# Patient Record
Sex: FEMALE
[De-identification: names, ages, dates, MRNs, and addresses within clinical notes are randomized per-mention and may not be internally consistent; named-entity substitution may affect disease eponyms.]

---

## 2017-11-14 ENCOUNTER — HOSPITAL ENCOUNTER (INPATIENT)
Dept: HOSPITAL 14 - H.ER | Age: 82
LOS: 15 days | Discharge: NURSING, REHAB OR LONG-TERM CARE | DRG: 471 | End: 2017-11-29
Attending: INTERNAL MEDICINE | Admitting: INTERNAL MEDICINE
Payer: MEDICARE

## 2017-11-14 DIAGNOSIS — E11.9: ICD-10-CM

## 2017-11-14 DIAGNOSIS — N17.9: ICD-10-CM

## 2017-11-14 DIAGNOSIS — E86.0: ICD-10-CM

## 2017-11-14 DIAGNOSIS — D50.9: ICD-10-CM

## 2017-11-14 DIAGNOSIS — G40.909: ICD-10-CM

## 2017-11-14 DIAGNOSIS — J45.909: ICD-10-CM

## 2017-11-14 DIAGNOSIS — S12.121A: ICD-10-CM

## 2017-11-14 DIAGNOSIS — H40.9: ICD-10-CM

## 2017-11-14 DIAGNOSIS — G93.6: ICD-10-CM
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
## 2017-11-14 DIAGNOSIS — F07.81: ICD-10-CM

## 2017-11-14 DIAGNOSIS — D63.8: ICD-10-CM

## 2017-11-14 DIAGNOSIS — J96.02: ICD-10-CM

## 2017-11-14 DIAGNOSIS — J44.1: ICD-10-CM

## 2017-11-14 DIAGNOSIS — S12.031A: Primary | ICD-10-CM

## 2017-11-14 DIAGNOSIS — J98.11: ICD-10-CM

## 2017-11-14 DIAGNOSIS — I95.1: ICD-10-CM

## 2017-11-14 DIAGNOSIS — J44.9: ICD-10-CM

## 2017-11-14 DIAGNOSIS — M81.0: ICD-10-CM

## 2017-11-14 DIAGNOSIS — I10: ICD-10-CM

## 2017-11-14 DIAGNOSIS — S00.83XA: ICD-10-CM

## 2017-11-14 LAB
ALBUMIN/GLOB SERPL: 1.2 {RATIO} (ref 1–2.1)
ALP SERPL-CCNC: 70 U/L (ref 38–126)
ALT SERPL-CCNC: 43 U/L (ref 9–52)
APTT BLD: 22.8 SECONDS (ref 25.6–37.1)
AST SERPL-CCNC: 34 U/L (ref 14–36)
BACTERIA #/AREA URNS HPF: (no result) /[HPF]
BASOPHILS # BLD AUTO: 0 K/UL (ref 0–0.2)
BASOPHILS NFR BLD: 0.6 % (ref 0–2)
BILIRUB SERPL-MCNC: 0.3 MG/DL (ref 0.2–1.3)
BILIRUB UR-MCNC: NEGATIVE MG/DL
BUN SERPL-MCNC: 37 MG/DL (ref 7–17)
CALCIUM SERPL-MCNC: 9.4 MG/DL (ref 8.4–10.2)
CO2 SERPL-SCNC: 27 MMOL/L (ref 22–30)
COLOR UR: YELLOW
EOSINOPHIL # BLD AUTO: 0 K/UL (ref 0–0.7)
EOSINOPHIL NFR BLD: 0.3 % (ref 0–4)
ERYTHROCYTE [DISTWIDTH] IN BLOOD BY AUTOMATED COUNT: 17 % (ref 11.5–14.5)
GLOBULIN SER-MCNC: 3.4 GM/DL (ref 2.2–3.9)
GLUCOSE SERPL-MCNC: 100 MG/DL (ref 65–105)
GLUCOSE UR STRIP-MCNC: (no result) MG/DL
HCT VFR BLD CALC: 25.5 % (ref 34–47)
KETONES UR STRIP-MCNC: NEGATIVE MG/DL
LEUKOCYTE ESTERASE UR-ACNC: (no result) LEU/UL
LYMPHOCYTES # BLD AUTO: 0.8 K/UL (ref 1–4.3)
LYMPHOCYTES NFR BLD AUTO: 10.5 % (ref 20–40)
MCH RBC QN AUTO: 23.4 PG (ref 27–31)
MCHC RBC AUTO-ENTMCNC: 29.5 G/DL (ref 33–37)
MCV RBC AUTO: 79.4 FL (ref 81–99)
MONOCYTES # BLD: 0.5 K/UL (ref 0–0.8)
MONOCYTES NFR BLD: 5.8 % (ref 0–10)
NEUTROPHILS # BLD: 6.5 K/UL (ref 1.8–7)
NEUTROPHILS NFR BLD AUTO: 82.8 % (ref 50–75)
NRBC BLD AUTO-RTO: 0.4 % (ref 0–0)
PH UR STRIP: 5 [PH] (ref 5–8)
PLATELET # BLD: 222 K/UL (ref 130–400)
PMV BLD AUTO: 8.4 FL (ref 7.2–11.7)
POTASSIUM SERPL-SCNC: 4.5 MMOL/L (ref 3.6–5)
PROT SERPL-MCNC: 7.6 G/DL (ref 6.3–8.2)
PROT UR STRIP-MCNC: 100 MG/DL
RBC # UR STRIP: (no result) /UL
RBC #/AREA URNS HPF: 13 /HPF (ref 0–3)
SODIUM SERPL-SCNC: 146 MMOL/L (ref 132–148)
SP GR UR STRIP: 1.01 (ref 1–1.03)
TROPONIN I SERPL-MCNC: 0.03 NG/ML (ref 0–0.12)
UROBILINOGEN UR-MCNC: (no result) MG/DL (ref 0.2–1)
WBC # BLD AUTO: 7.9 K/UL (ref 4.8–10.8)
WBC #/AREA URNS HPF: 4 /HPF (ref 0–5)

## 2017-11-14 NOTE — CT
EXAM:

  CT Head Without Intravenous Contrast



CLINICAL HISTORY:

  88 years old, female; Injury or trauma; Fall; Initial encounter; Concussion / 

head injury; Consciousness not specified



TECHNIQUE:

  Axial computed tomography images of the head/brain without intravenous 

contrast.  All CT scans at this facility use one or more dose reduction 

techniques, viz.: automated exposure control; ma/kV adjustment per patient size 

(including targeted exams where dose is matched to indication; i.e. head); or 

iterative reconstruction technique.

  Coronal and sagittal reformatted images were created and reviewed.



COMPARISON:

  No relevant prior studies available.







FINDINGS:

  Brain: No acute intracranial hemorrhage.  Age-appropriate periventricular 

white matter disease.  No edema.

  Ventricles: Age-appropriate ventriculomegaly.

  Bones:  No acute displaced fracture.

  Sinuses: Air-fluid level within the left sphenoid sinus. The remaining 

paranasal sinuses are otherwise unremarkable. 

Mastoid air cells:  Unremarkable as visualized.  No mastoid effusion.



Large right frontal scalp hematoma



IMPRESSION:     

Large right frontal scalp hematoma, without underlying fracture, acute 

intracranial hemorrhage, or suspicious mass effect.



Inflammatory sinus disease.

## 2017-11-14 NOTE — CT
EXAM:

  CT Maxillofacial Without Intravenous Contrast



CLINICAL HISTORY:

  88 years old, female; Injury or trauma; Fall; Initial encounter; Blunt trauma 

(contusions or hematomas); Forehead; Additional info: Facial trauma



TECHNIQUE:

  Axial computed tomography images of the face without intravenous contrast.  

All CT scans at this facility use one or more dose reduction techniques, viz.: 

automated exposure control; ma/kV adjustment per patient size (including 

targeted exams where dose is matched to indication; i.e. head); or iterative 

reconstruction technique.

  Coronal and sagittal reformatted images were created and reviewed.



COMPARISON:

  No relevant prior studies available.



FINDINGS:

  Bones/joints:  No acute fracture.

  Soft tissues: A large right frontal scalp hematoma is identified.

  Orbits: Preserved

  Sinuses:  Unremarkable.  No air-fluid levels.



IMPRESSION:     

Large right frontal scalp hematoma without underlying fracture.

## 2017-11-14 NOTE — CP.PCM.HP
History of Present Illness





- History of Present Illness


History of Present Illness: 


Attending: Dr Sandoval





Chief Complaint: Fall/ Head injury





The Patient was seen and examined in the ED in the Presence of her daughter





HPI: The hx is obtained from patient's daughter. She is an 88 years old  

 female with hx of COPD Asthma, DM II and HTN brought to the ED after 

she fell while getting up from sitting on a chair. She fell flat, receiving 

trauma to the right forehead resulting in edema and pain to the forehead. The 

patient referred that she could not remember the incident and so having a brief 

LOC. No fever, nausea, vomits, SOB, Hx of Dizziness, chest pain, nor 

diaphoresis.








PMH: Arthritis, Asthma; Asthma, COPD; DM II borderline;  Osteoporesis; Glaucoma 

of the right eye; HTN;





PSH: Tubal Ligation





SH: No Alcohol; never Smoked; No illegal drug use; Live with family





FH: Significant for DM and HTN





Allergies: NKDA





Medications: Viewed  











Present on Admission





- Present on Admission


Any Indicators Present on Admission: No


History of DVT/PE: No


History of Uncontrolled Diabetes: No


Urinary Catheter: No


Decubitus Ulcer Present: No





Review of Systems





- Constitutional


Constitutional: absent: Anorexia, Chills, Fever, Headache





- EENT


Eyes: Requires Corrective Lenses.  absent: Diplopia, Floaters


Ears: absent: Decreased Hearing, Ear Discharge, Tinnitus


Nose/Mouth/Throat: absent: Epistaxis, Nasal Congestion





- Cardiovascular


Cardiovascular: absent: Chest Pain, Dyspnea, Edema





- Respiratory


Respiratory: Cough.  absent: Dyspnea, Hemoptysis, Wheezing, Stridor





- Gastrointestinal


Gastrointestinal: absent: Abdominal Pain, Constipation, Diarrhea, Melena, Nausea

, Vomiting





- Genitourinary


Genitourinary: absent: Dysuria, Flank Pain, Hematuria, Urinary Frequency





- Musculoskeletal


Musculoskeletal: Arthralgias.  absent: Joint Swelling, Muscle Weakness


Additional comments: 





left wrist and hand pain





- Integumentary


Integumentary: absent: Pruritus, Rash, Skin Ulcer, Sores, Striae


Additional comments: 





Painful swelling above the right eye and right forehead





- Neurological


Neurological: Headaches.  absent: Confusion, Dizziness, Focal Weakness, Weakness





- Psychiatric


Psychiatric: absent: Anxiety, Depression, Panic Attacks





- Endocrine


Endocrine: absent: Palpitations, Polydipsia, Polyphagia, Polyuria





- Hematologic/Lymphatic


Hematologic: absent: Easy Bleeding, Easy Bruising





Past Patient History





- Infectious Disease


Hx of Infectious Diseases: None





- Tetanus Immunizations


Tetanus Immunization: Unknown





- Past Medical History & Family History


Past Medical History?: Yes





- Past Social History


Smoking Status: Never Smoked


Chewing Tobacco Use: No


Cigar Use: No


Alcohol: None


Drugs: Denies


Home Situation {Lives}: With Family





- CARDIAC


Hx Hypertension: Yes





- PULMONARY


Hx Asthma: Yes


Hx Chronic Obstructive Pulmonary Disease (COPD): Yes





- NEUROLOGICAL


Hx Neurological Disorder: No





- HEENT


Hx HEENT Problems: No





- RENAL


Hx Chronic Kidney Disease: No





- ENDOCRINE/METABOLIC


Hx Endocrine Disorders: Yes


Hx Diabetes Mellitus Type 2: Yes





- HEMATOLOGICAL/ONCOLOGICAL


Hx Blood Disorders: No





- INTEGUMENTARY


Hx Dermatological Problems: No





- MUSCULOSKELETAL/RHEUMATOLOGICAL


Hx Arthritis: Yes





- GASTROINTESTINAL


Hx Constipation: Yes





- GENITOURINARY/GYNECOLOGICAL


Hx Genitourinary Disorders: No





- PSYCHIATRIC


Hx Psychophysiologic Disorder: No


Hx Substance Use: No





- SURGICAL HISTORY


Hx Tubal Ligation: Yes





- ANESTHESIA


Hx Anesthesia: Yes


Hx Anesthesia Reactions: No





Meds


Allergies/Adverse Reactions: 


 Allergies











Allergy/AdvReac Type Severity Reaction Status Date / Time


 


No Known Allergies Allergy   Verified 11/14/17 20:12














Physical Exam





- Constitutional


Appears: No Acute Distress





- Head Exam


Additional comments: 





Right frontal scalp Hematoma.





- Eye Exam


Eye Exam: EOMI, PERRL


Pupil Exam: NORMAL ACCOMODATION, PERRL





- ENT Exam


ENT Exam: Mucous Membranes Dry, Normal Exam, Normal External Ear Exam


Additional comments: 





Tongue with areas of blue colorations of Hematomas.





- Neck Exam


Neck exam: Positive for: Full Rom, Normal Inspection





- Respiratory Exam


Respiratory Exam: Decreased Breath Sounds.  absent: Rales, Rhonchi, Wheezes





- Cardiovascular Exam


Cardiovascular Exam: REGULAR RHYTHM, RRR, +S1, +S2.  absent: Gallop, JVD





- GI/Abdominal Exam


GI & Abdominal Exam: Normal Bowel Sounds, Soft.  absent: Mass, Organomegaly





- Rectal Exam


Rectal Exam: Deferred





- Extremities Exam


Extremities exam: Positive for: full ROM, normal inspection.  Negative for: 

calf tenderness, joint swelling, normal capillary refill





- Back Exam


Back exam: NORMAL INSPECTION.  absent: CVA tenderness (L), CVA tenderness (R)





- Neurological Exam


Neurological exam: Alert, CN II-XII Intact, Oriented x3, Reflexes Normal





- Psychiatric Exam


Psychiatric exam: Normal Affect, Normal Mood





- Skin


Skin Exam: Dry, Intact, Normal Color, Warm





Results





- Vital Signs


Recent Vital Signs: 





 Last Vital Signs











Temp  97.2 F L  11/14/17 20:12


 


Pulse  88   11/14/17 20:12


 


Resp  16   11/14/17 20:12


 


BP  159/86 H  11/14/17 20:12


 


Pulse Ox      














- Labs


Result Diagrams: 


 11/14/17 21:54





 11/14/17 21:54


Labs: 





 Laboratory Results - last 24 hr











  11/14/17 11/14/17 11/14/17





  21:54 21:54 21:54


 


WBC  7.9  


 


RBC  3.21 L  


 


Hgb  7.5 L D  


 


Hct  25.5 L  


 


MCV  79.4 L D  


 


MCH  23.4 L  


 


MCHC  29.5 L  


 


RDW  17.0 H  


 


Plt Count  222  


 


MPV  8.4  


 


Neut % (Auto)  82.8 H  


 


Lymph % (Auto)  10.5 L  


 


Mono % (Auto)  5.8  


 


Eos % (Auto)  0.3  


 


Baso % (Auto)  0.6  


 


Neut #  6.5  


 


Lymph #  0.8 L  


 


Mono #  0.5  


 


Eos #  0.0  


 


Baso #  0.0  


 


PT    12.4


 


INR    1.1


 


APTT    22.8 L


 


Sodium   146 


 


Potassium   4.5 


 


Chloride   109 H 


 


Carbon Dioxide   27 


 


Anion Gap   15 


 


BUN   37 H 


 


Creatinine   1.5 H 


 


Est GFR ( Amer)   40 


 


Est GFR (Non-Af Amer)   33 


 


Random Glucose   100 


 


Calcium   9.4 


 


Total Bilirubin   0.3 


 


AST   34 


 


ALT   43 


 


Alkaline Phosphatase   70 


 


Troponin I   0.0250 


 


Total Protein   7.6 


 


Albumin   4.1 


 


Globulin   3.4 


 


Albumin/Globulin Ratio   1.2 














- Impressions


Impression: 





NSR 71/min with incomplete RBBB





- Imaging and Cardiology


  ** CT Head


Status: Image reviewed by me


Additional comment: 





Large right frontal scalp hematoma without underlying fx, acute intracraneal 

hemorrhage nor suspicious mass





  ** Maxillofacial CT


Additional comment: 





No fracture





Assessment & Plan





- Assessment and Plan (Free Text)


Assessment: 





#. Syncope


#. Microcytic Anemia


#. Azotemia with Dehydration


#. Forehead hematoma


#. HTN


#. COPD


#. DM II


Plan: 


88 years old   female with hx of COPD Asthma, DM II and HTN brought to 

the ED after she fell while getting up from sitting on a chair. She fell flat, 

receiving trauma to the right forehead resulting in edema and pain to the 

forehead. The patient had a brief LOC. 





#. Syncope probably secondary to Orthostatic Hypotension and not the anemia. r/

o Cardiac dysrrhythmis and seizure less likely as there was no post ictal state.


- Observe in Telemetry for Cardiac monitoring


- Neuro checks Q4 hrs


- Treat Dehydration





#. Head injury with Forehead Hematoma


- Observe for signs of increase intracraneal pressure


- Neuro checks


- Tylenol for Headaches





#. Microcytic Anemia


- Iron panel


- Vitamin B12 and folate


- Follow HB





#. Azotemia with Dehydration


- IV fluids 0.45NS at 75mls/hr


- Follow renal labs





#. HTN


- Enalapril


- Losartan


- Follow Blood Pressures





#. COPD/Asthma


- Albuterol


- 


#. DM II


- Aspart Insulin sliding scale according to Accucheck


- HbA1c





#. DVT prophylaxis with SCD





#. Code Status: Full





- Date & Time


Date: 11/14/17


Time: 22:53

## 2017-11-15 LAB
ABG MECHANICAL RATE: 10
ABG MECHANICAL RATE: 18
ARTERIAL  BLOOD GAS MODE: (no result)
ARTERIAL  BLOOD GAS MODE: (no result)
ARTERIAL PATENCY WRIST A: YES
ATERIAL BLOOD GAS PEEP: 3
ATERIAL BLOOD GAS PEEP: 3
BASOPHILS # BLD AUTO: 0.1 K/UL (ref 0–0.2)
BASOPHILS NFR BLD: 0.7 % (ref 0–2)
BUN SERPL-MCNC: 35 MG/DL (ref 7–17)
CALCIUM SERPL-MCNC: 9.4 MG/DL (ref 8.4–10.2)
CHLORIDE SERPL-SCNC: 109 MMOL/L (ref 98–107)
CHLORIDE SERPL-SCNC: 110 MMOL/L (ref 98–107)
CO2 SERPL-SCNC: 28 MMOL/L (ref 22–30)
COHGB MFR BLD: 2.8 % (ref 0.5–1.5)
COHGB MFR BLD: 2.9 % (ref 0.5–1.5)
EOSINOPHIL # BLD AUTO: 0 K/UL (ref 0–0.7)
EOSINOPHIL NFR BLD: 0.2 % (ref 0–4)
ERYTHROCYTE [DISTWIDTH] IN BLOOD BY AUTOMATED COUNT: 16.6 % (ref 11.5–14.5)
GLUCOSE SERPL-MCNC: 104 MG/DL (ref 65–105)
HCO3 BLDA-SCNC: 23.8 MMOL/L (ref 21–28)
HCO3 BLDA-SCNC: 24.5 MMOL/L (ref 21–28)
HCO3 BLDA-SCNC: 25.8 MMOL/L (ref 21–28)
HCT VFR BLD CALC: 24.6 % (ref 34–47)
HHB: -0.8 % (ref 0–5)
HHB: 0.9 % (ref 0–5)
IRON SERPL-MCNC: 23 UG/DL (ref 37–170)
LYMPHOCYTES # BLD AUTO: 1 K/UL (ref 1–4.3)
LYMPHOCYTES NFR BLD AUTO: 10.5 % (ref 20–40)
MCH RBC QN AUTO: 23.7 PG (ref 27–31)
MCHC RBC AUTO-ENTMCNC: 30.2 G/DL (ref 33–37)
MCV RBC AUTO: 78.6 FL (ref 81–99)
METHGB MFR BLD: 0.3 % (ref 0–3)
METHGB MFR BLD: 0.5 % (ref 0–3)
MONOCYTES # BLD: 0.5 K/UL (ref 0–0.8)
MONOCYTES NFR BLD: 5.1 % (ref 0–10)
NEUTROPHILS # BLD: 7.7 K/UL (ref 1.8–7)
NEUTROPHILS NFR BLD AUTO: 83.5 % (ref 50–75)
NRBC BLD AUTO-RTO: 0.2 % (ref 0–0)
O2 CAP BLDA-SCNC: 10.5 ML/DL (ref 16–24)
O2 CAP BLDA-SCNC: 9.9 ML/DL (ref 16–24)
O2 CT BLDA-SCNC: 10 ML/DL (ref 15–23)
O2 CT BLDA-SCNC: 10.4 ML/DL (ref 15–23)
PH BLDA: 7.04 [PH] (ref 7.35–7.45)
PH BLDA: 7.19 [PH] (ref 7.35–7.45)
PH BLDA: 7.31 [PH] (ref 7.35–7.45)
PLATELET # BLD: 221 K/UL (ref 130–400)
PMV BLD AUTO: 8.2 FL (ref 7.2–11.7)
PO2 BLDA: 109 MM/HG (ref 80–100)
PO2 BLDA: 204 MM/HG (ref 80–100)
PO2 BLDA: 91 MM/HG (ref 80–100)
POTASSIUM SERPL-SCNC: 4.5 MMOL/L (ref 3.6–5)
SAO2 % BLDA: 96.1 % (ref 95–98)
SAO2 % BLDA: 97.4 % (ref 95–98)
SODIUM SERPL-SCNC: 148 MMOL/L (ref 132–148)
TROPONIN I SERPL-MCNC: 0.03 NG/ML (ref 0–0.12)
WBC # BLD AUTO: 9.2 K/UL (ref 4.8–10.8)

## 2017-11-15 PROCEDURE — 5A1955Z RESPIRATORY VENTILATION, GREATER THAN 96 CONSECUTIVE HOURS: ICD-10-PCS | Performed by: INTERNAL MEDICINE

## 2017-11-15 PROCEDURE — 0BH17EZ INSERTION OF ENDOTRACHEAL AIRWAY INTO TRACHEA, VIA NATURAL OR ARTIFICIAL OPENING: ICD-10-PCS

## 2017-11-15 RX ADMIN — PROPOFOL SCH MLS/HR: 10 INJECTION, EMULSION INTRAVENOUS at 17:40

## 2017-11-15 RX ADMIN — ALBUTEROL SULFATE SCH MG: 2.5 SOLUTION RESPIRATORY (INHALATION) at 04:17

## 2017-11-15 RX ADMIN — IPRATROPIUM BROMIDE AND ALBUTEROL SULFATE SCH: .5; 3 SOLUTION RESPIRATORY (INHALATION) at 18:33

## 2017-11-15 RX ADMIN — ALBUTEROL SULFATE SCH: 2.5 SOLUTION RESPIRATORY (INHALATION) at 19:08

## 2017-11-15 RX ADMIN — FOSPHENYTOIN SODIUM SCH: 50 INJECTION, SOLUTION INTRAMUSCULAR; INTRAVENOUS at 20:00

## 2017-11-15 RX ADMIN — IPRATROPIUM BROMIDE AND ALBUTEROL SULFATE SCH ML: .5; 3 SOLUTION RESPIRATORY (INHALATION) at 19:07

## 2017-11-15 RX ADMIN — INSULIN LISPRO SCH UNITS: 100 INJECTION, SOLUTION INTRAVENOUS; SUBCUTANEOUS at 17:24

## 2017-11-15 RX ADMIN — INSULIN LISPRO SCH: 100 INJECTION, SOLUTION INTRAVENOUS; SUBCUTANEOUS at 22:00

## 2017-11-15 RX ADMIN — ALBUTEROL SULFATE SCH MG: 2.5 SOLUTION RESPIRATORY (INHALATION) at 13:13

## 2017-11-15 RX ADMIN — INSULIN LISPRO SCH: 100 INJECTION, SOLUTION INTRAVENOUS; SUBCUTANEOUS at 10:33

## 2017-11-15 RX ADMIN — ALBUTEROL SULFATE SCH MG: 2.5 SOLUTION RESPIRATORY (INHALATION) at 11:04

## 2017-11-15 NOTE — PCM.ANES
Anesthesia Emergent Intubation





- Diagnosis


Working Diagnosis:: cervical spine fracture 





- Consult


Reason for Consult:: Called for emergency intubation in ICU. Pt. in hard collar.





- Intubation Attempts


Previous Number of Intubation Attempts:: 0





- Pre-Intubation Vital Signs


Blood Pressure: 134/82


Heart Rate: 90


Respiratory Rate: 25


O2 Sat: 99


FIO2: 40


Oxygen Delivery Method: Nasal Cannula


Level Of Consciousness: Comatose/Unresponsive


Intubation Meds Given: Versed, Fentanyl





- Airway Management


Oropharyngeal Area Suctioned: Yes


PreOxygenation: 100


Inhalation: No


Rapid Sequence: No (Pt non-responsive hypercapnic CO2>100 CO2 Narcosis)


Cricoid Pressure: Yes


Possible Aspiration: No





- Method of Intubation


Intubation Method: Oral ETT


ETT Size: 8.0


Lipline@: 21


Easy: Yes


Atramatic: Yes





- Intubation Devices


Glide Scope Used: Yes ( IN-LINE NECK STABILIZED IN HARD COLLAR (Cervical Spine 

was not moved))


Fiber Optic Scope: No





- Placement Confirmation


Breath Sounds Present & Equal Bilaterally: Yes


Positive EtCO2: Yes


Portable CXR: Yes


Recommendations: Ventilator

## 2017-11-15 NOTE — RAD
HISTORY:

post intubation  



COMPARISON:

11/14/2017 



FINDINGS:



LUNGS:

No active pulmonary disease.



PLEURA:

No significant pleural effusion identified, no pneumothorax apparent.



CARDIOVASCULAR:

Mild cardiomegaly



OSSEOUS STRUCTURES:

No significant abnormalities.



VISUALIZED UPPER ABDOMEN:

Normal.



OTHER FINDINGS:

Endotracheal tube and nasogastric tube in satisfactory position



IMPRESSION:

No active disease.

## 2017-11-15 NOTE — CARD
--------------- APPROVED REPORT --------------





EKG Measurement

Heart [id number]

[id number]

[id number]

[id number]

[id number]



<Conclusion>

Normal sinus rhythm

Incomplete right bundle branch block

Cannot rule out Anteroseptal infarct, age undetermined

Abnormal ECG

## 2017-11-15 NOTE — CT
PROCEDURE:  CT HEAD WITHOUT CONTRAST.



HISTORY:

delayed bleeding



COMPARISON:

CT scan performed earlier the same day. 



TECHNIQUE:

Axial computed tomography images were obtained through the head/brain 

without intravenous contrast.  



Radiation dose:



Total exam DLP = 1428.36 mGy-cm.



This CT exam was performed using one or more of the following dose 

reduction techniques: Automated exposure control, adjustment of the 

mA and/or kV according to patient size, and/or use of iterative 

reconstruction technique.



FINDINGS:



HEMORRHAGE:

No intracranial hemorrhage. 



BRAIN:

Gray-white matter differentiation is preserved.  There is no mass, 

mass effect or abnormal extra-axial fluid collection.



VENTRICLES:

There is mild age-related global parenchymal volume loss and 

proportionate enlargement of the ventricles and cortical sulci 



CALVARIUM:

There is no calvarial fracture. There is a large right frontal and 

parietal scalp hematoma. 



PARANASAL SINUSES:

There are fluid levels in the left posterior ethmoid air cells and 

left sphenoid sinus, expected finding in intubated patients.



MASTOID AIR CELLS:

Unremarkable as visualized. No inflammatory changes.



OTHER FINDINGS:

None.



IMPRESSION:

No acute intracranial abnormality.



Large right frontal and parietal scalp hematoma.

## 2017-11-15 NOTE — CP.PCM.PN
Subjective





- Date & Time of Evaluation


Date of Evaluation: 11/15/17


Time of Evaluation: 07:35





- Subjective


Subjective: 





Pt seen and examined at bedside in the ED  ( pt in ED Hold - waiting for Tele 

bed)


She was noted to be very lethargic this am


accdg to Dr Simmons during  sign out - he was able to peak with pt in Greek 

last night


Pt barely responds to pain, sl moaning on sternal rub





Noted occ bigeminy on Tele monitor


VS stable


sat 96% on 2 liters,  RR=12-15


Lungs : clear, no wheezing, no rales


Heart; irreg rhythm








reviewed CT scan of head from last night ,  called up Radiology Dept to ask in 

house Radiologist  to review Night Radiology reading as there has been change 

of mental status in the pt  


Rpt CT of head also ordered to r/o late onset  intracerebral bleed/hematoma


accucheck 117

















 





Objective





- Vital Signs/Intake and Output


Vital Signs (last 24 hours): 


 











Temp Pulse Resp BP Pulse Ox


 


 98.1 F   54 L  19   116/64   100 


 


 11/15/17 07:29  11/15/17 15:00  11/15/17 15:00  11/15/17 15:00  11/15/17 15:00








Intake and Output: 


 











 11/15/17 11/15/17





 06:59 18:59


 


Intake Total  150


 


Output Total  225


 


Balance  -75














- Medications


Medications: 


 Current Medications





Acetaminophen (Tylenol 325mg Tab)  650 mg PO Q4 PRN


   PRN Reason: Headache


   Last Admin: 11/15/17 02:14 Dose:  650 mg


Albuterol Sulfate (Albuterol 0.083% Inhal Sol (2.5 Mg/3 Ml) Ud)  2.5 mg INH RQ6 

MARIELY


   Last Admin: 11/15/17 13:13 Dose:  2.5 mg


Albuterol/Ipratropium (Duoneb 3 Mg/0.5 Mg (3 Ml) Ud)  3 ml INH RQID MARIELY


Amlodipine Besylate (Norvasc)  2.5 mg PO DAILY MARIELY


   Last Admin: 11/15/17 10:34 Dose:  Not Given


Sodium Chloride (Sodium Chloride 0.45%)  1,000 mls @ 75 mls/hr IV .[id number] MARIELY


   Stop: 11/15/17 23:34


   Last Admin: 11/15/17 02:15 Dose:  75 mls/hr


Fentanyl Citrate 2,500 mcg/ (Dextrose)  250 mls @ 0 mls/hr IV .Q0M MARIELY; As 

Directed


   PRN Reason: Protocol


   Last Titration: 11/15/17 15:35 Dose:  2.49 mcg/kg/hr, 18.7 mls/hr


Insulin Human Lispro (Humalog)  0 units SC ACHS MARIELY


   PRN Reason: Protocol


   Last Admin: 11/15/17 10:33 Dose:  Not Given


Losartan Potassium (Cozaar)  50 mg PO DAILY MARIELY


   Last Admin: 11/15/17 10:34 Dose:  Not Given


Montelukast Sodium (Singulair)  10 mg PO HS MARIELY


Pantoprazole Sodium (Protonix Inj)  40 mg IVP DAILY MARIELY











- Labs


Labs: 


 





 11/15/17 05:12 





 11/15/17 05:12 





 











PT  12.4 Seconds (9.8-13.1)   11/14/17  21:54    


 


INR  1.1  (0.9-1.2)   11/14/17  21:54    


 


APTT  22.8 Seconds (25.6-37.1)  L  11/14/17  21:54    














Assessment and Plan





- Assessment and Plan (Free Text)


Assessment: 








82 y/o lady , had mechnical fall at home, sustained frontal scalp hematoma.  


Sudden mental Status change  12 hours post presentation in ED.   CT of head : 

C2 fracture.  ABG showed pCO2= 114, PH= 7.002





1. C2 Fracture


- rpt CT of head done bec of change in mental status- CT showed C2 fracture, 

spoke with dr Snowden - rec MRI of Cervical spine 


- cervical Collar applied


- Decadron 10 mg IV stat


- Neurosurgery consult stat - spoke with dr Pretty , discussed case rec CT 

of Cervical spine, pt would need Cervical fusion surgery


- ICU consult 





2.  Hypercapneic Respiratory Failure


? central due to C2 fracture  vs due to hx of COPD


Anesthesia called to intubate pt





3. Anemia prob chronic 


Hgb= 7.5


pt is not tachy, not hypotensive 


monitor cbc


type and cross PRBC 


discussed with pt's daughter Christa - wants to think about it before consenting 

to the transfusion





4. CKD  vs JASON


- IVF hydration


monitor   BMP





DVT proph


scd for now


no anticoag sec to head trauma

## 2017-11-15 NOTE — CP.CCUPN
CCU Subjective





- Physician Review


Subjective (Free Text): 





ICU Admission from ER Holding: discussed with Dr. Khan covering for Dr. Sandoval:


88F admitted after a syncopal episode at home after falling while getting up 

from a chair and falling forward hitting her forehead. Initial CT Head 

evaluations negative for any anne marie abnormalities nor acute hemorrhage. Several 

hours later upon re-examination, patient noted to be lethargic, and had repeat 

CT brain and spine imaging, now showing a non-displaced fracture at the base of 

the odontoid process.  Underwent eval by NeuroSurg and deemed a Type II 

odontoid fracture. She currently is wearing a rigid cervical spine collar. 

Patient was noted to be conversant on presentation and states she sensed blood 

in the back of her throat. ABG showed acute hypercarbia with PCO2 114 with pH 

7.04. After transfer to ICU, measures made to urgently intubate patient under 

Anesthesiologist assistance. 


Other vitals and I/O's reviewed. 


ROS:  No other pertinent negs or positives on 10+  system review. 


Allergies: NKDA





Home Meds:   Albuterol inh, Fosamax,Norvasc, Budesonide/Formeterol, Glipizde, 

Losaratn, Singulair, KCL, Torsemide.


PMSFH:    HTN, DMI II, COPD / asthma, osteoarthritis; no previous surgeries,   

non- smoker- Otherwise all Nursing and physician documentation reviewed to date

; no new pertinent info noted relevant to current medical problems.








IMPRESSION / MAJOR PROBLEMS NOW:


1.   Acute Type II Odontoid / C2 fracture ( nondisplaced)


2.   Acute Hypercapneic resp failure 2 #1 and contribution from COPD 

exacerbation


3.   Chronic Disease anemia


4.   HTN


5.   DM II








PLAN:


1.   Intubated by Anesthesiologist, now on MV: AC 10. 450ml TV, 50% PEEP 3. 

Check repeat ABG, for gradual reduction in PCO2 levels. 


2.   To minimize patient movement, will start Fentanyl drip as BP allows.


3.   Duonebs Q4-Q6H for now. Will try to limit steroids unless required by Neuro

/ Neurosurg. 


4.   Empiric dose of Decadron  given in ER.  


5.   Neurosurg plans noted, for OR on 11/17 pending medical clearance. 


6.   Maintain normoglycemia





CCU Objective





- Vital Signs / Intake & Output


Vital Signs (Last 4 hours): 


Vital Signs











  Pulse Resp BP Pulse Ox


 


 11/15/17 13:13  57 L   


 


 11/15/17 13:05  90  25 H  134/82  99


 


 11/15/17 10:55  61  16  135/82 


 


 11/15/17 10:13  61  16  135/82  96











Intake and Output (Last 8hrs): 


 Intake & Output











 11/14/17 11/15/17 11/15/17





 22:59 06:59 14:59


 


Weight 165 lb  














- Physical Exam


Physical Exam Limitations: Positive for: Altered Mental Status


Head: Positive for: Normocephalic, Contusion, Swelling, Ecchymosis (bilateral 

orbital areas).  Negative for: Laceration


Pupils: Positive for: Sluggish, Pinpoint


Conjunctiva: Positive for: Injected.  Negative for: Icteric


Mouth: Positive for: Moist Mucous Membranes


Pharnyx: Positive for: Normal


Neck: Positive for: Other (Hard, fixed Cervical collar in place)


Respiratory/Chest: Positive for: Decreased Breath Sounds.  Negative for: 

Accessory Muscle Use, Wheezes


Cardiovascular: Positive for: Irregular Rhythm, Bradycardic.  Negative for: 

Murmurs, Rub


Abdomen: Positive for: Normal Bowel Sounds.  Negative for: Tenderness, 

Distention, Mass/Organomegaly


Upper Extremity: Negative for: Edema


Lower Extremity: Positive for: Edema.  Negative for: CALF TENDERNESS, Cyanosis


Neurological: Negative for: GCS=15 (GCS= 6 ( E1V1M4))


Skin: Positive for: Warm, Dry.  Negative for: Rashes


Psychiatric: Positive for: Lethargic





- Medications


Active Medications: 


Active Medications











Generic Name Dose Route Start Last Admin





  Trade Name Freq  PRN Reason Stop Dose Admin


 


Acetaminophen  650 mg  11/14/17 23:29  11/15/17 02:14





  Tylenol 325mg Tab  PO   650 mg





  Q4 PRN   Administration





  Headache   


 


Albuterol Sulfate  2.5 mg  11/15/17 02:00  11/15/17 13:13





  Albuterol 0.083% Inhal Sol (2.5 Mg/3 Ml) Ud  INH   2.5 mg





  RQ6 MARIELY   Administration


 


Alendronate Sodium  70 mg  11/19/17 09:00  





  Fosamax  PO   





  SUN MARIELY   


 


Amlodipine Besylate  2.5 mg  11/15/17 09:00  11/15/17 10:34





  Norvasc  PO   Not Given





  DAILY MARIELY   


 


Clotrimazole  1 applic  11/15/17 09:00  





  Lotrimin 1% Vaginal  VG   





  DAILY MARIELY   


 


Sodium Chloride  1,000 mls @ 75 mls/hr  11/14/17 23:45  11/15/17 02:15





  Sodium Chloride 0.45%  IV  11/15/17 23:34  75 mls/hr





  .[id number] MARIELY   Administration


 


Fentanyl Citrate 2,500 mcg/  250 mls @ 0 mls/hr  11/15/17 12:30  11/15/17 13:00





  Dextrose  IV   1 mcg/kg/hr





  .Q0M MARIELY   7.5 mls/hr





  Protocol   Administration





  As Directed   


 


Insulin Human Lispro  0 units  11/15/17 07:30  11/15/17 10:33





  Humalog  SC   Not Given





  ACHS MARIELY   





  Protocol   


 


Losartan Potassium  50 mg  11/15/17 09:00  11/15/17 10:34





  Cozaar  PO   Not Given





  DAILY MARIELY   


 


Montelukast Sodium  10 mg  11/15/17 22:00  





  Singulair  PO   





  HS MARIELY   


 


Pantoprazole Sodium  40 mg  11/15/17 12:45  





  Protonix Inj  IVP   





  DAILY MARIELY   














- Patient Studies


Lab Studies: 


 Lab Studies











  11/15/17 11/15/17 11/15/17 Range/Units





  11:19 10:00 05:12 


 


WBC     (4.8-10.8)  K/uL


 


RBC     (3.80-5.20)  Mil/uL


 


Hgb     (12.0-16.0)  g/dL


 


Hct     (34.0-47.0)  %


 


MCV     (81.0-99.0)  fl


 


MCH     (27.0-31.0)  pg


 


MCHC     (33.0-37.0)  g/dL


 


RDW     (11.5-14.5)  %


 


Plt Count     (130-400)  K/uL


 


MPV     (7.2-11.7)  fl


 


Neut % (Auto)     (50.0-75.0)  %


 


Lymph % (Auto)     (20.0-40.0)  %


 


Mono % (Auto)     (0.0-10.0)  %


 


Eos % (Auto)     (0.0-4.0)  %


 


Baso % (Auto)     (0.0-2.0)  %


 


Neut #     (1.8-7.0)  K/uL


 


Lymph #     (1.0-4.3)  K/uL


 


Mono #     (0.0-0.8)  K/uL


 


Eos #     (0.0-0.7)  K/uL


 


Baso #     (0.0-0.2)  K/uL


 


PT     (9.8-13.1)  Seconds


 


INR     (0.9-1.2)  


 


APTT     (25.6-37.1)  Seconds


 


pCO2  114 H*    (35-45)  mm/Hg


 


pO2  91    ()  mm/Hg


 


HCO3  24.5    (21-28)  mmol/L


 


ABG pH  7.04 L*    (7.35-7.45)  


 


ABG Total CO2  34.3 H    (22-28)  mmol/L


 


ABG O2 Saturation  99.1 H    (95-98)  %


 


ABG O2 Content  10.4 L    (15-23)  ML/dL


 


ABG Base Excess  -0.6    (-2.0-3.0)  mmol/L


 


ABG Hemoglobin  7.6 L    (11.7-17.4)  g/dL


 


ABG Carboxyhemoglobin  2.8 H    (0.5-1.5)  %


 


POC ABG HHb (Measured)  0.9    (0.0-5.0)  %


 


ABG Methemoglobin  0.3    (0.0-3.0)  %


 


ABG O2 Capacity  10.5 L    (16-24)  mL/dL


 


Almas Test  Yes    


 


A-a O2 Difference  -12.0    mm/Hg


 


Hgb O2 Saturation  96.1    (95.0-98.0)  %


 


FiO2  31.0    %


 


Crit Value Called To  Dr rafat garcia    


 


Crit Value Called By  5    


 


Crit Value Read Back  Y    


 


Blood Gas Notified Time  1109    


 


Sodium     (132-148)  mmol/l


 


Potassium     (3.6-5.0)  MMOL/L


 


Chloride     ()  mmol/L


 


Carbon Dioxide     (22-30)  mmol/L


 


Anion Gap     (10-20)  


 


BUN     (7-17)  mg/dl


 


Creatinine     (0.7-1.2)  mg/dl


 


Est GFR (African Amer)     


 


Est GFR (Non-Af Amer)     


 


Random Glucose     ()  mg/dL


 


Hemoglobin A1c    6.1  (4.2-6.5)  %


 


Calcium     (8.4-10.2)  mg/dL


 


Iron     ()  ug/dL


 


TIBC     (250-450)  ug/dL


 


% Saturation     (20-55)  %


 


Total Bilirubin     (0.2-1.3)  mg/dl


 


AST     (14-36)  U/L


 


ALT     (9-52)  U/L


 


Alkaline Phosphatase     ()  U/L


 


Troponin I     (0.00-0.120)  ng/mL


 


Total Protein     (6.3-8.2)  G/DL


 


Albumin     (3.5-5.0)  g/dL


 


Globulin     (2.2-3.9)  gm/dL


 


Albumin/Globulin Ratio     (1.0-2.1)  


 


Vitamin B12     (239-931)  pg/mL


 


Urine Color     (YELLOW)  


 


Urine Clarity     (Clear)  


 


Urine pH     (5.0-8.0)  


 


Ur Specific Gravity     (1.003-1.030)  


 


Urine Protein     (NEGATIVE)  mg/dL


 


Urine Glucose (UA)     (Normal)  mg/dL


 


Urine Ketones     (NEGATIVE)  mg/dL


 


Urine Blood     (NEGATIVE)  


 


Urine Nitrate     (NEGATIVE)  


 


Urine Bilirubin     (NEGATIVE)  


 


Urine Urobilinogen     (0.2-1.0)  mg/dL


 


Ur Leukocyte Esterase     (Negative)  Tex/uL


 


Urine RBC (Auto)     (0-3)  /hpf


 


Urine Microscopic WBC     (0-5)  /hpf


 


Ur Squamous Epith Cells     (0-5)  /hpf


 


Urine Bacteria     (<OCC)  


 


Blood Type     


 


Blood Type Confirm   A POSITIVE   


 


Antibody Screen     


 


Crossmatch     


 


BBK History Checked     














  11/15/17 11/15/17 11/15/17 Range/Units





  05:12 05:12 05:12 


 


WBC  9.2    (4.8-10.8)  K/uL


 


RBC  3.13 L    (3.80-5.20)  Mil/uL


 


Hgb  7.4 L    (12.0-16.0)  g/dL


 


Hct  24.6 L    (34.0-47.0)  %


 


MCV  78.6 L    (81.0-99.0)  fl


 


MCH  23.7 L    (27.0-31.0)  pg


 


MCHC  30.2 L    (33.0-37.0)  g/dL


 


RDW  16.6 H    (11.5-14.5)  %


 


Plt Count  221    (130-400)  K/uL


 


MPV  8.2    (7.2-11.7)  fl


 


Neut % (Auto)  83.5 H    (50.0-75.0)  %


 


Lymph % (Auto)  10.5 L    (20.0-40.0)  %


 


Mono % (Auto)  5.1    (0.0-10.0)  %


 


Eos % (Auto)  0.2    (0.0-4.0)  %


 


Baso % (Auto)  0.7    (0.0-2.0)  %


 


Neut #  7.7 H    (1.8-7.0)  K/uL


 


Lymph #  1.0    (1.0-4.3)  K/uL


 


Mono #  0.5    (0.0-0.8)  K/uL


 


Eos #  0.0    (0.0-0.7)  K/uL


 


Baso #  0.1    (0.0-0.2)  K/uL


 


PT     (9.8-13.1)  Seconds


 


INR     (0.9-1.2)  


 


APTT     (25.6-37.1)  Seconds


 


pCO2     (35-45)  mm/Hg


 


pO2     ()  mm/Hg


 


HCO3     (21-28)  mmol/L


 


ABG pH     (7.35-7.45)  


 


ABG Total CO2     (22-28)  mmol/L


 


ABG O2 Saturation     (95-98)  %


 


ABG O2 Content     (15-23)  ML/dL


 


ABG Base Excess     (-2.0-3.0)  mmol/L


 


ABG Hemoglobin     (11.7-17.4)  g/dL


 


ABG Carboxyhemoglobin     (0.5-1.5)  %


 


POC ABG HHb (Measured)     (0.0-5.0)  %


 


ABG Methemoglobin     (0.0-3.0)  %


 


ABG O2 Capacity     (16-24)  mL/dL


 


Amlas Test     


 


A-a O2 Difference     mm/Hg


 


Hgb O2 Saturation     (95.0-98.0)  %


 


FiO2     %


 


Crit Value Called To     


 


Crit Value Called By     


 


Crit Value Read Back     


 


Blood Gas Notified Time     


 


Sodium   148   (132-148)  mmol/l


 


Potassium   4.5   (3.6-5.0)  MMOL/L


 


Chloride   110 H   ()  mmol/L


 


Carbon Dioxide   28   (22-30)  mmol/L


 


Anion Gap   14   (10-20)  


 


BUN   35 H   (7-17)  mg/dl


 


Creatinine   1.5 H   (0.7-1.2)  mg/dl


 


Est GFR (African Amer)   40   


 


Est GFR (Non-Af Amer)   33   


 


Random Glucose   104   ()  mg/dL


 


Hemoglobin A1c     (4.2-6.5)  %


 


Calcium   9.4   (8.4-10.2)  mg/dL


 


Iron    23 L  ()  ug/dL


 


TIBC    437  (250-450)  ug/dL


 


% Saturation    5 L  (20-55)  %


 


Total Bilirubin     (0.2-1.3)  mg/dl


 


AST     (14-36)  U/L


 


ALT     (9-52)  U/L


 


Alkaline Phosphatase     ()  U/L


 


Troponin I   0.0320   (0.00-0.120)  ng/mL


 


Total Protein     (6.3-8.2)  G/DL


 


Albumin     (3.5-5.0)  g/dL


 


Globulin     (2.2-3.9)  gm/dL


 


Albumin/Globulin Ratio     (1.0-2.1)  


 


Vitamin B12   389   (239-931)  pg/mL


 


Urine Color     (YELLOW)  


 


Urine Clarity     (Clear)  


 


Urine pH     (5.0-8.0)  


 


Ur Specific Gravity     (1.003-1.030)  


 


Urine Protein     (NEGATIVE)  mg/dL


 


Urine Glucose (UA)     (Normal)  mg/dL


 


Urine Ketones     (NEGATIVE)  mg/dL


 


Urine Blood     (NEGATIVE)  


 


Urine Nitrate     (NEGATIVE)  


 


Urine Bilirubin     (NEGATIVE)  


 


Urine Urobilinogen     (0.2-1.0)  mg/dL


 


Ur Leukocyte Esterase     (Negative)  Tex/uL


 


Urine RBC (Auto)     (0-3)  /hpf


 


Urine Microscopic WBC     (0-5)  /hpf


 


Ur Squamous Epith Cells     (0-5)  /hpf


 


Urine Bacteria     (<OCC)  


 


Blood Type     


 


Blood Type Confirm     


 


Antibody Screen     


 


Crossmatch     


 


BBK History Checked     














  11/15/17 11/14/17 11/14/17 Range/Units





  00:31 23:00 21:54 


 


WBC     (4.8-10.8)  K/uL


 


RBC     (3.80-5.20)  Mil/uL


 


Hgb     (12.0-16.0)  g/dL


 


Hct     (34.0-47.0)  %


 


MCV     (81.0-99.0)  fl


 


MCH     (27.0-31.0)  pg


 


MCHC     (33.0-37.0)  g/dL


 


RDW     (11.5-14.5)  %


 


Plt Count     (130-400)  K/uL


 


MPV     (7.2-11.7)  fl


 


Neut % (Auto)     (50.0-75.0)  %


 


Lymph % (Auto)     (20.0-40.0)  %


 


Mono % (Auto)     (0.0-10.0)  %


 


Eos % (Auto)     (0.0-4.0)  %


 


Baso % (Auto)     (0.0-2.0)  %


 


Neut #     (1.8-7.0)  K/uL


 


Lymph #     (1.0-4.3)  K/uL


 


Mono #     (0.0-0.8)  K/uL


 


Eos #     (0.0-0.7)  K/uL


 


Baso #     (0.0-0.2)  K/uL


 


PT    12.4  (9.8-13.1)  Seconds


 


INR    1.1  (0.9-1.2)  


 


APTT    22.8 L  (25.6-37.1)  Seconds


 


pCO2     (35-45)  mm/Hg


 


pO2     ()  mm/Hg


 


HCO3     (21-28)  mmol/L


 


ABG pH     (7.35-7.45)  


 


ABG Total CO2     (22-28)  mmol/L


 


ABG O2 Saturation     (95-98)  %


 


ABG O2 Content     (15-23)  ML/dL


 


ABG Base Excess     (-2.0-3.0)  mmol/L


 


ABG Hemoglobin     (11.7-17.4)  g/dL


 


ABG Carboxyhemoglobin     (0.5-1.5)  %


 


POC ABG HHb (Measured)     (0.0-5.0)  %


 


ABG Methemoglobin     (0.0-3.0)  %


 


ABG O2 Capacity     (16-24)  mL/dL


 


Almas Test     


 


A-a O2 Difference     mm/Hg


 


Hgb O2 Saturation     (95.0-98.0)  %


 


FiO2     %


 


Crit Value Called To     


 


Crit Value Called By     


 


Crit Value Read Back     


 


Blood Gas Notified Time     


 


Sodium     (132-148)  mmol/l


 


Potassium     (3.6-5.0)  MMOL/L


 


Chloride     ()  mmol/L


 


Carbon Dioxide     (22-30)  mmol/L


 


Anion Gap     (10-20)  


 


BUN     (7-17)  mg/dl


 


Creatinine     (0.7-1.2)  mg/dl


 


Est GFR (African Amer)     


 


Est GFR (Non-Af Amer)     


 


Random Glucose     ()  mg/dL


 


Hemoglobin A1c     (4.2-6.5)  %


 


Calcium     (8.4-10.2)  mg/dL


 


Iron     ()  ug/dL


 


TIBC     (250-450)  ug/dL


 


% Saturation     (20-55)  %


 


Total Bilirubin     (0.2-1.3)  mg/dl


 


AST     (14-36)  U/L


 


ALT     (9-52)  U/L


 


Alkaline Phosphatase     ()  U/L


 


Troponin I     (0.00-0.120)  ng/mL


 


Total Protein     (6.3-8.2)  G/DL


 


Albumin     (3.5-5.0)  g/dL


 


Globulin     (2.2-3.9)  gm/dL


 


Albumin/Globulin Ratio     (1.0-2.1)  


 


Vitamin B12     (239-931)  pg/mL


 


Urine Color   Yellow   (YELLOW)  


 


Urine Clarity   Cloudy   (Clear)  


 


Urine pH   5.0   (5.0-8.0)  


 


Ur Specific Gravity   1.013   (1.003-1.030)  


 


Urine Protein   100   (NEGATIVE)  mg/dL


 


Urine Glucose (UA)   Neg   (Normal)  mg/dL


 


Urine Ketones   Negative   (NEGATIVE)  mg/dL


 


Urine Blood   Small   (NEGATIVE)  


 


Urine Nitrate   Negative   (NEGATIVE)  


 


Urine Bilirubin   Negative   (NEGATIVE)  


 


Urine Urobilinogen   0.2-1.0   (0.2-1.0)  mg/dL


 


Ur Leukocyte Esterase   Neg   (Negative)  Tex/uL


 


Urine RBC (Auto)   13 H   (0-3)  /hpf


 


Urine Microscopic WBC   4   (0-5)  /hpf


 


Ur Squamous Epith Cells   < 1   (0-5)  /hpf


 


Urine Bacteria   Rare   (<OCC)  


 


Blood Type  A POSITIVE    


 


Blood Type Confirm     


 


Antibody Screen  Negative    


 


Crossmatch  See Detail    


 


BBK History Checked  No verified bt    














  11/14/17 11/14/17 Range/Units





  21:54 21:54 


 


WBC   7.9  (4.8-10.8)  K/uL


 


RBC   3.21 L  (3.80-5.20)  Mil/uL


 


Hgb   7.5 L D  (12.0-16.0)  g/dL


 


Hct   25.5 L  (34.0-47.0)  %


 


MCV   79.4 L D  (81.0-99.0)  fl


 


MCH   23.4 L  (27.0-31.0)  pg


 


MCHC   29.5 L  (33.0-37.0)  g/dL


 


RDW   17.0 H  (11.5-14.5)  %


 


Plt Count   222  (130-400)  K/uL


 


MPV   8.4  (7.2-11.7)  fl


 


Neut % (Auto)   82.8 H  (50.0-75.0)  %


 


Lymph % (Auto)   10.5 L  (20.0-40.0)  %


 


Mono % (Auto)   5.8  (0.0-10.0)  %


 


Eos % (Auto)   0.3  (0.0-4.0)  %


 


Baso % (Auto)   0.6  (0.0-2.0)  %


 


Neut #   6.5  (1.8-7.0)  K/uL


 


Lymph #   0.8 L  (1.0-4.3)  K/uL


 


Mono #   0.5  (0.0-0.8)  K/uL


 


Eos #   0.0  (0.0-0.7)  K/uL


 


Baso #   0.0  (0.0-0.2)  K/uL


 


PT    (9.8-13.1)  Seconds


 


INR    (0.9-1.2)  


 


APTT    (25.6-37.1)  Seconds


 


pCO2    (35-45)  mm/Hg


 


pO2    ()  mm/Hg


 


HCO3    (21-28)  mmol/L


 


ABG pH    (7.35-7.45)  


 


ABG Total CO2    (22-28)  mmol/L


 


ABG O2 Saturation    (95-98)  %


 


ABG O2 Content    (15-23)  ML/dL


 


ABG Base Excess    (-2.0-3.0)  mmol/L


 


ABG Hemoglobin    (11.7-17.4)  g/dL


 


ABG Carboxyhemoglobin    (0.5-1.5)  %


 


POC ABG HHb (Measured)    (0.0-5.0)  %


 


ABG Methemoglobin    (0.0-3.0)  %


 


ABG O2 Capacity    (16-24)  mL/dL


 


Almas Test    


 


A-a O2 Difference    mm/Hg


 


Hgb O2 Saturation    (95.0-98.0)  %


 


FiO2    %


 


Crit Value Called To    


 


Crit Value Called By    


 


Crit Value Read Back    


 


Blood Gas Notified Time    


 


Sodium  146   (132-148)  mmol/l


 


Potassium  4.5   (3.6-5.0)  MMOL/L


 


Chloride  109 H   ()  mmol/L


 


Carbon Dioxide  27   (22-30)  mmol/L


 


Anion Gap  15   (10-20)  


 


BUN  37 H   (7-17)  mg/dl


 


Creatinine  1.5 H   (0.7-1.2)  mg/dl


 


Est GFR (African Amer)  40   


 


Est GFR (Non-Af Amer)  33   


 


Random Glucose  100   ()  mg/dL


 


Hemoglobin A1c    (4.2-6.5)  %


 


Calcium  9.4   (8.4-10.2)  mg/dL


 


Iron    ()  ug/dL


 


TIBC    (250-450)  ug/dL


 


% Saturation    (20-55)  %


 


Total Bilirubin  0.3   (0.2-1.3)  mg/dl


 


AST  34   (14-36)  U/L


 


ALT  43   (9-52)  U/L


 


Alkaline Phosphatase  70   ()  U/L


 


Troponin I  0.0250   (0.00-0.120)  ng/mL


 


Total Protein  7.6   (6.3-8.2)  G/DL


 


Albumin  4.1   (3.5-5.0)  g/dL


 


Globulin  3.4   (2.2-3.9)  gm/dL


 


Albumin/Globulin Ratio  1.2   (1.0-2.1)  


 


Vitamin B12    (239-931)  pg/mL


 


Urine Color    (YELLOW)  


 


Urine Clarity    (Clear)  


 


Urine pH    (5.0-8.0)  


 


Ur Specific Gravity    (1.003-1.030)  


 


Urine Protein    (NEGATIVE)  mg/dL


 


Urine Glucose (UA)    (Normal)  mg/dL


 


Urine Ketones    (NEGATIVE)  mg/dL


 


Urine Blood    (NEGATIVE)  


 


Urine Nitrate    (NEGATIVE)  


 


Urine Bilirubin    (NEGATIVE)  


 


Urine Urobilinogen    (0.2-1.0)  mg/dL


 


Ur Leukocyte Esterase    (Negative)  Tex/uL


 


Urine RBC (Auto)    (0-3)  /hpf


 


Urine Microscopic WBC    (0-5)  /hpf


 


Ur Squamous Epith Cells    (0-5)  /hpf


 


Urine Bacteria    (<OCC)  


 


Blood Type    


 


Blood Type Confirm    


 


Antibody Screen    


 


Crossmatch    


 


BBK History Checked    








 Laboratory Results - last 24 hr











  11/14/17 11/14/17 11/14/17





  21:54 21:54 21:54


 


WBC  7.9  


 


RBC  3.21 L  


 


Hgb  7.5 L D  


 


Hct  25.5 L  


 


MCV  79.4 L D  


 


MCH  23.4 L  


 


MCHC  29.5 L  


 


RDW  17.0 H  


 


Plt Count  222  


 


MPV  8.4  


 


Neut % (Auto)  82.8 H  


 


Lymph % (Auto)  10.5 L  


 


Mono % (Auto)  5.8  


 


Eos % (Auto)  0.3  


 


Baso % (Auto)  0.6  


 


Neut #  6.5  


 


Lymph #  0.8 L  


 


Mono #  0.5  


 


Eos #  0.0  


 


Baso #  0.0  


 


PT    12.4


 


INR    1.1


 


APTT    22.8 L


 


pCO2   


 


pO2   


 


HCO3   


 


ABG pH   


 


ABG Total CO2   


 


ABG O2 Saturation   


 


ABG O2 Content   


 


ABG Base Excess   


 


ABG Hemoglobin   


 


ABG Carboxyhemoglobin   


 


POC ABG HHb (Measured)   


 


ABG Methemoglobin   


 


ABG O2 Capacity   


 


Almas Test   


 


A-a O2 Difference   


 


Hgb O2 Saturation   


 


FiO2   


 


Crit Value Called To   


 


Crit Value Called By   


 


Crit Value Read Back   


 


Blood Gas Notified Time   


 


Sodium   146 


 


Potassium   4.5 


 


Chloride   109 H 


 


Carbon Dioxide   27 


 


Anion Gap   15 


 


BUN   37 H 


 


Creatinine   1.5 H 


 


Est GFR ( Amer)   40 


 


Est GFR (Non-Af Amer)   33 


 


Random Glucose   100 


 


Hemoglobin A1c   


 


Calcium   9.4 


 


Iron   


 


TIBC   


 


% Saturation   


 


Total Bilirubin   0.3 


 


AST   34 


 


ALT   43 


 


Alkaline Phosphatase   70 


 


Troponin I   0.0250 


 


Total Protein   7.6 


 


Albumin   4.1 


 


Globulin   3.4 


 


Albumin/Globulin Ratio   1.2 


 


Vitamin B12   


 


Urine Color   


 


Urine Clarity   


 


Urine pH   


 


Ur Specific Gravity   


 


Urine Protein   


 


Urine Glucose (UA)   


 


Urine Ketones   


 


Urine Blood   


 


Urine Nitrate   


 


Urine Bilirubin   


 


Urine Urobilinogen   


 


Ur Leukocyte Esterase   


 


Urine RBC (Auto)   


 


Urine Microscopic WBC   


 


Ur Squamous Epith Cells   


 


Urine Bacteria   


 


Blood Type   


 


Blood Type Confirm   


 


Antibody Screen   


 


Crossmatch   


 


BBK History Checked   














  11/14/17 11/15/17 11/15/17





  23:00 00:31 05:12


 


WBC   


 


RBC   


 


Hgb   


 


Hct   


 


MCV   


 


MCH   


 


MCHC   


 


RDW   


 


Plt Count   


 


MPV   


 


Neut % (Auto)   


 


Lymph % (Auto)   


 


Mono % (Auto)   


 


Eos % (Auto)   


 


Baso % (Auto)   


 


Neut #   


 


Lymph #   


 


Mono #   


 


Eos #   


 


Baso #   


 


PT   


 


INR   


 


APTT   


 


pCO2   


 


pO2   


 


HCO3   


 


ABG pH   


 


ABG Total CO2   


 


ABG O2 Saturation   


 


ABG O2 Content   


 


ABG Base Excess   


 


ABG Hemoglobin   


 


ABG Carboxyhemoglobin   


 


POC ABG HHb (Measured)   


 


ABG Methemoglobin   


 


ABG O2 Capacity   


 


Almas Test   


 


A-a O2 Difference   


 


Hgb O2 Saturation   


 


FiO2   


 


Crit Value Called To   


 


Crit Value Called By   


 


Crit Value Read Back   


 


Blood Gas Notified Time   


 


Sodium   


 


Potassium   


 


Chloride   


 


Carbon Dioxide   


 


Anion Gap   


 


BUN   


 


Creatinine   


 


Est GFR ( Amer)   


 


Est GFR (Non-Af Amer)   


 


Random Glucose   


 


Hemoglobin A1c   


 


Calcium   


 


Iron    23 L


 


TIBC    437


 


% Saturation    5 L


 


Total Bilirubin   


 


AST   


 


ALT   


 


Alkaline Phosphatase   


 


Troponin I   


 


Total Protein   


 


Albumin   


 


Globulin   


 


Albumin/Globulin Ratio   


 


Vitamin B12   


 


Urine Color  Yellow  


 


Urine Clarity  Cloudy  


 


Urine pH  5.0  


 


Ur Specific Gravity  1.013  


 


Urine Protein  100  


 


Urine Glucose (UA)  Neg  


 


Urine Ketones  Negative  


 


Urine Blood  Small  


 


Urine Nitrate  Negative  


 


Urine Bilirubin  Negative  


 


Urine Urobilinogen  0.2-1.0  


 


Ur Leukocyte Esterase  Neg  


 


Urine RBC (Auto)  13 H  


 


Urine Microscopic WBC  4  


 


Ur Squamous Epith Cells  < 1  


 


Urine Bacteria  Rare  


 


Blood Type   A POSITIVE 


 


Blood Type Confirm   


 


Antibody Screen   Negative 


 


Crossmatch   See Detail 


 


BBK History Checked   No verified bt 














  11/15/17 11/15/17 11/15/17





  05:12 05:12 05:12


 


WBC   9.2 


 


RBC   3.13 L 


 


Hgb   7.4 L 


 


Hct   24.6 L 


 


MCV   78.6 L 


 


MCH   23.7 L 


 


MCHC   30.2 L 


 


RDW   16.6 H 


 


Plt Count   221 


 


MPV   8.2 


 


Neut % (Auto)   83.5 H 


 


Lymph % (Auto)   10.5 L 


 


Mono % (Auto)   5.1 


 


Eos % (Auto)   0.2 


 


Baso % (Auto)   0.7 


 


Neut #   7.7 H 


 


Lymph #   1.0 


 


Mono #   0.5 


 


Eos #   0.0 


 


Baso #   0.1 


 


PT   


 


INR   


 


APTT   


 


pCO2   


 


pO2   


 


HCO3   


 


ABG pH   


 


ABG Total CO2   


 


ABG O2 Saturation   


 


ABG O2 Content   


 


ABG Base Excess   


 


ABG Hemoglobin   


 


ABG Carboxyhemoglobin   


 


POC ABG HHb (Measured)   


 


ABG Methemoglobin   


 


ABG O2 Capacity   


 


Almas Test   


 


A-a O2 Difference   


 


Hgb O2 Saturation   


 


FiO2   


 


Crit Value Called To   


 


Crit Value Called By   


 


Crit Value Read Back   


 


Blood Gas Notified Time   


 


Sodium  148  


 


Potassium  4.5  


 


Chloride  110 H  


 


Carbon Dioxide  28  


 


Anion Gap  14  


 


BUN  35 H  


 


Creatinine  1.5 H  


 


Est GFR ( Amer)  40  


 


Est GFR (Non-Af Amer)  33  


 


Random Glucose  104  


 


Hemoglobin A1c    6.1


 


Calcium  9.4  


 


Iron   


 


TIBC   


 


% Saturation   


 


Total Bilirubin   


 


AST   


 


ALT   


 


Alkaline Phosphatase   


 


Troponin I  0.0320  


 


Total Protein   


 


Albumin   


 


Globulin   


 


Albumin/Globulin Ratio   


 


Vitamin B12  389  


 


Urine Color   


 


Urine Clarity   


 


Urine pH   


 


Ur Specific Gravity   


 


Urine Protein   


 


Urine Glucose (UA)   


 


Urine Ketones   


 


Urine Blood   


 


Urine Nitrate   


 


Urine Bilirubin   


 


Urine Urobilinogen   


 


Ur Leukocyte Esterase   


 


Urine RBC (Auto)   


 


Urine Microscopic WBC   


 


Ur Squamous Epith Cells   


 


Urine Bacteria   


 


Blood Type   


 


Blood Type Confirm   


 


Antibody Screen   


 


Crossmatch   


 


BBK History Checked   














  11/15/17 11/15/17





  10:00 11:19


 


WBC  


 


RBC  


 


Hgb  


 


Hct  


 


MCV  


 


MCH  


 


MCHC  


 


RDW  


 


Plt Count  


 


MPV  


 


Neut % (Auto)  


 


Lymph % (Auto)  


 


Mono % (Auto)  


 


Eos % (Auto)  


 


Baso % (Auto)  


 


Neut #  


 


Lymph #  


 


Mono #  


 


Eos #  


 


Baso #  


 


PT  


 


INR  


 


APTT  


 


pCO2   114 H*


 


pO2   91


 


HCO3   24.5


 


ABG pH   7.04 L*


 


ABG Total CO2   34.3 H


 


ABG O2 Saturation   99.1 H


 


ABG O2 Content   10.4 L


 


ABG Base Excess   -0.6


 


ABG Hemoglobin   7.6 L


 


ABG Carboxyhemoglobin   2.8 H


 


POC ABG HHb (Measured)   0.9


 


ABG Methemoglobin   0.3


 


ABG O2 Capacity   10.5 L


 


Almas Test   Yes


 


A-a O2 Difference   -12.0


 


Hgb O2 Saturation   96.1


 


FiO2   31.0


 


Crit Value Called To   Dr rafat garcia


 


Crit Value Called By   5


 


Crit Value Read Back   Y


 


Blood Gas Notified Time   1109


 


Sodium  


 


Potassium  


 


Chloride  


 


Carbon Dioxide  


 


Anion Gap  


 


BUN  


 


Creatinine  


 


Est GFR ( Amer)  


 


Est GFR (Non-Af Amer)  


 


Random Glucose  


 


Hemoglobin A1c  


 


Calcium  


 


Iron  


 


TIBC  


 


% Saturation  


 


Total Bilirubin  


 


AST  


 


ALT  


 


Alkaline Phosphatase  


 


Troponin I  


 


Total Protein  


 


Albumin  


 


Globulin  


 


Albumin/Globulin Ratio  


 


Vitamin B12  


 


Urine Color  


 


Urine Clarity  


 


Urine pH  


 


Ur Specific Gravity  


 


Urine Protein  


 


Urine Glucose (UA)  


 


Urine Ketones  


 


Urine Blood  


 


Urine Nitrate  


 


Urine Bilirubin  


 


Urine Urobilinogen  


 


Ur Leukocyte Esterase  


 


Urine RBC (Auto)  


 


Urine Microscopic WBC  


 


Ur Squamous Epith Cells  


 


Urine Bacteria  


 


Blood Type  


 


Blood Type Confirm  A POSITIVE 


 


Antibody Screen  


 


Crossmatch  


 


BBK History Checked  











Radiology Interpretations (Free Text): 


CXR (my interp); Post intubation film, ETT position OK above maegan, no yo 

consolidation. 


Fingerstick Blood Sugar Results: 111





Review of Systems





- Review of Systems


Systems not reviewed;Unavailable: Intubated





Critical Care Progress Note





- Ventilator Checklist


Head of Bed 30 Degrees: Yes


Daily Sedation Vacation: Yes


Daily Assessment of Readiness to Wean: Yes


Daily Spontaneous Breathing Trial: Yes


PUD Prophalyxis: Yes


DVT Prophylaxis: Yes


Oral Care with Chlorhexidine Gluconate {CHG}: Yes





- Vent Settings


MODE:: ASSIST CONTROL


TIDAL VOLUME:: 450


RESP RATE:: 10


FIO2:: 50


PEEP:: 3





- Extremities/Vascular


Does the Patient have a Central Venous Catheter?: No


Does the Patient need a Central Venous Catheter?: No


Does the Patient have a Mace Catheter?: Yes


Does the Patient need a Mace Catheter?: Yes


Catheter Insertion Criteria: Need for accurate measurement of output in 

critically ill patient





- Prophylaxis GI


Prophylaxis GI: PPI





- Prophylaxis DVT


Prophylaxis DVT: SCDs





- Nutrition


Nutrition: 


 Nutrition











 Category Date Time Status


 


 NPO Diet [DIET] Diets  11/15/17 Breakfast Active














Assessment/Plan





- Assessment and Plan (Free Text)


Plan: 





Time spent with this patient did not overlap with any other provider's medical 

or critical care time. Additionally the code selected for the services rendered 

in this note includes the time spent: talking to the patients family, 

associated physicians and reviewing hospital data/results not listed here which 

extended to a total of 50 minutes.

## 2017-11-15 NOTE — RAD
PROCEDURE:  Left Hand Radiographs.



HISTORY:

hand pain  



COMPARISON:

None.



FINDINGS:



BONES:

Diffuse osteopenia suggests osteoporosis, appear relatively 

extensive. No acute displaced fracture identified throughout or 

destructive bony lesion identified. 



JOINTS:

Diffuse joint space narrowing and cortical sclerosis are identified 

throughout the joints of the hand diffusely and also throughout the 

rest compatible with degenerative joint disease. The pattern seen 

worst at the distal interphalangeal joints as well as the basal joint 

at the wrist. 



SOFT TISSUES:

Normal. 



OTHER FINDINGS:

None.



IMPRESSION:

Advanced degenerative joint disease diffusely noted. No acute 

fracture or dislocation identified.



Diffuse osteopenia suggests osteoporosis.

## 2017-11-15 NOTE — CP.PCM.CON
History of Present Illness





- History of Present Illness


History of Present Illness: 





   CONSULT DICTATED 


POST CONCUSSION  NEW ONSET OF SEIZURES 


ODENTOID FRACTURE 


SEDATED 


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


RX CEREBYX 


EEG 


NEEDS MRI AFTER EXTUBATION 





Past Patient History





- Infectious Disease


Hx of Infectious Diseases: None





- Tetanus Immunizations


Tetanus Immunization: Unknown





- Past Medical History & Family History


Past Medical History?: Yes





- Past Social History


Smoking Status: Never Smoked





- CARDIAC


Hx Cardiac Disorders: Yes (HTN)





- PULMONARY


Hx Respiratory Disorders: Yes (ASTHMA, COPD)





- NEUROLOGICAL


Hx Neurological Disorder: No





- HEENT


Hx HEENT Problems: No





- RENAL


Hx Chronic Kidney Disease: No





- ENDOCRINE/METABOLIC


Hx Endocrine Disorders: Yes (DM)





- HEMATOLOGICAL/ONCOLOGICAL


Hx AIDS: No


Hx Human Immunodeficiency Virus (HIV): No





- INTEGUMENTARY


Hx Dermatological Problems: No





- MUSCULOSKELETAL/RHEUMATOLOGICAL


Hx Falls: Yes





- GASTROINTESTINAL


Hx Constipation: Yes





- GENITOURINARY/GYNECOLOGICAL


Hx Genitourinary Disorders: No





- PSYCHIATRIC


Hx Substance Use: No





- SURGICAL HISTORY


Hx Tubal Ligation: Yes





- ANESTHESIA


Hx Anesthesia: Yes


Hx Anesthesia Reactions: No





Meds


Allergies/Adverse Reactions: 


 Allergies











Allergy/AdvReac Type Severity Reaction Status Date / Time


 


No Known Allergies Allergy   Verified 11/14/17 20:12














- Medications


Medications: 


 Current Medications





Acetaminophen (Tylenol 325mg Tab)  650 mg PO Q4 PRN


   PRN Reason: Headache


   Last Admin: 11/15/17 02:14 Dose:  650 mg


Albuterol Sulfate (Albuterol 0.083% Inhal Sol (2.5 Mg/3 Ml) Ud)  2.5 mg INH RQ6 

[facility]


   Last Admin: 11/15/17 19:08 Dose:  Not Given


Albuterol/Ipratropium (Duoneb 3 Mg/0.5 Mg (3 Ml) Ud)  3 ml INH RQID [facility]


   Last Admin: 11/15/17 19:07 Dose:  3 ml


Amlodipine Besylate (Norvasc)  2.5 mg PO DAILY [facility]


   Last Admin: 11/15/17 10:34 Dose:  Not Given


Fosphenytoin Sodium (Cerebyx)  100 mg IV Q8 [facility]


Sodium Chloride (Sodium Chloride 0.45%)  1,000 mls @ 75 mls/hr IV .[id number] [facility]


   Stop: 11/15/17 23:34


   Last Admin: 11/15/17 02:15 Dose:  75 mls/hr


Propofol (Diprivan)  1,000 mg in 100 mls @ 2.245 mls/hr IV .Q24H [facility]; 5 MCG/KG/

MIN


   PRN Reason: Protocol


   Stop: 11/16/17 17:06


   Last Admin: 11/15/17 17:40 Dose:  5 mcg/kg/min, 2.245 mls/hr


Insulin Human Lispro (Humalog)  0 units SC ACHS [facility]


   PRN Reason: Protocol


   Last Admin: 11/15/17 17:24 Dose:  2 units


Losartan Potassium (Cozaar)  50 mg PO DAILY [facility]


   Last Admin: 11/15/17 10:34 Dose:  Not Given


Montelukast Sodium (Singulair)  10 mg PO HS [facility]


Pantoprazole Sodium (Protonix Inj)  40 mg IVP DAILY [facility]


   Last Admin: 11/15/17 16:37 Dose:  40 mg











Results





- Vital Signs


Recent Vital Signs: 


 Last Vital Signs











Temp  97.6 F   11/15/17 16:00


 


Pulse  58 L  11/15/17 18:00


 


Resp  18   11/15/17 18:00


 


BP  140/68   11/15/17 18:00


 


Pulse Ox  100   11/15/17 18:00














- Labs


Result Diagrams: 


 11/15/17 05:12





 11/15/17 05:12


Labs: 


 Laboratory Results - last 24 hr











  11/14/17 11/14/17 11/14/17





  21:54 21:54 21:54


 


WBC  7.9  


 


RBC  3.21 L  


 


Hgb  7.5 L D  


 


Hct  25.5 L  


 


MCV  79.4 L D  


 


MCH  23.4 L  


 


MCHC  29.5 L  


 


RDW  17.0 H  


 


Plt Count  222  


 


MPV  8.4  


 


Neut % (Auto)  82.8 H  


 


Lymph % (Auto)  10.5 L  


 


Mono % (Auto)  5.8  


 


Eos % (Auto)  0.3  


 


Baso % (Auto)  0.6  


 


Neut #  6.5  


 


Lymph #  0.8 L  


 


Mono #  0.5  


 


Eos #  0.0  


 


Baso #  0.0  


 


PT    12.4


 


INR    1.1


 


APTT    22.8 L


 


pCO2   


 


pO2   


 


HCO3   


 


ABG pH   


 


ABG Total CO2   


 


ABG O2 Saturation   


 


ABG O2 Content   


 


ABG Base Excess   


 


ABG Hemoglobin   


 


ABG Carboxyhemoglobin   


 


POC ABG HHb (Measured)   


 


ABG Methemoglobin   


 


ABG O2 Capacity   


 


Almas Test   


 


ABG Potassium   


 


A-a O2 Difference   


 


Hgb O2 Saturation   


 


Glucose   


 


Lactate   


 


Vent Mode   


 


Mechanical Rate   


 


FiO2   


 


Tidal Volume   


 


PEEP   


 


Crit Value Called To   


 


Crit Value Called By   


 


Crit Value Read Back   


 


Blood Gas Notified Time   


 


Sodium   146 


 


Potassium   4.5 


 


Chloride   109 H 


 


Carbon Dioxide   27 


 


Anion Gap   15 


 


BUN   37 H 


 


Creatinine   1.5 H 


 


Est GFR ( Amer)   40 


 


Est GFR (Non-Af Amer)   33 


 


POC Glucose (mg/dL)   


 


Random Glucose   100 


 


Hemoglobin A1c   


 


Calcium   9.4 


 


Iron   


 


TIBC   


 


% Saturation   


 


Total Bilirubin   0.3 


 


AST   34 


 


ALT   43 


 


Alkaline Phosphatase   70 


 


Troponin I   0.0250 


 


Total Protein   7.6 


 


Albumin   4.1 


 


Globulin   3.4 


 


Albumin/Globulin Ratio   1.2 


 


Vitamin B12   


 


Arterial Blood Potassium   


 


Urine Color   


 


Urine Clarity   


 


Urine pH   


 


Ur Specific Gravity   


 


Urine Protein   


 


Urine Glucose (UA)   


 


Urine Ketones   


 


Urine Blood   


 


Urine Nitrate   


 


Urine Bilirubin   


 


Urine Urobilinogen   


 


Ur Leukocyte Esterase   


 


Urine RBC (Auto)   


 


Urine Microscopic WBC   


 


Ur Squamous Epith Cells   


 


Urine Bacteria   


 


Blood Type   


 


Blood Type Confirm   


 


Antibody Screen   


 


Crossmatch   


 


BBK History Checked   














  11/14/17 11/15/17 11/15/17





  23:00 00:31 05:12


 


WBC   


 


RBC   


 


Hgb   


 


Hct   


 


MCV   


 


MCH   


 


MCHC   


 


RDW   


 


Plt Count   


 


MPV   


 


Neut % (Auto)   


 


Lymph % (Auto)   


 


Mono % (Auto)   


 


Eos % (Auto)   


 


Baso % (Auto)   


 


Neut #   


 


Lymph #   


 


Mono #   


 


Eos #   


 


Baso #   


 


PT   


 


INR   


 


APTT   


 


pCO2   


 


pO2   


 


HCO3   


 


ABG pH   


 


ABG Total CO2   


 


ABG O2 Saturation   


 


ABG O2 Content   


 


ABG Base Excess   


 


ABG Hemoglobin   


 


ABG Carboxyhemoglobin   


 


POC ABG HHb (Measured)   


 


ABG Methemoglobin   


 


ABG O2 Capacity   


 


Almas Test   


 


ABG Potassium   


 


A-a O2 Difference   


 


Hgb O2 Saturation   


 


Glucose   


 


Lactate   


 


Vent Mode   


 


Mechanical Rate   


 


FiO2   


 


Tidal Volume   


 


PEEP   


 


Crit Value Called To   


 


Crit Value Called By   


 


Crit Value Read Back   


 


Blood Gas Notified Time   


 


Sodium   


 


Potassium   


 


Chloride   


 


Carbon Dioxide   


 


Anion Gap   


 


BUN   


 


Creatinine   


 


Est GFR ( Amer)   


 


Est GFR (Non-Af Amer)   


 


POC Glucose (mg/dL)   


 


Random Glucose   


 


Hemoglobin A1c   


 


Calcium   


 


Iron    23 L


 


TIBC    437


 


% Saturation    5 L


 


Total Bilirubin   


 


AST   


 


ALT   


 


Alkaline Phosphatase   


 


Troponin I   


 


Total Protein   


 


Albumin   


 


Globulin   


 


Albumin/Globulin Ratio   


 


Vitamin B12   


 


Arterial Blood Potassium   


 


Urine Color  Yellow  


 


Urine Clarity  Cloudy  


 


Urine pH  5.0  


 


Ur Specific Gravity  1.013  


 


Urine Protein  100  


 


Urine Glucose (UA)  Neg  


 


Urine Ketones  Negative  


 


Urine Blood  Small  


 


Urine Nitrate  Negative  


 


Urine Bilirubin  Negative  


 


Urine Urobilinogen  0.2-1.0  


 


Ur Leukocyte Esterase  Neg  


 


Urine RBC (Auto)  13 H  


 


Urine Microscopic WBC  4  


 


Ur Squamous Epith Cells  < 1  


 


Urine Bacteria  Rare  


 


Blood Type   A POSITIVE 


 


Blood Type Confirm   


 


Antibody Screen   Negative 


 


Crossmatch   See Detail 


 


BBK History Checked   No verified bt 














  11/15/17 11/15/17 11/15/17





  05:12 05:12 05:12


 


WBC   9.2 


 


RBC   3.13 L 


 


Hgb   7.4 L 


 


Hct   24.6 L 


 


MCV   78.6 L 


 


MCH   23.7 L 


 


MCHC   30.2 L 


 


RDW   16.6 H 


 


Plt Count   221 


 


MPV   8.2 


 


Neut % (Auto)   83.5 H 


 


Lymph % (Auto)   10.5 L 


 


Mono % (Auto)   5.1 


 


Eos % (Auto)   0.2 


 


Baso % (Auto)   0.7 


 


Neut #   7.7 H 


 


Lymph #   1.0 


 


Mono #   0.5 


 


Eos #   0.0 


 


Baso #   0.1 


 


PT   


 


INR   


 


APTT   


 


pCO2   


 


pO2   


 


HCO3   


 


ABG pH   


 


ABG Total CO2   


 


ABG O2 Saturation   


 


ABG O2 Content   


 


ABG Base Excess   


 


ABG Hemoglobin   


 


ABG Carboxyhemoglobin   


 


POC ABG HHb (Measured)   


 


ABG Methemoglobin   


 


ABG O2 Capacity   


 


Almas Test   


 


ABG Potassium   


 


A-a O2 Difference   


 


Hgb O2 Saturation   


 


Glucose   


 


Lactate   


 


Vent Mode   


 


Mechanical Rate   


 


FiO2   


 


Tidal Volume   


 


PEEP   


 


Crit Value Called To   


 


Crit Value Called By   


 


Crit Value Read Back   


 


Blood Gas Notified Time   


 


Sodium  148  


 


Potassium  4.5  


 


Chloride  110 H  


 


Carbon Dioxide  28  


 


Anion Gap  14  


 


BUN  35 H  


 


Creatinine  1.5 H  


 


Est GFR ( Amer)  40  


 


Est GFR (Non-Af Amer)  33  


 


POC Glucose (mg/dL)   


 


Random Glucose  104  


 


Hemoglobin A1c    6.1


 


Calcium  9.4  


 


Iron   


 


TIBC   


 


% Saturation   


 


Total Bilirubin   


 


AST   


 


ALT   


 


Alkaline Phosphatase   


 


Troponin I  0.0320  


 


Total Protein   


 


Albumin   


 


Globulin   


 


Albumin/Globulin Ratio   


 


Vitamin B12  389  


 


Arterial Blood Potassium   


 


Urine Color   


 


Urine Clarity   


 


Urine pH   


 


Ur Specific Gravity   


 


Urine Protein   


 


Urine Glucose (UA)   


 


Urine Ketones   


 


Urine Blood   


 


Urine Nitrate   


 


Urine Bilirubin   


 


Urine Urobilinogen   


 


Ur Leukocyte Esterase   


 


Urine RBC (Auto)   


 


Urine Microscopic WBC   


 


Ur Squamous Epith Cells   


 


Urine Bacteria   


 


Blood Type   


 


Blood Type Confirm   


 


Antibody Screen   


 


Crossmatch   


 


BBK History Checked   














  11/15/17 11/15/17 11/15/17





  10:00 11:19 14:07


 


WBC   


 


RBC   


 


Hgb   


 


Hct   


 


MCV   


 


MCH   


 


MCHC   


 


RDW   


 


Plt Count   


 


MPV   


 


Neut % (Auto)   


 


Lymph % (Auto)   


 


Mono % (Auto)   


 


Eos % (Auto)   


 


Baso % (Auto)   


 


Neut #   


 


Lymph #   


 


Mono #   


 


Eos #   


 


Baso #   


 


PT   


 


INR   


 


APTT   


 


pCO2   114 H*  78 H*


 


pO2   91  109 H


 


HCO3   24.5  25.8


 


ABG pH   7.04 L*  7.19 L*


 


ABG Total CO2   34.3 H  32.2 H


 


ABG O2 Saturation   99.1 H  100.8 H


 


ABG O2 Content   10.4 L  10.0 L


 


ABG Base Excess   -0.6  1.1


 


ABG Hemoglobin   7.6 L  7.1 L


 


ABG Carboxyhemoglobin   2.8 H  2.9 H


 


POC ABG HHb (Measured)   0.9  -0.8 L


 


ABG Methemoglobin   0.3  0.5


 


ABG O2 Capacity   10.5 L  9.9 L


 


Almas Test   Yes  Yes


 


ABG Potassium   


 


A-a O2 Difference   -12.0  150.0


 


Hgb O2 Saturation   96.1  97.4


 


Glucose   


 


Lactate   


 


Vent Mode    Prvc/ac


 


Mechanical Rate    10


 


FiO2   31.0  50.0


 


Tidal Volume    450


 


PEEP    3


 


Crit Value Called To   rafat Hernandez


 


Crit Value Called By   5  203


 


Crit Value Read Back   Y  Y


 


Blood Gas Notified Time   1109  1419


 


Sodium   


 


Potassium   


 


Chloride   


 


Carbon Dioxide   


 


Anion Gap   


 


BUN   


 


Creatinine   


 


Est GFR ( Amer)   


 


Est GFR (Non-Af Amer)   


 


POC Glucose (mg/dL)   


 


Random Glucose   


 


Hemoglobin A1c   


 


Calcium   


 


Iron   


 


TIBC   


 


% Saturation   


 


Total Bilirubin   


 


AST   


 


ALT   


 


Alkaline Phosphatase   


 


Troponin I   


 


Total Protein   


 


Albumin   


 


Globulin   


 


Albumin/Globulin Ratio   


 


Vitamin B12   


 


Arterial Blood Potassium   


 


Urine Color   


 


Urine Clarity   


 


Urine pH   


 


Ur Specific Gravity   


 


Urine Protein   


 


Urine Glucose (UA)   


 


Urine Ketones   


 


Urine Blood   


 


Urine Nitrate   


 


Urine Bilirubin   


 


Urine Urobilinogen   


 


Ur Leukocyte Esterase   


 


Urine RBC (Auto)   


 


Urine Microscopic WBC   


 


Ur Squamous Epith Cells   


 


Urine Bacteria   


 


Blood Type   


 


Blood Type Confirm  A POSITIVE  


 


Antibody Screen   


 


Crossmatch   


 


BBK History Checked   














  11/15/17 11/15/17





  16:32 17:04


 


WBC  


 


RBC  


 


Hgb  


 


Hct  


 


MCV  


 


MCH  


 


MCHC  


 


RDW  


 


Plt Count  


 


MPV  


 


Neut % (Auto)  


 


Lymph % (Auto)  


 


Mono % (Auto)  


 


Eos % (Auto)  


 


Baso % (Auto)  


 


Neut #  


 


Lymph #  


 


Mono #  


 


Eos #  


 


Baso #  


 


PT  


 


INR  


 


APTT  


 


pCO2  49 H 


 


pO2  204 H 


 


HCO3  23.8 


 


ABG pH  7.31 L 


 


ABG Total CO2  26.2 


 


ABG O2 Saturation  100.9 H 


 


ABG O2 Content  


 


ABG Base Excess  -1.5 


 


ABG Hemoglobin  


 


ABG Carboxyhemoglobin  


 


POC ABG HHb (Measured)  


 


ABG Methemoglobin  


 


ABG O2 Capacity  


 


Almas Test  Yes 


 


ABG Potassium  4.8 


 


A-a O2 Difference  91.0 


 


Hgb O2 Saturation  


 


Glucose  185 H 


 


Lactate  2.7 H 


 


Vent Mode  Prvc/ac 


 


Mechanical Rate  18 


 


FiO2  50.0 


 


Tidal Volume  450 


 


PEEP  3 


 


Crit Value Called To  


 


Crit Value Called By  


 


Crit Value Read Back  


 


Blood Gas Notified Time  


 


Sodium  144.0 


 


Potassium  


 


Chloride  113.0 H 


 


Carbon Dioxide  


 


Anion Gap  


 


BUN  


 


Creatinine  


 


Est GFR ( Amer)  


 


Est GFR (Non-Af Amer)  


 


POC Glucose (mg/dL)   210 H


 


Random Glucose  


 


Hemoglobin A1c  


 


Calcium  


 


Iron  


 


TIBC  


 


% Saturation  


 


Total Bilirubin  


 


AST  


 


ALT  


 


Alkaline Phosphatase  


 


Troponin I  


 


Total Protein  


 


Albumin  


 


Globulin  


 


Albumin/Globulin Ratio  


 


Vitamin B12  


 


Arterial Blood Potassium  4.8 


 


Urine Color  


 


Urine Clarity  


 


Urine pH  


 


Ur Specific Gravity  


 


Urine Protein  


 


Urine Glucose (UA)  


 


Urine Ketones  


 


Urine Blood  


 


Urine Nitrate  


 


Urine Bilirubin  


 


Urine Urobilinogen  


 


Ur Leukocyte Esterase  


 


Urine RBC (Auto)  


 


Urine Microscopic WBC  


 


Ur Squamous Epith Cells  


 


Urine Bacteria  


 


Blood Type  


 


Blood Type Confirm  


 


Antibody Screen  


 


Crossmatch  


 


BBK History Checked

## 2017-11-15 NOTE — RAD
PROCEDURE:  CHEST RADIOGRAPH, 1 VIEW



HISTORY:

admit



COMPARISON:

Chest radiographs 01/28/2017.



FINDINGS:



LUNGS:

No acute infiltrate identified bilaterally.



PLEURA:

No pneumothorax or pleural fluid seen.



CARDIOVASCULAR:

Stable cardiomegaly is noted with mild right hilar prominence 

unchanged in the interval.



OSSEOUS STRUCTURES:

No significant abnormalities.



VISUALIZED UPPER ABDOMEN:

Normal.



OTHER FINDINGS:

None. 



IMPRESSION:

No interval acute cardiopulmonary disease appreciated. Mild 

cardiomegaly is unchanged.

## 2017-11-15 NOTE — MRI
PROCEDURE:  MR CERVICAL SPINE WITHOUT CONTRAST



HISTORY:

c spine fx



COMPARISON:

CT cervical spine performed the same day. 



TECHNIQUE:

Multiecho multiplanar sequences were performed through the cervical 

spine without the use of intravenous contrast.



FINDINGS:

There is normal alignment of the cervical vertebral bodies.  There is 

normal cervical lordosis.  There is an acute nondisplaced fracture at 

the base of the odontoid process with mild perivertebral soft tissue 

swelling. There is mild bone marrow edema/ contusion in the C2 

vertebral body and spinous process of C2. No evidence of 

spondylolisthesis. The atlantoaxial joint is normal. 



The cervical cord is normal in contour, caliber and has normal 

intrinsic signal. There is no evidence of cord compression or 

epidural hematoma. 



C2-C3:

No disc herniation, spinal canal stenosis or neural foraminal 

narrowing. 



C3-C4:

Disc osteophyte complex and superimposed small left posterolateral 

protrusion without spinal canal stenosis. No neural foraminal 

stenosis. 



C4-C5:

Broad-based disc osteophyte complex in conjunction with mild right 

and moderate left facet arthropathy result in mild left neural 

foraminal stenosis. No spinal canal stenosis. 



C5-C6:

Broad-based disc osteophyte complex without central spinal canal 

stenosis. No neural foraminal stenosis. 



C6-C7:

No disc herniation, spinal canal stenosis or neural foraminal 

narrowing. 



C7-T1:

No  disc herniation, spinal canal stenosis or neural foraminal 

narrowing. 



OTHER FINDINGS:

The paraspinous soft tissues are normal. 



IMPRESSION:

1. Acute nondisplaced Type 2 odontoid fracture with mild 

perivertebral soft tissue swelling.  No evidence of epidural hematoma 

or cord compression. No atlantoaxial dislocation. 



2. Mild multilevel degenerative disc disease without spinal canal 

stenosis.

## 2017-11-15 NOTE — CP.PCM.HP
History of Present Illness





- History of Present Illness


History of Present Illness: 





H & P will be dictated. Cont present care as per ICU. Neurosurgical and Neuro 

consults appreciated. Cont to monitor ABG and labwork. EEG, MRI, tentative 

surgery on Friday pending Cardiology clearance. Cleared for Pulmonary and 

Medical view, in view of a much needed surgical procedure. [phone] for all 

communications. 











Past Patient History





- Infectious Disease


Hx of Infectious Diseases: None





- Tetanus Immunizations


Tetanus Immunization: Unknown





- Past Medical History & Family History


Past Medical History?: Yes





- Past Social History


Smoking Status: Never Smoked





- CARDIAC


Hx Cardiac Disorders: Yes (HTN)





- PULMONARY


Hx Respiratory Disorders: Yes (ASTHMA, COPD)





- NEUROLOGICAL


Hx Neurological Disorder: No





- HEENT


Hx HEENT Problems: No





- RENAL


Hx Chronic Kidney Disease: No





- ENDOCRINE/METABOLIC


Hx Endocrine Disorders: Yes (DM)





- HEMATOLOGICAL/ONCOLOGICAL


Hx AIDS: No


Hx Human Immunodeficiency Virus (HIV): No





- INTEGUMENTARY


Hx Dermatological Problems: No





- MUSCULOSKELETAL/RHEUMATOLOGICAL


Hx Falls: Yes





- GASTROINTESTINAL


Hx Constipation: Yes





- GENITOURINARY/GYNECOLOGICAL


Hx Genitourinary Disorders: No





- PSYCHIATRIC


Hx Substance Use: No





- SURGICAL HISTORY


Hx Tubal Ligation: Yes





- ANESTHESIA


Hx Anesthesia: Yes


Hx Anesthesia Reactions: No





Meds


Allergies/Adverse Reactions: 


 Allergies











Allergy/AdvReac Type Severity Reaction Status Date / Time


 


No Known Allergies Allergy   Verified 11/14/17 20:12














Results





- Vital Signs


Recent Vital Signs: 





 Last Vital Signs











Temp  98.9 F   11/15/17 20:00


 


Pulse  49 L  11/15/17 21:00


 


Resp  18   11/15/17 21:00


 


BP  103/52 L  11/15/17 21:00


 


Pulse Ox  100   11/15/17 21:00














- Labs


Result Diagrams: 


 11/15/17 05:12





 11/15/17 05:12


Labs: 





 Laboratory Results - last 24 hr











  11/14/17 11/14/17 11/14/17





  21:54 21:54 21:54


 


WBC  7.9  


 


RBC  3.21 L  


 


Hgb  7.5 L D  


 


Hct  25.5 L  


 


MCV  79.4 L D  


 


MCH  23.4 L  


 


MCHC  29.5 L  


 


RDW  17.0 H  


 


Plt Count  222  


 


MPV  8.4  


 


Neut % (Auto)  82.8 H  


 


Lymph % (Auto)  10.5 L  


 


Mono % (Auto)  5.8  


 


Eos % (Auto)  0.3  


 


Baso % (Auto)  0.6  


 


Neut #  6.5  


 


Lymph #  0.8 L  


 


Mono #  0.5  


 


Eos #  0.0  


 


Baso #  0.0  


 


PT    12.4


 


INR    1.1


 


APTT    22.8 L


 


pCO2   


 


pO2   


 


HCO3   


 


ABG pH   


 


ABG Total CO2   


 


ABG O2 Saturation   


 


ABG O2 Content   


 


ABG Base Excess   


 


ABG Hemoglobin   


 


ABG Carboxyhemoglobin   


 


POC ABG HHb (Measured)   


 


ABG Methemoglobin   


 


ABG O2 Capacity   


 


Almas Test   


 


ABG Potassium   


 


A-a O2 Difference   


 


Hgb O2 Saturation   


 


Glucose   


 


Lactate   


 


Vent Mode   


 


Mechanical Rate   


 


FiO2   


 


Tidal Volume   


 


PEEP   


 


Crit Value Called To   


 


Crit Value Called By   


 


Crit Value Read Back   


 


Blood Gas Notified Time   


 


Sodium   146 


 


Potassium   4.5 


 


Chloride   109 H 


 


Carbon Dioxide   27 


 


Anion Gap   15 


 


BUN   37 H 


 


Creatinine   1.5 H 


 


Est GFR ( Amer)   40 


 


Est GFR (Non-Af Amer)   33 


 


POC Glucose (mg/dL)   


 


Random Glucose   100 


 


Hemoglobin A1c   


 


Calcium   9.4 


 


Iron   


 


TIBC   


 


% Saturation   


 


Total Bilirubin   0.3 


 


AST   34 


 


ALT   43 


 


Alkaline Phosphatase   70 


 


Troponin I   0.0250 


 


Total Protein   7.6 


 


Albumin   4.1 


 


Globulin   3.4 


 


Albumin/Globulin Ratio   1.2 


 


Vitamin B12   


 


Arterial Blood Potassium   


 


Urine Color   


 


Urine Clarity   


 


Urine pH   


 


Ur Specific Gravity   


 


Urine Protein   


 


Urine Glucose (UA)   


 


Urine Ketones   


 


Urine Blood   


 


Urine Nitrate   


 


Urine Bilirubin   


 


Urine Urobilinogen   


 


Ur Leukocyte Esterase   


 


Urine RBC (Auto)   


 


Urine Microscopic WBC   


 


Ur Squamous Epith Cells   


 


Urine Bacteria   


 


Blood Type   


 


Blood Type Confirm   


 


Antibody Screen   


 


Crossmatch   


 


BBK History Checked   














  11/14/17 11/15/17 11/15/17





  23:00 00:31 05:12


 


WBC   


 


RBC   


 


Hgb   


 


Hct   


 


MCV   


 


MCH   


 


MCHC   


 


RDW   


 


Plt Count   


 


MPV   


 


Neut % (Auto)   


 


Lymph % (Auto)   


 


Mono % (Auto)   


 


Eos % (Auto)   


 


Baso % (Auto)   


 


Neut #   


 


Lymph #   


 


Mono #   


 


Eos #   


 


Baso #   


 


PT   


 


INR   


 


APTT   


 


pCO2   


 


pO2   


 


HCO3   


 


ABG pH   


 


ABG Total CO2   


 


ABG O2 Saturation   


 


ABG O2 Content   


 


ABG Base Excess   


 


ABG Hemoglobin   


 


ABG Carboxyhemoglobin   


 


POC ABG HHb (Measured)   


 


ABG Methemoglobin   


 


ABG O2 Capacity   


 


Almas Test   


 


ABG Potassium   


 


A-a O2 Difference   


 


Hgb O2 Saturation   


 


Glucose   


 


Lactate   


 


Vent Mode   


 


Mechanical Rate   


 


FiO2   


 


Tidal Volume   


 


PEEP   


 


Crit Value Called To   


 


Crit Value Called By   


 


Crit Value Read Back   


 


Blood Gas Notified Time   


 


Sodium   


 


Potassium   


 


Chloride   


 


Carbon Dioxide   


 


Anion Gap   


 


BUN   


 


Creatinine   


 


Est GFR ( Amer)   


 


Est GFR (Non-Af Amer)   


 


POC Glucose (mg/dL)   


 


Random Glucose   


 


Hemoglobin A1c   


 


Calcium   


 


Iron    23 L


 


TIBC    437


 


% Saturation    5 L


 


Total Bilirubin   


 


AST   


 


ALT   


 


Alkaline Phosphatase   


 


Troponin I   


 


Total Protein   


 


Albumin   


 


Globulin   


 


Albumin/Globulin Ratio   


 


Vitamin B12   


 


Arterial Blood Potassium   


 


Urine Color  Yellow  


 


Urine Clarity  Cloudy  


 


Urine pH  5.0  


 


Ur Specific Gravity  1.013  


 


Urine Protein  100  


 


Urine Glucose (UA)  Neg  


 


Urine Ketones  Negative  


 


Urine Blood  Small  


 


Urine Nitrate  Negative  


 


Urine Bilirubin  Negative  


 


Urine Urobilinogen  0.2-1.0  


 


Ur Leukocyte Esterase  Neg  


 


Urine RBC (Auto)  13 H  


 


Urine Microscopic WBC  4  


 


Ur Squamous Epith Cells  < 1  


 


Urine Bacteria  Rare  


 


Blood Type   A POSITIVE 


 


Blood Type Confirm   


 


Antibody Screen   Negative 


 


Crossmatch   See Detail 


 


BBK History Checked   No verified bt 














  11/15/17 11/15/17 11/15/17





  05:12 05:12 05:12


 


WBC   9.2 


 


RBC   3.13 L 


 


Hgb   7.4 L 


 


Hct   24.6 L 


 


MCV   78.6 L 


 


MCH   23.7 L 


 


MCHC   30.2 L 


 


RDW   16.6 H 


 


Plt Count   221 


 


MPV   8.2 


 


Neut % (Auto)   83.5 H 


 


Lymph % (Auto)   10.5 L 


 


Mono % (Auto)   5.1 


 


Eos % (Auto)   0.2 


 


Baso % (Auto)   0.7 


 


Neut #   7.7 H 


 


Lymph #   1.0 


 


Mono #   0.5 


 


Eos #   0.0 


 


Baso #   0.1 


 


PT   


 


INR   


 


APTT   


 


pCO2   


 


pO2   


 


HCO3   


 


ABG pH   


 


ABG Total CO2   


 


ABG O2 Saturation   


 


ABG O2 Content   


 


ABG Base Excess   


 


ABG Hemoglobin   


 


ABG Carboxyhemoglobin   


 


POC ABG HHb (Measured)   


 


ABG Methemoglobin   


 


ABG O2 Capacity   


 


Almas Test   


 


ABG Potassium   


 


A-a O2 Difference   


 


Hgb O2 Saturation   


 


Glucose   


 


Lactate   


 


Vent Mode   


 


Mechanical Rate   


 


FiO2   


 


Tidal Volume   


 


PEEP   


 


Crit Value Called To   


 


Crit Value Called By   


 


Crit Value Read Back   


 


Blood Gas Notified Time   


 


Sodium  148  


 


Potassium  4.5  


 


Chloride  110 H  


 


Carbon Dioxide  28  


 


Anion Gap  14  


 


BUN  35 H  


 


Creatinine  1.5 H  


 


Est GFR ( Amer)  40  


 


Est GFR (Non-Af Amer)  33  


 


POC Glucose (mg/dL)   


 


Random Glucose  104  


 


Hemoglobin A1c    6.1


 


Calcium  9.4  


 


Iron   


 


TIBC   


 


% Saturation   


 


Total Bilirubin   


 


AST   


 


ALT   


 


Alkaline Phosphatase   


 


Troponin I  0.0320  


 


Total Protein   


 


Albumin   


 


Globulin   


 


Albumin/Globulin Ratio   


 


Vitamin B12  389  


 


Arterial Blood Potassium   


 


Urine Color   


 


Urine Clarity   


 


Urine pH   


 


Ur Specific Gravity   


 


Urine Protein   


 


Urine Glucose (UA)   


 


Urine Ketones   


 


Urine Blood   


 


Urine Nitrate   


 


Urine Bilirubin   


 


Urine Urobilinogen   


 


Ur Leukocyte Esterase   


 


Urine RBC (Auto)   


 


Urine Microscopic WBC   


 


Ur Squamous Epith Cells   


 


Urine Bacteria   


 


Blood Type   


 


Blood Type Confirm   


 


Antibody Screen   


 


Crossmatch   


 


BBK History Checked   














  11/15/17 11/15/17 11/15/17





  10:00 11:19 14:07


 


WBC   


 


RBC   


 


Hgb   


 


Hct   


 


MCV   


 


MCH   


 


MCHC   


 


RDW   


 


Plt Count   


 


MPV   


 


Neut % (Auto)   


 


Lymph % (Auto)   


 


Mono % (Auto)   


 


Eos % (Auto)   


 


Baso % (Auto)   


 


Neut #   


 


Lymph #   


 


Mono #   


 


Eos #   


 


Baso #   


 


PT   


 


INR   


 


APTT   


 


pCO2   114 H*  78 H*


 


pO2   91  109 H


 


HCO3   24.5  25.8


 


ABG pH   7.04 L*  7.19 L*


 


ABG Total CO2   34.3 H  32.2 H


 


ABG O2 Saturation   99.1 H  100.8 H


 


ABG O2 Content   10.4 L  10.0 L


 


ABG Base Excess   -0.6  1.1


 


ABG Hemoglobin   7.6 L  7.1 L


 


ABG Carboxyhemoglobin   2.8 H  2.9 H


 


POC ABG HHb (Measured)   0.9  -0.8 L


 


ABG Methemoglobin   0.3  0.5


 


ABG O2 Capacity   10.5 L  9.9 L


 


Almas Test   Yes  Yes


 


ABG Potassium   


 


A-a O2 Difference   -12.0  150.0


 


Hgb O2 Saturation   96.1  97.4


 


Glucose   


 


Lactate   


 


Vent Mode    Prvc/ac


 


Mechanical Rate    10


 


FiO2   31.0  50.0


 


Tidal Volume    450


 


PEEP    3


 


Crit Value Called To   rafat Hernandez


 


Crit Value Called By   5  203


 


Crit Value Read Back   Y  Y


 


Blood Gas Notified Time   1109  1419


 


Sodium   


 


Potassium   


 


Chloride   


 


Carbon Dioxide   


 


Anion Gap   


 


BUN   


 


Creatinine   


 


Est GFR ( Amer)   


 


Est GFR (Non-Af Amer)   


 


POC Glucose (mg/dL)   


 


Random Glucose   


 


Hemoglobin A1c   


 


Calcium   


 


Iron   


 


TIBC   


 


% Saturation   


 


Total Bilirubin   


 


AST   


 


ALT   


 


Alkaline Phosphatase   


 


Troponin I   


 


Total Protein   


 


Albumin   


 


Globulin   


 


Albumin/Globulin Ratio   


 


Vitamin B12   


 


Arterial Blood Potassium   


 


Urine Color   


 


Urine Clarity   


 


Urine pH   


 


Ur Specific Gravity   


 


Urine Protein   


 


Urine Glucose (UA)   


 


Urine Ketones   


 


Urine Blood   


 


Urine Nitrate   


 


Urine Bilirubin   


 


Urine Urobilinogen   


 


Ur Leukocyte Esterase   


 


Urine RBC (Auto)   


 


Urine Microscopic WBC   


 


Ur Squamous Epith Cells   


 


Urine Bacteria   


 


Blood Type   


 


Blood Type Confirm  A POSITIVE  


 


Antibody Screen   


 


Crossmatch   


 


BBK History Checked   














  11/15/17 11/15/17 11/15/17





  16:32 17:04 21:32


 


WBC   


 


RBC   


 


Hgb   


 


Hct   


 


MCV   


 


MCH   


 


MCHC   


 


RDW   


 


Plt Count   


 


MPV   


 


Neut % (Auto)   


 


Lymph % (Auto)   


 


Mono % (Auto)   


 


Eos % (Auto)   


 


Baso % (Auto)   


 


Neut #   


 


Lymph #   


 


Mono #   


 


Eos #   


 


Baso #   


 


PT   


 


INR   


 


APTT   


 


pCO2  49 H  


 


pO2  204 H  


 


HCO3  23.8  


 


ABG pH  7.31 L  


 


ABG Total CO2  26.2  


 


ABG O2 Saturation  100.9 H  


 


ABG O2 Content   


 


ABG Base Excess  -1.5  


 


ABG Hemoglobin   


 


ABG Carboxyhemoglobin   


 


POC ABG HHb (Measured)   


 


ABG Methemoglobin   


 


ABG O2 Capacity   


 


Almas Test  Yes  


 


ABG Potassium  4.8  


 


A-a O2 Difference  91.0  


 


Hgb O2 Saturation   


 


Glucose  185 H  


 


Lactate  2.7 H  


 


Vent Mode  Prvc/ac  


 


Mechanical Rate  18  


 


FiO2  50.0  


 


Tidal Volume  450  


 


PEEP  3  


 


Crit Value Called To   


 


Crit Value Called By   


 


Crit Value Read Back   


 


Blood Gas Notified Time   


 


Sodium  144.0  


 


Potassium   


 


Chloride  113.0 H  


 


Carbon Dioxide   


 


Anion Gap   


 


BUN   


 


Creatinine   


 


Est GFR ( Amer)   


 


Est GFR (Non-Af Amer)   


 


POC Glucose (mg/dL)   210 H  136 H


 


Random Glucose   


 


Hemoglobin A1c   


 


Calcium   


 


Iron   


 


TIBC   


 


% Saturation   


 


Total Bilirubin   


 


AST   


 


ALT   


 


Alkaline Phosphatase   


 


Troponin I   


 


Total Protein   


 


Albumin   


 


Globulin   


 


Albumin/Globulin Ratio   


 


Vitamin B12   


 


Arterial Blood Potassium  4.8  


 


Urine Color   


 


Urine Clarity   


 


Urine pH   


 


Ur Specific Gravity   


 


Urine Protein   


 


Urine Glucose (UA)   


 


Urine Ketones   


 


Urine Blood   


 


Urine Nitrate   


 


Urine Bilirubin   


 


Urine Urobilinogen   


 


Ur Leukocyte Esterase   


 


Urine RBC (Auto)   


 


Urine Microscopic WBC   


 


Ur Squamous Epith Cells   


 


Urine Bacteria   


 


Blood Type   


 


Blood Type Confirm   


 


Antibody Screen   


 


Crossmatch   


 


BBK History Checked

## 2017-11-15 NOTE — CT
PROCEDURE:  CT Cervical Spine without contrast



HISTORY:

Trauma



COMPARISON:

None available.



TECHNIQUE:

Axial computed tomography images were obtained of the cervical spine 

without the use of intravenous contrast. Coronal and sagittal 

reformatted images were created and reviewed.



Radiation dose: 



Total exam DLP = 595.09 mGy-cm.



This CT exam was performed using one or more of the following dose 

reduction techniques: Automated exposure control, adjustment of the 

mA and/or kV according to patient size, and/or use of iterative 

reconstruction technique.



FINDINGS:



VERTEBRAE:

There is an acute nondisplaced fracture at the base of the odontoid 

process with approximately 3 mm distraction of fracture fragments. 

There is mild perivertebral soft tissue swelling. There are also 

acute nondisplaced fractures in the posterior arch of C1, mildly 

distracted on the left with 2 mm distraction of fracture fragments. 

No evidence of spondylolisthesis.  Vertebral alignment is normal.  

There is normal cervical lordosis.  The atlantoaxial joint is normal.



DISCS/SPINAL CANAL/NEURAL FORAMINA:

There is mild multilevel degenerative disc disease due to combination 

of disc osteophyte complexes, uncovertebral joint hypertrophy and 

multilevel facet arthropathy, predominantly at C2-3, C3-4 and C4-5 

with variable degree of left neural foraminal stenosis.  No spinal 

canal stenosis.



PARASPINAL SOFT TISSUES:

The paraspinous soft tissues are normal. 



OTHER FINDINGS:

There is no apical pneumothorax.  There is a multinodular thyroid 

gland with asymmetric enlargement of the right thyroid lobe. 



IMPRESSION:

Acute nondisplaced type 2 odontoid fracture and nondisplaced 

fractures in the posterior arch of C1 with 2 mm distraction of 

fracture fragments in the arch on the left. Mild perivertebral soft 

tissue swelling.  No evidence of epidural hematoma or cord 

compression. Although, MRI is more sensitive for evaluation of 

intrinsic cord signal abnormality.

## 2017-11-15 NOTE — CT
PROCEDURE:  CT HEAD WITHOUT CONTRAST.



HISTORY:

r/o head bleed, unarousable



COMPARISON:

Noncontrast head CT performed 11/14/17 



TECHNIQUE:

Axial computed tomography images were obtained through the head/brain 

without intravenous contrast.  



Radiation dose:



Total exam DLP = 973.74 mGy-cm.



This CT exam was performed using one or more of the following dose 

reduction techniques: Automated exposure control, adjustment of the 

mA and/or kV according to patient size, and/or use of iterative 

reconstruction technique.



FINDINGS:



HEMORRHAGE:

No intracranial hemorrhage. 



BRAIN:

No mass effect or edema.  Scattered white matter hypodensities, which 

are nonspecific, but often seen with chronic microvascular ischemic 

disease. Please note that MRI with diffusion imaging is more 

sensitive in the detection of acute ischemic event.



VENTRICLES:

No hydrocephalus. 



CALVARIUM:

Unremarkable.



PARANASAL SINUSES:

Fluid level within the left sphenoid sinus.  Mucosal thickening/small 

fluid within the ethmoid air cells.



MASTOID AIR CELLS:

Unremarkable as visualized. No inflammatory changes.



OTHER FINDINGS:

Right scalp hematoma.



Acute oblique fracture deformity of the odontoid. 



IMPRESSION:

Acute oblique fracture deformity of the odontoid. Recommend emergent 

MRI for further evaluation. 



Right scalp hematoma. 



Fluid level within the left sphenoid sinus.  Mucosal thickening/small 

fluid within the ethmoid air cells. 



Emergent findings discussed with Dr. Khan on 11/15/17 at 8:29 a.m.

## 2017-11-16 LAB
ABG MECHANICAL RATE: 18
ALBUMIN/GLOB SERPL: 1.1 {RATIO} (ref 1–2.1)
ALP SERPL-CCNC: 49 U/L (ref 38–126)
ALT SERPL-CCNC: 39 U/L (ref 9–52)
ARTERIAL  BLOOD GAS MODE: (no result)
ARTERIAL PATENCY WRIST A: YES
AST SERPL-CCNC: 34 U/L (ref 14–36)
ATERIAL BLOOD GAS PEEP: 3
BASOPHILS # BLD AUTO: 0 K/UL (ref 0–0.2)
BASOPHILS NFR BLD: 0.3 % (ref 0–2)
BILIRUB SERPL-MCNC: 0.2 MG/DL (ref 0.2–1.3)
BUN SERPL-MCNC: 43 MG/DL (ref 7–17)
CALCIUM SERPL-MCNC: 8.5 MG/DL (ref 8.4–10.2)
CHLORIDE SERPL-SCNC: 114 MMOL/L (ref 98–107)
CO2 SERPL-SCNC: 26 MMOL/L (ref 22–30)
COHGB MFR BLD: 1 % (ref 0.5–1.5)
EOSINOPHIL # BLD AUTO: 0 K/UL (ref 0–0.7)
EOSINOPHIL NFR BLD: 0.1 % (ref 0–4)
ERYTHROCYTE [DISTWIDTH] IN BLOOD BY AUTOMATED COUNT: 16.7 % (ref 11.5–14.5)
FOLATE SERPL-MCNC: > 20 NG/ML
GLOBULIN SER-MCNC: 2.7 GM/DL (ref 2.2–3.9)
GLUCOSE SERPL-MCNC: 118 MG/DL (ref 65–105)
HCO3 BLDA-SCNC: 27.8 MMOL/L (ref 21–28)
HCT VFR BLD CALC: 20.6 % (ref 34–47)
HHB: 0.7 % (ref 0–5)
LYMPHOCYTES # BLD AUTO: 0.9 K/UL (ref 1–4.3)
LYMPHOCYTES NFR BLD AUTO: 11.2 % (ref 20–40)
MCH RBC QN AUTO: 23.9 PG (ref 27–31)
MCHC RBC AUTO-ENTMCNC: 31.2 G/DL (ref 33–37)
MCV RBC AUTO: 76.5 FL (ref 81–99)
METHGB MFR BLD: 0.9 % (ref 0–3)
MONOCYTES # BLD: 0.9 K/UL (ref 0–0.8)
MONOCYTES NFR BLD: 11.4 % (ref 0–10)
NEUTROPHILS # BLD: 6 K/UL (ref 1.8–7)
NEUTROPHILS NFR BLD AUTO: 77 % (ref 50–75)
NRBC BLD AUTO-RTO: 0.6 % (ref 0–0)
O2 CAP BLDA-SCNC: 9.6 ML/DL (ref 16–24)
O2 CT BLDA-SCNC: 9.5 ML/DL (ref 15–23)
PH BLDA: 7.56 [PH] (ref 7.35–7.45)
PLATELET # BLD: 180 K/UL (ref 130–400)
PMV BLD AUTO: 8.8 FL (ref 7.2–11.7)
PO2 BLDA: 137 MM/HG (ref 80–100)
POTASSIUM SERPL-SCNC: 4.3 MMOL/L (ref 3.6–5)
PROT SERPL-MCNC: 5.7 G/DL (ref 6.3–8.2)
SAO2 % BLDA: 97.3 % (ref 95–98)
SODIUM SERPL-SCNC: 147 MMOL/L (ref 132–148)
WBC # BLD AUTO: 7.7 K/UL (ref 4.8–10.8)

## 2017-11-16 PROCEDURE — 30233N1 TRANSFUSION OF NONAUTOLOGOUS RED BLOOD CELLS INTO PERIPHERAL VEIN, PERCUTANEOUS APPROACH: ICD-10-PCS | Performed by: INTERNAL MEDICINE

## 2017-11-16 RX ADMIN — INSULIN LISPRO SCH: 100 INJECTION, SOLUTION INTRAVENOUS; SUBCUTANEOUS at 06:37

## 2017-11-16 RX ADMIN — ALBUTEROL SULFATE SCH MG: 2.5 SOLUTION RESPIRATORY (INHALATION) at 01:12

## 2017-11-16 RX ADMIN — FOSPHENYTOIN SODIUM SCH MG: 50 INJECTION, SOLUTION INTRAMUSCULAR; INTRAVENOUS at 01:01

## 2017-11-16 RX ADMIN — IPRATROPIUM BROMIDE AND ALBUTEROL SULFATE SCH ML: .5; 3 SOLUTION RESPIRATORY (INHALATION) at 19:23

## 2017-11-16 RX ADMIN — PROPOFOL SCH MLS/HR: 10 INJECTION, EMULSION INTRAVENOUS at 07:57

## 2017-11-16 RX ADMIN — IPRATROPIUM BROMIDE AND ALBUTEROL SULFATE SCH: .5; 3 SOLUTION RESPIRATORY (INHALATION) at 08:20

## 2017-11-16 RX ADMIN — INSULIN LISPRO SCH: 100 INJECTION, SOLUTION INTRAVENOUS; SUBCUTANEOUS at 22:12

## 2017-11-16 RX ADMIN — FOSPHENYTOIN SODIUM SCH MG: 50 INJECTION, SOLUTION INTRAMUSCULAR; INTRAVENOUS at 16:50

## 2017-11-16 RX ADMIN — DEXTROSE AND SODIUM CHLORIDE SCH MLS/HR: 5; 450 INJECTION, SOLUTION INTRAVENOUS at 06:57

## 2017-11-16 RX ADMIN — INSULIN LISPRO SCH: 100 INJECTION, SOLUTION INTRAVENOUS; SUBCUTANEOUS at 11:33

## 2017-11-16 RX ADMIN — FOSPHENYTOIN SODIUM SCH MG: 50 INJECTION, SOLUTION INTRAMUSCULAR; INTRAVENOUS at 09:23

## 2017-11-16 RX ADMIN — IPRATROPIUM BROMIDE AND ALBUTEROL SULFATE SCH ML: .5; 3 SOLUTION RESPIRATORY (INHALATION) at 15:38

## 2017-11-16 RX ADMIN — IPRATROPIUM BROMIDE AND ALBUTEROL SULFATE SCH ML: .5; 3 SOLUTION RESPIRATORY (INHALATION) at 11:14

## 2017-11-16 RX ADMIN — DEXTROSE AND SODIUM CHLORIDE SCH MLS/HR: 5; 450 INJECTION, SOLUTION INTRAVENOUS at 16:31

## 2017-11-16 RX ADMIN — ALBUTEROL SULFATE SCH MG: 2.5 SOLUTION RESPIRATORY (INHALATION) at 08:19

## 2017-11-16 RX ADMIN — INSULIN LISPRO SCH UNITS: 100 INJECTION, SOLUTION INTRAVENOUS; SUBCUTANEOUS at 16:52

## 2017-11-16 NOTE — CP.CCUPN
CCU Subjective





- Physician Review


Subjective (Free Text): 





Sedated on Propofol now, still having intermittent seizures overnight, started 

on Cerebryx as per Neuro. MV adjusted to limit post-hypercapneic resp alkalosis 

now as evident on this AM's ABG. No MV weans performed today, scheduled for OR 

tomorrow AM if medicaly cleared. 





Other vitals and I/O's reviewed. 





ROS:  No other pertinent negs or positives on 10+  system review. 





PMSFH:    HTN, DMI II, COPD / asthma, osteoarthritis; no previous surgeries,   

non- smoker- Otherwise all Nursing and physician documentation reviewed to date

; no new pertinent info noted relevant to current medical problems.








IMPRESSION / MAJOR PROBLEMS NOW:


1.   Acute Type II Odontoid / C2 fracture ( nondisplaced)


2.   Acute Hypercapneic resp failure 2 #1 and contribution from COPD 

exacerbation


3.   Chronic Disease anemia


4.   HTN


5.   DM II








PLAN:


1.   Intubated by Anesthesiologist yesterday using Glidescope assistance, now 

on MV: AC 12. 450ml TV, 40% PEEP 3. Check repeat ABG, for gradual adustment in 

PCO2 levels. 


2.   To minimize patient movement, maintain Propofol drip for sedation as BP 

allows.


3.   Duonebs Q4-Q6H for now. Will try to limit steroids unless required by Neuro

/ Neurosurg. 


4.   Empiric dose of Decadron  given in ER.  


5.   Neurosurg plans noted, for OR on 11/17 pending medical clearance. 


6.   Maintain normoglycemia


7.   Recuurent seizure activity, check phenytoin levels, will re-bolus if 

necessary.


8.   PRBCs now consented to by daughter. 











CCU Objective





- Vital Signs / Intake & Output


Vital Signs (Last 4 hours): 


Vital Signs











  Pulse Resp BP Pulse Ox


 


 11/16/17 06:00  49 L  14  130/61  100


 


 11/16/17 05:00  52 L  18  134/67  100











Intake and Output (Last 8hrs): 


 Intake & Output











 11/15/17 11/16/17 11/16/17





 22:59 06:59 14:59


 


Intake Total 425 575 225


 


Output Total 100 150 20


 


Balance 325 425 205


 


Intake:   


 


   525 225


 


  Intake, Piggyback 50 50 


 


Output:   


 


  Urine 100 150 20


 


    Urethral (Mace) 100 150 20














- Physical Exam


Head: Positive for: Normocephalic, Contusion, Swelling, Ecchymosis (bilateral 

orbital areas).  Negative for: Laceration


Pupils: Positive for: Sluggish, Pinpoint


Conjunctiva: Positive for: Injected.  Negative for: Icteric


Mouth: Positive for: Moist Mucous Membranes


Pharnyx: Positive for: Normal


Neck: Positive for: Other (Hard, fixed Cervical collar in place)


Respiratory/Chest: Positive for: Decreased Breath Sounds.  Negative for: 

Accessory Muscle Use, Wheezes


Cardiovascular: Positive for: Irregular Rhythm, Bradycardic.  Negative for: 

Murmurs, Rub


Abdomen: Positive for: Normal Bowel Sounds.  Negative for: Tenderness, 

Distention, Mass/Organomegaly


Upper Extremity: Negative for: Edema


Lower Extremity: Positive for: Edema.  Negative for: CALF TENDERNESS, Cyanosis


Neurological: Negative for: GCS=15 (GCS= 6 ( [id number]))


Skin: Positive for: Warm, Dry.  Negative for: Rashes


Psychiatric: Positive for: Lethargic





- Medications


Active Medications: 


Active Medications











Generic Name Dose Route Start Last Admin





  Trade Name Freq  PRN Reason Stop Dose Admin


 


Acetaminophen  650 mg  11/14/17 23:29  11/15/17 02:14





  Tylenol 325mg Tab  PO   650 mg





  Q4 PRN   Administration





  Headache   


 


Albuterol Sulfate  2.5 mg  11/15/17 02:00  11/16/17 01:12





  Albuterol 0.083% Inhal Sol (2.5 Mg/3 Ml) Ud  INH   2.5 mg





  RQ6 MARIELY   Administration


 


Albuterol/Ipratropium  3 ml  11/15/17 16:00  11/15/17 19:07





  Duoneb 3 Mg/0.5 Mg (3 Ml) Ud  INH   3 ml





  RQID MARIELY   Administration


 


Amlodipine Besylate  2.5 mg  11/15/17 09:00  11/15/17 10:34





  Norvasc  PO   Not Given





  DAILY MARIELY   


 


Fosphenytoin Sodium  100 mg  11/15/17 17:00  11/16/17 01:01





  Cerebyx  IV   100 mg





  Q8 MARIELY   Administration


 


Propofol  1,000 mg in 100 mls @ 2.245 mls/hr  11/15/17 17:15  11/16/17 07:57





  Diprivan  IV  11/16/17 17:06  10 mcg/kg/min





  .Q24H MARIELY   4.491 mls/hr





  Protocol   Administration





  5 MCG/KG/MIN   


 


Dextrose/Sodium Chloride  1,000 mls @ 125 mls/hr  11/16/17 06:45  11/16/17 06:57





  Dextrose 5%/0.45% Ns 1000 Ml  IV  11/17/17 06:39  125 mls/hr





  .Q8H MARIELY   Administration


 


Insulin Human Lispro  0 units  11/15/17 07:30  11/16/17 06:37





  Humalog  SC   Not Given





  ACHS MARIELY   





  Protocol   


 


Lorazepam  2 mg  11/16/17 02:24  





  Ativan  IV   





  Q4 PRN   





  Seizure activity   


 


Losartan Potassium  50 mg  11/15/17 09:00  11/15/17 10:34





  Cozaar  PO   Not Given





  DAILY MARIELY   


 


Montelukast Sodium  10 mg  11/15/17 22:00  11/15/17 22:53





  Singulair  PO   Not Given





  HS MARIELY   


 


Pantoprazole Sodium  40 mg  11/15/17 12:45  11/15/17 16:37





  Protonix Inj  IVP   40 mg





  DAILY MARIELY   Administration














- Patient Studies


Lab Studies: 


 Lab Studies











  11/16/17 11/16/17 11/16/17 Range/Units





  05:19 04:52 04:20 


 


WBC     (4.8-10.8)  K/uL


 


RBC     (3.80-5.20)  Mil/uL


 


Hgb     (12.0-16.0)  g/dL


 


Hct     (34.0-47.0)  %


 


MCV     (81.0-99.0)  fl


 


MCH     (27.0-31.0)  pg


 


MCHC     (33.0-37.0)  g/dL


 


RDW     (11.5-14.5)  %


 


Plt Count     (130-400)  K/uL


 


MPV     (7.2-11.7)  fl


 


Neut % (Auto)     (50.0-75.0)  %


 


Lymph % (Auto)     (20.0-40.0)  %


 


Mono % (Auto)     (0.0-10.0)  %


 


Eos % (Auto)     (0.0-4.0)  %


 


Baso % (Auto)     (0.0-2.0)  %


 


Neut #     (1.8-7.0)  K/uL


 


Lymph #     (1.0-4.3)  K/uL


 


Mono #     (0.0-0.8)  K/uL


 


Eos #     (0.0-0.7)  K/uL


 


Baso #     (0.0-0.2)  K/uL


 


pCO2  29 L    (35-45)  mm/Hg


 


pO2  137 H    ()  mm/Hg


 


HCO3  27.8    (21-28)  mmol/L


 


ABG pH  7.56 H    (7.35-7.45)  


 


ABG Total CO2  26.9    (22-28)  mmol/L


 


ABG O2 Saturation  99.3 H    (95-98)  %


 


ABG O2 Content  9.5 L    (15-23)  ML/dL


 


ABG Base Excess  3.6 H    (-2.0-3.0)  mmol/L


 


ABG Hemoglobin  6.7 L    (11.7-17.4)  g/dL


 


ABG Carboxyhemoglobin  1.0    (0.5-1.5)  %


 


POC ABG HHb (Measured)  0.7    (0.0-5.0)  %


 


ABG Methemoglobin  0.9    (0.0-3.0)  %


 


ABG O2 Capacity  9.6 L    (16-24)  mL/dL


 


Almas Test  Yes    


 


ABG Potassium     (3.6-5.2)  mmol/L


 


A-a O2 Difference  112.0    mm/Hg


 


Hgb O2 Saturation  97.3    (95.0-98.0)  %


 


Sodium    147  (132-148)  mmol/L


 


Glucose     ()  mg/dL


 


Lactate     (0.7-2.1)  mmol/L


 


Vent Mode  Prvc/ac    


 


Mechanical Rate  18    


 


FiO2  40.0    %


 


Tidal Volume  450    


 


PEEP  3    


 


Crit Value Called To     


 


Crit Value Called By     


 


Crit Value Read Back     


 


Blood Gas Notified Time     


 


Potassium    4.3  (3.6-5.0)  MMOL/L


 


Chloride    114 H  ()  mmol/L


 


Carbon Dioxide    26  (22-30)  mmol/L


 


Anion Gap    11  (10-20)  


 


BUN    43 H  (7-17)  mg/dl


 


Creatinine    1.9 H  (0.7-1.2)  mg/dl


 


Est GFR (African Amer)    30  


 


Est GFR (Non-Af Amer)    25  


 


POC Glucose (mg/dL)   138 H   ()  mg/dL


 


Random Glucose    118 H  ()  mg/dL


 


Hemoglobin A1c     (4.2-6.5)  %


 


Calcium    8.5  (8.4-10.2)  mg/dL


 


Total Bilirubin    0.2  (0.2-1.3)  mg/dl


 


AST    34  (14-36)  U/L


 


ALT    39  (9-52)  U/L


 


Alkaline Phosphatase    49  ()  U/L


 


Total Protein    5.7 L  (6.3-8.2)  G/DL


 


Albumin    3.0 L D  (3.5-5.0)  g/dL


 


Globulin    2.7  (2.2-3.9)  gm/dL


 


Albumin/Globulin Ratio    1.1  (1.0-2.1)  


 


Arterial Blood Potassium     (3.6-5.2)  mmol/L


 


Blood Type     


 


Blood Type Confirm     


 


Antibody Screen     


 


Crossmatch     


 


BBK History Checked     














  11/16/17 11/15/17 11/15/17 Range/Units





  04:20 21:32 17:04 


 


WBC  7.7    (4.8-10.8)  K/uL


 


RBC  2.69 L    (3.80-5.20)  Mil/uL


 


Hgb  6.4 L*    (12.0-16.0)  g/dL


 


Hct  20.6 L    (34.0-47.0)  %


 


MCV  76.5 L D    (81.0-99.0)  fl


 


MCH  23.9 L    (27.0-31.0)  pg


 


MCHC  31.2 L    (33.0-37.0)  g/dL


 


RDW  16.7 H    (11.5-14.5)  %


 


Plt Count  180    (130-400)  K/uL


 


MPV  8.8    (7.2-11.7)  fl


 


Neut % (Auto)  77.0 H    (50.0-75.0)  %


 


Lymph % (Auto)  11.2 L    (20.0-40.0)  %


 


Mono % (Auto)  11.4 H    (0.0-10.0)  %


 


Eos % (Auto)  0.1    (0.0-4.0)  %


 


Baso % (Auto)  0.3    (0.0-2.0)  %


 


Neut #  6.0    (1.8-7.0)  K/uL


 


Lymph #  0.9 L    (1.0-4.3)  K/uL


 


Mono #  0.9 H    (0.0-0.8)  K/uL


 


Eos #  0.0    (0.0-0.7)  K/uL


 


Baso #  0.0    (0.0-0.2)  K/uL


 


pCO2     (35-45)  mm/Hg


 


pO2     ()  mm/Hg


 


HCO3     (21-28)  mmol/L


 


ABG pH     (7.35-7.45)  


 


ABG Total CO2     (22-28)  mmol/L


 


ABG O2 Saturation     (95-98)  %


 


ABG O2 Content     (15-23)  ML/dL


 


ABG Base Excess     (-2.0-3.0)  mmol/L


 


ABG Hemoglobin     (11.7-17.4)  g/dL


 


ABG Carboxyhemoglobin     (0.5-1.5)  %


 


POC ABG HHb (Measured)     (0.0-5.0)  %


 


ABG Methemoglobin     (0.0-3.0)  %


 


ABG O2 Capacity     (16-24)  mL/dL


 


Almas Test     


 


ABG Potassium     (3.6-5.2)  mmol/L


 


A-a O2 Difference     mm/Hg


 


Hgb O2 Saturation     (95.0-98.0)  %


 


Sodium     (132-148)  mmol/L


 


Glucose     ()  mg/dL


 


Lactate     (0.7-2.1)  mmol/L


 


Vent Mode     


 


Mechanical Rate     


 


FiO2     %


 


Tidal Volume     


 


PEEP     


 


Crit Value Called To     


 


Crit Value Called By     


 


Crit Value Read Back     


 


Blood Gas Notified Time     


 


Potassium     (3.6-5.0)  MMOL/L


 


Chloride     ()  mmol/L


 


Carbon Dioxide     (22-30)  mmol/L


 


Anion Gap     (10-20)  


 


BUN     (7-17)  mg/dl


 


Creatinine     (0.7-1.2)  mg/dl


 


Est GFR (African Amer)     


 


Est GFR (Non-Af Amer)     


 


POC Glucose (mg/dL)   136 H  210 H  ()  mg/dL


 


Random Glucose     ()  mg/dL


 


Hemoglobin A1c     (4.2-6.5)  %


 


Calcium     (8.4-10.2)  mg/dL


 


Total Bilirubin     (0.2-1.3)  mg/dl


 


AST     (14-36)  U/L


 


ALT     (9-52)  U/L


 


Alkaline Phosphatase     ()  U/L


 


Total Protein     (6.3-8.2)  G/DL


 


Albumin     (3.5-5.0)  g/dL


 


Globulin     (2.2-3.9)  gm/dL


 


Albumin/Globulin Ratio     (1.0-2.1)  


 


Arterial Blood Potassium     (3.6-5.2)  mmol/L


 


Blood Type     


 


Blood Type Confirm     


 


Antibody Screen     


 


Crossmatch     


 


BBK History Checked     














  11/15/17 11/15/17 11/15/17 Range/Units





  16:32 14:07 11:19 


 


WBC     (4.8-10.8)  K/uL


 


RBC     (3.80-5.20)  Mil/uL


 


Hgb     (12.0-16.0)  g/dL


 


Hct     (34.0-47.0)  %


 


MCV     (81.0-99.0)  fl


 


MCH     (27.0-31.0)  pg


 


MCHC     (33.0-37.0)  g/dL


 


RDW     (11.5-14.5)  %


 


Plt Count     (130-400)  K/uL


 


MPV     (7.2-11.7)  fl


 


Neut % (Auto)     (50.0-75.0)  %


 


Lymph % (Auto)     (20.0-40.0)  %


 


Mono % (Auto)     (0.0-10.0)  %


 


Eos % (Auto)     (0.0-4.0)  %


 


Baso % (Auto)     (0.0-2.0)  %


 


Neut #     (1.8-7.0)  K/uL


 


Lymph #     (1.0-4.3)  K/uL


 


Mono #     (0.0-0.8)  K/uL


 


Eos #     (0.0-0.7)  K/uL


 


Baso #     (0.0-0.2)  K/uL


 


pCO2  49 H  78 H*  114 H*  (35-45)  mm/Hg


 


pO2  204 H  109 H  91  ()  mm/Hg


 


HCO3  23.8  25.8  24.5  (21-28)  mmol/L


 


ABG pH  7.31 L  7.19 L*  7.04 L*  (7.35-7.45)  


 


ABG Total CO2  26.2  32.2 H  34.3 H  (22-28)  mmol/L


 


ABG O2 Saturation  100.9 H  100.8 H  99.1 H  (95-98)  %


 


ABG O2 Content   10.0 L  10.4 L  (15-23)  ML/dL


 


ABG Base Excess  -1.5  1.1  -0.6  (-2.0-3.0)  mmol/L


 


ABG Hemoglobin   7.1 L  7.6 L  (11.7-17.4)  g/dL


 


ABG Carboxyhemoglobin   2.9 H  2.8 H  (0.5-1.5)  %


 


POC ABG HHb (Measured)   -0.8 L  0.9  (0.0-5.0)  %


 


ABG Methemoglobin   0.5  0.3  (0.0-3.0)  %


 


ABG O2 Capacity   9.9 L  10.5 L  (16-24)  mL/dL


 


Almas Test  Yes  Yes  Yes  


 


ABG Potassium  4.8    (3.6-5.2)  mmol/L


 


A-a O2 Difference  91.0  150.0  -12.0  mm/Hg


 


Hgb O2 Saturation   97.4  96.1  (95.0-98.0)  %


 


Sodium  144.0    (132-148)  mmol/L


 


Glucose  185 H    ()  mg/dL


 


Lactate  2.7 H    (0.7-2.1)  mmol/L


 


Vent Mode  Prvc/ac  Prvc/ac   


 


Mechanical Rate  18  10   


 


FiO2  50.0  50.0  31.0  %


 


Tidal Volume  450  450   


 


PEEP  3  3   


 


Crit Value Called To   rafat [address]  


 


Crit Value Called By   203  5  


 


Crit Value Read Back   Y  Y  


 


Blood Gas Notified Time   1419  1109  


 


Potassium     (3.6-5.0)  MMOL/L


 


Chloride  113.0 H    ()  mmol/L


 


Carbon Dioxide     (22-30)  mmol/L


 


Anion Gap     (10-20)  


 


BUN     (7-17)  mg/dl


 


Creatinine     (0.7-1.2)  mg/dl


 


Est GFR (African Amer)     


 


Est GFR (Non-Af Amer)     


 


POC Glucose (mg/dL)     ()  mg/dL


 


Random Glucose     ()  mg/dL


 


Hemoglobin A1c     (4.2-6.5)  %


 


Calcium     (8.4-10.2)  mg/dL


 


Total Bilirubin     (0.2-1.3)  mg/dl


 


AST     (14-36)  U/L


 


ALT     (9-52)  U/L


 


Alkaline Phosphatase     ()  U/L


 


Total Protein     (6.3-8.2)  G/DL


 


Albumin     (3.5-5.0)  g/dL


 


Globulin     (2.2-3.9)  gm/dL


 


Albumin/Globulin Ratio     (1.0-2.1)  


 


Arterial Blood Potassium  4.8    (3.6-5.2)  mmol/L


 


Blood Type     


 


Blood Type Confirm     


 


Antibody Screen     


 


Crossmatch     


 


BBK History Checked     














  11/15/17 11/15/17 11/15/17 Range/Units





  10:00 05:12 05:12 


 


WBC     (4.8-10.8)  K/uL


 


RBC     (3.80-5.20)  Mil/uL


 


Hgb     (12.0-16.0)  g/dL


 


Hct     (34.0-47.0)  %


 


MCV     (81.0-99.0)  fl


 


MCH     (27.0-31.0)  pg


 


MCHC     (33.0-37.0)  g/dL


 


RDW     (11.5-14.5)  %


 


Plt Count     (130-400)  K/uL


 


MPV     (7.2-11.7)  fl


 


Neut % (Auto)     (50.0-75.0)  %


 


Lymph % (Auto)     (20.0-40.0)  %


 


Mono % (Auto)     (0.0-10.0)  %


 


Eos % (Auto)     (0.0-4.0)  %


 


Baso % (Auto)     (0.0-2.0)  %


 


Neut #     (1.8-7.0)  K/uL


 


Lymph #     (1.0-4.3)  K/uL


 


Mono #     (0.0-0.8)  K/uL


 


Eos #     (0.0-0.7)  K/uL


 


Baso #     (0.0-0.2)  K/uL


 


pCO2     (35-45)  mm/Hg


 


pO2     ()  mm/Hg


 


HCO3     (21-28)  mmol/L


 


ABG pH     (7.35-7.45)  


 


ABG Total CO2     (22-28)  mmol/L


 


ABG O2 Saturation     (95-98)  %


 


ABG O2 Content     (15-23)  ML/dL


 


ABG Base Excess     (-2.0-3.0)  mmol/L


 


ABG Hemoglobin     (11.7-17.4)  g/dL


 


ABG Carboxyhemoglobin     (0.5-1.5)  %


 


POC ABG HHb (Measured)     (0.0-5.0)  %


 


ABG Methemoglobin     (0.0-3.0)  %


 


ABG O2 Capacity     (16-24)  mL/dL


 


Almas Test     


 


ABG Potassium     (3.6-5.2)  mmol/L


 


A-a O2 Difference     mm/Hg


 


Hgb O2 Saturation     (95.0-98.0)  %


 


Sodium     (132-148)  mmol/L


 


Glucose     ()  mg/dL


 


Lactate     (0.7-2.1)  mmol/L


 


Vent Mode     


 


Mechanical Rate     


 


FiO2     %


 


Tidal Volume     


 


PEEP     


 


Crit Value Called To     


 


Crit Value Called By     


 


Crit Value Read Back     


 


Blood Gas Notified Time     


 


Potassium     (3.6-5.0)  MMOL/L


 


Chloride    110 H  ()  mmol/L


 


Carbon Dioxide     (22-30)  mmol/L


 


Anion Gap    14  (10-20)  


 


BUN     (7-17)  mg/dl


 


Creatinine     (0.7-1.2)  mg/dl


 


Est GFR (African Amer)     


 


Est GFR (Non-Af Amer)     


 


POC Glucose (mg/dL)     ()  mg/dL


 


Random Glucose     ()  mg/dL


 


Hemoglobin A1c   6.1   (4.2-6.5)  %


 


Calcium     (8.4-10.2)  mg/dL


 


Total Bilirubin     (0.2-1.3)  mg/dl


 


AST     (14-36)  U/L


 


ALT     (9-52)  U/L


 


Alkaline Phosphatase     ()  U/L


 


Total Protein     (6.3-8.2)  G/DL


 


Albumin     (3.5-5.0)  g/dL


 


Globulin     (2.2-3.9)  gm/dL


 


Albumin/Globulin Ratio     (1.0-2.1)  


 


Arterial Blood Potassium     (3.6-5.2)  mmol/L


 


Blood Type     


 


Blood Type Confirm  A POSITIVE    


 


Antibody Screen     


 


Crossmatch     


 


BBK History Checked     














  11/15/17 11/14/17 11/14/17 Range/Units





  00:31 21:54 21:07 


 


WBC     (4.8-10.8)  K/uL


 


RBC     (3.80-5.20)  Mil/uL


 


Hgb     (12.0-16.0)  g/dL


 


Hct     (34.0-47.0)  %


 


MCV     (81.0-99.0)  fl


 


MCH     (27.0-31.0)  pg


 


MCHC     (33.0-37.0)  g/dL


 


RDW     (11.5-14.5)  %


 


Plt Count     (130-400)  K/uL


 


MPV     (7.2-11.7)  fl


 


Neut % (Auto)     (50.0-75.0)  %


 


Lymph % (Auto)     (20.0-40.0)  %


 


Mono % (Auto)     (0.0-10.0)  %


 


Eos % (Auto)     (0.0-4.0)  %


 


Baso % (Auto)     (0.0-2.0)  %


 


Neut #     (1.8-7.0)  K/uL


 


Lymph #     (1.0-4.3)  K/uL


 


Mono #     (0.0-0.8)  K/uL


 


Eos #     (0.0-0.7)  K/uL


 


Baso #     (0.0-0.2)  K/uL


 


pCO2     (35-45)  mm/Hg


 


pO2     ()  mm/Hg


 


HCO3     (21-28)  mmol/L


 


ABG pH     (7.35-7.45)  


 


ABG Total CO2     (22-28)  mmol/L


 


ABG O2 Saturation     (95-98)  %


 


ABG O2 Content     (15-23)  ML/dL


 


ABG Base Excess     (-2.0-3.0)  mmol/L


 


ABG Hemoglobin     (11.7-17.4)  g/dL


 


ABG Carboxyhemoglobin     (0.5-1.5)  %


 


POC ABG HHb (Measured)     (0.0-5.0)  %


 


ABG Methemoglobin     (0.0-3.0)  %


 


ABG O2 Capacity     (16-24)  mL/dL


 


Almas Test     


 


ABG Potassium     (3.6-5.2)  mmol/L


 


A-a O2 Difference     mm/Hg


 


Hgb O2 Saturation     (95.0-98.0)  %


 


Sodium     (132-148)  mmol/L


 


Glucose     ()  mg/dL


 


Lactate     (0.7-2.1)  mmol/L


 


Vent Mode     


 


Mechanical Rate     


 


FiO2     %


 


Tidal Volume     


 


PEEP     


 


Crit Value Called To     


 


Crit Value Called By     


 


Crit Value Read Back     


 


Blood Gas Notified Time     


 


Potassium     (3.6-5.0)  MMOL/L


 


Chloride   109 H   ()  mmol/L


 


Carbon Dioxide     (22-30)  mmol/L


 


Anion Gap     (10-20)  


 


BUN     (7-17)  mg/dl


 


Creatinine     (0.7-1.2)  mg/dl


 


Est GFR (African Amer)     


 


Est GFR (Non-Af Amer)     


 


POC Glucose (mg/dL)    111 H  ()  mg/dL


 


Random Glucose     ()  mg/dL


 


Hemoglobin A1c     (4.2-6.5)  %


 


Calcium     (8.4-10.2)  mg/dL


 


Total Bilirubin     (0.2-1.3)  mg/dl


 


AST     (14-36)  U/L


 


ALT     (9-52)  U/L


 


Alkaline Phosphatase     ()  U/L


 


Total Protein     (6.3-8.2)  G/DL


 


Albumin     (3.5-5.0)  g/dL


 


Globulin     (2.2-3.9)  gm/dL


 


Albumin/Globulin Ratio     (1.0-2.1)  


 


Arterial Blood Potassium     (3.6-5.2)  mmol/L


 


Blood Type  A POSITIVE    


 


Blood Type Confirm     


 


Antibody Screen  Negative    


 


Crossmatch  See Detail    


 


BBK History Checked  No verified bt    








 Laboratory Results - last 24 hr











  11/14/17 11/14/17 11/15/17





  21:07 21:54 00:31


 


WBC   


 


RBC   


 


Hgb   


 


Hct   


 


MCV   


 


MCH   


 


MCHC   


 


RDW   


 


Plt Count   


 


MPV   


 


Neut % (Auto)   


 


Lymph % (Auto)   


 


Mono % (Auto)   


 


Eos % (Auto)   


 


Baso % (Auto)   


 


Neut #   


 


Lymph #   


 


Mono #   


 


Eos #   


 


Baso #   


 


pCO2   


 


pO2   


 


HCO3   


 


ABG pH   


 


ABG Total CO2   


 


ABG O2 Saturation   


 


ABG O2 Content   


 


ABG Base Excess   


 


ABG Hemoglobin   


 


ABG Carboxyhemoglobin   


 


POC ABG HHb (Measured)   


 


ABG Methemoglobin   


 


ABG O2 Capacity   


 


Almas Test   


 


ABG Potassium   


 


A-a O2 Difference   


 


Hgb O2 Saturation   


 


Sodium   


 


Glucose   


 


Lactate   


 


Vent Mode   


 


Mechanical Rate   


 


FiO2   


 


Tidal Volume   


 


PEEP   


 


Crit Value Called To   


 


Crit Value Called By   


 


Crit Value Read Back   


 


Blood Gas Notified Time   


 


Potassium   


 


Chloride   109 H 


 


Carbon Dioxide   


 


Anion Gap   


 


BUN   


 


Creatinine   


 


Est GFR ( Amer)   


 


Est GFR (Non-Af Amer)   


 


POC Glucose (mg/dL)  111 H  


 


Random Glucose   


 


Hemoglobin A1c   


 


Calcium   


 


Total Bilirubin   


 


AST   


 


ALT   


 


Alkaline Phosphatase   


 


Total Protein   


 


Albumin   


 


Globulin   


 


Albumin/Globulin Ratio   


 


Arterial Blood Potassium   


 


Blood Type    A POSITIVE


 


Blood Type Confirm   


 


Antibody Screen    Negative


 


Crossmatch    See Detail


 


BBK History Checked    No verified bt














  11/15/17 11/15/17 11/15/17





  05:12 05:12 10:00


 


WBC   


 


RBC   


 


Hgb   


 


Hct   


 


MCV   


 


MCH   


 


MCHC   


 


RDW   


 


Plt Count   


 


MPV   


 


Neut % (Auto)   


 


Lymph % (Auto)   


 


Mono % (Auto)   


 


Eos % (Auto)   


 


Baso % (Auto)   


 


Neut #   


 


Lymph #   


 


Mono #   


 


Eos #   


 


Baso #   


 


pCO2   


 


pO2   


 


HCO3   


 


ABG pH   


 


ABG Total CO2   


 


ABG O2 Saturation   


 


ABG O2 Content   


 


ABG Base Excess   


 


ABG Hemoglobin   


 


ABG Carboxyhemoglobin   


 


POC ABG HHb (Measured)   


 


ABG Methemoglobin   


 


ABG O2 Capacity   


 


Almas Test   


 


ABG Potassium   


 


A-a O2 Difference   


 


Hgb O2 Saturation   


 


Sodium   


 


Glucose   


 


Lactate   


 


Vent Mode   


 


Mechanical Rate   


 


FiO2   


 


Tidal Volume   


 


PEEP   


 


Crit Value Called To   


 


Crit Value Called By   


 


Crit Value Read Back   


 


Blood Gas Notified Time   


 


Potassium   


 


Chloride  110 H  


 


Carbon Dioxide   


 


Anion Gap  14  


 


BUN   


 


Creatinine   


 


Est GFR ( Amer)   


 


Est GFR (Non-Af Amer)   


 


POC Glucose (mg/dL)   


 


Random Glucose   


 


Hemoglobin A1c   6.1 


 


Calcium   


 


Total Bilirubin   


 


AST   


 


ALT   


 


Alkaline Phosphatase   


 


Total Protein   


 


Albumin   


 


Globulin   


 


Albumin/Globulin Ratio   


 


Arterial Blood Potassium   


 


Blood Type   


 


Blood Type Confirm    A POSITIVE


 


Antibody Screen   


 


Crossmatch   


 


BBK History Checked   














  11/15/17 11/15/17 11/15/17





  11:19 14:07 16:32


 


WBC   


 


RBC   


 


Hgb   


 


Hct   


 


MCV   


 


MCH   


 


MCHC   


 


RDW   


 


Plt Count   


 


MPV   


 


Neut % (Auto)   


 


Lymph % (Auto)   


 


Mono % (Auto)   


 


Eos % (Auto)   


 


Baso % (Auto)   


 


Neut #   


 


Lymph #   


 


Mono #   


 


Eos #   


 


Baso #   


 


pCO2  114 H*  78 H*  49 H


 


pO2  91  109 H  204 H


 


HCO3  24.5  25.8  23.8


 


ABG pH  7.04 L*  7.19 L*  7.31 L


 


ABG Total CO2  34.3 H  32.2 H  26.2


 


ABG O2 Saturation  99.1 H  100.8 H  100.9 H


 


ABG O2 Content  10.4 L  10.0 L 


 


ABG Base Excess  -0.6  1.1  -1.5


 


ABG Hemoglobin  7.6 L  7.1 L 


 


ABG Carboxyhemoglobin  2.8 H  2.9 H 


 


POC ABG HHb (Measured)  0.9  -0.8 L 


 


ABG Methemoglobin  0.3  0.5 


 


ABG O2 Capacity  10.5 L  9.9 L 


 


Almas Test  Yes  Yes  Yes


 


ABG Potassium    4.8


 


A-a O2 Difference  -12.0  150.0  91.0


 


Hgb O2 Saturation  96.1  97.4 


 


Sodium    144.0


 


Glucose    185 H


 


Lactate    2.7 H


 


Vent Mode   Prvc/ac  Prvc/ac


 


Mechanical Rate   10  18


 


FiO2  31.0  50.0  50.0


 


Tidal Volume   450  450


 


PEEP   3  3


 


Crit Value Called To  rafat Hernandez 


 


Crit Value Called By  5  203 


 


Crit Value Read Back  Y  Y 


 


Blood Gas Notified Time  1109  1419 


 


Potassium   


 


Chloride    113.0 H


 


Carbon Dioxide   


 


Anion Gap   


 


BUN   


 


Creatinine   


 


Est GFR ( Amer)   


 


Est GFR (Non-Af Amer)   


 


POC Glucose (mg/dL)   


 


Random Glucose   


 


Hemoglobin A1c   


 


Calcium   


 


Total Bilirubin   


 


AST   


 


ALT   


 


Alkaline Phosphatase   


 


Total Protein   


 


Albumin   


 


Globulin   


 


Albumin/Globulin Ratio   


 


Arterial Blood Potassium    4.8


 


Blood Type   


 


Blood Type Confirm   


 


Antibody Screen   


 


Crossmatch   


 


BBK History Checked   














  11/15/17 11/15/17 11/16/17





  17:04 21:32 04:20


 


WBC    7.7


 


RBC    2.69 L


 


Hgb    6.4 L*


 


Hct    20.6 L


 


MCV    76.5 L D


 


MCH    23.9 L


 


MCHC    31.2 L


 


RDW    16.7 H


 


Plt Count    180


 


MPV    8.8


 


Neut % (Auto)    77.0 H


 


Lymph % (Auto)    11.2 L


 


Mono % (Auto)    11.4 H


 


Eos % (Auto)    0.1


 


Baso % (Auto)    0.3


 


Neut #    6.0


 


Lymph #    0.9 L


 


Mono #    0.9 H


 


Eos #    0.0


 


Baso #    0.0


 


pCO2   


 


pO2   


 


HCO3   


 


ABG pH   


 


ABG Total CO2   


 


ABG O2 Saturation   


 


ABG O2 Content   


 


ABG Base Excess   


 


ABG Hemoglobin   


 


ABG Carboxyhemoglobin   


 


POC ABG HHb (Measured)   


 


ABG Methemoglobin   


 


ABG O2 Capacity   


 


Almas Test   


 


ABG Potassium   


 


A-a O2 Difference   


 


Hgb O2 Saturation   


 


Sodium   


 


Glucose   


 


Lactate   


 


Vent Mode   


 


Mechanical Rate   


 


FiO2   


 


Tidal Volume   


 


PEEP   


 


Crit Value Called To   


 


Crit Value Called By   


 


Crit Value Read Back   


 


Blood Gas Notified Time   


 


Potassium   


 


Chloride   


 


Carbon Dioxide   


 


Anion Gap   


 


BUN   


 


Creatinine   


 


Est GFR ( Amer)   


 


Est GFR (Non-Af Amer)   


 


POC Glucose (mg/dL)  210 H  136 H 


 


Random Glucose   


 


Hemoglobin A1c   


 


Calcium   


 


Total Bilirubin   


 


AST   


 


ALT   


 


Alkaline Phosphatase   


 


Total Protein   


 


Albumin   


 


Globulin   


 


Albumin/Globulin Ratio   


 


Arterial Blood Potassium   


 


Blood Type   


 


Blood Type Confirm   


 


Antibody Screen   


 


Crossmatch   


 


BBK History Checked   














  11/16/17 11/16/17 11/16/17





  04:20 04:52 05:19


 


WBC   


 


RBC   


 


Hgb   


 


Hct   


 


MCV   


 


MCH   


 


MCHC   


 


RDW   


 


Plt Count   


 


MPV   


 


Neut % (Auto)   


 


Lymph % (Auto)   


 


Mono % (Auto)   


 


Eos % (Auto)   


 


Baso % (Auto)   


 


Neut #   


 


Lymph #   


 


Mono #   


 


Eos #   


 


Baso #   


 


pCO2    29 L


 


pO2    137 H


 


HCO3    27.8


 


ABG pH    7.56 H


 


ABG Total CO2    26.9


 


ABG O2 Saturation    99.3 H


 


ABG O2 Content    9.5 L


 


ABG Base Excess    3.6 H


 


ABG Hemoglobin    6.7 L


 


ABG Carboxyhemoglobin    1.0


 


POC ABG HHb (Measured)    0.7


 


ABG Methemoglobin    0.9


 


ABG O2 Capacity    9.6 L


 


Almas Test    Yes


 


ABG Potassium   


 


A-a O2 Difference    112.0


 


Hgb O2 Saturation    97.3


 


Sodium  147  


 


Glucose   


 


Lactate   


 


Vent Mode    Prvc/ac


 


Mechanical Rate    18


 


FiO2    40.0


 


Tidal Volume    450


 


PEEP    3


 


Crit Value Called To   


 


Crit Value Called By   


 


Crit Value Read Back   


 


Blood Gas Notified Time   


 


Potassium  4.3  


 


Chloride  114 H  


 


Carbon Dioxide  26  


 


Anion Gap  11  


 


BUN  43 H  


 


Creatinine  1.9 H  


 


Est GFR ( Amer)  30  


 


Est GFR (Non-Af Amer)  25  


 


POC Glucose (mg/dL)   138 H 


 


Random Glucose  118 H  


 


Hemoglobin A1c   


 


Calcium  8.5  


 


Total Bilirubin  0.2  


 


AST  34  


 


ALT  39  


 


Alkaline Phosphatase  49  


 


Total Protein  5.7 L  


 


Albumin  3.0 L D  


 


Globulin  2.7  


 


Albumin/Globulin Ratio  1.1  


 


Arterial Blood Potassium   


 


Blood Type   


 


Blood Type Confirm   


 


Antibody Screen   


 


Crossmatch   


 


BBK History Checked   











Fingerstick Blood Sugar Results: 138





Review of Systems





- Review of Systems


Systems not reviewed;Unavailable: Intubated





Critical Care Progress Note





- Ventilator Checklist


Head of Bed 30 Degrees: Yes


Daily Sedation Vacation: Yes


Daily Assessment of Readiness to Wean: No (no MV weans while awaiting OR 

procedure in less than 24h.)


Daily Spontaneous Breathing Trial: No (no MV weans while awaiting OR procedure 

in less than 24h.)


PUD Prophalyxis: Yes


DVT Prophylaxis: Yes


Oral Care with Chlorhexidine Gluconate {CHG}: Yes





- Vent Settings


MODE:: ASSIST CONTROL


TIDAL VOLUME:: 450


RESP RATE:: 12


FIO2:: 40


PEEP:: 3





- Extremities/Vascular


Does the Patient have a Central Venous Catheter?: No


Does the Patient need a Central Venous Catheter?: No


Does the Patient have a Mace Catheter?: Yes


Does the Patient need a Mace Catheter?: Yes


Catheter Insertion Criteria: Need for accurate measurement of output in 

critically ill patient





- Prophylaxis GI


Prophylaxis GI: PPI





- Prophylaxis DVT


Prophylaxis DVT: SCDs





- Nutrition


Nutrition: 


 Nutrition











 Category Date Time Status


 


 NPO Diet [DIET] Diets  11/15/17 Breakfast Active

## 2017-11-16 NOTE — CON
DATE:  11/15/2017



HISTORY OF PRESENT ILLNESS:  Ms. Lexy Herndon is an 88-year-old, right

handed, moderately obese,  female, brought in to [facility] following a fall from the chair being witnessed by her daughter. 

She fell forward, hitting the wooden floor on her head.  No history of

seizure activities, bowel or bladder incontinence.  Immediately, 911 was

called and patient was brought in to the hospital.  During the hospital

course, the patient did have workup, found that odontoid process fracture

and she was placed on [place] collar.  This evening, patient had

witnessed generalized tonic-clonic activities.  Patient was electively

intubated prior to the seizure activities.  Patient was advised to give

Cerebyx to break seizure activities.



PAST MEDICAL HISTORY:  Bronchial asthma, hypertension.  Patient is also

known to be prediabetic.



ALLERGIES:  NO KNOWN ALLERGIES:



PERSONAL HISTORY:  Denies smoking or alcohol use.



REVIEW OF SYSTEMS:  A 12-point system has been reviewed.  From neuro, new

onset of seizures.



PHYSICAL EXAMINATION:

VITAL SIGNS:  Blood pressure 140/68, mean arterial pressure 92, respiratory

rate 18, on vent.  Temperature 97.6.  Pulse rate 58.

NECK:  Under [place] collar.

GENERAL:  Patient is sedated.  She is not moving any of the 4 extremities. 

On stopping the propofol, she has some movement of her both upper

extremities; however, it is not under command.  She spontaneously moves.

HEENT:  Face, ecchymosis and edema in both orbits.  Scalp edema over the

right parietal region.  No conjunctival hemorrhage.  Pupils seems to be

sluggishly reactive.  Corneal reflex present.

EXTREMITIES:  No movement of both upper and lower extremities.

NEUROLOGIC:  Deep tendon reflexes are absent.  Plantars are without

response.  Patient not responding to pain.



WORKUP:  CT of the head, no acute pathology.  CT of the cervical spine and

MRI of the cervical spine being reviewed showed nondisplaced type 2

odontoid fracture.  There is 2-mm distraction of the fracture fragment in

the arch on the left side.  There is no edema noted.  MRI of the cervical

spine, there is herniated disc and severe degenerative disc disease with

pressure on thecal sac.



BLOOD WORKUP:  WBC 9.2, hemoglobin 7.4, hematocrit 24.6, platelet 221.  PT

12.4, INR 1.1, PTT 22.8.  Sodium 148, potassium 4.5, chloride 110,

bicarbonate 28, BUN 35.  Urinalysis shows 13 rbc's.



CONCLUSION:  Ms. Lexy Herndon is presenting with post-concussion

syndrome, superimposed with probably contusion in the brain, could be

precipitating her generalized tonic-clonic activities.  Patient also had

odontoid fracture from the fall.  The current examination does not show any

myelopathy.



RECOMMENDATIONS:  Cerebyx is given, continue with given bolus, followed

with 100 mg q. 8 hours.  Keep that therapeutic level.  Deep vein thrombosis

prophylaxis.  Patient is scheduled for neurosurgical procedure to stabilize

her fracture.  However, I strongly recommend her to have medical as well as

cardiology clearance prior to the surgical approach.



The patient's condition has been well discussed with her daughter.  Patient

will be followed closely with you.





__________________________________________

Girish Huang MD



DD:  11/15/2017 19:35:49

DT:  11/16/2017 1:42:37

Job # [id number]

## 2017-11-16 NOTE — RAD
HISTORY:

vented  



COMPARISON:

Portable chest 11/15/2017. 



FINDINGS:

Endotracheal tube is unchanged in position with nasogastric tube 

again identified placed with the tip off the inferior margins of the 

image but directed into the abdomen. 



LUNGS:

Interval bilateral basilar opacity may indicate bilateral atelectasis 

though pneumonia is not excluded.



PLEURA:

Bilateral pleural effusions are in question given opacity at the 

bilateral lung bases and lack of definition of either hemidiaphragm. 

No pneumothorax bilaterally.



CARDIOVASCULAR:

Cardiac silhouette is stable. No definite pulmonary vascular 

derangement appreciated this time.



OSSEOUS STRUCTURES:

No significant abnormalities.



VISUALIZED UPPER ABDOMEN:

Normal.



OTHER FINDINGS:

None.



IMPRESSION:

The bilateral hemidiaphragms are obscured suggesting interval 

bilateral basilar airspace disease or effusions or combination of 

both.  No pneumothorax bilaterally. Endotracheal tube and nasogastric 

tube are unchanged in appearance as imaged.

## 2017-11-16 NOTE — CON
DATE:



HISTORY OF PRESENT ILLNESS:  An 88-year-old woman, history of asthma, COPD,

diabetes, hypertension, came to the Emergency Room after falling from

sitting on the chair.  She had ecchymosis around her face and forehead. 

She was awake, but did not remember the incident.  She was taken to CT,

showed that no intracranial hemorrhage.  Several hours after being brought

into the ER, her mental status changed, she became more lethargic.  A

repeat CT was done and it did not show any intracranial hemorrhage or

lesion, but it did catch a fracture of the dens at C2.  She subsequently

underwent a CT and an MRI of the C-spine, which demonstrate type 2 odontoid

fracture.  Her mental status continued to wean.  Blood gas demonstrated a

pCO2 of approximately 120 and currently she is in the process of being

intubated.



PHYSICAL EXAMINATION:  Her pupils are equal and reactive.  She is not

following commands.  She is moving all extremities purposefully to pin. 

Reflexes are 1/4.



IMPRESSION AND PLAN:  At this point, she has a type 2 odontoid fracture,

most likely this will not heal without a fusion fixation.  I discussed this

with the daughter.  I explained the risks, benefits, and alternatives, and

the daughter wishes to proceed with surgery.  I will schedule for Friday

morning, keeping the [employer] collar until then, get medical clearance, and

if there is no change in her mental status, then we will rethink doing

surgery at this time.





__________________________________________

Fadi Pretty MD



DD:  11/15/2017 12:01:02

DT:  11/15/2017 13:31:11

Job # [id number]

## 2017-11-16 NOTE — PN
DATE:  11/16/2017



NEUROLOGICAL PROBLEM:  Postconcussion syndrome with new onset of seizures

and odontoid fracture.



PHYSICAL EXAMINATION:

VITAL SIGNS:  Blood pressure 130/63, mean arterial pressure of 85,

respirations are 12, temperature afebrile with a pulse rate of 55.

NEUROLOGICAL:  The patient is sedated with propofol and Cerebyx.  The

patient's eyes are closed.  Ecchymosis noted over her eye and facial

region.  Pupils are sluggishly reactive to light.  Right side tone is

increased to compare with the left side.  Right side leg is slightly

externally rotated.  Plantars are withdrawn, respond to pain symmetrically

on both sides.



ASSESSMENT AND PLAN:  The patient is stable with antiepileptic drug at

present.  The patient was seen by cardiologist and she was cleared for her

surgery tomorrow.



The patient will be followed closely while she is in the hospital.



__________________________________________

iGrish Huang MD





DD:  11/16/2017 19:49:13

DT:  11/16/2017 20:24:42

Job # [id number]

## 2017-11-16 NOTE — CP.PCM.CON
History of Present Illness





- History of Present Illness


History of Present Illness: 





CC:


S/p fall, C2 cervical fracture.





HPI:


I have been requested on cardiology consultation by Dr. Sandoval on Ms. Herndon 

who is accompanied by her daughter who provides the history. The patient has a 

PMH of borderline diabetes mellitus, hypertension, asthma and osteoperosis who 

was doing well until this past Tuesday when was witnessed to sustain a fall at 

home while getting up from a chair in her living room after which the saw her 

face down on the floor unable to move. The daughter denies syncope, seizures or 

secondary causes for the fall and states that her mother did not complain of 

any specific symptoms such as chest pain, dizziness, lightheadedness or 

palpitations. The family quickly turned her over and called 911 who promptly 

transported the patient to [facility] where a CT of the head 

revealed a large frontal scalp hematoma and a cervical CT revealed an acute 

odontoid and C1 spiral fracture. An ECG reveals NSR at 76 BPM, prolonged DC 

interval, IRBBB, possible anterior MI, age indeterminate. Cardiology is 

consulted for clearance to proceed with neurosurgery and cervical fusion. The 

patient is currently intubated and sedated for airway protection, 

immobilization and for seizure activity presumably due to cerebral edema and 

has been started on IV Cerebrex. She is s/p 3 units of PRBC transfusion for a 

hemoglobin drop from 7.5 g/dl to 6.4 g/dl.





Review of Systems





- Review of Systems


Systems not reviewed;Unavailable: Acuity of Condition, Intubated


All systems: reviewed and no additional remarkable complaints except





Past Patient History





- Infectious Disease


Hx of Infectious Diseases: None





- Tetanus Immunizations


Tetanus Immunization: Unknown





- Past Medical History & Family History


Past Medical History?: Yes





- Past Social History


Smoking Status: Never Smoked


Chewing Tobacco Use: No


Cigar Use: No


Alcohol: None


Drugs: Denies


Home Situation {Lives}: With Family





- CARDIAC


Hx Cardiac Disorders: Yes (HTN)


Hx Angina: No


Hx Atrial Fibrillation: No


Hx Cardia Arrhythmia: No


Hx Circulatory Problems: No


Hx Congestive Heart Failure: No


Hx Heart Attack: No


Hx Hypercholesterolemia: No


Hx Hypertension: Yes


Hx Internal Defibrillator: No


Hx Pacemaker: No


Hx Peripheral Vascular Disease: No





- PULMONARY


Hx Respiratory Disorders: Yes (ASTHMA, COPD)





- NEUROLOGICAL


Hx Neurological Disorder: No


HX Cerebrovascular Accident: No


Hx Seizures: No


Hx Syncope: No


Hx Transient Ischemic Attacks (TIA): No





- HEENT


Hx HEENT Problems: No





- RENAL


Hx Chronic Kidney Disease: No





- ENDOCRINE/METABOLIC


Hx Endocrine Disorders: Yes (DM)





- HEMATOLOGICAL/ONCOLOGICAL


Hx AIDS: No


Hx Human Immunodeficiency Virus (HIV): No





- INTEGUMENTARY


Hx Dermatological Problems: No





- MUSCULOSKELETAL/RHEUMATOLOGICAL


Hx Falls: Yes


Hx Osteoporosis: Yes





- GASTROINTESTINAL


Hx Constipation: Yes





- GENITOURINARY/GYNECOLOGICAL


Hx Genitourinary Disorders: No





- PSYCHIATRIC


Hx Substance Use: No





- SURGICAL HISTORY


Hx Tubal Ligation: Yes





- ANESTHESIA


Hx Anesthesia: Yes


Hx Anesthesia Reactions: No





Meds


Allergies/Adverse Reactions: 


 Allergies











Allergy/AdvReac Type Severity Reaction Status Date / Time


 


No Known Allergies Allergy   Verified 11/14/17 20:12














- Medications


Medications: 


 Current Medications





Acetaminophen (Tylenol 325mg Tab)  650 mg PO Q4 PRN


   PRN Reason: Headache


   Last Admin: 11/15/17 02:14 Dose:  650 mg


Albuterol/Ipratropium (Duoneb 3 Mg/0.5 Mg (3 Ml) Ud)  3 ml INH RQID [facility]


   Last Admin: 11/16/17 11:14 Dose:  3 ml


Amlodipine Besylate (Norvasc)  2.5 mg PO DAILY [facility]


   Last Admin: 11/16/17 08:52 Dose:  2.5 mg


Fosphenytoin Sodium (Cerebyx)  100 mg IV Q8 [facility]


   Last Admin: 11/16/17 09:23 Dose:  100 mg


Propofol (Diprivan)  1,000 mg in 100 mls @ 2.245 mls/hr IV .Q24H [facility]; 5 MCG/KG/

MIN


   PRN Reason: Protocol


   Stop: 11/16/17 17:06


   Last Admin: 11/16/17 07:57 Dose:  10 mcg/kg/min, 4.491 mls/hr


Dextrose/Sodium Chloride (Dextrose 5%/0.45% Ns 1000 Ml)  1,000 mls @ 125 mls/hr 

IV .Q8H [facility]


   Stop: 11/17/17 06:39


   Last Admin: 11/16/17 06:57 Dose:  125 mls/hr


Insulin Human Lispro (Humalog)  0 units SC ACHS [facility]


   PRN Reason: Protocol


   Last Admin: 11/16/17 11:33 Dose:  Not Given


Lorazepam (Ativan)  2 mg IV Q4 PRN


   PRN Reason: Seizure activity


Losartan Potassium (Cozaar)  50 mg PO DAILY [facility]


   Last Admin: 11/16/17 08:51 Dose:  50 mg


Montelukast Sodium (Singulair)  10 mg PO HS [facility]


   Last Admin: 11/15/17 22:53 Dose:  Not Given


Pantoprazole Sodium (Protonix Inj)  40 mg IVP DAILY [facility]


   Last Admin: 11/16/17 08:52 Dose:  40 mg











Physical Exam





- Constitutional


Appears: Other


Additional comments: 





Intubated, sedated.





- Head Exam


Additional comments: 





Frontal and periorbital ecchymosis and edema.





- Eye Exam


Eye Exam: Periorbital swelling


Pupil Exam: PERRL





- ENT Exam


ENT Exam: Mucous Membranes Moist





- Neck Exam


Additional comments: 





Cervical collar.





- Respiratory Exam


Respiratory Exam: Decreased Breath Sounds, Rhonchi





- Cardiovascular Exam


Cardiovascular Exam: REGULAR RHYTHM, RRR, +S1, +S2, Systolic Murmur





- GI/Abdominal Exam


GI & Abdominal Exam: Diminished Bowel Sounds, Soft





- Rectal Exam


Rectal Exam: Deferred





- Extremities Exam


Extremities exam: Positive for: normal capillary refill, normal inspection, 

pedal pulses present





- Neurological Exam


Additional comments: 





Sedated.





- Skin


Skin Exam: Dry, Normal Color, Warm





Results





- Vital Signs


Recent Vital Signs: 


 Last Vital Signs











Temp  99.5 F   11/16/17 08:00


 


Pulse  49 L  11/16/17 14:00


 


Resp  12   11/16/17 14:00


 


BP  138/74   11/16/17 14:00


 


Pulse Ox  100   11/16/17 14:00














- Labs


Result Diagrams: 


 11/16/17 04:20





 11/16/17 04:20


Labs: 


 Laboratory Results - last 24 hr











  11/14/17 11/14/17 11/15/17





  21:07 21:54 00:31


 


WBC   


 


RBC   


 


Hgb   


 


Hct   


 


MCV   


 


MCH   


 


MCHC   


 


RDW   


 


Plt Count   


 


MPV   


 


Neut % (Auto)   


 


Lymph % (Auto)   


 


Mono % (Auto)   


 


Eos % (Auto)   


 


Baso % (Auto)   


 


Neut #   


 


Lymph #   


 


Mono #   


 


Eos #   


 


Baso #   


 


pCO2   


 


pO2   


 


HCO3   


 


ABG pH   


 


ABG Total CO2   


 


ABG O2 Saturation   


 


ABG O2 Content   


 


ABG Base Excess   


 


ABG Hemoglobin   


 


ABG Carboxyhemoglobin   


 


POC ABG HHb (Measured)   


 


ABG Methemoglobin   


 


ABG O2 Capacity   


 


Almas Test   


 


ABG Potassium   


 


A-a O2 Difference   


 


Hgb O2 Saturation   


 


Sodium   


 


Glucose   


 


Lactate   


 


Vent Mode   


 


Mechanical Rate   


 


FiO2   


 


Tidal Volume   


 


PEEP   


 


Potassium   


 


Chloride   109 H 


 


Carbon Dioxide   


 


Anion Gap   


 


BUN   


 


Creatinine   


 


Est GFR ( Amer)   


 


Est GFR (Non-Af Amer)   


 


POC Glucose (mg/dL)  111 H  


 


Random Glucose   


 


Calcium   


 


Total Bilirubin   


 


AST   


 


ALT   


 


Alkaline Phosphatase   


 


Total Protein   


 


Albumin   


 


Globulin   


 


Albumin/Globulin Ratio   


 


Arterial Blood Potassium   


 


Blood Type    A POSITIVE


 


Antibody Screen    Negative


 


Crossmatch    See Detail


 


BBK History Checked    No verified bt














  11/15/17 11/15/17 11/15/17





  05:12 07:22 16:32


 


WBC   


 


RBC   


 


Hgb   


 


Hct   


 


MCV   


 


MCH   


 


MCHC   


 


RDW   


 


Plt Count   


 


MPV   


 


Neut % (Auto)   


 


Lymph % (Auto)   


 


Mono % (Auto)   


 


Eos % (Auto)   


 


Baso % (Auto)   


 


Neut #   


 


Lymph #   


 


Mono #   


 


Eos #   


 


Baso #   


 


pCO2    49 H


 


pO2    204 H


 


HCO3    23.8


 


ABG pH    7.31 L


 


ABG Total CO2    26.2


 


ABG O2 Saturation    100.9 H


 


ABG O2 Content   


 


ABG Base Excess    -1.5


 


ABG Hemoglobin   


 


ABG Carboxyhemoglobin   


 


POC ABG HHb (Measured)   


 


ABG Methemoglobin   


 


ABG O2 Capacity   


 


Almas Test    Yes


 


ABG Potassium    4.8


 


A-a O2 Difference    91.0


 


Hgb O2 Saturation   


 


Sodium    144.0


 


Glucose    185 H


 


Lactate    2.7 H


 


Vent Mode    Prvc/ac


 


Mechanical Rate    18


 


FiO2    50.0


 


Tidal Volume    450


 


PEEP    3


 


Potassium   


 


Chloride  110 H   113.0 H


 


Carbon Dioxide   


 


Anion Gap  14  


 


BUN   


 


Creatinine   


 


Est GFR ( Amer)   


 


Est GFR (Non-Af Amer)   


 


POC Glucose (mg/dL)   117 H 


 


Random Glucose   


 


Calcium   


 


Total Bilirubin   


 


AST   


 


ALT   


 


Alkaline Phosphatase   


 


Total Protein   


 


Albumin   


 


Globulin   


 


Albumin/Globulin Ratio   


 


Arterial Blood Potassium    4.8


 


Blood Type   


 


Antibody Screen   


 


Crossmatch   


 


BBK History Checked   














  11/15/17 11/15/17 11/16/17





  17:04 21:32 04:20


 


WBC    7.7


 


RBC    2.69 L


 


Hgb    6.4 L*


 


Hct    20.6 L


 


MCV    76.5 L D


 


MCH    23.9 L


 


MCHC    31.2 L


 


RDW    16.7 H


 


Plt Count    180


 


MPV    8.8


 


Neut % (Auto)    77.0 H


 


Lymph % (Auto)    11.2 L


 


Mono % (Auto)    11.4 H


 


Eos % (Auto)    0.1


 


Baso % (Auto)    0.3


 


Neut #    6.0


 


Lymph #    0.9 L


 


Mono #    0.9 H


 


Eos #    0.0


 


Baso #    0.0


 


pCO2   


 


pO2   


 


HCO3   


 


ABG pH   


 


ABG Total CO2   


 


ABG O2 Saturation   


 


ABG O2 Content   


 


ABG Base Excess   


 


ABG Hemoglobin   


 


ABG Carboxyhemoglobin   


 


POC ABG HHb (Measured)   


 


ABG Methemoglobin   


 


ABG O2 Capacity   


 


Almas Test   


 


ABG Potassium   


 


A-a O2 Difference   


 


Hgb O2 Saturation   


 


Sodium   


 


Glucose   


 


Lactate   


 


Vent Mode   


 


Mechanical Rate   


 


FiO2   


 


Tidal Volume   


 


PEEP   


 


Potassium   


 


Chloride   


 


Carbon Dioxide   


 


Anion Gap   


 


BUN   


 


Creatinine   


 


Est GFR ( Amer)   


 


Est GFR (Non-Af Amer)   


 


POC Glucose (mg/dL)  210 H  136 H 


 


Random Glucose   


 


Calcium   


 


Total Bilirubin   


 


AST   


 


ALT   


 


Alkaline Phosphatase   


 


Total Protein   


 


Albumin   


 


Globulin   


 


Albumin/Globulin Ratio   


 


Arterial Blood Potassium   


 


Blood Type   


 


Antibody Screen   


 


Crossmatch   


 


BBK History Checked   














  11/16/17 11/16/17 11/16/17





  04:20 04:52 05:19


 


WBC   


 


RBC   


 


Hgb   


 


Hct   


 


MCV   


 


MCH   


 


MCHC   


 


RDW   


 


Plt Count   


 


MPV   


 


Neut % (Auto)   


 


Lymph % (Auto)   


 


Mono % (Auto)   


 


Eos % (Auto)   


 


Baso % (Auto)   


 


Neut #   


 


Lymph #   


 


Mono #   


 


Eos #   


 


Baso #   


 


pCO2    29 L


 


pO2    137 H


 


HCO3    27.8


 


ABG pH    7.56 H


 


ABG Total CO2    26.9


 


ABG O2 Saturation    99.3 H


 


ABG O2 Content    9.5 L


 


ABG Base Excess    3.6 H


 


ABG Hemoglobin    6.7 L


 


ABG Carboxyhemoglobin    1.0


 


POC ABG HHb (Measured)    0.7


 


ABG Methemoglobin    0.9


 


ABG O2 Capacity    9.6 L


 


Almas Test    Yes


 


ABG Potassium   


 


A-a O2 Difference    112.0


 


Hgb O2 Saturation    97.3


 


Sodium  147  


 


Glucose   


 


Lactate   


 


Vent Mode    Prvc/ac


 


Mechanical Rate    18


 


FiO2    40.0


 


Tidal Volume    450


 


PEEP    3


 


Potassium  4.3  


 


Chloride  114 H  


 


Carbon Dioxide  26  


 


Anion Gap  11  


 


BUN  43 H  


 


Creatinine  1.9 H  


 


Est GFR ( Amer)  30  


 


Est GFR (Non-Af Amer)  25  


 


POC Glucose (mg/dL)   138 H 


 


Random Glucose  118 H  


 


Calcium  8.5  


 


Total Bilirubin  0.2  


 


AST  34  


 


ALT  39  


 


Alkaline Phosphatase  49  


 


Total Protein  5.7 L  


 


Albumin  3.0 L D  


 


Globulin  2.7  


 


Albumin/Globulin Ratio  1.1  


 


Arterial Blood Potassium   


 


Blood Type   


 


Antibody Screen   


 


Crossmatch   


 


BBK History Checked   














  11/16/17





  11:18


 


WBC 


 


RBC 


 


Hgb 


 


Hct 


 


MCV 


 


MCH 


 


MCHC 


 


RDW 


 


Plt Count 


 


MPV 


 


Neut % (Auto) 


 


Lymph % (Auto) 


 


Mono % (Auto) 


 


Eos % (Auto) 


 


Baso % (Auto) 


 


Neut # 


 


Lymph # 


 


Mono # 


 


Eos # 


 


Baso # 


 


pCO2 


 


pO2 


 


HCO3 


 


ABG pH 


 


ABG Total CO2 


 


ABG O2 Saturation 


 


ABG O2 Content 


 


ABG Base Excess 


 


ABG Hemoglobin 


 


ABG Carboxyhemoglobin 


 


POC ABG HHb (Measured) 


 


ABG Methemoglobin 


 


ABG O2 Capacity 


 


Almas Test 


 


ABG Potassium 


 


A-a O2 Difference 


 


Hgb O2 Saturation 


 


Sodium 


 


Glucose 


 


Lactate 


 


Vent Mode 


 


Mechanical Rate 


 


FiO2 


 


Tidal Volume 


 


PEEP 


 


Potassium 


 


Chloride 


 


Carbon Dioxide 


 


Anion Gap 


 


BUN 


 


Creatinine 


 


Est GFR ( Amer) 


 


Est GFR (Non-Af Amer) 


 


POC Glucose (mg/dL)  135 H


 


Random Glucose 


 


Calcium 


 


Total Bilirubin 


 


AST 


 


ALT 


 


Alkaline Phosphatase 


 


Total Protein 


 


Albumin 


 


Globulin 


 


Albumin/Globulin Ratio 


 


Arterial Blood Potassium 


 


Blood Type 


 


Antibody Screen 


 


Crossmatch 


 


BBK History Checked 














Assessment & Plan





- Assessment and Plan (Free Text)


Assessment: 





1. Cervical and odontoid fracture.


2. s/p fall.


3. Bradycardia.


4. Anemia.


5. Prerenal azotemia.


6. Dehydration.


7. Hypertension.


8. Seizure.


9. Diabetes mellitus.


Plan: 





1. Continue vent support.


2. Continue sedation.


3. Monitor telemetry.


4. Monitor hemoglobin.


5. IVF.


6. Management discussed with Dr. Gonzalez, MICU nurse and the patient's daughter 

regarding cardiac clearance. The patient requires urgent neurosurgery for 

cervical spine stabilization to prevent catastrophic complications and as such 

she will be cleared from a cardiac point since there are no acute cardiac 

issues such as ACS or CHF. Given the patient's advanced age and comorbidities, 

the overall risk is moderate to high.





Thank you for allowing me to participate in the care of this patient.





- Date & Time


Date: 11/16/17


Time: 15:15

## 2017-11-16 NOTE — CP.PCM.PN
Subjective





- Subjective


Subjective: 





Acute Resp Failure type 2 2/2 RAD and possible brainstem dysfunction not 

detected on imaging stuides? 


DMII


HTN


Odontoid Fx


OA


Sz: combination of previous alkalemia from a resp alkalosis and possible brain 

trauma. 


Anemia.





S/ Patient is in a propofol drip and not able to respond. 





O/ VSS Afebrile.


Head: Ecchymosis of orbits as well as other soft tissue swelling of face.


Heart RRR, Ns1S2 Neg m


Lungs: Clear to A & P


Abdo, Soft, not Pos BS


Ext: No c,c,1 plus edema of LE


Neuro: unbable to asses since patient is in a propfol induced stupor/coma.





Labs: as below


 


Cont MV, and monitor serial ABG's. Avoid alkalemia and hyperoxia. 


Cont anti Sz Rx and Neuro recommendations. 


Bg monitoring with SSI. 


Monitor Hgb. Will transfuse 2 units of PRBC, and continue a anemia w/u after 

sugery. 


Cont VERNA Q 4 hours. No wheezing, and patient scheduled for surgery tomorrow, 

so no steroids for now. 


Cont anit DM/HTN rx. 


For Neurosurgery in am. Patient is cleared from a Medical/Pulmonary/Cardiology (

see cardiology note) for this much needed surgery, with its associated risks 2/

2 age and other comorbidities. Daughter understands risks as well as 

alternatives explained to her clearly. She acknowledges and agrees for surgery. 


EEG today. would benefit from continous EEG if Sz continue despite 

anticonvulsives. 


PUD & DVT Px. 


Please call my cell phone for all communications regarding my patient: [phone]











Objective





- Vital Signs/Intake and Output


Vital Signs (last 24 hours): 


 











Temp Pulse Resp BP Pulse Ox


 


 96.7 F L  55 L  12   130/63   100 


 


 11/16/17 16:00  11/16/17 18:00  11/16/17 18:00  11/16/17 18:00  11/16/17 18:00








Intake and Output: 


 











 11/16/17 11/17/17





 18:59 06:59


 


Intake Total 1675 


 


Output Total 190 


 


Balance 1485 














- Medications


Medications: 


 Current Medications





Acetaminophen (Tylenol 325mg Tab)  650 mg PO Q4 PRN


   PRN Reason: Headache


   Last Admin: 11/15/17 02:14 Dose:  650 mg


Albuterol/Ipratropium (Duoneb 3 Mg/0.5 Mg (3 Ml) Ud)  3 ml INH RQID [facility]


   Last Admin: 11/16/17 19:23 Dose:  3 ml


Amlodipine Besylate (Norvasc)  2.5 mg PO DAILY [facility]


   Last Admin: 11/16/17 08:52 Dose:  2.5 mg


Fosphenytoin Sodium (Cerebyx)  100 mg IV Q8 [facility]


   Last Admin: 11/16/17 16:50 Dose:  100 mg


Dextrose/Sodium Chloride (Dextrose 5%/0.45% Ns 1000 Ml)  1,000 mls @ 125 mls/hr 

IV .Q8H [facility]


   Stop: 11/17/17 06:39


   Last Admin: 11/16/17 16:31 Dose:  125 mls/hr


Insulin Human Lispro (Humalog)  0 units SC ACHS [facility]


   PRN Reason: Protocol


   Last Admin: 11/16/17 16:52 Dose:  1 units


Lorazepam (Ativan)  2 mg IV Q4 PRN


   PRN Reason: Seizure activity


Losartan Potassium (Cozaar)  50 mg PO DAILY [facility]


   Last Admin: 11/16/17 08:51 Dose:  50 mg


Montelukast Sodium (Singulair)  10 mg PO HS [facility]


   Last Admin: 11/15/17 22:53 Dose:  Not Given


Pantoprazole Sodium (Protonix Inj)  40 mg IVP DAILY [facility]


   Last Admin: 11/16/17 08:52 Dose:  40 mg











- Labs


Labs: 


 





 11/16/17 04:20 





 11/16/17 04:20 





 











PT  12.4 Seconds (9.8-13.1)   11/14/17  21:54    


 


INR  1.1  (0.9-1.2)   11/14/17  21:54    


 


APTT  22.8 Seconds (25.6-37.1)  L  11/14/17  21:54

## 2017-11-17 LAB
ABG MECHANICAL RATE: 12
APTT BLD: 21.5 SECONDS (ref 25.6–37.1)
ARTERIAL  BLOOD GAS MODE: (no result)
ARTERIAL PATENCY WRIST A: YES
ATERIAL BLOOD GAS PEEP: 3
BUN SERPL-MCNC: 38 MG/DL (ref 7–17)
CALCIUM SERPL-MCNC: 7.5 MG/DL (ref 8.4–10.2)
CHLORIDE SERPL-SCNC: 111 MMOL/L (ref 98–107)
CO2 SERPL-SCNC: 21 MMOL/L (ref 22–30)
COHGB MFR BLD: 1.4 % (ref 0.5–1.5)
ERYTHROCYTE [DISTWIDTH] IN BLOOD BY AUTOMATED COUNT: 17.2 % (ref 11.5–14.5)
GLUCOSE SERPL-MCNC: 135 MG/DL (ref 65–105)
HCO3 BLDA-SCNC: 25.1 MMOL/L (ref 21–28)
HCT VFR BLD CALC: 30.5 % (ref 34–47)
HHB: 0.6 % (ref 0–5)
MCH RBC QN AUTO: 25.2 PG (ref 27–31)
MCHC RBC AUTO-ENTMCNC: 31.5 G/DL (ref 33–37)
MCV RBC AUTO: 80.3 FL (ref 81–99)
METHGB MFR BLD: 1 % (ref 0–3)
O2 CAP BLDA-SCNC: 13.2 ML/DL (ref 16–24)
O2 CT BLDA-SCNC: 13.1 ML/DL (ref 15–23)
PH BLDA: 7.38 [PH] (ref 7.35–7.45)
PLATELET # BLD: 150 K/UL (ref 130–400)
PO2 BLDA: 123 MM/HG (ref 80–100)
POTASSIUM SERPL-SCNC: 4 MMOL/L (ref 3.6–5)
SAO2 % BLDA: 97 % (ref 95–98)
SODIUM SERPL-SCNC: 142 MMOL/L (ref 132–148)
WBC # BLD AUTO: 10.8 K/UL (ref 4.8–10.8)

## 2017-11-17 PROCEDURE — 0RG00K1 FUSION OF OCCIPITAL-CERVICAL JOINT WITH NONAUTOLOGOUS TISSUE SUBSTITUTE, POSTERIOR APPROACH, POSTERIOR COLUMN, OPEN APPROACH: ICD-10-PCS

## 2017-11-17 RX ADMIN — IPRATROPIUM BROMIDE AND ALBUTEROL SULFATE SCH ML: .5; 3 SOLUTION RESPIRATORY (INHALATION) at 19:47

## 2017-11-17 RX ADMIN — FOSPHENYTOIN SODIUM SCH MG: 50 INJECTION, SOLUTION INTRAMUSCULAR; INTRAVENOUS at 17:38

## 2017-11-17 RX ADMIN — INSULIN LISPRO SCH: 100 INJECTION, SOLUTION INTRAVENOUS; SUBCUTANEOUS at 17:39

## 2017-11-17 RX ADMIN — FOSPHENYTOIN SODIUM SCH MG: 50 INJECTION, SOLUTION INTRAMUSCULAR; INTRAVENOUS at 01:00

## 2017-11-17 RX ADMIN — INSULIN LISPRO SCH: 100 INJECTION, SOLUTION INTRAVENOUS; SUBCUTANEOUS at 22:48

## 2017-11-17 RX ADMIN — IPRATROPIUM BROMIDE AND ALBUTEROL SULFATE SCH ML: .5; 3 SOLUTION RESPIRATORY (INHALATION) at 07:49

## 2017-11-17 RX ADMIN — IPRATROPIUM BROMIDE AND ALBUTEROL SULFATE SCH ML: .5; 3 SOLUTION RESPIRATORY (INHALATION) at 12:00

## 2017-11-17 RX ADMIN — DEXTROSE AND SODIUM CHLORIDE SCH MLS/HR: 5; 450 INJECTION, SOLUTION INTRAVENOUS at 00:30

## 2017-11-17 RX ADMIN — INSULIN LISPRO SCH: 100 INJECTION, SOLUTION INTRAVENOUS; SUBCUTANEOUS at 11:34

## 2017-11-17 RX ADMIN — IPRATROPIUM BROMIDE AND ALBUTEROL SULFATE SCH ML: .5; 3 SOLUTION RESPIRATORY (INHALATION) at 15:01

## 2017-11-17 RX ADMIN — FOSPHENYTOIN SODIUM SCH MG: 50 INJECTION, SOLUTION INTRAMUSCULAR; INTRAVENOUS at 08:02

## 2017-11-17 RX ADMIN — INSULIN LISPRO SCH: 100 INJECTION, SOLUTION INTRAVENOUS; SUBCUTANEOUS at 07:01

## 2017-11-17 NOTE — RAD
HISTORY:

INTUBATED  



COMPARISON:

11/16/2017 



FINDINGS:

The endotracheal tube terminates 2.9 cm proximal to the maegan. The 

nasogastric tube terminates in the stomach. 



LUNGS:

Again seen is mild pulmonary venous congestion.  There is a 

persistent left retrocardiac opacity.



PLEURA:

Bilateral pleural effusions. No pneumothorax. 



CARDIOVASCULAR:

Persistent mild cardiomegaly



OSSEOUS STRUCTURES:

No significant abnormalities.



VISUALIZED UPPER ABDOMEN:

Normal.



OTHER FINDINGS:

None.



IMPRESSION:

No change in left retrocardiac atelectasis/ pneumonia.



Persistent pulmonary venous congestion and bilateral pleural 

effusions.



Stable position of endotracheal and nasogastric tubes.

## 2017-11-17 NOTE — OP
PROCEDURE DATE:  11/17/2017



PREOPERATIVE DIAGNOSIS:  Type 2 odontoid fracture.



POSTOPERATIVE DIAGNOSES:  Type 2 odontoid fracture and lamina fracture of

C1.



OPERATIVE PROCEDURE:  Occiput to C2 fusion fixation with Darrin cables.



SURGEON:  Fadi Pretty MD.



ASSISTANT:  Teddy Perez MD.



DESCRIPTION OF PROCEDURE:  The patient was brought to the operating room

already intubated.  She was in the hard collar.  Pins were placed.  She was

turned carefully prone onto bolsters and head placed in Dola pin head

quezada.  The position of her neck was verified on x-ray.  Baseline SSEP and

motor evoked potentials were obtained both prior to and after positioning. 

At some point early after positioning and after the baselines were obtained

in the prone position, motor potentials were lost.  This was done prior to

surgery to operating itself.  However, this appeared to be technical and

potentials returned, and by the end of the case, all SSEP and motor evoked

potentials were at baseline.  After prepping and draping, a midline

incision was made from the base of the skull down to just below the spine

of C2 taken through the subcuticular tissue.  The muscles were stripped off

the skull and lamina of C1 and spinous process of C2 and laminas as far as

necessary.  We identified a fracture on the right at C1 lamina arch and the

intended procedure of a C1-C2 fusion could not be done.  We chose to do an

occiput to C2 fusion.  After making certain that again we identified our

landmarks, the high-speed drill was used to make 2 small holes in the

occiput just above the foramen magnum, one on each side, and then we

attempted to pass Darrin cables, one cable through each of these holes,

through the hole we made into the foramen magnum.  Because of the way the

skull was built, we could not pass a cable safely on the patient's left

side and chose to put 2 cables around.  The hole placed on the right side

and under the foramen magnum.  We then placed a hole passing through the

base of the spinous process of C2 and then used that hole to pass the

cables that we passed at the occiput through there.  We decorticated the

base of the skull, the C1 arch, C2 arch, and spinous process.  We then took

a piece of iliac crest, we have an Allograft and cut a piece to fit between

the top of the spine of C2 and the base of the skull and used the Darrin

cables and tightened and torqued them around this so that, that piece of

bone would be locked in place.  We got good tension on the cables and when

we took the contents of an IC chamber as well as graft on puddy and filled

in the spaces around the remainder of C2, C1, and the occiput.  A final

x-ray was obtained showing good position of the spine and good position of

the construct.  The muscle and fascia were then reapproximated with 0

Vicryl multiple layers, 2-0 Vicryl skin, and subcuticular and skin staples

to skin and sterile dressing was applied.  At the time of this dictation,

the patient was taken out of pin head quezada and turned on to her bed.  She

was not going to be extubated and it was too early to assess

neurologically.  Neuromonitoring was at baseline at the end of the case. 

_____.  The patient received 125 Cell Saver.  All counts were correct.  No

specimen.







__________________________________________

Fadi Pretty MD



DD:  11/17/2017 11:00:47

DT:  11/17/2017 13:09:53

Job # [id number]

## 2017-11-17 NOTE — CP.PCM.PN
Subjective





- Subjective


Subjective: 





Acute Resp Failure type 2 2/2 RAD and possible brainstem dysfunction not 

detected on imaging stuides? 


DMII


HTN


Odontoid Fx s/p fixation by Neurosurgery today. 


OA


Sz: combination of previous alkalemia from a resp alkalosis and possible brain 

trauma. 


Anemia.





S/ Patient is in a propofol drip and not able to respond. 





O/ VSS Afebrile.


Head: Ecchymosis of orbits as well as other soft tissue swelling of face.


Heart RRR, Ns1S2 Neg m


Lungs: Clear to A & P


Abdo, Soft, not Pos BS


Ext: No c,c,1 plus edema of LE


Neuro: unbable to asses since patient is in a propfol induced stupor/coma.





Labs: as below


 


Cont MV, and monitor serial ABG's. Avoid alkalemia and hyperoxia. Will speak 

with ICU physician to initiate SBT's. Neurosurgery f/u. Slowly taper off 

propofol and start prn Ativan. 


Cont anti Sz Rx and Neuro recommendations. 


Bg monitoring with SSI. 


Monitor Hgb. Hematology consult in AM. 


Cont VERNA Q 4 hours. No wheezing, and patient scheduled for surgery tomorrow, 

so no steroids for now. 


Cont anit DM/HTN rx. 


Would benefit from continous EEG if Sz continue despite anticonvulsives. 


Cont analgesics for post op pain. 


PUD & DVT Px. 


Please call my cell phone for all communications regarding my patient: [phone]





Objective





- Vital Signs/Intake and Output


Vital Signs (last 24 hours): 


 











Temp Pulse Resp BP Pulse Ox


 


 99.1 F   65   16   150/71   100 


 


 11/17/17 19:52  11/17/17 22:00  11/17/17 22:00  11/17/17 22:00  11/17/17 22:00








Intake and Output: 


 











 11/17/17 11/18/17





 18:59 06:59


 


Intake Total 2225 260


 


Output Total 600 200


 


Balance 1625 60














- Medications


Medications: 


 Current Medications





Acetaminophen (Tylenol 325mg Tab)  650 mg PO Q4 PRN


   PRN Reason: Headache


   Last Admin: 11/15/17 02:14 Dose:  650 mg


Albuterol/Ipratropium (Duoneb 3 Mg/0.5 Mg (3 Ml) Ud)  3 ml INH RQID [facility]


   Last Admin: 11/17/17 19:47 Dose:  3 ml


Amlodipine Besylate (Norvasc)  2.5 mg PO DAILY [facility]


   Last Admin: 11/17/17 11:37 Dose:  2.5 mg


Fosphenytoin Sodium (Cerebyx)  100 mg IV Q8 [facility]


   Last Admin: 11/17/17 17:38 Dose:  100 mg


Hydromorphone HCl (Dilaudid)  1 mg IVP Q4 PRN


   PRN Reason: Pain, moderate (4-7)


   Last Admin: 11/17/17 21:53 Dose:  1 mg


Dextrose/Sodium Chloride (Dextrose 5%-0.45% Ns 500 Ml)  500 mls @ 125 mls/hr IV 

.Q4H [facility]


   Stop: 11/18/17 18:07


   Last Admin: 11/17/17 21:50 Dose:  Not Given


Insulin Human Lispro (Humalog)  0 units SC ACHS [facility]


   PRN Reason: Protocol


   Last Admin: 11/17/17 22:48 Dose:  Not Given


Lorazepam (Ativan)  2 mg IV Q4 PRN


   PRN Reason: Seizure activity


Losartan Potassium (Cozaar)  50 mg PO DAILY [facility]


   Last Admin: 11/17/17 11:36 Dose:  50 mg


Montelukast Sodium (Singulair)  10 mg PO HS [facility]


   Last Admin: 11/17/17 21:53 Dose:  10 mg


Pantoprazole Sodium (Protonix Inj)  40 mg IVP DAILY [facility]


   Last Admin: 11/17/17 08:10 Dose:  40 mg











- Labs


Labs: 


 





 11/17/17 04:47 





 11/17/17 04:47 





 











PT  12.6 Seconds (9.8-13.1)   11/17/17  04:47    


 


INR  1.1  (0.9-1.2)   11/17/17  04:47    


 


APTT  21.5 Seconds (25.6-37.1)  L  11/17/17  04:47

## 2017-11-17 NOTE — CP.CCUPN
CCU Subjective





- Physician Review


Subjective (Free Text): 





Returned from OR today after posterior Cervical Fusion of C1-C2. Remains orally 

intubated, no distress, given Dialudid earlier for post op pain. Awakened and 

follow commands. 





Other vitals and I/O's reviewed. 





ROS:  No other pertinent negs or positives on 10+  system review. 





PMSFH:    HTN, DMI II, COPD / asthma, osteoarthritis; no previous surgeries,   

non- smoker- Otherwise all Nursing and physician documentation reviewed to date

; no new pertinent info noted relevant to current medical problems.








IMPRESSION / MAJOR PROBLEMS NOW:


1.   Acute Type II Odontoid / C1-C2 fracture ( nondisplaced)


2.   Acute Hypercapneic resp failure 2 #1 and contribution from COPD 

exacerbation


3.   Chronic Disease anemia


4.   HTN


5.   DM II








PLAN:


1.     Await arousal from anesthesia and hopefuly able to extubate today / 

tomoorw.


2.     Stop Propofol as tolerated.


3.     Keep C collar in place.


4.     EEG when feasible, remains on Cerebryx. No recuurent seizure activity. 


5.    IVF hydration in the interim, HGb at expected levels post PRBCs 

yesterday. 








CCU Objective





- Vital Signs / Intake & Output


Vital Signs (Last 4 hours): 


Vital Signs











  Temp Pulse Resp BP Pulse Ox


 


 11/17/17 17:55   61  15  138/64  100


 


 11/17/17 17:00   62  13  138/64  100


 


 11/17/17 16:00  99.4 F  65  15  131/56 L  100


 


 11/17/17 15:00   58 L  15  110/63  100


 


 11/17/17 14:00   59 L  12  147/82  100











Intake and Output (Last 8hrs): 


 Intake & Output











 11/17/17 11/17/17 11/17/17





 06:59 14:59 22:59


 


Intake Total 1086 1725 500


 


Output Total 550 300 300


 


Balance 536 1425 200


 


Intake:   


 


  IV 1036 1550 500


 


  Intake, Piggyback 50  


 


  Oral  50 0


 


  Blood Product  125 


 


Output:   


 


  Urine 550 300 300


 


    Urethral (Mace) 550 300 300














- Physical Exam


Head: Positive for: Normocephalic, Contusion, Swelling, Ecchymosis (bilateral 

orbital areas).  Negative for: Laceration


Pupils: Positive for: Sluggish, Pinpoint


Conjunctiva: Positive for: Injected.  Negative for: Icteric


Mouth: Positive for: Moist Mucous Membranes


Pharnyx: Positive for: Normal


Neck: Positive for: Other (Hard, fixed Cervical collar in place)


Respiratory/Chest: Positive for: Decreased Breath Sounds.  Negative for: 

Accessory Muscle Use, Wheezes


Cardiovascular: Positive for: Irregular Rhythm, Bradycardic.  Negative for: 

Murmurs, Rub


Abdomen: Positive for: Normal Bowel Sounds.  Negative for: Tenderness, 

Distention, Mass/Organomegaly


Upper Extremity: Negative for: Edema


Lower Extremity: Positive for: Edema.  Negative for: CALF TENDERNESS, Cyanosis


Neurological: Negative for: GCS=15 (GCS= 6 ( [id number]))


Skin: Positive for: Warm, Dry.  Negative for: Rashes


Psychiatric: Positive for: Lethargic





- Medications


Active Medications: 


Active Medications











Generic Name Dose Route Start Last Admin





  Trade Name Freq  PRN Reason Stop Dose Admin


 


Acetaminophen  650 mg  11/14/17 23:29  11/15/17 02:14





  Tylenol 325mg Tab  PO   650 mg





  Q4 PRN   Administration





  Headache   


 


Albuterol/Ipratropium  3 ml  11/15/17 16:00  11/17/17 15:01





  Duoneb 3 Mg/0.5 Mg (3 Ml) Ud  INH   3 ml





  RQID [facility]   Administration


 


Amlodipine Besylate  2.5 mg  11/15/17 09:00  11/17/17 11:37





  Norvasc  PO   2.5 mg





  DAILY [facility]   Administration


 


Fosphenytoin Sodium  100 mg  11/15/17 17:00  11/17/17 17:38





  Cerebyx  IV   100 mg





  Q8 [facility]   Administration


 


Hydromorphone HCl  1 mg  11/17/17 12:27  11/17/17 12:44





  Dilaudid  IVP   1 mg





  Q4 PRN   Administration





  Pain, moderate (4-7)   


 


Insulin Human Lispro  0 units  11/15/17 07:30  11/17/17 17:39





  Humalog  SC   Not Given





  [facility]   





  Protocol   


 


Lorazepam  2 mg  11/16/17 02:24  





  Ativan  IV   





  Q4 PRN   





  Seizure activity   


 


Losartan Potassium  50 mg  11/15/17 09:00  11/17/17 11:36





  Cozaar  PO   50 mg





  DAILY [facility]   Administration


 


Montelukast Sodium  10 mg  11/15/17 22:00  11/16/17 22:12





  Singulair  PO   10 mg





  HS [facility]   Administration


 


Pantoprazole Sodium  40 mg  11/15/17 12:45  11/17/17 08:10





  Protonix Inj  IVP   40 mg





  DAILY [facility]   Administration














- Patient Studies


Lab Studies: 


 Microbiology Studies











 11/15/17 21:10 MRSA Culture (Admit) - Final





 Naris    MRSA NOT DETECTED








 Lab Studies











  11/17/17 11/17/17 11/17/17 Range/Units





  16:23 11:15 05:29 


 


WBC     (4.8-10.8)  K/uL


 


RBC     (3.80-5.20)  Mil/uL


 


Hgb     (12.0-16.0)  g/dL


 


Hct     (34.0-47.0)  %


 


MCV     (81.0-99.0)  fl


 


MCH     (27.0-31.0)  pg


 


MCHC     (33.0-37.0)  g/dL


 


RDW     (11.5-14.5)  %


 


Plt Count     (130-400)  K/uL


 


PT     (9.8-13.1)  Seconds


 


INR     (0.9-1.2)  


 


APTT     (25.6-37.1)  Seconds


 


pCO2     (35-45)  mm/Hg


 


pO2     ()  mm/Hg


 


HCO3     (21-28)  mmol/L


 


ABG pH     (7.35-7.45)  


 


ABG Total CO2     (22-28)  mmol/L


 


ABG O2 Saturation     (95-98)  %


 


ABG O2 Content     (15-23)  ML/dL


 


ABG Base Excess     (-2.0-3.0)  mmol/L


 


ABG Hemoglobin     (11.7-17.4)  g/dL


 


ABG Carboxyhemoglobin     (0.5-1.5)  %


 


POC ABG HHb (Measured)     (0.0-5.0)  %


 


ABG Methemoglobin     (0.0-3.0)  %


 


ABG O2 Capacity     (16-24)  mL/dL


 


Almas Test     


 


A-a O2 Difference     mm/Hg


 


Hgb O2 Saturation     (95.0-98.0)  %


 


Vent Mode     


 


Mechanical Rate     


 


FiO2     %


 


Tidal Volume     


 


PEEP     


 


Sodium     (132-148)  mmol/l


 


Potassium     (3.6-5.0)  MMOL/L


 


Chloride     ()  mmol/L


 


Carbon Dioxide     (22-30)  mmol/L


 


Anion Gap     (10-20)  


 


BUN     (7-17)  mg/dl


 


Creatinine     (0.7-1.2)  mg/dl


 


Est GFR (African Amer)     


 


Est GFR (Non-Af Amer)     


 


POC Glucose (mg/dL)  108  144 H  139 H  ()  mg/dL


 


Random Glucose     ()  mg/dL


 


Calcium     (8.4-10.2)  mg/dL














  11/17/17 11/17/17 11/17/17 Range/Units





  05:23 04:47 04:47 


 


WBC     (4.8-10.8)  K/uL


 


RBC     (3.80-5.20)  Mil/uL


 


Hgb     (12.0-16.0)  g/dL


 


Hct     (34.0-47.0)  %


 


MCV     (81.0-99.0)  fl


 


MCH     (27.0-31.0)  pg


 


MCHC     (33.0-37.0)  g/dL


 


RDW     (11.5-14.5)  %


 


Plt Count     (130-400)  K/uL


 


PT   12.6   (9.8-13.1)  Seconds


 


INR   1.1   (0.9-1.2)  


 


APTT   21.5 L   (25.6-37.1)  Seconds


 


pCO2  43    (35-45)  mm/Hg


 


pO2  123 H    ()  mm/Hg


 


HCO3  25.1    (21-28)  mmol/L


 


ABG pH  7.38    (7.35-7.45)  


 


ABG Total CO2  26.7    (22-28)  mmol/L


 


ABG O2 Saturation  99.4 H    (95-98)  %


 


ABG O2 Content  13.1 L    (15-23)  ML/dL


 


ABG Base Excess  0.2    (-2.0-3.0)  mmol/L


 


ABG Hemoglobin  9.4 L    (11.7-17.4)  g/dL


 


ABG Carboxyhemoglobin  1.4    (0.5-1.5)  %


 


POC ABG HHb (Measured)  0.6    (0.0-5.0)  %


 


ABG Methemoglobin  1.0    (0.0-3.0)  %


 


ABG O2 Capacity  13.2 L    (16-24)  mL/dL


 


Almas Test  Yes    


 


A-a O2 Difference  108.0    mm/Hg


 


Hgb O2 Saturation  97.0    (95.0-98.0)  %


 


Vent Mode  A/c    


 


Mechanical Rate  12    


 


FiO2  40.0    %


 


Tidal Volume  450    


 


PEEP  3    


 


Sodium    142  (132-148)  mmol/l


 


Potassium    4.0  (3.6-5.0)  MMOL/L


 


Chloride    111 H  ()  mmol/L


 


Carbon Dioxide    21 L  (22-30)  mmol/L


 


Anion Gap    14  (10-20)  


 


BUN    38 H  (7-17)  mg/dl


 


Creatinine    1.3 H  (0.7-1.2)  mg/dl


 


Est GFR ( Amer)    47  


 


Est GFR (Non-Af Amer)    39  


 


POC Glucose (mg/dL)     ()  mg/dL


 


Random Glucose    135 H  ()  mg/dL


 


Calcium    7.5 L  (8.4-10.2)  mg/dL














  11/17/17 11/16/17 Range/Units





  04:47 21:25 


 


WBC  10.8   (4.8-10.8)  K/uL


 


RBC  3.80   (3.80-5.20)  Mil/uL


 


Hgb  9.6 L D   (12.0-16.0)  g/dL


 


Hct  30.5 L   (34.0-47.0)  %


 


MCV  80.3 L D   (81.0-99.0)  fl


 


MCH  25.2 L   (27.0-31.0)  pg


 


MCHC  31.5 L   (33.0-37.0)  g/dL


 


RDW  17.2 H   (11.5-14.5)  %


 


Plt Count  150   (130-400)  K/uL


 


PT    (9.8-13.1)  Seconds


 


INR    (0.9-1.2)  


 


APTT    (25.6-37.1)  Seconds


 


pCO2    (35-45)  mm/Hg


 


pO2    ()  mm/Hg


 


HCO3    (21-28)  mmol/L


 


ABG pH    (7.35-7.45)  


 


ABG Total CO2    (22-28)  mmol/L


 


ABG O2 Saturation    (95-98)  %


 


ABG O2 Content    (15-23)  ML/dL


 


ABG Base Excess    (-2.0-3.0)  mmol/L


 


ABG Hemoglobin    (11.7-17.4)  g/dL


 


ABG Carboxyhemoglobin    (0.5-1.5)  %


 


POC ABG HHb (Measured)    (0.0-5.0)  %


 


ABG Methemoglobin    (0.0-3.0)  %


 


ABG O2 Capacity    (16-24)  mL/dL


 


Almas Test    


 


A-a O2 Difference    mm/Hg


 


Hgb O2 Saturation    (95.0-98.0)  %


 


Vent Mode    


 


Mechanical Rate    


 


FiO2    %


 


Tidal Volume    


 


PEEP    


 


Sodium    (132-148)  mmol/l


 


Potassium    (3.6-5.0)  MMOL/L


 


Chloride    ()  mmol/L


 


Carbon Dioxide    (22-30)  mmol/L


 


Anion Gap    (10-20)  


 


BUN    (7-17)  mg/dl


 


Creatinine    (0.7-1.2)  mg/dl


 


Est GFR (African Amer)    


 


Est GFR (Non-Af Amer)    


 


POC Glucose (mg/dL)   168 H  ()  mg/dL


 


Random Glucose    ()  mg/dL


 


Calcium    (8.4-10.2)  mg/dL








 Laboratory Results - last 24 hr











  11/16/17 11/17/17 11/17/17





  21:25 04:47 04:47


 


WBC   10.8 


 


RBC   3.80 


 


Hgb   9.6 L D 


 


Hct   30.5 L 


 


MCV   80.3 L D 


 


MCH   25.2 L 


 


MCHC   31.5 L 


 


RDW   17.2 H 


 


Plt Count   150 


 


PT   


 


INR   


 


APTT   


 


pCO2   


 


pO2   


 


HCO3   


 


ABG pH   


 


ABG Total CO2   


 


ABG O2 Saturation   


 


ABG O2 Content   


 


ABG Base Excess   


 


ABG Hemoglobin   


 


ABG Carboxyhemoglobin   


 


POC ABG HHb (Measured)   


 


ABG Methemoglobin   


 


ABG O2 Capacity   


 


Almas Test   


 


A-a O2 Difference   


 


Hgb O2 Saturation   


 


Vent Mode   


 


Mechanical Rate   


 


FiO2   


 


Tidal Volume   


 


PEEP   


 


Sodium    142


 


Potassium    4.0


 


Chloride    111 H


 


Carbon Dioxide    21 L


 


Anion Gap    14


 


BUN    38 H


 


Creatinine    1.3 H


 


Est GFR ( Amer)    47


 


Est GFR (Non-Af Amer)    39


 


POC Glucose (mg/dL)  168 H  


 


Random Glucose    135 H


 


Calcium    7.5 L














  11/17/17 11/17/17 11/17/17





  04:47 05:23 05:29


 


WBC   


 


RBC   


 


Hgb   


 


Hct   


 


MCV   


 


MCH   


 


MCHC   


 


RDW   


 


Plt Count   


 


PT  12.6  


 


INR  1.1  


 


APTT  21.5 L  


 


pCO2   43 


 


pO2   123 H 


 


HCO3   25.1 


 


ABG pH   7.38 


 


ABG Total CO2   26.7 


 


ABG O2 Saturation   99.4 H 


 


ABG O2 Content   13.1 L 


 


ABG Base Excess   0.2 


 


ABG Hemoglobin   9.4 L 


 


ABG Carboxyhemoglobin   1.4 


 


POC ABG HHb (Measured)   0.6 


 


ABG Methemoglobin   1.0 


 


ABG O2 Capacity   13.2 L 


 


Almas Test   Yes 


 


A-a O2 Difference   108.0 


 


Hgb O2 Saturation   97.0 


 


Vent Mode   A/c 


 


Mechanical Rate   12 


 


FiO2   40.0 


 


Tidal Volume   450 


 


PEEP   3 


 


Sodium   


 


Potassium   


 


Chloride   


 


Carbon Dioxide   


 


Anion Gap   


 


BUN   


 


Creatinine   


 


Est GFR ( Amer)   


 


Est GFR (Non-Af Amer)   


 


POC Glucose (mg/dL)    139 H


 


Random Glucose   


 


Calcium   














  11/17/17 11/17/17





  11:15 16:23


 


WBC  


 


RBC  


 


Hgb  


 


Hct  


 


MCV  


 


MCH  


 


MCHC  


 


RDW  


 


Plt Count  


 


PT  


 


INR  


 


APTT  


 


pCO2  


 


pO2  


 


HCO3  


 


ABG pH  


 


ABG Total CO2  


 


ABG O2 Saturation  


 


ABG O2 Content  


 


ABG Base Excess  


 


ABG Hemoglobin  


 


ABG Carboxyhemoglobin  


 


POC ABG HHb (Measured)  


 


ABG Methemoglobin  


 


ABG O2 Capacity  


 


Almas Test  


 


A-a O2 Difference  


 


Hgb O2 Saturation  


 


Vent Mode  


 


Mechanical Rate  


 


FiO2  


 


Tidal Volume  


 


PEEP  


 


Sodium  


 


Potassium  


 


Chloride  


 


Carbon Dioxide  


 


Anion Gap  


 


BUN  


 


Creatinine  


 


Est GFR ( Amer)  


 


Est GFR (Non-Af Amer)  


 


POC Glucose (mg/dL)  144 H  108


 


Random Glucose  


 


Calcium  











Fingerstick Blood Sugar Results: 108





Review of Systems





- Review of Systems


Systems not reviewed;Unavailable: Intubated





Critical Care Progress Note





- Ventilator Checklist


Head of Bed 30 Degrees: Yes


Daily Sedation Vacation: Yes


Daily Assessment of Readiness to Wean: Yes


Daily Spontaneous Breathing Trial: Yes


PUD Prophalyxis: Yes


DVT Prophylaxis: Yes


Oral Care with Chlorhexidine Gluconate {CHG}: Yes





- Vent Settings


MODE:: ASSIST CONTROL


TIDAL VOLUME:: 450


RESP RATE:: 12


FIO2:: 40


PEEP:: 5





- Extremities/Vascular


Does the Patient have a Central Venous Catheter?: No


Does the Patient need a Central Venous Catheter?: No


Does the Patient have a Mace Catheter?: Yes


Does the Patient need a Mace Catheter?: Yes


Catheter Insertion Criteria: Need for accurate measurement of output in 

critically ill patient





- Restraints


Justification for Restraints: High risk for self extubation, High risk for 

removing IV access, High risk for harming self





- Prophylaxis GI


Prophylaxis GI: PPI





- Prophylaxis DVT


Prophylaxis DVT: SCDs





- Nutrition


Nutrition: 


 Nutrition











 Category Date Time Status


 


 NPO Diet [DIET] Diets  11/15/17 Breakfast Active

## 2017-11-18 LAB
ABG MECHANICAL RATE: 12
ARTERIAL  BLOOD GAS MODE: (no result)
ARTERIAL PATENCY WRIST A: YES
ATERIAL BLOOD GAS PEEP: 3
BACTERIA #/AREA URNS HPF: (no result) /[HPF]
BASOPHILS # BLD AUTO: 0 K/UL (ref 0–0.2)
BASOPHILS NFR BLD: 0.1 % (ref 0–2)
BILIRUB UR-MCNC: NEGATIVE MG/DL
BUN SERPL-MCNC: 22 MG/DL (ref 7–17)
CALCIUM SERPL-MCNC: 7.5 MG/DL (ref 8.4–10.2)
CHLORIDE SERPL-SCNC: 109 MMOL/L (ref 98–107)
CO2 SERPL-SCNC: 24 MMOL/L (ref 22–30)
COHGB MFR BLD: 1.8 % (ref 0.5–1.5)
COLOR UR: YELLOW
EOSINOPHIL # BLD AUTO: 0 K/UL (ref 0–0.7)
EOSINOPHIL NFR BLD: 0.1 % (ref 0–4)
ERYTHROCYTE [DISTWIDTH] IN BLOOD BY AUTOMATED COUNT: 17.7 % (ref 11.5–14.5)
GIANT PLATELETS BLD QL SMEAR: PRESENT
GLUCOSE SERPL-MCNC: 163 MG/DL (ref 65–105)
GLUCOSE UR STRIP-MCNC: (no result) MG/DL
HCO3 BLDA-SCNC: 24.5 MMOL/L (ref 21–28)
HCT VFR BLD CALC: 29 % (ref 34–47)
HHB: 0.8 % (ref 0–5)
KETONES UR STRIP-MCNC: NEGATIVE MG/DL
LEUKOCYTE ESTERASE UR-ACNC: (no result) LEU/UL
LG PLATELETS BLD QL SMEAR: PRESENT
LYMPHOCYTES # BLD AUTO: 0.8 K/UL (ref 1–4.3)
LYMPHOCYTES NFR BLD AUTO: 5.4 % (ref 20–40)
MCH RBC QN AUTO: 25.1 PG (ref 27–31)
MCHC RBC AUTO-ENTMCNC: 31.5 G/DL (ref 33–37)
MCV RBC AUTO: 79.6 FL (ref 81–99)
METHGB MFR BLD: 0.8 % (ref 0–3)
MONOCYTES # BLD: 1.2 K/UL (ref 0–0.8)
MONOCYTES NFR BLD: 8 % (ref 0–10)
NEUTROPHILS # BLD: 13.3 K/UL (ref 1.8–7)
NEUTROPHILS NFR BLD AUTO: 83 % (ref 42–75)
NEUTROPHILS NFR BLD AUTO: 86.4 % (ref 50–75)
NRBC BLD AUTO-RTO: 0.1 % (ref 0–0)
O2 CAP BLDA-SCNC: 12.5 ML/DL (ref 16–24)
O2 CT BLDA-SCNC: 12.4 ML/DL (ref 15–23)
PH BLDA: 7.36 [PH] (ref 7.35–7.45)
PH UR STRIP: 5 [PH] (ref 5–8)
PLATELET # BLD: 139 K/UL (ref 130–400)
PMV BLD AUTO: 8.4 FL (ref 7.2–11.7)
PO2 BLDA: 102 MM/HG (ref 80–100)
POTASSIUM SERPL-SCNC: 3.8 MMOL/L (ref 3.6–5)
PROT UR STRIP-MCNC: 100 MG/DL
RBC # UR STRIP: (no result) /UL
RBC #/AREA URNS HPF: 3 /HPF (ref 0–3)
SAO2 % BLDA: 96.6 % (ref 95–98)
SODIUM SERPL-SCNC: 137 MMOL/L (ref 132–148)
SP GR UR STRIP: 1.02 (ref 1–1.03)
TOTAL CELLS COUNTED BLD: 100
UROBILINOGEN UR-MCNC: (no result) MG/DL (ref 0.2–1)
WBC # BLD AUTO: 15.4 K/UL (ref 4.8–10.8)
WBC #/AREA URNS HPF: 24 /HPF (ref 0–5)

## 2017-11-18 RX ADMIN — INSULIN LISPRO SCH UNITS: 100 INJECTION, SOLUTION INTRAVENOUS; SUBCUTANEOUS at 07:02

## 2017-11-18 RX ADMIN — INSULIN LISPRO SCH: 100 INJECTION, SOLUTION INTRAVENOUS; SUBCUTANEOUS at 11:45

## 2017-11-18 RX ADMIN — IPRATROPIUM BROMIDE AND ALBUTEROL SULFATE SCH ML: .5; 3 SOLUTION RESPIRATORY (INHALATION) at 11:09

## 2017-11-18 RX ADMIN — ENOXAPARIN SODIUM SCH MG: 30 INJECTION SUBCUTANEOUS at 14:00

## 2017-11-18 RX ADMIN — FOSPHENYTOIN SODIUM SCH MG: 50 INJECTION, SOLUTION INTRAMUSCULAR; INTRAVENOUS at 17:23

## 2017-11-18 RX ADMIN — FOSPHENYTOIN SODIUM SCH MG: 50 INJECTION, SOLUTION INTRAMUSCULAR; INTRAVENOUS at 09:27

## 2017-11-18 RX ADMIN — ACETAMINOPHEN PRN MG: 160 SOLUTION ORAL at 06:45

## 2017-11-18 RX ADMIN — IPRATROPIUM BROMIDE AND ALBUTEROL SULFATE SCH ML: .5; 3 SOLUTION RESPIRATORY (INHALATION) at 15:23

## 2017-11-18 RX ADMIN — FOSPHENYTOIN SODIUM SCH MG: 50 INJECTION, SOLUTION INTRAMUSCULAR; INTRAVENOUS at 01:11

## 2017-11-18 RX ADMIN — IPRATROPIUM BROMIDE AND ALBUTEROL SULFATE SCH ML: .5; 3 SOLUTION RESPIRATORY (INHALATION) at 19:29

## 2017-11-18 RX ADMIN — ACETAMINOPHEN PRN MG: 160 SOLUTION ORAL at 20:29

## 2017-11-18 RX ADMIN — INSULIN LISPRO SCH: 100 INJECTION, SOLUTION INTRAVENOUS; SUBCUTANEOUS at 23:38

## 2017-11-18 RX ADMIN — INSULIN LISPRO SCH: 100 INJECTION, SOLUTION INTRAVENOUS; SUBCUTANEOUS at 17:20

## 2017-11-18 RX ADMIN — IPRATROPIUM BROMIDE AND ALBUTEROL SULFATE SCH ML: .5; 3 SOLUTION RESPIRATORY (INHALATION) at 08:00

## 2017-11-18 NOTE — RAD
HISTORY:

intubated  



COMPARISON:

Portable chest 11/17/2017. 



FINDINGS:

Endotracheal tube is unchanged in position with nasogastric tube 

placed with the tip off the image. 



LUNGS:

Bilateral basilar airspace disease facet changes greater the left and 

right



PLEURA:

Bowel pleural effusions persist remain mild. No pneumothorax.



CARDIOVASCULAR:

Cardiomegaly appears stable. Borderline pulmonary vascular 

derangement appreciated.



OSSEOUS STRUCTURES:

No significant abnormalities.



VISUALIZED UPPER ABDOMEN:

Normal.



OTHER FINDINGS:

None.



IMPRESSION:

Stable bibasilar airspace disease and pleural effusions

## 2017-11-18 NOTE — RAD
PROCEDURE:  Intraoperative Fluoroscopy. 



HISTORY:

POSTERIOR CERVICAL LAMINECTOMY



FINDINGS:

Fluoroscopic assistance was provided for upper posterior cervical 

spinal fusion. Please refer to the operative report from  



DLP dose of 0.38 mGy.

## 2017-11-18 NOTE — CP.PCM.PN
Subjective





- Date & Time of Evaluation


Date of Evaluation: 11/18/17


Time of Evaluation: 13:37





- Subjective


Subjective: 





SPINE - POD #1





Pt remains intubated, in ICU. Stable. Seems to respond to commands.





VSS. Temp 99. Moves all extremities actively. [place]-J collar in place.





Plan: cont med care. Will order smaller collar for better fit.





Objective





- Vital Signs/Intake and Output


Vital Signs (last 24 hours): 


 











Temp Pulse Resp BP Pulse Ox


 


 99.4 F   58 L  12   111/54 L  99 


 


 11/18/17 12:00  11/18/17 12:00  11/18/17 06:00  11/18/17 12:00  11/18/17 12:00








Intake and Output: 


 











 11/18/17 11/18/17





 06:59 18:59


 


Intake Total 930 


 


Output Total 925 


 


Balance 5 














- Medications


Medications: 


 Current Medications





Acetaminophen (Tylenol 650mg/20.3ml Solution Ud)  650 mg PO Q4 PRN


   PRN Reason: fever


   Last Admin: 11/18/17 06:45 Dose:  650 mg


Albuterol/Ipratropium (Duoneb 3 Mg/0.5 Mg (3 Ml) Ud)  3 ml INH RQID [facility]


   Last Admin: 11/18/17 11:09 Dose:  3 ml


Amlodipine Besylate (Norvasc)  2.5 mg PO DAILY [facility]


   Last Admin: 11/18/17 08:58 Dose:  2.5 mg


Enoxaparin Sodium (Lovenox)  30 mg SC DAILY [facility]


   PRN Reason: Protocol


Fosphenytoin Sodium (Cerebyx)  100 mg IV Q8 [facility]


   Last Admin: 11/18/17 09:27 Dose:  100 mg


Hydromorphone HCl (Dilaudid)  1 mg IVP Q4 PRN


   PRN Reason: Pain, moderate (4-7)


   Last Admin: 11/18/17 10:47 Dose:  1 mg


Sodium Chloride (Sodium Chloride 0.9%)  1,000 mls @ 60 mls/hr IV .[id number] [facility]


   Stop: 11/19/17 11:46


Insulin Human Lispro (Humalog)  0 units SC Q6H [facility]


   PRN Reason: Protocol


Lorazepam (Ativan)  2 mg IV Q4 PRN


   PRN Reason: Seizure activity


Losartan Potassium (Cozaar)  50 mg PO DAILY [facility]


   Last Admin: 11/18/17 08:59 Dose:  50 mg


Montelukast Sodium (Singulair)  10 mg PO HS [facility]


   Last Admin: 11/17/17 21:53 Dose:  10 mg


Pantoprazole Sodium (Protonix Inj)  40 mg IVP DAILY [facility]


   Last Admin: 11/18/17 08:58 Dose:  40 mg











- Labs


Labs: 


 





 11/18/17 08:00 





 11/18/17 08:00 





 











PT  12.6 Seconds (9.8-13.1)   11/17/17  04:47    


 


INR  1.1  (0.9-1.2)   11/17/17  04:47    


 


APTT  21.5 Seconds (25.6-37.1)  L  11/17/17  04:47

## 2017-11-18 NOTE — CP.PCM.PN
Subjective





- Subjective


Subjective: 





Acute Resp Failure type 2 2/2 RAD and possible brainstem dysfunction not 

detected on imaging stuides? 


DMII


HTN


Odontoid Fx s/p fixation by Neurosurgery yesterday. 


OA


Sz: combination of previous alkalemia from a resp alkalosis and possible brain 

trauma. 


Anemia.


ARF: Improved Cr. 





S/ Patient is able to follow commands; wiggle toes, move fingers. Understand 

where she is. 





O/ VSS Afebrile.


Head: Ecchymosis of orbits as well as other soft tissue swelling of face.


Heart RRR, Ns1S2 Neg m


Lungs: Clear to A & P


Abdo, Soft, not Pos BS


Ext: No c,c,1 plus edema of LE


Neuro: As aforementioned. 





Labs: as below


 


Cont MV, and monitor serial ABG's. Avoid alkalemia and hyperoxia. Did not 

tolerate SBT's for too long today, but receiving narcotics for pain. Will lower 

dose (done already.) Neurosurgery f/u.


Cont anti Sz Rx and Neuro recommendations. 


Bg monitoring with SSI. 


Monitor Hgb. Transfuse PRN. 


Cont VERNA Q 4 hours. 


Cont anit DM/HTN rx. 


Would benefit from continous EEG if Sz continue despite anticonvulsives. 


Cont analgesics for post op pain. 


Will attempt another round of SBT's in am. Would want to avoid VAP from 

prolonged intubations. 


Cont care as per ICU team. 


PUD & DVT Px. 


Please call my cell phone for all communications regarding my patient: [phone]





Objective





- Vital Signs/Intake and Output


Vital Signs (last 24 hours): 


 











Temp Pulse Resp BP Pulse Ox


 


 100.1 F H  68   15   148/66   100 


 


 11/18/17 20:29  11/18/17 22:00  11/18/17 22:00  11/18/17 22:00  11/18/17 22:00








Intake and Output: 


 











 11/18/17 11/19/17





 18:59 06:59


 


Intake Total  250


 


Balance  250














- Medications


Medications: 


 Current Medications





Acetaminophen (Tylenol 650mg/20.3ml Solution Ud)  650 mg PO Q4 PRN


   PRN Reason: fever


   Last Admin: 11/18/17 20:29 Dose:  650 mg


Albuterol/Ipratropium (Duoneb 3 Mg/0.5 Mg (3 Ml) Ud)  3 ml INH RQID MARIELY


   Last Admin: 11/18/17 19:29 Dose:  3 ml


Amlodipine Besylate (Norvasc)  2.5 mg PO DAILY [facility]


   Last Admin: 11/18/17 08:58 Dose:  2.5 mg


Enoxaparin Sodium (Lovenox)  30 mg SC DAILY [facility]


   PRN Reason: Protocol


   Last Admin: 11/18/17 14:00 Dose:  30 mg


Fosphenytoin Sodium (Cerebyx)  100 mg IV Q8 [facility]


   Last Admin: 11/18/17 17:23 Dose:  100 mg


Hydromorphone HCl (Dilaudid)  1 mg IVP Q4 PRN


   PRN Reason: Pain, moderate (4-7)


   Last Admin: 11/18/17 10:47 Dose:  1 mg


Sodium Chloride (Sodium Chloride 0.9%)  1,000 mls @ 60 mls/hr IV .[id number] [facility]


   Stop: 11/19/17 11:46


   Last Admin: 11/18/17 13:15 Dose:  60 mls/hr


Insulin Human Lispro (Humalog)  0 units SC Q6H MARIELY


   PRN Reason: Protocol


   Last Admin: 11/18/17 17:20 Dose:  Not Given


Lorazepam (Ativan)  2 mg IV Q4 PRN


   PRN Reason: Seizure activity


Losartan Potassium (Cozaar)  50 mg PO DAILY [facility]


   Last Admin: 11/18/17 08:59 Dose:  50 mg


Montelukast Sodium (Singulair)  10 mg PO HS [facility]


   Last Admin: 11/18/17 21:03 Dose:  10 mg


Pantoprazole Sodium (Protonix Inj)  40 mg IVP DAILY [facility]


   Last Admin: 11/18/17 08:58 Dose:  40 mg











- Labs


Labs: 


 





 11/18/17 08:00 





 11/18/17 08:00 





 











PT  12.6 Seconds (9.8-13.1)   11/17/17  04:47    


 


INR  1.1  (0.9-1.2)   11/17/17  04:47    


 


APTT  21.5 Seconds (25.6-37.1)  L  11/17/17  04:47

## 2017-11-18 NOTE — CP.PCM.PN
Subjective





- Date & Time of Evaluation


Date of Evaluation: 11/18/17


Time of Evaluation: 18:59





- Subjective


Subjective: 





The patient is seen in the ICU. The family members are present during the 

encounter.


She is awake and responds to commands.





Objective





- Vital Signs/Intake and Output


Vital Signs (last 24 hours): 


 











Temp Pulse Resp BP Pulse Ox


 


 99.6 F   67   12   136/71   93 L


 


 11/18/17 16:00  11/18/17 16:00  11/18/17 06:00  11/18/17 16:00  11/18/17 16:00








Intake and Output: 


 











 11/18/17 11/18/17





 06:59 18:59


 


Intake Total 930 


 


Output Total 925 


 


Balance 5 














- Medications


Medications: 


 Current Medications





Acetaminophen (Tylenol 650mg/20.3ml Solution Ud)  650 mg PO Q4 PRN


   PRN Reason: fever


   Last Admin: 11/18/17 06:45 Dose:  650 mg


Albuterol/Ipratropium (Duoneb 3 Mg/0.5 Mg (3 Ml) Ud)  3 ml INH RQID [facility]


   Last Admin: 11/18/17 15:23 Dose:  3 ml


Amlodipine Besylate (Norvasc)  2.5 mg PO DAILY [facility]


   Last Admin: 11/18/17 08:58 Dose:  2.5 mg


Enoxaparin Sodium (Lovenox)  30 mg SC DAILY [facility]


   PRN Reason: Protocol


   Last Admin: 11/18/17 14:00 Dose:  30 mg


Fosphenytoin Sodium (Cerebyx)  100 mg IV Q8 [facility]


   Last Admin: 11/18/17 17:23 Dose:  100 mg


Hydromorphone HCl (Dilaudid)  1 mg IVP Q4 PRN


   PRN Reason: Pain, moderate (4-7)


   Last Admin: 11/18/17 10:47 Dose:  1 mg


Sodium Chloride (Sodium Chloride 0.9%)  1,000 mls @ 60 mls/hr IV .[id number] [facility]


   Stop: 11/19/17 11:46


   Last Admin: 11/18/17 13:15 Dose:  60 mls/hr


Insulin Human Lispro (Humalog)  0 units SC Q6H [facility]


   PRN Reason: Protocol


   Last Admin: 11/18/17 17:20 Dose:  Not Given


Lorazepam (Ativan)  2 mg IV Q4 PRN


   PRN Reason: Seizure activity


Losartan Potassium (Cozaar)  50 mg PO DAILY [facility]


   Last Admin: 11/18/17 08:59 Dose:  50 mg


Montelukast Sodium (Singulair)  10 mg PO HS [facility]


   Last Admin: 11/17/17 21:53 Dose:  10 mg


Pantoprazole Sodium (Protonix Inj)  40 mg IVP DAILY [facility]


   Last Admin: 11/18/17 08:58 Dose:  40 mg











- Labs


Labs: 


 





 11/18/17 08:00 





 11/18/17 08:00 





 











PT  12.6 Seconds (9.8-13.1)   11/17/17  04:47    


 


INR  1.1  (0.9-1.2)   11/17/17  04:47    


 


APTT  21.5 Seconds (25.6-37.1)  L  11/17/17  04:47    














- Head Exam


Additional comments: 





cervical collar in place.





- Eye Exam


Eye Exam: Periorbital swelling, Periorbital tenderness





- ENT Exam


ENT Exam: Mucous Membranes Moist





- Respiratory Exam


Respiratory Exam: Decreased Breath Sounds





- Cardiovascular Exam


Cardiovascular Exam: RRR, +S1, +S2





- GI/Abdominal Exam


GI & Abdominal Exam: Soft, Normal Bowel Sounds





- Extremities Exam


Extremities Exam: Full ROM, Normal Inspection





- Skin


Skin Exam: Normal Color, Warm





Assessment and Plan





- Assessment and Plan (Free Text)


Assessment: 





1. Hypertension.


2. s/p fall with cervical fracture.


Plan: 





1. Routine post-op care.


2. Cardiac status stable.


3. Follow up prn.

## 2017-11-18 NOTE — PN
DATE:  11/17/2017



TIME OF EVALUATION:  7:10 p.m.



SUBJECTIVE:  The patient is examined in the presence of her daughter as

well as her granddaughter.  Status post C1-C2 fusion.  The patient on

[place] collar.  Diprivan was turned off to see her response.  The

patient responds to pain symmetrically on both sides.  She moves right more

than her left side.  However, the tone is increased on the right side is

noted same as before.  Deep tendon reflexes are symmetric on the side.  Her

plantars are upgoing on her right to compared with the left side.



The patient does not have any seizures.  Stable with current medication. 

The patient is closely observed while she is in the hospital.



__________________________________________

Girish Huang MD





DD:  11/18/2017 7:11:29

DT:  11/18/2017 8:25:59

Job # [id number]

## 2017-11-18 NOTE — CP.CCUPN
CCU Subjective





- Physician Review


Events Since Last Encounter (Free Text): 





11/18/17 16:59


intermittently alert, with sedation stopped.





CCU Objective





- Vital Signs / Intake & Output


Intake and Output (Last 8hrs): 


 Intake & Output











 11/18/17 11/18/17 11/18/17





 06:59 14:59 22:59


 


Intake Total 670  


 


Output Total 725  


 


Balance -55  


 


Intake:   


 


    


 


  Intake, Piggyback 50  


 


  Oral 100  


 


Output:   


 


  Urine 525  


 


    Urethral (Tarango) 525  


 


  Stool 200  














- Physical Exam


Physical Exam Limitations: Positive for: Altered Mental Status


Head: Positive for: Normocephalic, Contusion, Swelling, Ecchymosis (bilateral 

orbital areas).  Negative for: Laceration


Pupils: Positive for: Sluggish, Pinpoint


Conjunctiva: Positive for: Injected.  Negative for: Icteric


Mouth: Positive for: Moist Mucous Membranes


Pharnyx: Positive for: Normal


Neck: Positive for: Other (Hard, fixed Cervical collar in place)


Respiratory/Chest: Positive for: Decreased Breath Sounds.  Negative for: 

Accessory Muscle Use, Wheezes


Cardiovascular: Positive for: Irregular Rhythm, Bradycardic.  Negative for: 

Murmurs, Rub


Abdomen: Positive for: Normal Bowel Sounds.  Negative for: Tenderness, 

Distention, Mass/Organomegaly


Upper Extremity: Negative for: Edema


Lower Extremity: Positive for: Edema.  Negative for: CALF TENDERNESS, Cyanosis


Neurological: Negative for: GCS=15 (GCS= 6 ( E1V1M4))


Skin: Positive for: Warm, Dry.  Negative for: Rashes


Psychiatric: Positive for: Lethargic





- Medications


Active Medications: 


Active Medications











Generic Name Dose Route Start Last Admin





  Trade Name Goyoq  PRN Reason Stop Dose Admin


 


Acetaminophen  650 mg  11/18/17 06:16  11/18/17 06:45





  Tylenol 650mg/20.3ml Solution Ud  PO   650 mg





  Q4 PRN   Administration





  fever   


 


Albuterol/Ipratropium  3 ml  11/15/17 16:00  11/18/17 15:23





  Duoneb 3 Mg/0.5 Mg (3 Ml) Ud  INH   3 ml





  RQID [facility]   Administration


 


Amlodipine Besylate  2.5 mg  11/15/17 09:00  11/18/17 08:58





  Norvasc  PO   2.5 mg





  DAILY [facility]   Administration


 


Enoxaparin Sodium  30 mg  11/18/17 12:00  





  Lovenox  SC   





  DAILY [facility]   





  Protocol   


 


Fosphenytoin Sodium  100 mg  11/15/17 17:00  11/18/17 09:27





  Cerebyx  IV   100 mg





  Q8 [facility]   Administration


 


Hydromorphone HCl  1 mg  11/17/17 12:27  11/18/17 10:47





  Dilaudid  IVP   1 mg





  Q4 PRN   Administration





  Pain, moderate (4-7)   


 


Sodium Chloride  1,000 mls @ 60 mls/hr  11/18/17 12:00  





  Sodium Chloride 0.9%  IV  11/19/17 11:46  





  [id number] [facility]   


 


Insulin Human Lispro  0 units  11/18/17 11:45  





  Humalog  SC   





  Q6H [facility]   





  Protocol   


 


Lorazepam  2 mg  11/16/17 02:24  





  Ativan  IV   





  Q4 PRN   





  Seizure activity   


 


Losartan Potassium  50 mg  11/15/17 09:00  11/18/17 08:59





  Cozaar  PO   50 mg





  DAILY [facility]   Administration


 


Montelukast Sodium  10 mg  11/15/17 22:00  11/17/17 21:53





  Singulair  PO   10 mg





  HS [facility]   Administration


 


Pantoprazole Sodium  40 mg  11/15/17 12:45  11/18/17 08:58





  Protonix Inj  IVP   40 mg





  DAILY [facility]   Administration














- Patient Studies


Lab Studies: 


 Microbiology Studies











 11/15/17 21:10 MRSA Culture (Admit) - Final





 Naris    MRSA NOT DETECTED








 Lab Studies











  11/18/17 11/18/17 11/18/17 Range/Units





  11:29 08:00 08:00 


 


WBC    15.4 H  (4.8-10.8)  K/uL


 


RBC    3.64 L  (3.80-5.20)  Mil/uL


 


Hgb    9.1 L  (12.0-16.0)  g/dL


 


Hct    29.0 L  (34.0-47.0)  %


 


MCV    79.6 L  (81.0-99.0)  fl


 


MCH    25.1 L  (27.0-31.0)  pg


 


MCHC    31.5 L  (33.0-37.0)  g/dL


 


RDW    17.7 H  (11.5-14.5)  %


 


Plt Count    139  (130-400)  K/uL


 


MPV    8.4  (7.2-11.7)  fl


 


Neut % (Auto)    86.4 H  (50.0-75.0)  %


 


Lymph % (Auto)    5.4 L  (20.0-40.0)  %


 


Mono % (Auto)    8.0  (0.0-10.0)  %


 


Eos % (Auto)    0.1  (0.0-4.0)  %


 


Baso % (Auto)    0.1  (0.0-2.0)  %


 


Neut #    13.3 H  (1.8-7.0)  K/uL


 


Lymph #    0.8 L  (1.0-4.3)  K/uL


 


Mono #    1.2 H  (0.0-0.8)  K/uL


 


Eos #    0.0  (0.0-0.7)  K/uL


 


Baso #    0.0  (0.0-0.2)  K/uL


 


Neutrophils % (Manual)    83 H  (42-75)  %


 


Band Neutrophils %    2  (0-2)  %


 


Lymphocytes % (Manual)    7 L  (20-50)  %


 


Monocytes % (Manual)    8  (0-10)  %


 


Toxic Granulation    Present  


 


Platelet Estimate    Normal  (NORMAL)  


 


Large Platelets    Present  


 


Giant Platelets    Present  


 


Hypochromasia (manual)    Slight  


 


Poikilocytosis (manual    Slight  


 


Anisocytosis (manual)    Moderate  


 


Ovalocytes    Slight  


 


pCO2     (35-45)  mm/Hg


 


pO2     ()  mm/Hg


 


HCO3     (21-28)  mmol/L


 


ABG pH     (7.35-7.45)  


 


ABG Total CO2     (22-28)  mmol/L


 


ABG O2 Saturation     (95-98)  %


 


ABG O2 Content     (15-23)  ML/dL


 


ABG Base Excess     (-2.0-3.0)  mmol/L


 


ABG Hemoglobin     (11.7-17.4)  g/dL


 


ABG Carboxyhemoglobin     (0.5-1.5)  %


 


POC ABG HHb (Measured)     (0.0-5.0)  %


 


ABG Methemoglobin     (0.0-3.0)  %


 


ABG O2 Capacity     (16-24)  mL/dL


 


Almas Test     


 


A-a O2 Difference     mm/Hg


 


Hgb O2 Saturation     (95.0-98.0)  %


 


Vent Mode     


 


Mechanical Rate     


 


FiO2     %


 


Tidal Volume     


 


PEEP     


 


Sodium   137   (132-148)  mmol/l


 


Potassium   3.8   (3.6-5.0)  MMOL/L


 


Chloride   109 H   ()  mmol/L


 


Carbon Dioxide   24   (22-30)  mmol/L


 


Anion Gap   8 L   (10-20)  


 


BUN   22 H   (7-17)  mg/dl


 


Creatinine   1.3 H   (0.7-1.2)  mg/dl


 


Est GFR ( Amer)   47   


 


Est GFR (Non-Af Amer)   39   


 


POC Glucose (mg/dL)  132 H    ()  mg/dL


 


Random Glucose   163 H   ()  mg/dL


 


Calcium   7.5 L   (8.4-10.2)  mg/dL














  11/18/17 11/18/17 11/17/17 Range/Units





  07:00 05:26 22:26 


 


WBC     (4.8-10.8)  K/uL


 


RBC     (3.80-5.20)  Mil/uL


 


Hgb     (12.0-16.0)  g/dL


 


Hct     (34.0-47.0)  %


 


MCV     (81.0-99.0)  fl


 


MCH     (27.0-31.0)  pg


 


MCHC     (33.0-37.0)  g/dL


 


RDW     (11.5-14.5)  %


 


Plt Count     (130-400)  K/uL


 


MPV     (7.2-11.7)  fl


 


Neut % (Auto)     (50.0-75.0)  %


 


Lymph % (Auto)     (20.0-40.0)  %


 


Mono % (Auto)     (0.0-10.0)  %


 


Eos % (Auto)     (0.0-4.0)  %


 


Baso % (Auto)     (0.0-2.0)  %


 


Neut #     (1.8-7.0)  K/uL


 


Lymph #     (1.0-4.3)  K/uL


 


Mono #     (0.0-0.8)  K/uL


 


Eos #     (0.0-0.7)  K/uL


 


Baso #     (0.0-0.2)  K/uL


 


Neutrophils % (Manual)     (42-75)  %


 


Band Neutrophils %     (0-2)  %


 


Lymphocytes % (Manual)     (20-50)  %


 


Monocytes % (Manual)     (0-10)  %


 


Toxic Granulation     


 


Platelet Estimate     (NORMAL)  


 


Large Platelets     


 


Giant Platelets     


 


Hypochromasia (manual)     


 


Poikilocytosis (manual     


 


Anisocytosis (manual)     


 


Ovalocytes     


 


pCO2   44   (35-45)  mm/Hg


 


pO2   102 H   ()  mm/Hg


 


HCO3   24.5   (21-28)  mmol/L


 


ABG pH   7.36   (7.35-7.45)  


 


ABG Total CO2   26.3   (22-28)  mmol/L


 


ABG O2 Saturation   99.2 H   (95-98)  %


 


ABG O2 Content   12.4 L   (15-23)  ML/dL


 


ABG Base Excess   -0.6   (-2.0-3.0)  mmol/L


 


ABG Hemoglobin   9.0 L   (11.7-17.4)  g/dL


 


ABG Carboxyhemoglobin   1.8 H   (0.5-1.5)  %


 


POC ABG HHb (Measured)   0.8   (0.0-5.0)  %


 


ABG Methemoglobin   0.8   (0.0-3.0)  %


 


ABG O2 Capacity   12.5 L   (16-24)  mL/dL


 


Almas Test   Yes   


 


A-a O2 Difference   128.0   mm/Hg


 


Hgb O2 Saturation   96.6   (95.0-98.0)  %


 


Vent Mode   A/c   


 


Mechanical Rate   12   


 


FiO2   40.0   %


 


Tidal Volume   450   


 


PEEP   3   


 


Sodium     (132-148)  mmol/l


 


Potassium     (3.6-5.0)  MMOL/L


 


Chloride     ()  mmol/L


 


Carbon Dioxide     (22-30)  mmol/L


 


Anion Gap     (10-20)  


 


BUN     (7-17)  mg/dl


 


Creatinine     (0.7-1.2)  mg/dl


 


Est GFR (African Amer)     


 


Est GFR (Non-Af Amer)     


 


POC Glucose (mg/dL)  185 H   150 H  ()  mg/dL


 


Random Glucose     ()  mg/dL


 


Calcium     (8.4-10.2)  mg/dL








 Laboratory Results - last 24 hr











  11/17/17 11/18/17 11/18/17





  22:26 05:26 07:00


 


WBC   


 


RBC   


 


Hgb   


 


Hct   


 


MCV   


 


MCH   


 


MCHC   


 


RDW   


 


Plt Count   


 


MPV   


 


Neut % (Auto)   


 


Lymph % (Auto)   


 


Mono % (Auto)   


 


Eos % (Auto)   


 


Baso % (Auto)   


 


Neut #   


 


Lymph #   


 


Mono #   


 


Eos #   


 


Baso #   


 


Neutrophils % (Manual)   


 


Band Neutrophils %   


 


Lymphocytes % (Manual)   


 


Monocytes % (Manual)   


 


Toxic Granulation   


 


Platelet Estimate   


 


Large Platelets   


 


Giant Platelets   


 


Hypochromasia (manual)   


 


Poikilocytosis (manual   


 


Anisocytosis (manual)   


 


Ovalocytes   


 


pCO2   44 


 


pO2   102 H 


 


HCO3   24.5 


 


ABG pH   7.36 


 


ABG Total CO2   26.3 


 


ABG O2 Saturation   99.2 H 


 


ABG O2 Content   12.4 L 


 


ABG Base Excess   -0.6 


 


ABG Hemoglobin   9.0 L 


 


ABG Carboxyhemoglobin   1.8 H 


 


POC ABG HHb (Measured)   0.8 


 


ABG Methemoglobin   0.8 


 


ABG O2 Capacity   12.5 L 


 


Almas Test   Yes 


 


A-a O2 Difference   128.0 


 


Hgb O2 Saturation   96.6 


 


Vent Mode   A/c 


 


Mechanical Rate   12 


 


FiO2   40.0 


 


Tidal Volume   450 


 


PEEP   3 


 


Sodium   


 


Potassium   


 


Chloride   


 


Carbon Dioxide   


 


Anion Gap   


 


BUN   


 


Creatinine   


 


Est GFR ( Amer)   


 


Est GFR (Non-Af Amer)   


 


POC Glucose (mg/dL)  150 H   185 H


 


Random Glucose   


 


Calcium   














  11/18/17 11/18/17 11/18/17





  08:00 08:00 11:29


 


WBC  15.4 H  


 


RBC  3.64 L  


 


Hgb  9.1 L  


 


Hct  29.0 L  


 


MCV  79.6 L  


 


MCH  25.1 L  


 


MCHC  31.5 L  


 


RDW  17.7 H  


 


Plt Count  139  


 


MPV  8.4  


 


Neut % (Auto)  86.4 H  


 


Lymph % (Auto)  5.4 L  


 


Mono % (Auto)  8.0  


 


Eos % (Auto)  0.1  


 


Baso % (Auto)  0.1  


 


Neut #  13.3 H  


 


Lymph #  0.8 L  


 


Mono #  1.2 H  


 


Eos #  0.0  


 


Baso #  0.0  


 


Neutrophils % (Manual)  83 H  


 


Band Neutrophils %  2  


 


Lymphocytes % (Manual)  7 L  


 


Monocytes % (Manual)  8  


 


Toxic Granulation  Present  


 


Platelet Estimate  Normal  


 


Large Platelets  Present  


 


Giant Platelets  Present  


 


Hypochromasia (manual)  Slight  


 


Poikilocytosis (manual  Slight  


 


Anisocytosis (manual)  Moderate  


 


Ovalocytes  Slight  


 


pCO2   


 


pO2   


 


HCO3   


 


ABG pH   


 


ABG Total CO2   


 


ABG O2 Saturation   


 


ABG O2 Content   


 


ABG Base Excess   


 


ABG Hemoglobin   


 


ABG Carboxyhemoglobin   


 


POC ABG HHb (Measured)   


 


ABG Methemoglobin   


 


ABG O2 Capacity   


 


Almas Test   


 


A-a O2 Difference   


 


Hgb O2 Saturation   


 


Vent Mode   


 


Mechanical Rate   


 


FiO2   


 


Tidal Volume   


 


PEEP   


 


Sodium   137 


 


Potassium   3.8 


 


Chloride   109 H 


 


Carbon Dioxide   24 


 


Anion Gap   8 L 


 


BUN   22 H 


 


Creatinine   1.3 H 


 


Est GFR ( Amer)   47 


 


Est GFR (Non-Af Amer)   39 


 


POC Glucose (mg/dL)    132 H


 


Random Glucose   163 H 


 


Calcium   7.5 L 











Fingerstick Blood Sugar Results: 185





Review of Systems





- Review of Systems


Systems not reviewed;Unavailable: Altered Mental Status





Critical Care Progress Note





- Ventilator Checklist


Head of Bed 30 Degrees: Yes


Daily Sedation Vacation: Yes


Daily Assessment of Readiness to Wean: Yes


Daily Spontaneous Breathing Trial: Yes


PUD Prophalyxis: Yes


DVT Prophylaxis: Yes





- Nutrition


Nutrition: 


 Nutrition











 Category Date Time Status


 


 NPO Diet [DIET] Diets  11/15/17 Breakfast Active














Assessment/Plan


(1) Cervical vertebral fracture


Assessment and plan: 


78yo M.  PMHx CKD stage 3, afib, PPM, HTN, CVA, seizure disorder, BPH.  p/w 

recurrent acute respiratory failure.





Neuro: alert and following commands, seizures controlled with lamotrigine.





Pulm: acute on chronic respiratory failure, secondary to pulmonary edema.  now 

chronically on BIPAP.  Duonebs q4h.  Patient may need a chest tube, but 

placement is difficult given high INR, will transfuse FFP to lower INR.  

Patient is also a very high risk for any procedure. 





CV: hemodynamically stable.  Echo shows no signs of valvular disease or heart 

failure.  Possible heart failure with preserved ejection fraction.  Afib rate 

controlled with lopressor IV q6h.





Hem: no acute issues.





Renal: diuresing with lasix 40mg IV q12h.





Endo: No acute issues





GI: NPO while on BIPAP





ID: possible underlying pneumonia, continue Vancomycin and Zosyn.  Should 

consider stopping.





DVT proph - heparin sq


GI proph - pepcid


tarango for strict I/O's during acute illness


Code status - DNI only





Critical Care Time spent 35 minutes


Multi-disciplinary rounds were performed with house staff, nursing, speech 

therapy, respiratory therapy, pharmacy and nutrition with integrated input from 

the primary team/attending and other consulting services.  The documented time 

is cumulative and includes review of patient data/exams/labs/chart review and 

examination of the patient on rounds and throughout the day; time is exclusive 

of any procedures or teaching time.


Current Visit: Yes   Status: Acute

## 2017-11-19 LAB
ABG MECHANICAL RATE: 12
ARTERIAL  BLOOD GAS MODE: (no result)
ARTERIAL PATENCY WRIST A: YES
ATERIAL BLOOD GAS PEEP: 5
BUN SERPL-MCNC: 24 MG/DL (ref 7–17)
CALCIUM SERPL-MCNC: 7.8 MG/DL (ref 8.4–10.2)
CHLORIDE SERPL-SCNC: 109 MMOL/L (ref 98–107)
CO2 SERPL-SCNC: 24 MMOL/L (ref 22–30)
COHGB MFR BLD: 1.7 % (ref 0.5–1.5)
ERYTHROCYTE [DISTWIDTH] IN BLOOD BY AUTOMATED COUNT: 18.1 % (ref 11.5–14.5)
GLUCOSE SERPL-MCNC: 132 MG/DL (ref 65–105)
HCO3 BLDA-SCNC: 23.1 MMOL/L (ref 21–28)
HCT VFR BLD CALC: 29.6 % (ref 34–47)
HHB: 0.7 % (ref 0–5)
MCH RBC QN AUTO: 24.5 PG (ref 27–31)
MCHC RBC AUTO-ENTMCNC: 29.7 G/DL (ref 33–37)
MCV RBC AUTO: 82.4 FL (ref 81–99)
METHGB MFR BLD: 0.9 % (ref 0–3)
O2 CAP BLDA-SCNC: 13.8 ML/DL (ref 16–24)
O2 CT BLDA-SCNC: 13.7 ML/DL (ref 15–23)
PH BLDA: 7.33 [PH] (ref 7.35–7.45)
PLATELET # BLD: 156 K/UL (ref 130–400)
PO2 BLDA: 132 MM/HG (ref 80–100)
POTASSIUM SERPL-SCNC: 4 MMOL/L (ref 3.6–5)
SAO2 % BLDA: 96.7 % (ref 95–98)
SODIUM SERPL-SCNC: 142 MMOL/L (ref 132–148)
WBC # BLD AUTO: 19 K/UL (ref 4.8–10.8)

## 2017-11-19 RX ADMIN — IPRATROPIUM BROMIDE AND ALBUTEROL SULFATE SCH ML: .5; 3 SOLUTION RESPIRATORY (INHALATION) at 11:24

## 2017-11-19 RX ADMIN — ACETAMINOPHEN PRN MG: 160 SOLUTION ORAL at 18:42

## 2017-11-19 RX ADMIN — IPRATROPIUM BROMIDE AND ALBUTEROL SULFATE SCH ML: .5; 3 SOLUTION RESPIRATORY (INHALATION) at 15:26

## 2017-11-19 RX ADMIN — IPRATROPIUM BROMIDE AND ALBUTEROL SULFATE SCH ML: .5; 3 SOLUTION RESPIRATORY (INHALATION) at 08:04

## 2017-11-19 RX ADMIN — INSULIN LISPRO SCH: 100 INJECTION, SOLUTION INTRAVENOUS; SUBCUTANEOUS at 23:45

## 2017-11-19 RX ADMIN — IPRATROPIUM BROMIDE AND ALBUTEROL SULFATE SCH ML: .5; 3 SOLUTION RESPIRATORY (INHALATION) at 19:36

## 2017-11-19 RX ADMIN — INSULIN LISPRO SCH: 100 INJECTION, SOLUTION INTRAVENOUS; SUBCUTANEOUS at 05:45

## 2017-11-19 RX ADMIN — INSULIN LISPRO SCH: 100 INJECTION, SOLUTION INTRAVENOUS; SUBCUTANEOUS at 11:17

## 2017-11-19 RX ADMIN — ENOXAPARIN SODIUM SCH MG: 30 INJECTION SUBCUTANEOUS at 08:13

## 2017-11-19 RX ADMIN — FOSPHENYTOIN SODIUM SCH MG: 50 INJECTION, SOLUTION INTRAMUSCULAR; INTRAVENOUS at 08:32

## 2017-11-19 RX ADMIN — INSULIN LISPRO SCH: 100 INJECTION, SOLUTION INTRAVENOUS; SUBCUTANEOUS at 17:08

## 2017-11-19 RX ADMIN — FOSPHENYTOIN SODIUM SCH MG: 50 INJECTION, SOLUTION INTRAMUSCULAR; INTRAVENOUS at 00:48

## 2017-11-19 RX ADMIN — FOSPHENYTOIN SODIUM SCH MG: 50 INJECTION, SOLUTION INTRAMUSCULAR; INTRAVENOUS at 16:28

## 2017-11-19 NOTE — CP.PCM.PN
Subjective





- Subjective


Subjective: 





Acute Resp Failure type 2 2/2 RAD and possible brainstem dysfunction not 

detected on imaging stuides? 


DMII


HTN


Odontoid Fx s/p fixation by Neurosurgery Post Op day 3. 


OA


Sz: combination of previous alkalemia from a resp alkalosis and possible brain 

trauma. 


Anemia.


ARF: Improved Cr. 





S/ Patient is able to follow commands; wiggle toes, move fingers. Understand 

where she is. 





O/ VSS Afebrile.


Head: Ecchymosis of orbits as well as other soft tissue swelling of face.


Heart RRR, Ns1S2 Neg m


Lungs: Some Rhonchi which change with Vt. 


Abdo, Soft, not Pos BS


Ext: No c,c,1 plus edema of LE


Neuro: As aforementioned. 





Labs: as below


 


Cont MV, and monitor serial ABG's. Avoid alkalemia and hyperoxia. Tolerating 

CPAP with PS. Will speak with ICU physician about possible liberation from MV 

tomorrow if parameters are acceptable.  Neurosurgery f/u.


Cont anti Sz Rx and Neuro recommendations. 


Bg monitoring with SSI. 


Monitor Hgb. Transfuse PRN. 


Cont VERNA Q 4 hours. 


Cont anit DM/HTN rx. 


Would benefit from continous EEG if Sz continue despite anticonvulsives. 


Cont analgesics for post op pain. 


Cont care as per ICU team. 


PUD & DVT Px. 


Please call my cell phone for all communications regarding my patient: [phone]





Objective





- Vital Signs/Intake and Output


Vital Signs (last 24 hours): 


 











Temp Pulse Resp BP Pulse Ox


 


 100.8 F H  83   14   128/55 L  100 


 


 11/19/17 20:00  11/19/17 20:00  11/19/17 20:00  11/19/17 20:50  11/19/17 20:00








Intake and Output: 


 











 11/19/17 11/20/17





 18:59 06:59


 


Intake Total 578 


 


Output Total 4200 


 


Balance -3622 














- Medications


Medications: 


 Current Medications





Acetaminophen (Tylenol 650mg/20.3ml Solution Ud)  650 mg PO Q4 PRN


   PRN Reason: fever


   Last Admin: 11/19/17 18:42 Dose:  650 mg


Albuterol/Ipratropium (Duoneb 3 Mg/0.5 Mg (3 Ml) Ud)  3 ml INH RQID [facility]


   Last Admin: 11/19/17 19:36 Dose:  3 ml


Amlodipine Besylate (Norvasc)  2.5 mg PO DAILY [facility]


   Last Admin: 11/19/17 08:13 Dose:  2.5 mg


Enoxaparin Sodium (Lovenox)  30 mg SC DAILY [facility]


   PRN Reason: Protocol


   Last Admin: 11/19/17 08:13 Dose:  30 mg


Fosphenytoin Sodium (Cerebyx)  100 mg IV Q8 [facility]


   Last Admin: 11/19/17 16:28 Dose:  100 mg


Furosemide (Lasix)  40 mg IVP Q12 [facility]


   Stop: 11/20/17 05:00


   Last Admin: 11/19/17 20:50 Dose:  40 mg


Hydromorphone HCl (Dilaudid)  1 mg IVP Q4 PRN


   PRN Reason: Pain, moderate (4-7)


   Last Admin: 11/19/17 20:39 Dose:  1 mg


Insulin Human Lispro (Humalog)  0 units SC Q6H [facility]


   PRN Reason: Protocol


   Last Admin: 11/19/17 17:08 Dose:  Not Given


Lorazepam (Ativan)  2 mg IV Q4 PRN


   PRN Reason: Seizure activity


Losartan Potassium (Cozaar)  50 mg PO DAILY [facility]


   Last Admin: 11/19/17 08:12 Dose:  50 mg


Montelukast Sodium (Singulair)  10 mg PO HS [facility]


   Last Admin: 11/19/17 21:06 Dose:  10 mg


Pantoprazole Sodium (Protonix Inj)  40 mg IVP DAILY [facility]


   Last Admin: 11/19/17 08:13 Dose:  40 mg











- Labs


Labs: 


 





 11/19/17 05:30 





 11/19/17 05:30 





 











PT  12.6 Seconds (9.8-13.1)   11/17/17  04:47    


 


INR  1.1  (0.9-1.2)   11/17/17  04:47    


 


APTT  21.5 Seconds (25.6-37.1)  L  11/17/17  04:47

## 2017-11-19 NOTE — RAD
HISTORY:

intubated  



COMPARISON:

Portable chest 11/18/2017. 



FINDINGS:



LUNGS:

Endotracheal tube is unchanged in position with nasogastric tube 

again identified placed in skin staples identified at the left neck 

incidentally. Bilateral basilar airspace disease unchanged.



PLEURA:

Mild bilateral pleural effusions persist and are unchanged. No 

pneumothorax bilaterally.



CARDIOVASCULAR:

Cardiac silhouette appears stable.  Borderline pulmonary venous 

congestion.



OSSEOUS STRUCTURES:

No significant abnormalities.



VISUALIZED UPPER ABDOMEN:

Normal.



OTHER FINDINGS:

None.



IMPRESSION:

Persistent opacification at the lower lung zones is noted bilaterally 

representing small bilateral pleural effusions with underlying 

airspace disease not excluded bilaterally. Borderline CHF.

## 2017-11-19 NOTE — CP.CCUPN
CCU Subjective





- Physician Review


Events Since Last Encounter (Free Text): 





11/19/17 15:18


alert and following commands.





CCU Objective





- Vital Signs / Intake & Output


Vital Signs (Last 4 hours): 


Vital Signs











  Temp Pulse Resp BP Pulse Ox


 


 11/19/17 14:00   84  27 H  166/75 H  100


 


 11/19/17 13:00   85  28 H  158/73 H  100


 


 11/19/17 12:00  100.2 F H  92 H  27 H  136/84  98











Intake and Output (Last 8hrs): 


 Intake & Output











 11/19/17 11/19/17 11/19/17





 06:59 14:59 22:59


 


Intake Total 520 514 


 


Output Total 360  


 


Balance 160 514 


 


Intake:   


 


   364 


 


  Intake, Piggyback 100  


 


  Oral  0 


 


  Tube Feeding  100 


 


  Free Water Flush  50 


 


Output:   


 


  Urine 360  


 


    Urethral (Tarango) 360  














- Physical Exam


Head: Positive for: Normocephalic, Contusion, Swelling, Ecchymosis (bilateral 

orbital areas).  Negative for: Laceration


Extroacular Muscles: Positive for: EOMI


Conjunctiva: Positive for: Injected.  Negative for: Icteric


Mouth: Positive for: Moist Mucous Membranes


Pharnyx: Positive for: Normal


Neck: Positive for: Other (Hard, fixed Cervical collar in place)


Respiratory/Chest: Positive for: Decreased Breath Sounds.  Negative for: 

Accessory Muscle Use, Wheezes


Cardiovascular: Positive for: Irregular Rhythm, Bradycardic.  Negative for: 

Murmurs, Rub


Abdomen: Positive for: Normal Bowel Sounds.  Negative for: Tenderness, 

Distention, Mass/Organomegaly


Upper Extremity: Negative for: Edema


Lower Extremity: Positive for: Edema.  Negative for: CALF TENDERNESS, Cyanosis


Neurological: Negative for: GCS=15 (GCS= 6 ( [id number]))


Skin: Positive for: Warm, Dry.  Negative for: Rashes


Psychiatric: Positive for: Alert





- Medications


Active Medications: 


Active Medications











Generic Name Dose Route Start Last Admin





  Trade Name Freq  PRN Reason Stop Dose Admin


 


Acetaminophen  650 mg  11/18/17 06:16  11/18/17 20:29





  Tylenol 650mg/20.3ml Solution Ud  PO   650 mg





  Q4 PRN   Administration





  fever   


 


Albuterol/Ipratropium  3 ml  11/15/17 16:00  11/19/17 11:24





  Duoneb 3 Mg/0.5 Mg (3 Ml) Ud  INH   3 ml





  RQID MARIELY   Administration


 


Amlodipine Besylate  2.5 mg  11/15/17 09:00  11/19/17 08:13





  Norvasc  PO   2.5 mg





  DAILY MARIELY   Administration


 


Enoxaparin Sodium  30 mg  11/18/17 12:00  11/19/17 08:13





  Lovenox  SC   30 mg





  DAILY MARIELY   Administration





  Protocol   


 


Fosphenytoin Sodium  100 mg  11/15/17 17:00  11/19/17 08:32





  Cerebyx  IV   100 mg





  Q8 MARIELY   Administration


 


Furosemide  40 mg  11/19/17 17:00  





  Lasix  IVP  11/20/17 05:00  





  Q12 MARIELY   


 


Hydromorphone HCl  1 mg  11/17/17 12:27  11/18/17 10:47





  Dilaudid  IVP   1 mg





  Q4 PRN   Administration





  Pain, moderate (4-7)   


 


Insulin Human Lispro  0 units  11/18/17 11:45  11/19/17 11:17





  Humalog  SC   Not Given





  Q6H [facility]   





  Protocol   


 


Lorazepam  2 mg  11/16/17 02:24  





  Ativan  IV   





  Q4 PRN   





  Seizure activity   


 


Losartan Potassium  50 mg  11/15/17 09:00  11/19/17 08:12





  Cozaar  PO   50 mg





  DAILY MARIELY   Administration


 


Montelukast Sodium  10 mg  11/15/17 22:00  11/18/17 21:03





  Singulair  PO   10 mg





  HS MARIELY   Administration


 


Pantoprazole Sodium  40 mg  11/15/17 12:45  11/19/17 08:13





  Protonix Inj  IVP   40 mg





  DAILY MARIELY   Administration














- Patient Studies


Lab Studies: 


 Lab Studies











  11/19/17 11/19/17 11/19/17 Range/Units





  06:11 05:30 05:30 


 


WBC    19.0 H  (4.8-10.8)  K/uL


 


RBC    3.59 L  (3.80-5.20)  Mil/uL


 


Hgb    8.8 L  (12.0-16.0)  g/dL


 


Hct    29.6 L  (34.0-47.0)  %


 


MCV    82.4  D  (81.0-99.0)  fl


 


MCH    24.5 L  (27.0-31.0)  pg


 


MCHC    29.7 L  (33.0-37.0)  g/dL


 


RDW    18.1 H  (11.5-14.5)  %


 


Plt Count    156  (130-400)  K/uL


 


pCO2     (35-45)  mm/Hg


 


pO2     ()  mm/Hg


 


HCO3     (21-28)  mmol/L


 


ABG pH     (7.35-7.45)  


 


ABG Total CO2     (22-28)  mmol/L


 


ABG O2 Saturation     (95-98)  %


 


ABG O2 Content     (15-23)  ML/dL


 


ABG Base Excess     (-2.0-3.0)  mmol/L


 


ABG Hemoglobin     (11.7-17.4)  g/dL


 


ABG Carboxyhemoglobin     (0.5-1.5)  %


 


POC ABG HHb (Measured)     (0.0-5.0)  %


 


ABG Methemoglobin     (0.0-3.0)  %


 


ABG O2 Capacity     (16-24)  mL/dL


 


Almas Test     


 


A-a O2 Difference     mm/Hg


 


Hgb O2 Saturation     (95.0-98.0)  %


 


Vent Mode     


 


Mechanical Rate     


 


FiO2     %


 


Tidal Volume     


 


PEEP     


 


Sodium   142   (132-148)  mmol/l


 


Potassium   4.0   (3.6-5.0)  MMOL/L


 


Chloride   109 H   ()  mmol/L


 


Carbon Dioxide   24   (22-30)  mmol/L


 


Anion Gap   13   (10-20)  


 


BUN   24 H   (7-17)  mg/dl


 


Creatinine   1.3 H   (0.7-1.2)  mg/dl


 


Est GFR ( Amer)   47   


 


Est GFR (Non-Af Amer)   39   


 


POC Glucose (mg/dL)  128 H    ()  mg/dL


 


Random Glucose   132 H   ()  mg/dL


 


Calcium   7.8 L   (8.4-10.2)  mg/dL


 


Urine Color     (YELLOW)  


 


Urine Clarity     (Clear)  


 


Urine pH     (5.0-8.0)  


 


Ur Specific Gravity     (1.003-1.030)  


 


Urine Protein     (NEGATIVE)  mg/dL


 


Urine Glucose (UA)     (Normal)  mg/dL


 


Urine Ketones     (NEGATIVE)  mg/dL


 


Urine Blood     (NEGATIVE)  


 


Urine Nitrate     (NEGATIVE)  


 


Urine Bilirubin     (NEGATIVE)  


 


Urine Urobilinogen     (0.2-1.0)  mg/dL


 


Ur Leukocyte Esterase     (Negative)  Tex/uL


 


Urine RBC (Auto)     (0-3)  /hpf


 


Urine Microscopic WBC     (0-5)  /hpf


 


Ur Squamous Epith Cells     (0-5)  /hpf


 


Urine Bacteria     (<OCC)  


 


Crossmatch     














  11/19/17 11/18/17 11/18/17 Range/Units





  04:08 21:32 20:54 


 


WBC     (4.8-10.8)  K/uL


 


RBC     (3.80-5.20)  Mil/uL


 


Hgb     (12.0-16.0)  g/dL


 


Hct     (34.0-47.0)  %


 


MCV     (81.0-99.0)  fl


 


MCH     (27.0-31.0)  pg


 


MCHC     (33.0-37.0)  g/dL


 


RDW     (11.5-14.5)  %


 


Plt Count     (130-400)  K/uL


 


pCO2  45    (35-45)  mm/Hg


 


pO2  132 H    ()  mm/Hg


 


HCO3  23.1    (21-28)  mmol/L


 


ABG pH  7.33 L    (7.35-7.45)  


 


ABG Total CO2  25.1    (22-28)  mmol/L


 


ABG O2 Saturation  99.3 H    (95-98)  %


 


ABG O2 Content  13.7 L    (15-23)  ML/dL


 


ABG Base Excess  -2.3 L    (-2.0-3.0)  mmol/L


 


ABG Hemoglobin  9.9 L    (11.7-17.4)  g/dL


 


ABG Carboxyhemoglobin  1.7 H    (0.5-1.5)  %


 


POC ABG HHb (Measured)  0.7    (0.0-5.0)  %


 


ABG Methemoglobin  0.9    (0.0-3.0)  %


 


ABG O2 Capacity  13.8 L    (16-24)  mL/dL


 


Almas Test  Yes    


 


A-a O2 Difference  97.0    mm/Hg


 


Hgb O2 Saturation  96.7    (95.0-98.0)  %


 


Vent Mode  A/c    


 


Mechanical Rate  12    


 


FiO2  40.0    %


 


Tidal Volume  450    


 


PEEP  5    


 


Sodium     (132-148)  mmol/l


 


Potassium     (3.6-5.0)  MMOL/L


 


Chloride     ()  mmol/L


 


Carbon Dioxide     (22-30)  mmol/L


 


Anion Gap     (10-20)  


 


BUN     (7-17)  mg/dl


 


Creatinine     (0.7-1.2)  mg/dl


 


Est GFR (African Amer)     


 


Est GFR (Non-Af Amer)     


 


POC Glucose (mg/dL)   131 H   ()  mg/dL


 


Random Glucose     ()  mg/dL


 


Calcium     (8.4-10.2)  mg/dL


 


Urine Color    Yellow  (YELLOW)  


 


Urine Clarity    Cloudy  (Clear)  


 


Urine pH    5.0  (5.0-8.0)  


 


Ur Specific Gravity    1.019  (1.003-1.030)  


 


Urine Protein    100  (NEGATIVE)  mg/dL


 


Urine Glucose (UA)    Neg  (Normal)  mg/dL


 


Urine Ketones    Negative  (NEGATIVE)  mg/dL


 


Urine Blood    Small  (NEGATIVE)  


 


Urine Nitrate    Negative  (NEGATIVE)  


 


Urine Bilirubin    Negative  (NEGATIVE)  


 


Urine Urobilinogen    0.2-1.0  (0.2-1.0)  mg/dL


 


Ur Leukocyte Esterase    Small  (Negative)  Tex/uL


 


Urine RBC (Auto)    3  (0-3)  /hpf


 


Urine Microscopic WBC    24 H  (0-5)  /hpf


 


Ur Squamous Epith Cells    1  (0-5)  /hpf


 


Urine Bacteria    Occ H  (<OCC)  


 


Crossmatch     














  11/18/17 11/15/17 Range/Units





  17:52 00:31 


 


WBC    (4.8-10.8)  K/uL


 


RBC    (3.80-5.20)  Mil/uL


 


Hgb    (12.0-16.0)  g/dL


 


Hct    (34.0-47.0)  %


 


MCV    (81.0-99.0)  fl


 


MCH    (27.0-31.0)  pg


 


MCHC    (33.0-37.0)  g/dL


 


RDW    (11.5-14.5)  %


 


Plt Count    (130-400)  K/uL


 


pCO2    (35-45)  mm/Hg


 


pO2    ()  mm/Hg


 


HCO3    (21-28)  mmol/L


 


ABG pH    (7.35-7.45)  


 


ABG Total CO2    (22-28)  mmol/L


 


ABG O2 Saturation    (95-98)  %


 


ABG O2 Content    (15-23)  ML/dL


 


ABG Base Excess    (-2.0-3.0)  mmol/L


 


ABG Hemoglobin    (11.7-17.4)  g/dL


 


ABG Carboxyhemoglobin    (0.5-1.5)  %


 


POC ABG HHb (Measured)    (0.0-5.0)  %


 


ABG Methemoglobin    (0.0-3.0)  %


 


ABG O2 Capacity    (16-24)  mL/dL


 


Almas Test    


 


A-a O2 Difference    mm/Hg


 


Hgb O2 Saturation    (95.0-98.0)  %


 


Vent Mode    


 


Mechanical Rate    


 


FiO2    %


 


Tidal Volume    


 


PEEP    


 


Sodium    (132-148)  mmol/l


 


Potassium    (3.6-5.0)  MMOL/L


 


Chloride    ()  mmol/L


 


Carbon Dioxide    (22-30)  mmol/L


 


Anion Gap    (10-20)  


 


BUN    (7-17)  mg/dl


 


Creatinine    (0.7-1.2)  mg/dl


 


Est GFR (African Amer)    


 


Est GFR (Non-Af Amer)    


 


POC Glucose (mg/dL)  155 H   ()  mg/dL


 


Random Glucose    ()  mg/dL


 


Calcium    (8.4-10.2)  mg/dL


 


Urine Color    (YELLOW)  


 


Urine Clarity    (Clear)  


 


Urine pH    (5.0-8.0)  


 


Ur Specific Gravity    (1.003-1.030)  


 


Urine Protein    (NEGATIVE)  mg/dL


 


Urine Glucose (UA)    (Normal)  mg/dL


 


Urine Ketones    (NEGATIVE)  mg/dL


 


Urine Blood    (NEGATIVE)  


 


Urine Nitrate    (NEGATIVE)  


 


Urine Bilirubin    (NEGATIVE)  


 


Urine Urobilinogen    (0.2-1.0)  mg/dL


 


Ur Leukocyte Esterase    (Negative)  Tex/uL


 


Urine RBC (Auto)    (0-3)  /hpf


 


Urine Microscopic WBC    (0-5)  /hpf


 


Ur Squamous Epith Cells    (0-5)  /hpf


 


Urine Bacteria    (<OCC)  


 


Crossmatch   See Detail  








 Laboratory Results - last 24 hr











  11/15/17 11/18/17 11/18/17





  00:31 17:52 20:54


 


WBC   


 


RBC   


 


Hgb   


 


Hct   


 


MCV   


 


MCH   


 


MCHC   


 


RDW   


 


Plt Count   


 


pCO2   


 


pO2   


 


HCO3   


 


ABG pH   


 


ABG Total CO2   


 


ABG O2 Saturation   


 


ABG O2 Content   


 


ABG Base Excess   


 


ABG Hemoglobin   


 


ABG Carboxyhemoglobin   


 


POC ABG HHb (Measured)   


 


ABG Methemoglobin   


 


ABG O2 Capacity   


 


Almas Test   


 


A-a O2 Difference   


 


Hgb O2 Saturation   


 


Vent Mode   


 


Mechanical Rate   


 


FiO2   


 


Tidal Volume   


 


PEEP   


 


Sodium   


 


Potassium   


 


Chloride   


 


Carbon Dioxide   


 


Anion Gap   


 


BUN   


 


Creatinine   


 


Est GFR ( Amer)   


 


Est GFR (Non-Af Amer)   


 


POC Glucose (mg/dL)   155 H 


 


Random Glucose   


 


Calcium   


 


Urine Color    Yellow


 


Urine Clarity    Cloudy


 


Urine pH    5.0


 


Ur Specific Gravity    1.019


 


Urine Protein    100


 


Urine Glucose (UA)    Neg


 


Urine Ketones    Negative


 


Urine Blood    Small


 


Urine Nitrate    Negative


 


Urine Bilirubin    Negative


 


Urine Urobilinogen    0.2-1.0


 


Ur Leukocyte Esterase    Small


 


Urine RBC (Auto)    3


 


Urine Microscopic WBC    24 H


 


Ur Squamous Epith Cells    1


 


Urine Bacteria    Occ H


 


Crossmatch  See Detail  














  11/18/17 11/19/17 11/19/17





  21:32 04:08 05:30


 


WBC    19.0 H


 


RBC    3.59 L


 


Hgb    8.8 L


 


Hct    29.6 L


 


MCV    82.4  D


 


MCH    24.5 L


 


MCHC    29.7 L


 


RDW    18.1 H


 


Plt Count    156


 


pCO2   45 


 


pO2   132 H 


 


HCO3   23.1 


 


ABG pH   7.33 L 


 


ABG Total CO2   25.1 


 


ABG O2 Saturation   99.3 H 


 


ABG O2 Content   13.7 L 


 


ABG Base Excess   -2.3 L 


 


ABG Hemoglobin   9.9 L 


 


ABG Carboxyhemoglobin   1.7 H 


 


POC ABG HHb (Measured)   0.7 


 


ABG Methemoglobin   0.9 


 


ABG O2 Capacity   13.8 L 


 


Almas Test   Yes 


 


A-a O2 Difference   97.0 


 


Hgb O2 Saturation   96.7 


 


Vent Mode   A/c 


 


Mechanical Rate   12 


 


FiO2   40.0 


 


Tidal Volume   450 


 


PEEP   5 


 


Sodium   


 


Potassium   


 


Chloride   


 


Carbon Dioxide   


 


Anion Gap   


 


BUN   


 


Creatinine   


 


Est GFR ( Amer)   


 


Est GFR (Non-Af Amer)   


 


POC Glucose (mg/dL)  131 H  


 


Random Glucose   


 


Calcium   


 


Urine Color   


 


Urine Clarity   


 


Urine pH   


 


Ur Specific Gravity   


 


Urine Protein   


 


Urine Glucose (UA)   


 


Urine Ketones   


 


Urine Blood   


 


Urine Nitrate   


 


Urine Bilirubin   


 


Urine Urobilinogen   


 


Ur Leukocyte Esterase   


 


Urine RBC (Auto)   


 


Urine Microscopic WBC   


 


Ur Squamous Epith Cells   


 


Urine Bacteria   


 


Crossmatch   














  11/19/17 11/19/17





  05:30 06:11


 


WBC  


 


RBC  


 


Hgb  


 


Hct  


 


MCV  


 


MCH  


 


MCHC  


 


RDW  


 


Plt Count  


 


pCO2  


 


pO2  


 


HCO3  


 


ABG pH  


 


ABG Total CO2  


 


ABG O2 Saturation  


 


ABG O2 Content  


 


ABG Base Excess  


 


ABG Hemoglobin  


 


ABG Carboxyhemoglobin  


 


POC ABG HHb (Measured)  


 


ABG Methemoglobin  


 


ABG O2 Capacity  


 


Almas Test  


 


A-a O2 Difference  


 


Hgb O2 Saturation  


 


Vent Mode  


 


Mechanical Rate  


 


FiO2  


 


Tidal Volume  


 


PEEP  


 


Sodium  142 


 


Potassium  4.0 


 


Chloride  109 H 


 


Carbon Dioxide  24 


 


Anion Gap  13 


 


BUN  24 H 


 


Creatinine  1.3 H 


 


Est GFR ( Amer)  47 


 


Est GFR (Non-Af Amer)  39 


 


POC Glucose (mg/dL)   128 H


 


Random Glucose  132 H 


 


Calcium  7.8 L 


 


Urine Color  


 


Urine Clarity  


 


Urine pH  


 


Ur Specific Gravity  


 


Urine Protein  


 


Urine Glucose (UA)  


 


Urine Ketones  


 


Urine Blood  


 


Urine Nitrate  


 


Urine Bilirubin  


 


Urine Urobilinogen  


 


Ur Leukocyte Esterase  


 


Urine RBC (Auto)  


 


Urine Microscopic WBC  


 


Ur Squamous Epith Cells  


 


Urine Bacteria  


 


Crossmatch  











Fingerstick Blood Sugar Results: 133





Review of Systems





- Review of Systems


Systems not reviewed;Unavailable: Intubated





Critical Care Progress Note





- Ventilator Checklist


Head of Bed 30 Degrees: Yes


Daily Sedation Vacation: Yes


Daily Assessment of Readiness to Wean: Yes


Daily Spontaneous Breathing Trial: Yes


PUD Prophalyxis: Yes


DVT Prophylaxis: Yes





- Nutrition


Nutrition: 


 Nutrition











 Category Date Time Status


 


 NPO Diet [DIET] Diets  11/15/17 Breakfast Active














Assessment/Plan


(1) Cervical vertebral fracture


Assessment and plan: 


89yo F. PMhx DM type 2, HTN, COPD asthma, osteoporosis, Glaucoma (right eye). 

suffered a fall, c/b C2 fracture, underwent C1-C2 posterior cervical fusion.





Neuro: stopped all sedation dilaudid prn for pain.  post-concussive seizures, 

continue fosphenytoin.  Brain MRI pending.





Pulm: acute respiratory failure on vent, tolerating PS trials, possible 

extubation tomorrow.





CV: hemodynamically stable.  HTN continue amlodipine and losartan.





Hem: no acute issues





Renal: urine output wnl, will monitor.  several liters positive, diuresing 

lasix 40mg IV q12h.





Endo:  DM type 2, short acting insulin sliding scale for coverage





GI: NPO, Glucerna@20, goal TBD.





ID: no acute issues.  leucocytosis today, f/u blood cx.





DVT proph - lovenox


GI proph - protonix


tarango for strict I/O's during acute illness


Code status - full code





Critical Care Time spent 35 minutes


Multi-disciplinary rounds were performed with house staff, nursing, speech 

therapy, respiratory therapy, pharmacy and nutrition with integrated input from 

the primary team/attending and other consulting services.  The documented time 

is cumulative and includes review of patient data/exams/labs/chart review and 

examination of the patient on rounds and throughout the day; time is exclusive 

of any procedures or teaching time.


Current Visit: Yes   Status: Acute

## 2017-11-20 LAB
ABG MECHANICAL RATE: 12
ARTERIAL  BLOOD GAS MODE: (no result)
ARTERIAL PATENCY WRIST A: YES
ATERIAL BLOOD GAS PEEP: 5
BUN SERPL-MCNC: 27 MG/DL (ref 7–17)
CALCIUM SERPL-MCNC: 7.9 MG/DL (ref 8.4–10.2)
CHLORIDE SERPL-SCNC: 109 MMOL/L (ref 98–107)
CO2 SERPL-SCNC: 26 MMOL/L (ref 22–30)
COHGB MFR BLD: 2.1 % (ref 0.5–1.5)
ERYTHROCYTE [DISTWIDTH] IN BLOOD BY AUTOMATED COUNT: 18.2 % (ref 11.5–14.5)
GLUCOSE SERPL-MCNC: 128 MG/DL (ref 65–105)
HCO3 BLDA-SCNC: 26.4 MMOL/L (ref 21–28)
HCT VFR BLD CALC: 27.5 % (ref 34–47)
HHB: -0.1 % (ref 0–5)
MCH RBC QN AUTO: 24.7 PG (ref 27–31)
MCHC RBC AUTO-ENTMCNC: 30.3 G/DL (ref 33–37)
MCV RBC AUTO: 81.5 FL (ref 81–99)
METHGB MFR BLD: 0.7 % (ref 0–3)
O2 CAP BLDA-SCNC: 11.9 ML/DL (ref 16–24)
O2 CT BLDA-SCNC: 11.9 ML/DL (ref 15–23)
PH BLDA: 7.36 [PH] (ref 7.35–7.45)
PLATELET # BLD: 170 K/UL (ref 130–400)
PO2 BLDA: 121 MM/HG (ref 80–100)
POTASSIUM SERPL-SCNC: 3.8 MMOL/L (ref 3.6–5)
SAO2 % BLDA: 97.3 % (ref 95–98)
SODIUM SERPL-SCNC: 145 MMOL/L (ref 132–148)
WBC # BLD AUTO: 14.6 K/UL (ref 4.8–10.8)

## 2017-11-20 PROCEDURE — 0BH17EZ INSERTION OF ENDOTRACHEAL AIRWAY INTO TRACHEA, VIA NATURAL OR ARTIFICIAL OPENING: ICD-10-PCS

## 2017-11-20 RX ADMIN — INSULIN LISPRO SCH: 100 INJECTION, SOLUTION INTRAVENOUS; SUBCUTANEOUS at 16:56

## 2017-11-20 RX ADMIN — FOSPHENYTOIN SODIUM SCH MG: 50 INJECTION, SOLUTION INTRAMUSCULAR; INTRAVENOUS at 17:14

## 2017-11-20 RX ADMIN — METHYLPREDNISOLONE SODIUM SUCCINATE SCH MG: 40 INJECTION, POWDER, FOR SOLUTION INTRAMUSCULAR; INTRAVENOUS at 17:07

## 2017-11-20 RX ADMIN — IPRATROPIUM BROMIDE AND ALBUTEROL SULFATE SCH ML: .5; 3 SOLUTION RESPIRATORY (INHALATION) at 16:11

## 2017-11-20 RX ADMIN — IPRATROPIUM BROMIDE AND ALBUTEROL SULFATE SCH ML: .5; 3 SOLUTION RESPIRATORY (INHALATION) at 19:22

## 2017-11-20 RX ADMIN — FOSPHENYTOIN SODIUM SCH MG: 50 INJECTION, SOLUTION INTRAMUSCULAR; INTRAVENOUS at 01:07

## 2017-11-20 RX ADMIN — IPRATROPIUM BROMIDE AND ALBUTEROL SULFATE SCH ML: .5; 3 SOLUTION RESPIRATORY (INHALATION) at 08:50

## 2017-11-20 RX ADMIN — IPRATROPIUM BROMIDE AND ALBUTEROL SULFATE SCH ML: .5; 3 SOLUTION RESPIRATORY (INHALATION) at 11:12

## 2017-11-20 RX ADMIN — ENOXAPARIN SODIUM SCH MG: 30 INJECTION SUBCUTANEOUS at 09:38

## 2017-11-20 RX ADMIN — FOSPHENYTOIN SODIUM SCH MG: 50 INJECTION, SOLUTION INTRAMUSCULAR; INTRAVENOUS at 09:52

## 2017-11-20 RX ADMIN — INSULIN LISPRO SCH: 100 INJECTION, SOLUTION INTRAVENOUS; SUBCUTANEOUS at 23:35

## 2017-11-20 RX ADMIN — INSULIN LISPRO SCH UNIT: 100 INJECTION, SOLUTION INTRAVENOUS; SUBCUTANEOUS at 12:44

## 2017-11-20 RX ADMIN — INSULIN LISPRO SCH: 100 INJECTION, SOLUTION INTRAVENOUS; SUBCUTANEOUS at 06:58

## 2017-11-20 NOTE — PCM.ANES
Anesthesia Emergent Intubation





- Diagnosis


Working Diagnosis:: Respiratory failure





- Consult


Reason for Consult:: Hypoxia





- Intubation Attempts


Previous Number of Intubation Attempts:: 0





- Pre-Intubation Vital Signs


Blood Pressure: 90/50


Heart Rate: 95


Respiratory Rate: 12


O2 Sat: 80


FIO2: 25 (Highflow)


Level Of Consciousness: Somnolent


Intubation Meds Given: Etomidate





- Airway Management


Oropharyngeal Area Suctioned: Yes


Inhalation: Yes


Rapid Sequence: Yes


Possible Aspiration: No





- Method of Intubation


Intubation Method: Oral ETT


ETT Size: 7.5


Lipline@: 22


Easy: Yes


Atramatic: Yes (in line stabilization held througout )





- Intubation Devices


Glide Scope Used: Yes ( MAC 3)





- Placement Confirmation


Breath Sounds Present & Equal Bilaterally: Yes


Gurgling Sounds Not Audible at Epigastrum: No


Positive EtCO2: Yes


Portable CXR: Yes (To be ordered by ICU)


Recommendations: Ventilator, Chest X Ray, ABG





- Post-Intubation Vital Signs


Blood Pressure: 105/65


Heart Rate: 90


Respiratory Rate: 10 (Rate to be determined by ICU)


O2 Sat: 100


FIO2: 100

## 2017-11-20 NOTE — RAD
HISTORY:

intubated  



COMPARISON:

Multiple serial examinations preceding the most recent study: 

November 19, 2017.  



FINDINGS:



LUNGS:

Stable lower lobe infiltrates



PLEURA:

Stable pleural effusions



CARDIOVASCULAR:

No significant interval change compared to the prior examination(s).



OSSEOUS STRUCTURES:

No significant abnormalities.



VISUALIZED UPPER ABDOMEN:

Normal.



OTHER FINDINGS:

Stable, satisfactory position ventilatory, and nasogastric apparatus.



IMPRESSION:

No significant interval change compared to the prior examination(s).

## 2017-11-20 NOTE — CT
PROCEDURE:  CT Chest without contrast



HISTORY:

assess for pneumonia, pulmonary edema



COMPARISON:

None.



TECHNIQUE:

Contiguous axial images were obtained through the chest without 

intravenous contrast enhancement. Sagittal and coronal 

reconstructions were performed.







Radiation dose (DLP): 651 mGy-cm. 



This CT exam was performed using one or more of the following dose 

reduction techniques: Automated exposure control, adjustment of the 

mA and/or kV according to patient size, and/or use of iterative 

reconstruction technique.



FINDINGS:



LUNGS:

There is bibasilar consolidation adjacent to the pleural effusions. 

The lungs are otherwise clear 



MEDIASTINUM:

Unremarkable thoracic aorta. No aneurysm. Normal sized heart. Main 

pulmonary artery unremarkable. No vascular congestion. No 

lymphadenopathy.



PLEURA:

Moderate size bilateral pleural effusions.  Minimal pericardial 

effusion



BONES:

No fracture. No destructive lesion. 



UPPER ABDOMEN:

Grossly unremarkable.



OTHER FINDINGS:

Endotracheal tube in satisfactory position



IMPRESSION:

Moderate size bilateral pleural effusions with adjacent areas of 

bibasilar consolidation.

## 2017-11-20 NOTE — PN
NEUROLOGICAL PROBLEM:  Possible hypoxic encephalopathy, superimposed with

seizures and post-traumatic C2 fracture, followed cervical fusion.



The patient is examined in the presence of her daughter.  The patient out

from sedation, moving all 4 extremities.  The patient respond to pain.  She

is trying to open her eyes.



The patient is neurologically improving, no seizures.  Continue the present

management.  I would like her to have a EEG probably tomorrow.







__________________________________________

Girish Huang MD





DD:  11/19/2017 14:44:45

DT:  11/19/2017 15:34:56

Job # [id number]

## 2017-11-20 NOTE — RAD
HISTORY:

s/p intubation  



COMPARISON:

November 20, 2017. Time of the most recent examination: 04:54. 



FINDINGS:



LUNGS:

Stable bilateral lower lobe infiltrates right greater than left



PLEURA:

Stable bilateral pleural effusions right larger than



CARDIOVASCULAR:

 No radiographic findings to suggest acute or significant 

cardiovascular disease.



OSSEOUS STRUCTURES:

No significant abnormalities.



VISUALIZED UPPER ABDOMEN:

Normal.



OTHER FINDINGS:

Stable position of endotracheal tube and nasogastric tube.



IMPRESSION:

No significant interval change compared to the prior examination(s).

## 2017-11-20 NOTE — CP.PCM.PN
Subjective





- Subjective


Subjective: 





Acute Resp Failure: Patient extubated today, but had to be reintubated. 

Considering obesity, age, poor cough effort, she will probably require a Trach. 


DMII


HTN


Odontoid Fx s/p fixation by Neurosurgery Post Op day 4. 


OA


Sz: combination of previous alkalemia from a resp alkalosis and possible brain 

trauma. 


Anemia.


ARF: Improved Cr. 





S/ Patient is able to follow commands. 





O/ VSS Afebrile.


Head: Ecchymosis of orbits as well as other soft tissue swelling of face.


Heart RRR, Ns1S2 Neg m


Lungs: Some Rhonchi which change with Vt. 


Abdo, Soft, not Pos BS


Ext: No c,c,1 plus edema of LE


Neuro: As aforementioned. 





Labs: as below


 


Cont MV, and monitor serial ABG's. Avoid alkalemia and hyperoxia. Tolerating 

CPAP with PS. Will speak with ICU physician about possible liberation from MV 

tomorrow if parameters are acceptable.  Neurosurgery f/u. Will let family know 

that she may be heading towards a Trach in view of the aforementioned. 


Cont anti Sz Rx and Neuro recommendations. 


Bg monitoring with SSI. 


Monitor Hgb. Transfuse PRN. 


Cont VERNA Q 4 hours. 


Cont anit DM/HTN rx. 


Would benefit from continous EEG if Sz continue despite anticonvulsives. 


Cont analgesics for post op pain. 


Cont care as per ICU team. 


PUD & DVT Px. 


Please call my cell phone for all communications regarding my patient: [phone]





Objective





- Vital Signs/Intake and Output


Vital Signs (last 24 hours): 


 











Temp Pulse Resp BP Pulse Ox


 


 98.8 F   71   12   135/76   100 


 


 11/20/17 20:00  11/20/17 20:00  11/20/17 20:00  11/20/17 20:00  11/20/17 20:00








Intake and Output: 


 











 11/20/17 11/21/17





 18:59 06:59


 


Intake Total 590 0


 


Output Total 800 


 


Balance -210 0














- Medications


Medications: 


 Current Medications





Acetaminophen (Tylenol 650mg/20.3ml Solution Ud)  650 mg PO Q4 PRN


   PRN Reason: fever


   Last Admin: 11/19/17 18:42 Dose:  650 mg


Albuterol/Ipratropium (Duoneb 3 Mg/0.5 Mg (3 Ml) Ud)  3 ml INH RQID [facility]


   Last Admin: 11/20/17 19:22 Dose:  3 ml


Amlodipine Besylate (Norvasc)  2.5 mg PO DAILY [facility]


   Last Admin: 11/20/17 09:38 Dose:  2.5 mg


Enoxaparin Sodium (Lovenox)  30 mg SC DAILY [facility]


   PRN Reason: Protocol


   Last Admin: 11/20/17 09:38 Dose:  30 mg


Fosphenytoin Sodium (Cerebyx)  100 mg IV Q8 [facility]


   Last Admin: 11/20/17 17:14 Dose:  100 mg


Furosemide (Lasix)  40 mg IV Q12H [facility]


   Stop: 11/24/17 05:01


   Last Admin: 11/20/17 17:09 Dose:  40 mg


Ceftriaxone Sodium 2 gm/ (Sodium Chloride)  100 mls @ 100 mls/hr IVPB DAILY [facility]


   PRN Reason: Protocol


Azithromycin 500 mg/ Sodium (Chloride)  250 mls @ 250 mls/hr IVPB DAILY [facility]


   PRN Reason: Protocol


Insulin Human Lispro (Humalog)  0 units SC Q6H [facility]


   PRN Reason: Protocol


   Last Admin: 11/20/17 16:56 Dose:  Not Given


Lorazepam (Ativan)  2 mg IV Q4 PRN


   PRN Reason: Seizure activity


Losartan Potassium (Cozaar)  50 mg PO DAILY [facility]


   Last Admin: 11/20/17 09:38 Dose:  50 mg


Methylprednisolone (Solu-Medrol)  40 mg IVP Q8 [facility]


   Last Admin: 11/20/17 17:07 Dose:  40 mg


Montelukast Sodium (Singulair)  10 mg PO HS [facility]


   Last Admin: 11/20/17 21:50 Dose:  10 mg


Pantoprazole Sodium (Protonix Inj)  40 mg IVP DAILY [facility]


   Last Admin: 11/20/17 09:37 Dose:  40 mg











- Labs


Labs: 


 





 11/20/17 04:20 





 11/20/17 04:20 





 











PT  12.6 Seconds (9.8-13.1)   11/17/17  04:47    


 


INR  1.1  (0.9-1.2)   11/17/17  04:47    


 


APTT  21.5 Seconds (25.6-37.1)  L  11/17/17  04:47

## 2017-11-20 NOTE — CP.CCUPN
CCU Subjective





- Physician Review


Events Since Last Encounter (Free Text): 





11/20/17 16:55


failed extubation today, reintubated.





CCU Objective





- Vital Signs / Intake & Output


Vital Signs (Last 4 hours): 


Vital Signs











  Pulse Resp BP Pulse Ox


 


 11/20/17 15:00  63  12  91/53 L  98


 


 11/20/17 14:00  71  12  84/45 L  98


 


 11/20/17 13:13  95 H  12  90/50 L  80 L


 


 11/20/17 13:00  88   109/50 L  98











Intake and Output (Last 8hrs): 


 Intake & Output











 11/20/17 11/20/17 11/20/17





 06:59 14:59 22:59


 


Intake Total 190 230 


 


Output Total 1250 425 


 


Balance -1060 -195 


 


Intake:   


 


  IV 0  


 


  Intake, Piggyback 50 50 


 


  Tube Feeding 140 180 


 


Output:   


 


  Urine 1250 425 


 


    Urethral (Tarango) 1250 425 














- Physical Exam


Head: Positive for: Normocephalic, Contusion, Swelling, Ecchymosis (bilateral 

orbital areas).  Negative for: Laceration


Pupils: Positive for: Sluggish, Pinpoint


Extroacular Muscles: Positive for: EOMI


Conjunctiva: Positive for: Injected.  Negative for: Icteric


Mouth: Positive for: Moist Mucous Membranes


Pharnyx: Positive for: Normal


Neck: Positive for: Other (Hard, fixed Cervical collar in place)


Respiratory/Chest: Positive for: Wheezes, Decreased Breath Sounds.  Negative for

: Accessory Muscle Use


Cardiovascular: Positive for: Irregular Rhythm, Bradycardic.  Negative for: 

Murmurs, Rub


Abdomen: Positive for: Normal Bowel Sounds.  Negative for: Tenderness, 

Distention, Mass/Organomegaly


Upper Extremity: Negative for: Edema


Lower Extremity: Positive for: Edema.  Negative for: CALF TENDERNESS, Cyanosis


Neurological: Negative for: GCS=15 (GCS= 6 ( E1V1M4))


Skin: Positive for: Warm, Dry.  Negative for: Rashes


Psychiatric: Positive for: Alert





- Medications


Active Medications: 


Active Medications











Generic Name Dose Route Start Last Admin





  Trade Name Freq  PRN Reason Stop Dose Admin


 


Acetaminophen  650 mg  11/18/17 06:16  11/19/17 18:42





  Tylenol 650mg/20.3ml Solution Ud  PO   650 mg





  Q4 PRN   Administration





  fever   


 


Albuterol/Ipratropium  3 ml  11/15/17 16:00  11/20/17 16:11





  Duoneb 3 Mg/0.5 Mg (3 Ml) Ud  INH   3 ml





  RQID MARIELY   Administration


 


Amlodipine Besylate  2.5 mg  11/15/17 09:00  11/20/17 09:38





  Norvasc  PO   2.5 mg





  DAILY MARIELY   Administration


 


Enoxaparin Sodium  30 mg  11/18/17 12:00  11/20/17 09:38





  Lovenox  SC   30 mg





  DAILY MARIELY   Administration





  Protocol   


 


Fosphenytoin Sodium  100 mg  11/15/17 17:00  11/20/17 09:52





  Cerebyx  IV   100 mg





  Q8 MARIELY   Administration


 


Furosemide  40 mg  11/20/17 17:00  





  Lasix  IV  11/24/17 05:01  





  Q12H MARIELY   


 


Insulin Human Lispro  0 units  11/18/17 11:45  11/20/17 12:44





  Humalog  SC   1 unit





  Q6H MARIELY   Administration





  Protocol   


 


Lorazepam  2 mg  11/16/17 02:24  





  Ativan  IV   





  Q4 PRN   





  Seizure activity   


 


Losartan Potassium  50 mg  11/15/17 09:00  11/20/17 09:38





  Cozaar  PO   50 mg





  DAILY MARIELY   Administration


 


Methylprednisolone  40 mg  11/20/17 17:00  





  Solu-Medrol  IVP   





  Q8 MARIELY   


 


Montelukast Sodium  10 mg  11/15/17 22:00  11/19/17 21:06





  Singulair  PO   10 mg





  HS MARIELY   Administration


 


Pantoprazole Sodium  40 mg  11/15/17 12:45  11/20/17 09:37





  Protonix Inj  IVP   40 mg





  DAILY MARIELY   Administration














- Patient Studies


Lab Studies: 


 Microbiology Studies











 11/18/17 20:54 Urine Culture - Final





 Urine,Tarango    No Growth (<1,000 CFU/ML)


 


 11/18/17 19:04 Blood Culture - Preliminary





 Blood-Venous    NO GROWTH AFTER 24 HOURS








 Lab Studies











  11/20/17 11/20/17 11/20/17 Range/Units





  11:29 06:27 06:25 


 


WBC     (4.8-10.8)  K/uL


 


RBC     (3.80-5.20)  Mil/uL


 


Hgb     (12.0-16.0)  g/dL


 


Hct     (34.0-47.0)  %


 


MCV     (81.0-99.0)  fl


 


MCH     (27.0-31.0)  pg


 


MCHC     (33.0-37.0)  g/dL


 


RDW     (11.5-14.5)  %


 


Plt Count     (130-400)  K/uL


 


pCO2    49 H  (35-45)  mm/Hg


 


pO2    121 H  ()  mm/Hg


 


HCO3    26.4  (21-28)  mmol/L


 


ABG pH    7.36  (7.35-7.45)  


 


ABG Total CO2    29.2 H  (22-28)  mmol/L


 


ABG O2 Saturation    100.1 H  (95-98)  %


 


ABG O2 Content    11.9 L  (15-23)  ML/dL


 


ABG Base Excess    1.9  (-2.0-3.0)  mmol/L


 


ABG Hemoglobin    8.5 L  (11.7-17.4)  g/dL


 


ABG Carboxyhemoglobin    2.1 H  (0.5-1.5)  %


 


POC ABG HHb (Measured)    -0.1 L  (0.0-5.0)  %


 


ABG Methemoglobin    0.7  (0.0-3.0)  %


 


ABG O2 Capacity    11.9 L  (16-24)  mL/dL


 


Almas Test    Yes  


 


A-a O2 Difference    103.0  mm/Hg


 


Hgb O2 Saturation    97.3  (95.0-98.0)  %


 


Vent Mode    A/c  


 


Mechanical Rate    12  


 


FiO2    40.0  %


 


Tidal Volume    450  


 


PEEP    5  


 


Sodium     (132-148)  mmol/l


 


Potassium     (3.6-5.0)  MMOL/L


 


Chloride     ()  mmol/L


 


Carbon Dioxide     (22-30)  mmol/L


 


Anion Gap     (10-20)  


 


BUN     (7-17)  mg/dl


 


Creatinine     (0.7-1.2)  mg/dl


 


Est GFR (African Amer)     


 


Est GFR (Non-Af Amer)     


 


POC Glucose (mg/dL)  165 H  137 H   ()  mg/dL


 


Random Glucose     ()  mg/dL


 


Calcium     (8.4-10.2)  mg/dL














  11/20/17 11/20/17 11/19/17 Range/Units





  04:20 04:20 21:30 


 


WBC   14.6 H   (4.8-10.8)  K/uL


 


RBC   3.38 L   (3.80-5.20)  Mil/uL


 


Hgb   8.3 L   (12.0-16.0)  g/dL


 


Hct   27.5 L   (34.0-47.0)  %


 


MCV   81.5   (81.0-99.0)  fl


 


MCH   24.7 L   (27.0-31.0)  pg


 


MCHC   30.3 L   (33.0-37.0)  g/dL


 


RDW   18.2 H   (11.5-14.5)  %


 


Plt Count   170   (130-400)  K/uL


 


pCO2     (35-45)  mm/Hg


 


pO2     ()  mm/Hg


 


HCO3     (21-28)  mmol/L


 


ABG pH     (7.35-7.45)  


 


ABG Total CO2     (22-28)  mmol/L


 


ABG O2 Saturation     (95-98)  %


 


ABG O2 Content     (15-23)  ML/dL


 


ABG Base Excess     (-2.0-3.0)  mmol/L


 


ABG Hemoglobin     (11.7-17.4)  g/dL


 


ABG Carboxyhemoglobin     (0.5-1.5)  %


 


POC ABG HHb (Measured)     (0.0-5.0)  %


 


ABG Methemoglobin     (0.0-3.0)  %


 


ABG O2 Capacity     (16-24)  mL/dL


 


Almas Test     


 


A-a O2 Difference     mm/Hg


 


Hgb O2 Saturation     (95.0-98.0)  %


 


Vent Mode     


 


Mechanical Rate     


 


FiO2     %


 


Tidal Volume     


 


PEEP     


 


Sodium  145    (132-148)  mmol/l


 


Potassium  3.8    (3.6-5.0)  MMOL/L


 


Chloride  109 H    ()  mmol/L


 


Carbon Dioxide  26    (22-30)  mmol/L


 


Anion Gap  14    (10-20)  


 


BUN  27 H    (7-17)  mg/dl


 


Creatinine  1.5 H    (0.7-1.2)  mg/dl


 


Est GFR (African Amer)  40    


 


Est GFR (Non-Af Amer)  33    


 


POC Glucose (mg/dL)    164 H  ()  mg/dL


 


Random Glucose  128 H    ()  mg/dL


 


Calcium  7.9 L    (8.4-10.2)  mg/dL








 Laboratory Results - last 24 hr











  11/19/17 11/20/17 11/20/17





  21:30 04:20 04:20


 


WBC   14.6 H 


 


RBC   3.38 L 


 


Hgb   8.3 L 


 


Hct   27.5 L 


 


MCV   81.5 


 


MCH   24.7 L 


 


MCHC   30.3 L 


 


RDW   18.2 H 


 


Plt Count   170 


 


pCO2   


 


pO2   


 


HCO3   


 


ABG pH   


 


ABG Total CO2   


 


ABG O2 Saturation   


 


ABG O2 Content   


 


ABG Base Excess   


 


ABG Hemoglobin   


 


ABG Carboxyhemoglobin   


 


POC ABG HHb (Measured)   


 


ABG Methemoglobin   


 


ABG O2 Capacity   


 


Almas Test   


 


A-a O2 Difference   


 


Hgb O2 Saturation   


 


Vent Mode   


 


Mechanical Rate   


 


FiO2   


 


Tidal Volume   


 


PEEP   


 


Sodium    145


 


Potassium    3.8


 


Chloride    109 H


 


Carbon Dioxide    26


 


Anion Gap    14


 


BUN    27 H


 


Creatinine    1.5 H


 


Est GFR ( Amer)    40


 


Est GFR (Non-Af Amer)    33


 


POC Glucose (mg/dL)  164 H  


 


Random Glucose    128 H


 


Calcium    7.9 L














  11/20/17 11/20/17 11/20/17





  06:25 06:27 11:29


 


WBC   


 


RBC   


 


Hgb   


 


Hct   


 


MCV   


 


MCH   


 


MCHC   


 


RDW   


 


Plt Count   


 


pCO2  49 H  


 


pO2  121 H  


 


HCO3  26.4  


 


ABG pH  7.36  


 


ABG Total CO2  29.2 H  


 


ABG O2 Saturation  100.1 H  


 


ABG O2 Content  11.9 L  


 


ABG Base Excess  1.9  


 


ABG Hemoglobin  8.5 L  


 


ABG Carboxyhemoglobin  2.1 H  


 


POC ABG HHb (Measured)  -0.1 L  


 


ABG Methemoglobin  0.7  


 


ABG O2 Capacity  11.9 L  


 


Almas Test  Yes  


 


A-a O2 Difference  103.0  


 


Hgb O2 Saturation  97.3  


 


Vent Mode  A/c  


 


Mechanical Rate  12  


 


FiO2  40.0  


 


Tidal Volume  450  


 


PEEP  5  


 


Sodium   


 


Potassium   


 


Chloride   


 


Carbon Dioxide   


 


Anion Gap   


 


BUN   


 


Creatinine   


 


Est GFR ( Amer)   


 


Est GFR (Non-Af Amer)   


 


POC Glucose (mg/dL)   137 H  165 H


 


Random Glucose   


 


Calcium   











Fingerstick Blood Sugar Results: 165





Review of Systems





- Review of Systems


Systems not reviewed;Unavailable: Intubated





Critical Care Progress Note





- Nutrition


Nutrition: 


 Nutrition











 Category Date Time Status


 


 NPO Diet [DIET] Diets  11/15/17 Breakfast Active














Assessment/Plan


(1) Cervical vertebral fracture


Assessment and plan: 


87yo F. PMhx DM type 2, HTN, COPD asthma, osteoporosis, Glaucoma (right eye). 

suffered a fall, c/b C2 fracture, underwent C1-C2 posterior cervical fusion.





Neuro: stopped all sedation dilaudid prn for pain.  post-concussive seizures, 

continue fosphenytoin.  EEG not performed because of space restriction with 

current C-Collar, new smaller one ordered.  Brain MRI pending.





Pulm: acute respiratory failure on vent, Patient was tolerating PS trials, 

attempted extubation with immediate stridor and wheezing.  Gave racemic 

epinephrine, steroids and duonebs, with no improvement.  Reintubated with 

anesthesia, copious pink frothy sputum.  Patient still has significant 

pulmonary edema, despite diuresis for 36h prior.  Will continue diuresis for 

reattempt at a later date, if no improvement, patient will need trach.  Started 

on steroids for asthmatic treatment.  Ordered Chest CT, if significant 

effusions may need thoracentesis.





CV: hemodynamically stable.  HTN continue amlodipine and losartan.  Echo (01/29

) no valvular or systolic dysfunction, grade 1 diastolic dysfunction.





Hem: no acute issues





Renal: urine output wnl, will monitor.  several liters positive, diuresing 

lasix 40mg IV q12h.





Endo:  DM type 2, short acting insulin sliding scale for coverage





GI: NPO, Glucerna@20, goal TBD.





ID: possible underlying pneumonia, started on Ceftriaxone and Azithromycin.





DVT proph - lovenox


GI proph - protonix


tarango for strict I/O's during acute illness


Code status - full code





Critical Care Time spent 35 minutes


Multi-disciplinary rounds were performed with house staff, nursing, speech 

therapy, respiratory therapy, pharmacy and nutrition with integrated input from 

the primary team/attending and other consulting services.  The documented time 

is cumulative and includes review of patient data/exams/labs/chart review and 

examination of the patient on rounds and throughout the day; time is exclusive 

of any procedures or teaching time.


Current Visit: Yes   Status: Acute

## 2017-11-21 LAB
ABG MECHANICAL RATE: 450
ALBUMIN/GLOB SERPL: 1 {RATIO} (ref 1–2.1)
ALP SERPL-CCNC: 74 U/L (ref 38–126)
ALT SERPL-CCNC: 32 U/L (ref 9–52)
ARTERIAL  BLOOD GAS MODE: (no result)
ARTERIAL PATENCY WRIST A: YES
AST SERPL-CCNC: 23 U/L (ref 14–36)
ATERIAL BLOOD GAS PEEP: 5
BILIRUB SERPL-MCNC: 0.4 MG/DL (ref 0.2–1.3)
BUN SERPL-MCNC: 40 MG/DL (ref 7–17)
CALCIUM SERPL-MCNC: 8.1 MG/DL (ref 8.4–10.2)
CHLORIDE SERPL-SCNC: 109 MMOL/L (ref 98–107)
CO2 SERPL-SCNC: 26 MMOL/L (ref 22–30)
COHGB MFR BLD: 1.7 % (ref 0.5–1.5)
ERYTHROCYTE [DISTWIDTH] IN BLOOD BY AUTOMATED COUNT: 18.7 % (ref 11.5–14.5)
GLOBULIN SER-MCNC: 3.2 GM/DL (ref 2.2–3.9)
GLUCOSE SERPL-MCNC: 158 MG/DL (ref 65–105)
HCO3 BLDA-SCNC: 26.9 MMOL/L (ref 21–28)
HCT VFR BLD CALC: 27.2 % (ref 34–47)
HHB: 0.1 % (ref 0–5)
MCH RBC QN AUTO: 25.1 PG (ref 27–31)
MCHC RBC AUTO-ENTMCNC: 30.6 G/DL (ref 33–37)
MCV RBC AUTO: 81.9 FL (ref 81–99)
METHGB MFR BLD: 1.1 % (ref 0–3)
O2 CAP BLDA-SCNC: 11.6 ML/DL (ref 16–24)
O2 CT BLDA-SCNC: 11.6 ML/DL (ref 15–23)
PH BLDA: 7.39 [PH] (ref 7.35–7.45)
PLATELET # BLD: 202 K/UL (ref 130–400)
PO2 BLDA: 134 MM/HG (ref 80–100)
POTASSIUM SERPL-SCNC: 3.9 MMOL/L (ref 3.6–5)
PROT SERPL-MCNC: 6.4 G/DL (ref 6.3–8.2)
SAO2 % BLDA: 97.1 % (ref 95–98)
SODIUM SERPL-SCNC: 148 MMOL/L (ref 132–148)
WBC # BLD AUTO: 12.9 K/UL (ref 4.8–10.8)

## 2017-11-21 PROCEDURE — 02HV33Z INSERTION OF INFUSION DEVICE INTO SUPERIOR VENA CAVA, PERCUTANEOUS APPROACH: ICD-10-PCS

## 2017-11-21 RX ADMIN — FOSPHENYTOIN SODIUM SCH MG: 50 INJECTION, SOLUTION INTRAMUSCULAR; INTRAVENOUS at 08:47

## 2017-11-21 RX ADMIN — INSULIN LISPRO SCH: 100 INJECTION, SOLUTION INTRAVENOUS; SUBCUTANEOUS at 06:00

## 2017-11-21 RX ADMIN — METHYLPREDNISOLONE SODIUM SUCCINATE SCH MG: 40 INJECTION, POWDER, FOR SOLUTION INTRAMUSCULAR; INTRAVENOUS at 16:12

## 2017-11-21 RX ADMIN — METHYLPREDNISOLONE SODIUM SUCCINATE SCH MG: 40 INJECTION, POWDER, FOR SOLUTION INTRAMUSCULAR; INTRAVENOUS at 08:49

## 2017-11-21 RX ADMIN — FOSPHENYTOIN SODIUM SCH MG: 50 INJECTION, SOLUTION INTRAMUSCULAR; INTRAVENOUS at 16:14

## 2017-11-21 RX ADMIN — IPRATROPIUM BROMIDE AND ALBUTEROL SULFATE SCH ML: .5; 3 SOLUTION RESPIRATORY (INHALATION) at 19:29

## 2017-11-21 RX ADMIN — INSULIN LISPRO SCH UNIT: 100 INJECTION, SOLUTION INTRAVENOUS; SUBCUTANEOUS at 11:19

## 2017-11-21 RX ADMIN — INSULIN LISPRO SCH: 100 INJECTION, SOLUTION INTRAVENOUS; SUBCUTANEOUS at 17:45

## 2017-11-21 RX ADMIN — METHYLPREDNISOLONE SODIUM SUCCINATE SCH MG: 40 INJECTION, POWDER, FOR SOLUTION INTRAMUSCULAR; INTRAVENOUS at 00:59

## 2017-11-21 RX ADMIN — IPRATROPIUM BROMIDE AND ALBUTEROL SULFATE SCH ML: .5; 3 SOLUTION RESPIRATORY (INHALATION) at 08:02

## 2017-11-21 RX ADMIN — ACETAMINOPHEN PRN MG: 160 SOLUTION ORAL at 16:11

## 2017-11-21 RX ADMIN — INSULIN LISPRO SCH: 100 INJECTION, SOLUTION INTRAVENOUS; SUBCUTANEOUS at 22:55

## 2017-11-21 RX ADMIN — FOSPHENYTOIN SODIUM SCH MG: 50 INJECTION, SOLUTION INTRAMUSCULAR; INTRAVENOUS at 00:58

## 2017-11-21 RX ADMIN — ENOXAPARIN SODIUM SCH MG: 30 INJECTION SUBCUTANEOUS at 08:49

## 2017-11-21 RX ADMIN — IPRATROPIUM BROMIDE AND ALBUTEROL SULFATE SCH ML: .5; 3 SOLUTION RESPIRATORY (INHALATION) at 11:26

## 2017-11-21 RX ADMIN — IPRATROPIUM BROMIDE AND ALBUTEROL SULFATE SCH ML: .5; 3 SOLUTION RESPIRATORY (INHALATION) at 16:00

## 2017-11-21 NOTE — [id number].[email or web address]
Surgeon's Initial Post Op Note





- Surgeon's Notes


Surgeon: Didier Alamo MD


Assistant: NONE


Type of Anesthesia: Local


Pre-Operative Diagnosis: Poor venous access


Operative Findings: US showed a patent right basilic vein.


Post-Operative Diagnosis: Poor venous access


Operation Performed: Double lumen picc placement right basilic vein, 33 cm.  

Tip is in the SVC.


Specimen/Specimens Removed: none


Estimated Blood Loss: EBL {In ML}: 2


Blood Products Given: N/A


Drains Used: No Drains


Post-Op Condition: Poor


Date of Surgery/Procedure: 11/21/17


Time of Surgery/Procedure: 10:45

## 2017-11-21 NOTE — VASCULAR
PROCEDURE:  Date of procedure: 11/21/2017



Procedure: 



1. Placement of a right arm PICC with ultrasound and fluoroscopic 

guidance, CPT 75111



2. PICC tip confirmation with spot radiograph and is in the superior 

vena cava 



Medications: 3cc 1 percent lidocaine 







Total Fluoro time: 2.1 seconds



Radiation: 0.17 MGy



EBL: 2 cc



HISTORY:

Poor venous access



TECHNIQUE:

 Following informed consent and procedure time-out, the patient was 

placed supine on the interventional table and the right arm prepped 

and draped in the usual sterile fashion.  Ultrasound showed a patent 

and compressible right basilic vein. After the skin was anesthetized 

with lidocaine, the basilic vein was accessed with micro 

micropuncture technique using ultrasound guidance. A guidewire was 

then advanced under fluoroscopic guidance into the superior vena 

cava. An image documenting ultrasound guidance for vascular access 

was permanently saved.



The length of the double [place] PICC was trimmed to 33 centimeters 

and advanced through a peel-away sheath.  The PICC was position with 

tip of PICC confirm a spot radiograph the superior vena cava. The 

PICC was secured to the patient's skin.  The PICC was flushed.  A 

biopatch and sterile dressing was applied. 



IMPRESSION:





Placement of a double [place] PICC  trimmed to 33 centimeters via 

right basilic vein. The tip of the PICC is confirmed with spot 

radiograph and is in the superior vena cava.

## 2017-11-21 NOTE — CP.CCUPN
CCU Subjective





- Physician Review


Events Since Last Encounter (Free Text): 





11/21/17 17:33


The patient was Seen/interviewed and examined by me at the bedside, 


Medical records reviewed and Management issues were discussed and formulated 

with the house staff.


Orally intubated, and mechanically ventilated. 


failed extubation yesterday.


Patient awake, response, follows basic commands.


Comfortable, Resp labored.


Afebrile, NSR on the monitor


Tolerating OGT feeding with Glucerna 1.2 @20cc/H, will increase to goal


11/21, underwent Double lumen right basilic vein PICC placement


On Lasix 40 mg IV Q12H


Last 24H I&O 880/1600  (-720)





11/21/17 18:15


CXR 11/21/2017 :Right basilar opacity and bilateral pleural effusion.  ET tube 

tip at the level of the tracheal maegan and should be repositioned more 

proximally. 


ET tubewas pulled up 2 CM





CCU Objective





- Vital Signs / Intake & Output


Vital Signs (Last 4 hours): 


Vital Signs











  Temp Pulse Resp BP Pulse Ox


 


 11/21/17 12:00  98.9 F  73  15  133/62  100


 


 11/21/17 11:00   68  16  148/68  100


 


 11/21/17 10:53  99.9 F H  67   158/61 H  16 L











Intake and Output (Last 8hrs): 


 Intake & Output











 11/20/17 11/21/17 11/21/17





 22:59 06:59 14:59


 


Intake Total 440 210 520


 


Output Total 375 800 780


 


Balance 65 -590 -260


 


Intake:   


 


    


 


  Intake, Piggyback 50 50 400


 


  Tube Feeding 100 160 120


 


  Free Water Flush 40  


 


Output:   


 


  Urine 375 800 780


 


    Urethral (Mace) 375 800 780














- Physical Exam


Head: Positive for: Normocephalic, Contusion, Swelling, Ecchymosis (bilateral 

orbital areas).  Negative for: Laceration


Pupils: Positive for: Sluggish, Pinpoint


Extroacular Muscles: Positive for: EOMI


Conjunctiva: Positive for: Injected.  Negative for: Icteric


Mouth: Positive for: Moist Mucous Membranes


Pharnyx: Positive for: Normal


Neck: Positive for: Other (Hard, fixed Cervical collar in place)


Respiratory/Chest: Positive for: Wheezes, Decreased Breath Sounds.  Negative for

: Accessory Muscle Use


Cardiovascular: Positive for: Irregular Rhythm, Bradycardic.  Negative for: 

Murmurs, Rub


Abdomen: Positive for: Normal Bowel Sounds.  Negative for: Tenderness, 

Distention, Mass/Organomegaly


Upper Extremity: Negative for: Edema


Lower Extremity: Positive for: Edema.  Negative for: CALF TENDERNESS, Cyanosis


Neurological: Negative for: GCS=15 (GCS= 6 ( [id number]))


Skin: Positive for: Warm, Dry.  Negative for: Rashes


Psychiatric: Positive for: Alert





- Medications


Active Medications: 


Active Medications











Generic Name Dose Route Start Last Admin





  Trade Name Freq  PRN Reason Stop Dose Admin


 


Acetaminophen  650 mg  11/18/17 06:16  11/19/17 18:42





  Tylenol 650mg/20.3ml Solution Ud  PO   650 mg





  Q4 PRN   Administration





  fever   


 


Albuterol/Ipratropium  3 ml  11/15/17 16:00  11/21/17 11:26





  Duoneb 3 Mg/0.5 Mg (3 Ml) Ud  INH   3 ml





  RQID MARIELY   Administration


 


Amlodipine Besylate  2.5 mg  11/15/17 09:00  11/21/17 08:48





  Norvasc  PO   2.5 mg





  DAILY MARIELY   Administration


 


Fosphenytoin Sodium  100 mg  11/15/17 17:00  11/21/17 08:47





  Cerebyx  IV   100 mg





  Q8 MARIELY   Administration


 


Furosemide  40 mg  11/20/17 17:00  11/21/17 05:07





  Lasix  IV  11/24/17 05:01  40 mg





  Q12H MARIELY   Administration


 


Ceftriaxone Sodium 2 gm/  100 mls @ 100 mls/hr  11/21/17 09:00  11/21/17 11:17





  Sodium Chloride  IVPB   100 mls/hr





  DAILY MARIELY   Administration





  Protocol   


 


Azithromycin 500 mg/ Sodium  250 mls @ 250 mls/hr  11/21/17 09:00  11/21/17 11:

18





  Chloride  IVPB   250 mls/hr





  DAILY MARIELY   Administration





  Protocol   


 


Insulin Human Lispro  0 units  11/18/17 11:45  11/21/17 11:19





  Humalog  SC   1 unit





  Q6H MARIELY   Administration





  Protocol   


 


Lorazepam  2 mg  11/16/17 02:24  





  Ativan  IV   





  Q4 PRN   





  Seizure activity   


 


Losartan Potassium  50 mg  11/15/17 09:00  11/21/17 08:48





  Cozaar  PO   50 mg





  DAILY MARIELY   Administration


 


Methylprednisolone  40 mg  11/20/17 17:00  11/21/17 08:49





  Solu-Medrol  IVP   40 mg





  Q8 MARIELY   Administration


 


Montelukast Sodium  10 mg  11/15/17 22:00  11/20/17 21:50





  Singulair  PO   10 mg





  HS MARIELY   Administration


 


Pantoprazole Sodium  40 mg  11/15/17 12:45  11/21/17 08:49





  Protonix Inj  IVP   40 mg





  DAILY MARIELY   Administration














- Patient Studies


Lab Studies: 


 Microbiology Studies











 11/18/17 19:04 Blood Culture - Preliminary





 Blood-Venous    NO GROWTH AFTER 48 HOURS


 


 11/18/17 20:54 Urine Culture - Final





 Urine,Mace    No Growth (<1,000 CFU/ML)








 Lab Studies











  11/21/17 11/21/17 11/21/17 Range/Units





  11:16 05:15 05:05 


 


WBC     (4.8-10.8)  K/uL


 


RBC     (3.80-5.20)  Mil/uL


 


Hgb     (12.0-16.0)  g/dL


 


Hct     (34.0-47.0)  %


 


MCV     (81.0-99.0)  fl


 


MCH     (27.0-31.0)  pg


 


MCHC     (33.0-37.0)  g/dL


 


RDW     (11.5-14.5)  %


 


Plt Count     (130-400)  K/uL


 


pCO2    46 H  (35-45)  mm/Hg


 


pO2    134 H  ()  mm/Hg


 


HCO3    26.9  (21-28)  mmol/L


 


ABG pH    7.39  (7.35-7.45)  


 


ABG Total CO2    29.2 H  (22-28)  mmol/L


 


ABG O2 Saturation    99.9 H  (95-98)  %


 


ABG O2 Content    11.6 L  (15-23)  ML/dL


 


ABG Base Excess    2.5  (-2.0-3.0)  mmol/L


 


ABG Hemoglobin    8.3 L  (11.7-17.4)  g/dL


 


ABG Carboxyhemoglobin    1.7 H  (0.5-1.5)  %


 


POC ABG HHb (Measured)    0.1  (0.0-5.0)  %


 


ABG Methemoglobin    1.1  (0.0-3.0)  %


 


ABG O2 Capacity    11.6 L  (16-24)  mL/dL


 


Almas Test    Yes  


 


A-a O2 Difference    94.0  mm/Hg


 


Hgb O2 Saturation    97.1  (95.0-98.0)  %


 


Vent Mode    A/c  


 


Mechanical Rate    450  


 


FiO2    40.0  %


 


Tidal Volume    12  


 


PEEP    5  


 


Sodium     (132-148)  mmol/l


 


Potassium     (3.6-5.0)  MMOL/L


 


Chloride     ()  mmol/L


 


Carbon Dioxide     (22-30)  mmol/L


 


Anion Gap     (10-20)  


 


BUN     (7-17)  mg/dl


 


Creatinine     (0.7-1.2)  mg/dl


 


Est GFR (African Amer)     


 


Est GFR (Non-Af Amer)     


 


POC Glucose (mg/dL)  158 H  142 H   ()  mg/dL


 


Random Glucose     ()  mg/dL


 


Calcium     (8.4-10.2)  mg/dL


 


Total Bilirubin     (0.2-1.3)  mg/dl


 


AST     (14-36)  U/L


 


ALT     (9-52)  U/L


 


Alkaline Phosphatase     ()  U/L


 


NT-Pro-B Natriuret Pep     (0-900)  pg/ml


 


Total Protein     (6.3-8.2)  G/DL


 


Albumin     (3.5-5.0)  g/dL


 


Globulin     (2.2-3.9)  gm/dL


 


Albumin/Globulin Ratio     (1.0-2.1)  














  11/21/17 11/21/17 11/20/17 Range/Units





  04:45 04:45 23:17 


 


WBC  12.9 H    (4.8-10.8)  K/uL


 


RBC  3.32 L    (3.80-5.20)  Mil/uL


 


Hgb  8.3 L    (12.0-16.0)  g/dL


 


Hct  27.2 L    (34.0-47.0)  %


 


MCV  81.9    (81.0-99.0)  fl


 


MCH  25.1 L    (27.0-31.0)  pg


 


MCHC  30.6 L    (33.0-37.0)  g/dL


 


RDW  18.7 H    (11.5-14.5)  %


 


Plt Count  202    (130-400)  K/uL


 


pCO2     (35-45)  mm/Hg


 


pO2     ()  mm/Hg


 


HCO3     (21-28)  mmol/L


 


ABG pH     (7.35-7.45)  


 


ABG Total CO2     (22-28)  mmol/L


 


ABG O2 Saturation     (95-98)  %


 


ABG O2 Content     (15-23)  ML/dL


 


ABG Base Excess     (-2.0-3.0)  mmol/L


 


ABG Hemoglobin     (11.7-17.4)  g/dL


 


ABG Carboxyhemoglobin     (0.5-1.5)  %


 


POC ABG HHb (Measured)     (0.0-5.0)  %


 


ABG Methemoglobin     (0.0-3.0)  %


 


ABG O2 Capacity     (16-24)  mL/dL


 


Almas Test     


 


A-a O2 Difference     mm/Hg


 


Hgb O2 Saturation     (95.0-98.0)  %


 


Vent Mode     


 


Mechanical Rate     


 


FiO2     %


 


Tidal Volume     


 


PEEP     


 


Sodium   148   (132-148)  mmol/l


 


Potassium   3.9   (3.6-5.0)  MMOL/L


 


Chloride   109 H   ()  mmol/L


 


Carbon Dioxide   26   (22-30)  mmol/L


 


Anion Gap   17   (10-20)  


 


BUN   40 H   (7-17)  mg/dl


 


Creatinine   1.7 H   (0.7-1.2)  mg/dl


 


Est GFR (African Amer)   34   


 


Est GFR (Non-Af Amer)   28   


 


POC Glucose (mg/dL)    164 H  ()  mg/dL


 


Random Glucose   158 H   ()  mg/dL


 


Calcium   8.1 L   (8.4-10.2)  mg/dL


 


Total Bilirubin   0.4   (0.2-1.3)  mg/dl


 


AST   23   (14-36)  U/L


 


ALT   32   (9-52)  U/L


 


Alkaline Phosphatase   74   ()  U/L


 


NT-Pro-B Natriuret Pep   9890 H   (0-900)  pg/ml


 


Total Protein   6.4   (6.3-8.2)  G/DL


 


Albumin   3.3 L   (3.5-5.0)  g/dL


 


Globulin   3.2   (2.2-3.9)  gm/dL


 


Albumin/Globulin Ratio   1.0   (1.0-2.1)  














  11/20/17 Range/Units





  16:46 


 


WBC   (4.8-10.8)  K/uL


 


RBC   (3.80-5.20)  Mil/uL


 


Hgb   (12.0-16.0)  g/dL


 


Hct   (34.0-47.0)  %


 


MCV   (81.0-99.0)  fl


 


MCH   (27.0-31.0)  pg


 


MCHC   (33.0-37.0)  g/dL


 


RDW   (11.5-14.5)  %


 


Plt Count   (130-400)  K/uL


 


pCO2   (35-45)  mm/Hg


 


pO2   ()  mm/Hg


 


HCO3   (21-28)  mmol/L


 


ABG pH   (7.35-7.45)  


 


ABG Total CO2   (22-28)  mmol/L


 


ABG O2 Saturation   (95-98)  %


 


ABG O2 Content   (15-23)  ML/dL


 


ABG Base Excess   (-2.0-3.0)  mmol/L


 


ABG Hemoglobin   (11.7-17.4)  g/dL


 


ABG Carboxyhemoglobin   (0.5-1.5)  %


 


POC ABG HHb (Measured)   (0.0-5.0)  %


 


ABG Methemoglobin   (0.0-3.0)  %


 


ABG O2 Capacity   (16-24)  mL/dL


 


Almas Test   


 


A-a O2 Difference   mm/Hg


 


Hgb O2 Saturation   (95.0-98.0)  %


 


Vent Mode   


 


Mechanical Rate   


 


FiO2   %


 


Tidal Volume   


 


PEEP   


 


Sodium   (132-148)  mmol/l


 


Potassium   (3.6-5.0)  MMOL/L


 


Chloride   ()  mmol/L


 


Carbon Dioxide   (22-30)  mmol/L


 


Anion Gap   (10-20)  


 


BUN   (7-17)  mg/dl


 


Creatinine   (0.7-1.2)  mg/dl


 


Est GFR (African Amer)   


 


Est GFR (Non-Af Amer)   


 


POC Glucose (mg/dL)  146 H  ()  mg/dL


 


Random Glucose   ()  mg/dL


 


Calcium   (8.4-10.2)  mg/dL


 


Total Bilirubin   (0.2-1.3)  mg/dl


 


AST   (14-36)  U/L


 


ALT   (9-52)  U/L


 


Alkaline Phosphatase   ()  U/L


 


NT-Pro-B Natriuret Pep   (0-900)  pg/ml


 


Total Protein   (6.3-8.2)  G/DL


 


Albumin   (3.5-5.0)  g/dL


 


Globulin   (2.2-3.9)  gm/dL


 


Albumin/Globulin Ratio   (1.0-2.1)  








 Laboratory Results - last 24 hr











  11/20/17 11/20/17 11/21/17





  16:46 23:17 04:45


 


WBC   


 


RBC   


 


Hgb   


 


Hct   


 


MCV   


 


MCH   


 


MCHC   


 


RDW   


 


Plt Count   


 


pCO2   


 


pO2   


 


HCO3   


 


ABG pH   


 


ABG Total CO2   


 


ABG O2 Saturation   


 


ABG O2 Content   


 


ABG Base Excess   


 


ABG Hemoglobin   


 


ABG Carboxyhemoglobin   


 


POC ABG HHb (Measured)   


 


ABG Methemoglobin   


 


ABG O2 Capacity   


 


Almas Test   


 


A-a O2 Difference   


 


Hgb O2 Saturation   


 


Vent Mode   


 


Mechanical Rate   


 


FiO2   


 


Tidal Volume   


 


PEEP   


 


Sodium    148


 


Potassium    3.9


 


Chloride    109 H


 


Carbon Dioxide    26


 


Anion Gap    17


 


BUN    40 H


 


Creatinine    1.7 H


 


Est GFR ( Amer)    34


 


Est GFR (Non-Af Amer)    28


 


POC Glucose (mg/dL)  146 H  164 H 


 


Random Glucose    158 H


 


Calcium    8.1 L


 


Total Bilirubin    0.4


 


AST    23


 


ALT    32


 


Alkaline Phosphatase    74


 


NT-Pro-B Natriuret Pep    9890 H


 


Total Protein    6.4


 


Albumin    3.3 L


 


Globulin    3.2


 


Albumin/Globulin Ratio    1.0














  11/21/17 11/21/17 11/21/17





  04:45 05:05 05:15


 


WBC  12.9 H  


 


RBC  3.32 L  


 


Hgb  8.3 L  


 


Hct  27.2 L  


 


MCV  81.9  


 


MCH  25.1 L  


 


MCHC  30.6 L  


 


RDW  18.7 H  


 


Plt Count  202  


 


pCO2   46 H 


 


pO2   134 H 


 


HCO3   26.9 


 


ABG pH   7.39 


 


ABG Total CO2   29.2 H 


 


ABG O2 Saturation   99.9 H 


 


ABG O2 Content   11.6 L 


 


ABG Base Excess   2.5 


 


ABG Hemoglobin   8.3 L 


 


ABG Carboxyhemoglobin   1.7 H 


 


POC ABG HHb (Measured)   0.1 


 


ABG Methemoglobin   1.1 


 


ABG O2 Capacity   11.6 L 


 


Almas Test   Yes 


 


A-a O2 Difference   94.0 


 


Hgb O2 Saturation   97.1 


 


Vent Mode   A/c 


 


Mechanical Rate   450 


 


FiO2   40.0 


 


Tidal Volume   12 


 


PEEP   5 


 


Sodium   


 


Potassium   


 


Chloride   


 


Carbon Dioxide   


 


Anion Gap   


 


BUN   


 


Creatinine   


 


Est GFR ( Amer)   


 


Est GFR (Non-Af Amer)   


 


POC Glucose (mg/dL)    142 H


 


Random Glucose   


 


Calcium   


 


Total Bilirubin   


 


AST   


 


ALT   


 


Alkaline Phosphatase   


 


NT-Pro-B Natriuret Pep   


 


Total Protein   


 


Albumin   


 


Globulin   


 


Albumin/Globulin Ratio   














  11/21/17





  11:16


 


WBC 


 


RBC 


 


Hgb 


 


Hct 


 


MCV 


 


MCH 


 


MCHC 


 


RDW 


 


Plt Count 


 


pCO2 


 


pO2 


 


HCO3 


 


ABG pH 


 


ABG Total CO2 


 


ABG O2 Saturation 


 


ABG O2 Content 


 


ABG Base Excess 


 


ABG Hemoglobin 


 


ABG Carboxyhemoglobin 


 


POC ABG HHb (Measured) 


 


ABG Methemoglobin 


 


ABG O2 Capacity 


 


Almas Test 


 


A-a O2 Difference 


 


Hgb O2 Saturation 


 


Vent Mode 


 


Mechanical Rate 


 


FiO2 


 


Tidal Volume 


 


PEEP 


 


Sodium 


 


Potassium 


 


Chloride 


 


Carbon Dioxide 


 


Anion Gap 


 


BUN 


 


Creatinine 


 


Est GFR ( Amer) 


 


Est GFR (Non-Af Amer) 


 


POC Glucose (mg/dL)  158 H


 


Random Glucose 


 


Calcium 


 


Total Bilirubin 


 


AST 


 


ALT 


 


Alkaline Phosphatase 


 


NT-Pro-B Natriuret Pep 


 


Total Protein 


 


Albumin 


 


Globulin 


 


Albumin/Globulin Ratio 











Fingerstick Blood Sugar Results: 158





Critical Care Progress Note





- Ventilator Checklist


Head of Bed 30 Degrees: Yes


Daily Sedation Vacation: Yes


Daily Assessment of Readiness to Wean: Yes


Daily Spontaneous Breathing Trial: Yes


PUD Prophalyxis: Yes


DVT Prophylaxis: Yes


Oral Care with Chlorhexidine Gluconate {CHG}: Yes





- Nutrition


Nutrition: 


 Nutrition











 Category Date Time Status


 


 NPO Diet [DIET] Diets  11/15/17 Breakfast Active














Assessment/Plan


(1) Acute respiratory failure with hypercapnia


Current Visit: Yes   Status: Acute   Comment: 


Failed Extubation yesterday


Continue Solu-Medrol 40 mg IVP Q8 


Gentle diuresis


C/W Albuterol/Ipratropium inhaler Q 4H  


Maintain aspiration precautions


Anrtibiotics   





(2) Cervical vertebral fracture


Current Visit: Yes   Status: Acute   Comment: 


Non-displaced fracture at the base of the Odontoid & C2 fracture POD #5 s/p 

fixation by Neurosurgery 


Failed Extubation yesterday


Daily SAT/SBT








   





(3) Anemia


Current Visit: Yes   Status: Acute   





(4) Seizure


Current Visit: Yes   Status: Acute   Comment: 


Continue fosphenytoin

## 2017-11-21 NOTE — RAD
HISTORY:

Intubated; with OGt  



COMPARISON:

11/20/2017 



FINDINGS:



LUNGS:

Right basilar opacity, nonspecific. Correlating with CT examination 

of the previous day, likely atelectasis as well as pleural effusion.



PLEURA:

Bilateral pleural effusion, right greater than left. No pneumothorax.



CARDIOVASCULAR:

Endotracheal tube tip is seen at the level of the tracheal maegan and 

should be repositioned more proximally. Orogastric tube extends to 

the left upper quadrant of the abdomen.



OSSEOUS STRUCTURES:

No significant abnormalities.



VISUALIZED UPPER ABDOMEN:

Normal.



OTHER FINDINGS:

None.



IMPRESSION:

Right basilar opacity and bilateral pleural effusion.  ET tube tip at 

the level of the tracheal maegan and should be repositioned more 

proximally. 



The findings were discussed by telephone with Dr. Lagunas, 11:46 a.m. 

on 11/21/2017.

## 2017-11-21 NOTE — CP.PCM.PN
Subjective





- Subjective


Subjective: 





Acute Resp Failure: Patient extubated yesterday, but had to be reintubated. 

Considering obesity, age, poor cough effort, recent surgery and narcotics, she 

will probably require a Trach. 


DMII


HTN


Odontoid Fx s/p fixation by Neurosurgery Post Op day 5. 


OA


Sz: combination of previous alkalemia from a resp alkalosis and possible brain 

trauma. 


Anemia.


ARF: 





S/ Patient is able to follow commands. 





O/ VSS Afebrile.


Head: Ecchymosis of orbits as well as other soft tissue swelling of face.


Heart RRR, Ns1S2 Neg m


Lungs: Some Rhonchi which change with Vt. 


Abdo, Soft, not Pos BS


Ext: No c,c,1 plus edema of LE


Neuro: As aforementioned. 





Labs: as below


 


Cont MV, and monitor serial ABG's. Avoid alkalemia and hyperoxia. Continue 

spont breathing trials as tolerated.  Neurosurgery f/u. Will let family know 

that she may be heading towards a Trach in view of the aforementioned. 


Cont anti Sz Rx and Neuro recommendations. 


Bg monitoring with SSI. 


Monitor Hgb. Transfuse PRN. 


Cont VERNA Q 4 hours. 


Cont anit DM/HTN rx. 


Would benefit from continous EEG if Sz continue despite anticonvulsives. 


Cont analgesics for post op pain. 


Cont care as per ICU team. 


PUD & DVT Px. 


Please call my cell phone for all communications regarding my patient: [phone]








Objective





- Vital Signs/Intake and Output


Vital Signs (last 24 hours): 


 











Temp Pulse Resp BP Pulse Ox


 


 99.8 F H  65   14   138/68   100 


 


 11/21/17 20:00  11/21/17 20:00  11/21/17 20:00  11/21/17 20:00  11/21/17 20:00








Intake and Output: 


 











 11/21/17 11/22/17





 18:59 06:59


 


Intake Total 690 40


 


Output Total 1300 


 


Balance -610 40














- Medications


Medications: 


 Current Medications





Acetaminophen (Tylenol 650mg/20.3ml Solution Ud)  650 mg PO Q4 PRN


   PRN Reason: fever


   Last Admin: 11/21/17 16:11 Dose:  650 mg


Albuterol/Ipratropium (Duoneb 3 Mg/0.5 Mg (3 Ml) Ud)  3 ml INH RQID MARIELY


   Last Admin: 11/21/17 19:29 Dose:  3 ml


Amlodipine Besylate (Norvasc)  2.5 mg PO DAILY [facility]


   Last Admin: 11/21/17 08:48 Dose:  2.5 mg


Fosphenytoin Sodium (Cerebyx)  100 mg IV Q8 MARIELY


   Last Admin: 11/21/17 16:14 Dose:  100 mg


Furosemide (Lasix)  40 mg IV Q12H [facility]


   Stop: 11/24/17 05:01


   Last Admin: 11/21/17 16:12 Dose:  40 mg


Ceftriaxone Sodium 2 gm/ (Sodium Chloride)  100 mls @ 100 mls/hr IVPB DAILY MARIELY


   PRN Reason: Protocol


   Last Admin: 11/21/17 11:17 Dose:  100 mls/hr


Azithromycin 500 mg/ Sodium (Chloride)  250 mls @ 250 mls/hr IVPB DAILY MARIELY


   PRN Reason: Protocol


   Last Admin: 11/21/17 11:18 Dose:  250 mls/hr


Insulin Human Lispro (Humalog)  0 units SC Q6H MARIELY


   PRN Reason: Protocol


   Last Admin: 11/21/17 17:45 Dose:  Not Given


Lorazepam (Ativan)  2 mg IV Q4 PRN


   PRN Reason: Seizure activity


Losartan Potassium (Cozaar)  50 mg PO DAILY [facility]


   Last Admin: 11/21/17 08:48 Dose:  50 mg


Methylprednisolone (Solu-Medrol)  40 mg IVP Q8 [facility]


   Last Admin: 11/21/17 16:12 Dose:  40 mg


Montelukast Sodium (Singulair)  10 mg PO HS [facility]


   Last Admin: 11/21/17 21:01 Dose:  10 mg


Pantoprazole Sodium (Protonix Inj)  40 mg IVP DAILY [facility]


   Last Admin: 11/21/17 08:49 Dose:  40 mg











- Labs


Labs: 


 





 11/21/17 04:45 





 11/21/17 04:45 





 











PT  12.6 Seconds (9.8-13.1)   11/17/17  04:47    


 


INR  1.1  (0.9-1.2)   11/17/17  04:47    


 


APTT  21.5 Seconds (25.6-37.1)  L  11/17/17  04:47

## 2017-11-22 LAB
ABG MECHANICAL RATE: 12
ARTERIAL  BLOOD GAS MODE: (no result)
ARTERIAL PATENCY WRIST A: YES
ARTERIAL PATENCY WRIST A: YES
ATERIAL BLOOD GAS PEEP: 5
BUN SERPL-MCNC: 50 MG/DL (ref 7–17)
CALCIUM SERPL-MCNC: 8.2 MG/DL (ref 8.4–10.2)
CHLORIDE SERPL-SCNC: 109 MMOL/L (ref 98–107)
CO2 SERPL-SCNC: 28 MMOL/L (ref 22–30)
COHGB MFR BLD: 1.2 % (ref 0.5–1.5)
COHGB MFR BLD: 2.3 % (ref 0.5–1.5)
ERYTHROCYTE [DISTWIDTH] IN BLOOD BY AUTOMATED COUNT: 18.5 % (ref 11.5–14.5)
GLUCOSE SERPL-MCNC: 154 MG/DL (ref 65–105)
HCO3 BLDA-SCNC: 30.4 MMOL/L (ref 21–28)
HCO3 BLDA-SCNC: 31 MMOL/L (ref 21–28)
HCT VFR BLD CALC: 25.5 % (ref 34–47)
HHB: -0.9 % (ref 0–5)
HHB: 0.8 % (ref 0–5)
MCH RBC QN AUTO: 24.8 PG (ref 27–31)
MCHC RBC AUTO-ENTMCNC: 30.6 G/DL (ref 33–37)
MCV RBC AUTO: 81.1 FL (ref 81–99)
METHGB MFR BLD: 0.6 % (ref 0–3)
METHGB MFR BLD: 0.7 % (ref 0–3)
O2 CAP BLDA-SCNC: 10.8 ML/DL (ref 16–24)
O2 CAP BLDA-SCNC: 11.3 ML/DL (ref 16–24)
O2 CT BLDA-SCNC: 10.9 ML/DL (ref 15–23)
O2 CT BLDA-SCNC: 11.2 ML/DL (ref 15–23)
PH BLDA: 7.42 [PH] (ref 7.35–7.45)
PH BLDA: 7.44 [PH] (ref 7.35–7.45)
PLATELET # BLD: 192 K/UL (ref 130–400)
PO2 BLDA: 114 MM/HG (ref 80–100)
PO2 BLDA: 141 MM/HG (ref 80–100)
POTASSIUM SERPL-SCNC: 3.5 MMOL/L (ref 3.6–5)
SAO2 % BLDA: 97.3 % (ref 95–98)
SAO2 % BLDA: 97.9 % (ref 95–98)
SODIUM SERPL-SCNC: 146 MMOL/L (ref 132–148)
WBC # BLD AUTO: 10.1 K/UL (ref 4.8–10.8)

## 2017-11-22 RX ADMIN — FOSPHENYTOIN SODIUM SCH MG: 50 INJECTION, SOLUTION INTRAMUSCULAR; INTRAVENOUS at 00:07

## 2017-11-22 RX ADMIN — IPRATROPIUM BROMIDE AND ALBUTEROL SULFATE SCH ML: .5; 3 SOLUTION RESPIRATORY (INHALATION) at 08:05

## 2017-11-22 RX ADMIN — INSULIN LISPRO SCH UNIT: 100 INJECTION, SOLUTION INTRAVENOUS; SUBCUTANEOUS at 06:41

## 2017-11-22 RX ADMIN — FOSPHENYTOIN SODIUM SCH MG: 50 INJECTION, SOLUTION INTRAMUSCULAR; INTRAVENOUS at 11:31

## 2017-11-22 RX ADMIN — IPRATROPIUM BROMIDE AND ALBUTEROL SULFATE SCH ML: .5; 3 SOLUTION RESPIRATORY (INHALATION) at 23:34

## 2017-11-22 RX ADMIN — IPRATROPIUM BROMIDE AND ALBUTEROL SULFATE SCH ML: .5; 3 SOLUTION RESPIRATORY (INHALATION) at 15:00

## 2017-11-22 RX ADMIN — METHYLPREDNISOLONE SODIUM SUCCINATE SCH MG: 40 INJECTION, POWDER, FOR SOLUTION INTRAMUSCULAR; INTRAVENOUS at 00:08

## 2017-11-22 RX ADMIN — FOSPHENYTOIN SODIUM SCH MG: 50 INJECTION, SOLUTION INTRAMUSCULAR; INTRAVENOUS at 16:56

## 2017-11-22 RX ADMIN — INSULIN LISPRO SCH: 100 INJECTION, SOLUTION INTRAVENOUS; SUBCUTANEOUS at 12:13

## 2017-11-22 RX ADMIN — IPRATROPIUM BROMIDE AND ALBUTEROL SULFATE SCH ML: .5; 3 SOLUTION RESPIRATORY (INHALATION) at 23:35

## 2017-11-22 RX ADMIN — METHYLPREDNISOLONE SODIUM SUCCINATE SCH MG: 40 INJECTION, POWDER, FOR SOLUTION INTRAMUSCULAR; INTRAVENOUS at 08:41

## 2017-11-22 RX ADMIN — METHYLPREDNISOLONE SODIUM SUCCINATE SCH MG: 40 INJECTION, POWDER, FOR SOLUTION INTRAMUSCULAR; INTRAVENOUS at 16:37

## 2017-11-22 RX ADMIN — INSULIN LISPRO SCH: 100 INJECTION, SOLUTION INTRAVENOUS; SUBCUTANEOUS at 23:33

## 2017-11-22 RX ADMIN — INSULIN LISPRO SCH UNIT: 100 INJECTION, SOLUTION INTRAVENOUS; SUBCUTANEOUS at 16:47

## 2017-11-22 RX ADMIN — IPRATROPIUM BROMIDE AND ALBUTEROL SULFATE SCH ML: .5; 3 SOLUTION RESPIRATORY (INHALATION) at 11:52

## 2017-11-22 NOTE — RAD
HISTORY:

ETT placement  



COMPARISON:

11/21/2017 



FINDINGS:



LUNGS:

Small right pleural effusion, unchanged.  No definite left pleural 

effusion.  Opacity at right base likely due to dependent pleural 

fluid. No definite infiltrate.



PLEURA:

As above



CARDIOVASCULAR:

Mild cardiomegaly. ET tube and NG tube unchanged. Right PICC catheter 

new since prior examination, terminates in the subclavian vein or 

brachiocephalic vein.



OSSEOUS STRUCTURES:

No significant abnormalities.



VISUALIZED UPPER ABDOMEN:

Normal.



OTHER FINDINGS:

None.



IMPRESSION:

New right PICC catheter terminates in subclavian/ brachiocephalic 

vein.  ETT and NG tube unchanged.  Persistent small right pleural 

effusion.

## 2017-11-22 NOTE — PN
DATE:  11/22/2017



NEUROLOGICAL PROBLEM:  New onset of seizures, status post postconcussion

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



The patient is extubated on hard cervical collar.



PHYSICAL EXAMINATION

VITAL SIGNS:  Blood pressure 147/66, mean arterial pressure of 93,

respiratory rate 16, temperature afebrile.

NEUROLOGICAL:  The patient is reexamined.  The patient is examined with the

family members.  She is awake, follows one to two step command.  Mild right

and left confusion; however, she moves all 4 extremities.  Extraocular

movement decreased; however, conjugate gaze noted.  Good corneal reflex. 

She could be able to count her fingers.



ASSESSMENT AND PLAN:  The patient's seizure is well controlled and after

surgery, the patient shows some physiological improvement.



Continue the present management.



__________________________________________

Girish Huang MD





DD:  11/22/2017 18:47:23

DT:  11/22/2017 20:05:26

Job # [id number]

## 2017-11-22 NOTE — PCM.PROC
Procedures


Attestation:: I certify that I have explained the specified Operation(s) or 

Procedure(s), risks, benefits and reasonable alternatives to the Patient and/or 

other person responsible. The opportunity was given to ask questions and all 

questions answered





- Extubation


Clinical Parameters: Resolution/Stabilization of disease process, 

Hemodynamically Stable, Intact Cough/Gag Reflex, Spontaneous Respirations, 

Acceptable Vent Settings (FIO2<50%, PEEP<8, PaO2>75, pH>7.25)


Weaning Criteria Met: Yes


General Weaning Approaches: Pressure Support Ventilation (PSV) Weaning


Patient Condition: Patient has been successfully extubated and assessed


Oxygen Therapy: High Flow Nasal Cannula


Patient Tolerated Procedure: Well


Additional Comments: 





ADDENDUM:





Patient successfully extubated at approx 900AM after spending 90mins on low 

level CPAP PS. Cuff leak test performed and approx 250ml return from initial 

440ml return with cuff inflation  noted. Good airflow noted on neck 

auscultation.  patient ETT removed without difficulty and placed on 40 LPM 40% 

oxygen HFNC, [place] J Collar remains snug in place. Lower lip remains mildly 

edematous/ swollen, voice is hoarse, but lowly audible post-extubation and no 

immediate stridor noted. VSS remain at 100% SPO@, HR 71,sinus, and /73.

## 2017-11-22 NOTE — CP.CCUPN
CCU Subjective





- Physician Review


Subjective (Free Text): 


Sleeping, easily arousable and follows commands, remains orally intubated, on 

AC 12 , 40% +5Peep, tolerated brief SBT on CPAP 5/PS 10. Otherwise, Day #

2 Re-intubation. Total time on Vent = 8 days. 





Other vitals and I/O's reviewed. Tmax 100.6F last 24H. Overall negative fluid 

balance last 2 days.





ROS:  No other pertinent negs or positives on 10+  system review. 





PMSFH:    HTN, DMI II, COPD / asthma, osteoarthritis; no previous surgeries,   

non- smoker- Otherwise all Nursing and physician documentation reviewed to date

; no new pertinent info noted relevant to current medical problems.








IMPRESSION / MAJOR PROBLEMS NOW:


1.   Acute Type II Odontoid / C1-C2 fracture ( nondisplaced)- POD #5 Occiput-to-

C2 Fusion/Fixation


2.   Acute Hypercapneic resp failure 2 #1 and contribution from COPD 

exacerbation


3.   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


4.   Chronic Disease anemia








PLAN:


1.   One extubation attempt noted 2 days ago, which failed and required re-

intubation. Will continue SBTs as tolerated. May need to optimize status a bit 

more with PRBCs. Ongoing steroids/Duonebs for now. Mild acceptable hypercarbia 

noted. Will do cuff leak test prior to any extubation test. 


2.   Discuss with PMD/Pulm: re- PRBCs, though she has not been hypotensive, 

hypoxemic, not any of its sequelae. 


3.   Check Phenytoin levels.


4.   Cautious ongoing diuresis, watch BUN / Cr levels.


5.   Frequent pulmonary toileting. 





ADDENDUM:





Patient successfully extubated at approx 900AM after spending 90mins on low 

level CPAP PS. Cuff leak test performed and approx 250ml return from initial 

440ml return with cuff inflation  noted. Good airflow noted on neck 

auscultation.  patient ETT removed without difficulty and placed on 40 LPM 40% 

oxygen HFNC, [place] J Collar remains snug in place. Lower lip remains mildly 

edematous/ swollen, voice is hoarse, but lowly audible post-extubation and no 

immediate stridor noted. VSS remain at 100% SPO@, HR 71,sinus, and /73. 





CCU Objective





- Vital Signs / Intake & Output


Vital Signs (Last 4 hours): 


Vital Signs











  Temp Pulse Resp BP Pulse Ox


 


 11/22/17 09:33    14  


 


 11/22/17 08:46   66   130/58 L 


 


 11/22/17 08:45   66   130/58 L 


 


 11/22/17 08:00  99.9 F H  60  16  130/49 L  100


 


 11/22/17 06:00   62  12  127/60  100











Intake and Output (Last 8hrs): 


 Intake & Output











 11/21/17 11/22/17 11/22/17





 22:59 06:59 14:59


 


Intake Total 220 280 190


 


Output Total 520 700 


 


Balance -300 -420 190


 


Intake:   


 


  Intake, Piggyback 50 100 120


 


  Tube Feeding 170 180 30


 


  Free Water Flush   40


 


Output:   


 


  Urine 520 700 


 


    Urethral (Mace) 520 700 














- Physical Exam


Head: Positive for: Normocephalic, Contusion, Swelling, Ecchymosis (bilateral 

orbital areas).  Negative for: Laceration


Pupils: Positive for: Sluggish, Pinpoint


Extroacular Muscles: Positive for: EOMI


Conjunctiva: Positive for: Injected.  Negative for: Icteric


Mouth: Positive for: Moist Mucous Membranes


Pharnyx: Positive for: Normal


Neck: Positive for: Other (Hard, fixed Cervical collar in place)


Respiratory/Chest: Positive for: Wheezes, Decreased Breath Sounds.  Negative for

: Accessory Muscle Use


Cardiovascular: Positive for: Irregular Rhythm, Bradycardic.  Negative for: 

Murmurs, Rub


Abdomen: Positive for: Normal Bowel Sounds.  Negative for: Tenderness, 

Distention, Mass/Organomegaly


Upper Extremity: Negative for: Edema


Lower Extremity: Positive for: Edema.  Negative for: CALF TENDERNESS, Cyanosis


Neurological: Negative for: GCS=15 (GCS= 6 ( E1V1M4))


Skin: Positive for: Warm, Dry.  Negative for: Rashes


Psychiatric: Positive for: Alert





- Medications


Active Medications: 


Active Medications











Generic Name Dose Route Start Last Admin





  Trade Name Freq  PRN Reason Stop Dose Admin


 


Acetaminophen  650 mg  11/18/17 06:16  11/21/17 16:11





  Tylenol 650mg/20.3ml Solution Ud  PO   650 mg





  Q4 PRN   Administration





  fever   


 


Albuterol/Ipratropium  3 ml  11/15/17 16:00  11/22/17 08:05





  Duoneb 3 Mg/0.5 Mg (3 Ml) Ud  INH   3 ml





  RQID MARIELY   Administration


 


Amlodipine Besylate  2.5 mg  11/15/17 09:00  11/22/17 08:45





  Norvasc  PO   2.5 mg





  DAILY MARIELY   Administration


 


Fosphenytoin Sodium  100 mg  11/15/17 17:00  11/22/17 00:07





  Cerebyx  IV   100 mg





  Q8 MARIELY   Administration


 


Furosemide  40 mg  11/20/17 17:00  11/22/17 04:10





  Lasix  IV  11/24/17 05:01  40 mg





  Q12H MARIELY   Administration


 


Ceftriaxone Sodium 2 gm/  100 mls @ 100 mls/hr  11/21/17 09:00  11/22/17 08:43





  Sodium Chloride  IVPB   100 mls/hr





  DAILY MARIELY   Administration





  Protocol   


 


Azithromycin 500 mg/ Sodium  250 mls @ 250 mls/hr  11/21/17 09:00  11/21/17 11:

18





  Chloride  IVPB   250 mls/hr





  DAILY MARIELY   Administration





  Protocol   


 


Insulin Human Lispro  0 units  11/18/17 11:45  11/22/17 06:41





  Humalog  SC   1 unit





  Q6H MARIELY   Administration





  Protocol   


 


Lorazepam  2 mg  11/16/17 02:24  





  Ativan  IV   





  Q4 PRN   





  Seizure activity   


 


Losartan Potassium  50 mg  11/15/17 09:00  11/22/17 08:46





  Cozaar  PO   50 mg





  DAILY MARIELY   Administration


 


Methylprednisolone  40 mg  11/20/17 17:00  11/22/17 08:41





  Solu-Medrol  IVP   40 mg





  Q8 MARIELY   Administration


 


Montelukast Sodium  10 mg  11/15/17 22:00  11/21/17 21:01





  Singulair  PO   10 mg





  HS MARIELY   Administration


 


Pantoprazole Sodium  40 mg  11/15/17 12:45  11/22/17 08:38





  Protonix Inj  IVP   40 mg





  DAILY MARIELY   Administration














- Patient Studies


Lab Studies: 


 Microbiology Studies











 11/18/17 19:04 Blood Culture - Preliminary





 Blood-Venous    NO GROWTH AFTER 3 DAYS








 Lab Studies











  11/22/17 11/22/17 11/22/17 Range/Units





  08:30 05:34 04:45 


 


WBC     (4.8-10.8)  K/uL


 


RBC     (3.80-5.20)  Mil/uL


 


Hgb     (12.0-16.0)  g/dL


 


Hct     (34.0-47.0)  %


 


MCV     (81.0-99.0)  fl


 


MCH     (27.0-31.0)  pg


 


MCHC     (33.0-37.0)  g/dL


 


RDW     (11.5-14.5)  %


 


Plt Count     (130-400)  K/uL


 


pCO2  51 H    (35-45)  mm/Hg


 


pO2  114 H    ()  mm/Hg


 


HCO3  31.0 H    (21-28)  mmol/L


 


ABG pH  7.42    (7.35-7.45)  


 


ABG Total CO2  34.7 H    (22-28)  mmol/L


 


ABG O2 Saturation  99.2 H    (95-98)  %


 


ABG O2 Content  11.2 L    (15-23)  ML/dL


 


ABG Base Excess  7.7 H    (-2.0-3.0)  mmol/L


 


ABG Hemoglobin  8.0 L    (11.7-17.4)  g/dL


 


ABG Carboxyhemoglobin  1.2    (0.5-1.5)  %


 


POC ABG HHb (Measured)  0.8    (0.0-5.0)  %


 


ABG Methemoglobin  0.6    (0.0-3.0)  %


 


ABG O2 Capacity  11.3 L    (16-24)  mL/dL


 


[place] Test  Yes    


 


A-a O2 Difference  107.0    mm/Hg


 


Hgb O2 Saturation  97.3    (95.0-98.0)  %


 


Vent Mode     


 


Mechanical Rate     


 


FiO2  40.0    %


 


Tidal Volume     


 


PEEP     


 


Pressure Support  10    


 


CPAP  5    


 


Sodium    146  (132-148)  mmol/l


 


Potassium    3.5 L  (3.6-5.0)  MMOL/L


 


Chloride    109 H  ()  mmol/L


 


Carbon Dioxide    28  (22-30)  mmol/L


 


Anion Gap    12  (10-20)  


 


BUN    50 H  (7-17)  mg/dl


 


Creatinine    1.8 H  (0.7-1.2)  mg/dl


 


Est GFR (African Amer)    32  


 


Est GFR (Non-Af Amer)    27  


 


POC Glucose (mg/dL)   177 H   ()  mg/dL


 


Random Glucose    154 H  ()  mg/dL


 


Calcium    8.2 L  (8.4-10.2)  mg/dL














  11/22/17 11/22/17 11/21/17 Range/Units





  04:45 04:21 21:23 


 


WBC  10.1    (4.8-10.8)  K/uL


 


RBC  3.15 L    (3.80-5.20)  Mil/uL


 


Hgb  7.8 L    (12.0-16.0)  g/dL


 


Hct  25.5 L    (34.0-47.0)  %


 


MCV  81.1    (81.0-99.0)  fl


 


MCH  24.8 L    (27.0-31.0)  pg


 


MCHC  30.6 L    (33.0-37.0)  g/dL


 


RDW  18.5 H    (11.5-14.5)  %


 


Plt Count  192    (130-400)  K/uL


 


pCO2   47 H   (35-45)  mm/Hg


 


pO2   141 H   ()  mm/Hg


 


HCO3   30.4 H   (21-28)  mmol/L


 


ABG pH   7.44   (7.35-7.45)  


 


ABG Total CO2   33.3 H   (22-28)  mmol/L


 


ABG O2 Saturation   100.9 H   (95-98)  %


 


ABG O2 Content   10.9 L   (15-23)  ML/dL


 


ABG Base Excess   7.0 H   (-2.0-3.0)  mmol/L


 


ABG Hemoglobin   7.7 L   (11.7-17.4)  g/dL


 


ABG Carboxyhemoglobin   2.3 H   (0.5-1.5)  %


 


POC ABG HHb (Measured)   -0.9 L   (0.0-5.0)  %


 


ABG Methemoglobin   0.7   (0.0-3.0)  %


 


ABG O2 Capacity   10.8 L   (16-24)  mL/dL


 


[place] Test   Yes   


 


A-a O2 Difference   85.0   mm/Hg


 


Hgb O2 Saturation   97.9   (95.0-98.0)  %


 


Vent Mode   A/c   


 


Mechanical Rate   12   


 


FiO2   40.0   %


 


Tidal Volume   450   


 


PEEP   5   


 


Pressure Support     


 


CPAP     


 


Sodium     (132-148)  mmol/l


 


Potassium     (3.6-5.0)  MMOL/L


 


Chloride     ()  mmol/L


 


Carbon Dioxide     (22-30)  mmol/L


 


Anion Gap     (10-20)  


 


BUN     (7-17)  mg/dl


 


Creatinine     (0.7-1.2)  mg/dl


 


Est GFR (African Amer)     


 


Est GFR (Non-Af Amer)     


 


POC Glucose (mg/dL)    187 H  ()  mg/dL


 


Random Glucose     ()  mg/dL


 


Calcium     (8.4-10.2)  mg/dL














  11/21/17 11/21/17 Range/Units





  16:26 11:16 


 


WBC    (4.8-10.8)  K/uL


 


RBC    (3.80-5.20)  Mil/uL


 


Hgb    (12.0-16.0)  g/dL


 


Hct    (34.0-47.0)  %


 


MCV    (81.0-99.0)  fl


 


MCH    (27.0-31.0)  pg


 


MCHC    (33.0-37.0)  g/dL


 


RDW    (11.5-14.5)  %


 


Plt Count    (130-400)  K/uL


 


pCO2    (35-45)  mm/Hg


 


pO2    ()  mm/Hg


 


HCO3    (21-28)  mmol/L


 


ABG pH    (7.35-7.45)  


 


ABG Total CO2    (22-28)  mmol/L


 


ABG O2 Saturation    (95-98)  %


 


ABG O2 Content    (15-23)  ML/dL


 


ABG Base Excess    (-2.0-3.0)  mmol/L


 


ABG Hemoglobin    (11.7-17.4)  g/dL


 


ABG Carboxyhemoglobin    (0.5-1.5)  %


 


POC ABG HHb (Measured)    (0.0-5.0)  %


 


ABG Methemoglobin    (0.0-3.0)  %


 


ABG O2 Capacity    (16-24)  mL/dL


 


[place] Test    


 


A-a O2 Difference    mm/Hg


 


Hgb O2 Saturation    (95.0-98.0)  %


 


Vent Mode    


 


Mechanical Rate    


 


FiO2    %


 


Tidal Volume    


 


PEEP    


 


Pressure Support    


 


CPAP    


 


Sodium    (132-148)  mmol/l


 


Potassium    (3.6-5.0)  MMOL/L


 


Chloride    ()  mmol/L


 


Carbon Dioxide    (22-30)  mmol/L


 


Anion Gap    (10-20)  


 


BUN    (7-17)  mg/dl


 


Creatinine    (0.7-1.2)  mg/dl


 


Est GFR (African Amer)    


 


Est GFR (Non-Af Amer)    


 


POC Glucose (mg/dL)  150 H  158 H  ()  mg/dL


 


Random Glucose    ()  mg/dL


 


Calcium    (8.4-10.2)  mg/dL








 Laboratory Results - last 24 hr











  11/21/17 11/21/17 11/21/17





  11:16 16:26 21:23


 


WBC   


 


RBC   


 


Hgb   


 


Hct   


 


MCV   


 


MCH   


 


MCHC   


 


RDW   


 


Plt Count   


 


pCO2   


 


pO2   


 


HCO3   


 


ABG pH   


 


ABG Total CO2   


 


ABG O2 Saturation   


 


ABG O2 Content   


 


ABG Base Excess   


 


ABG Hemoglobin   


 


ABG Carboxyhemoglobin   


 


POC ABG HHb (Measured)   


 


ABG Methemoglobin   


 


ABG O2 Capacity   


 


[place] Test   


 


A-a O2 Difference   


 


Hgb O2 Saturation   


 


Vent Mode   


 


Mechanical Rate   


 


FiO2   


 


Tidal Volume   


 


PEEP   


 


Pressure Support   


 


CPAP   


 


Sodium   


 


Potassium   


 


Chloride   


 


Carbon Dioxide   


 


Anion Gap   


 


BUN   


 


Creatinine   


 


Est GFR ( Amer)   


 


Est GFR (Non-Af Amer)   


 


POC Glucose (mg/dL)  158 H  150 H  187 H


 


Random Glucose   


 


Calcium   














  11/22/17 11/22/17 11/22/17





  04:21 04:45 04:45


 


WBC   10.1 


 


RBC   3.15 L 


 


Hgb   7.8 L 


 


Hct   25.5 L 


 


MCV   81.1 


 


MCH   24.8 L 


 


MCHC   30.6 L 


 


RDW   18.5 H 


 


Plt Count   192 


 


pCO2  47 H  


 


pO2  141 H  


 


HCO3  30.4 H  


 


ABG pH  7.44  


 


ABG Total CO2  33.3 H  


 


ABG O2 Saturation  100.9 H  


 


ABG O2 Content  10.9 L  


 


ABG Base Excess  7.0 H  


 


ABG Hemoglobin  7.7 L  


 


ABG Carboxyhemoglobin  2.3 H  


 


POC ABG HHb (Measured)  -0.9 L  


 


ABG Methemoglobin  0.7  


 


ABG O2 Capacity  10.8 L  


 


[place] Test  Yes  


 


A-a O2 Difference  85.0  


 


Hgb O2 Saturation  97.9  


 


Vent Mode  A/c  


 


Mechanical Rate  12  


 


FiO2  40.0  


 


Tidal Volume  450  


 


PEEP  5  


 


Pressure Support   


 


CPAP   


 


Sodium    146


 


Potassium    3.5 L


 


Chloride    109 H


 


Carbon Dioxide    28


 


Anion Gap    12


 


BUN    50 H


 


Creatinine    1.8 H


 


Est GFR ( Amer)    32


 


Est GFR (Non-Af Amer)    27


 


POC Glucose (mg/dL)   


 


Random Glucose    154 H


 


Calcium    8.2 L














  11/22/17 11/22/17





  05:34 08:30


 


WBC  


 


RBC  


 


Hgb  


 


Hct  


 


MCV  


 


MCH  


 


MCHC  


 


RDW  


 


Plt Count  


 


pCO2   51 H


 


pO2   114 H


 


HCO3   31.0 H


 


ABG pH   7.42


 


ABG Total CO2   34.7 H


 


ABG O2 Saturation   99.2 H


 


ABG O2 Content   11.2 L


 


ABG Base Excess   7.7 H


 


ABG Hemoglobin   8.0 L


 


ABG Carboxyhemoglobin   1.2


 


POC ABG HHb (Measured)   0.8


 


ABG Methemoglobin   0.6


 


ABG O2 Capacity   11.3 L


 


[place] Test   Yes


 


A-a O2 Difference   107.0


 


Hgb O2 Saturation   97.3


 


Vent Mode  


 


Mechanical Rate  


 


FiO2   40.0


 


Tidal Volume  


 


PEEP  


 


Pressure Support   10


 


CPAP   5


 


Sodium  


 


Potassium  


 


Chloride  


 


Carbon Dioxide  


 


Anion Gap  


 


BUN  


 


Creatinine  


 


Est GFR ( Amer)  


 


Est GFR (Non-Af Amer)  


 


POC Glucose (mg/dL)  177 H 


 


Random Glucose  


 


Calcium  











Fingerstick Blood Sugar Results: 177





Review of Systems





- Review of Systems


All systems: reviewed and no additional remarkable complaints except (as above)





Critical Care Progress Note





- Ventilator Checklist


Head of Bed 30 Degrees: Yes


Daily Sedation Vacation: Yes


Daily Spontaneous Breathing Trial: Yes


PUD Prophalyxis: Yes


DVT Prophylaxis: Yes


Oral Care with Chlorhexidine Gluconate {CHG}: Yes





- Vent Settings


MODE:: ASSIST CONTROL


TIDAL VOLUME:: 450


RESP RATE:: 12


FIO2:: 40


PEEP:: 5





- Extremities/Vascular


Does the Patient have a Central Venous Catheter?: Yes


Insertion Site:  (rue picc)


Does the Patient need a Central Venous Catheter?: Yes


Does the Patient have a Mace Catheter?: Yes


Does the Patient need a Mace Catheter?: Yes

## 2017-11-23 LAB
ALBUMIN/GLOB SERPL: 0.9 {RATIO} (ref 1–2.1)
ALP SERPL-CCNC: 92 U/L (ref 38–126)
ALT SERPL-CCNC: 30 U/L (ref 9–52)
AST SERPL-CCNC: 26 U/L (ref 14–36)
BASOPHILS # BLD AUTO: 0 K/UL (ref 0–0.2)
BASOPHILS NFR BLD: 0.1 % (ref 0–2)
BILIRUB SERPL-MCNC: 0.4 MG/DL (ref 0.2–1.3)
BUN SERPL-MCNC: 54 MG/DL (ref 7–17)
CALCIUM SERPL-MCNC: 8.5 MG/DL (ref 8.4–10.2)
CHLORIDE SERPL-SCNC: 107 MMOL/L (ref 98–107)
CO2 SERPL-SCNC: 36 MMOL/L (ref 22–30)
EOSINOPHIL # BLD AUTO: 0 K/UL (ref 0–0.7)
EOSINOPHIL NFR BLD: 0.1 % (ref 0–4)
EOSINOPHIL NFR BLD: 1 % (ref 0–7)
ERYTHROCYTE [DISTWIDTH] IN BLOOD BY AUTOMATED COUNT: 18.3 % (ref 11.5–14.5)
GLOBULIN SER-MCNC: 3.9 GM/DL (ref 2.2–3.9)
GLUCOSE SERPL-MCNC: 162 MG/DL (ref 65–105)
HCT VFR BLD CALC: 30.8 % (ref 34–47)
LYMPHOCYTES # BLD AUTO: 0.8 K/UL (ref 1–4.3)
LYMPHOCYTES NFR BLD AUTO: 8.2 % (ref 20–40)
MAGNESIUM SERPL-MCNC: 2.3 MG/DL (ref 1.6–2.3)
MCH RBC QN AUTO: 25.1 PG (ref 27–31)
MCHC RBC AUTO-ENTMCNC: 30.2 G/DL (ref 33–37)
MCV RBC AUTO: 83 FL (ref 81–99)
MONOCYTES # BLD: 0.6 K/UL (ref 0–0.8)
MONOCYTES NFR BLD: 6.3 % (ref 0–10)
NEUTROPHILS # BLD: 8.7 K/UL (ref 1.8–7)
NEUTROPHILS NFR BLD AUTO: 85.3 % (ref 50–75)
NEUTROPHILS NFR BLD AUTO: 88 % (ref 42–75)
NRBC BLD AUTO-RTO: 0 % (ref 0–0)
PHOSPHATE SERPL-MCNC: 5.6 MG/DL (ref 2.5–4.5)
PLATELET # BLD: 264 K/UL (ref 130–400)
PMV BLD AUTO: 8.8 FL (ref 7.2–11.7)
POTASSIUM SERPL-SCNC: 3.6 MMOL/L (ref 3.6–5)
PROT SERPL-MCNC: 7.5 G/DL (ref 6.3–8.2)
SODIUM SERPL-SCNC: 157 MMOL/L (ref 132–148)
TOTAL CELLS COUNTED BLD: 100
WBC # BLD AUTO: 10.2 K/UL (ref 4.8–10.8)

## 2017-11-23 RX ADMIN — INSULIN LISPRO SCH: 100 INJECTION, SOLUTION INTRAVENOUS; SUBCUTANEOUS at 23:18

## 2017-11-23 RX ADMIN — FOSPHENYTOIN SODIUM SCH MG: 50 INJECTION, SOLUTION INTRAMUSCULAR; INTRAVENOUS at 00:54

## 2017-11-23 RX ADMIN — ACETYLCYSTEINE SCH ML: 200 INHALANT RESPIRATORY (INHALATION) at 19:37

## 2017-11-23 RX ADMIN — METHYLPREDNISOLONE SODIUM SUCCINATE SCH MG: 40 INJECTION, POWDER, FOR SOLUTION INTRAMUSCULAR; INTRAVENOUS at 01:00

## 2017-11-23 RX ADMIN — METHYLPREDNISOLONE SODIUM SUCCINATE SCH MG: 40 INJECTION, POWDER, FOR SOLUTION INTRAMUSCULAR; INTRAVENOUS at 16:59

## 2017-11-23 RX ADMIN — ACETYLCYSTEINE SCH ML: 200 INHALANT RESPIRATORY (INHALATION) at 12:01

## 2017-11-23 RX ADMIN — IPRATROPIUM BROMIDE AND ALBUTEROL SULFATE SCH ML: .5; 3 SOLUTION RESPIRATORY (INHALATION) at 15:40

## 2017-11-23 RX ADMIN — FOSPHENYTOIN SODIUM SCH MG: 50 INJECTION, SOLUTION INTRAMUSCULAR; INTRAVENOUS at 16:26

## 2017-11-23 RX ADMIN — INSULIN LISPRO SCH: 100 INJECTION, SOLUTION INTRAVENOUS; SUBCUTANEOUS at 11:36

## 2017-11-23 RX ADMIN — IPRATROPIUM BROMIDE AND ALBUTEROL SULFATE SCH ML: .5; 3 SOLUTION RESPIRATORY (INHALATION) at 03:00

## 2017-11-23 RX ADMIN — INSULIN LISPRO SCH UNIT: 100 INJECTION, SOLUTION INTRAVENOUS; SUBCUTANEOUS at 06:53

## 2017-11-23 RX ADMIN — FOSPHENYTOIN SODIUM SCH MG: 50 INJECTION, SOLUTION INTRAMUSCULAR; INTRAVENOUS at 11:06

## 2017-11-23 RX ADMIN — METHYLPREDNISOLONE SODIUM SUCCINATE SCH MG: 40 INJECTION, POWDER, FOR SOLUTION INTRAMUSCULAR; INTRAVENOUS at 08:28

## 2017-11-23 RX ADMIN — IPRATROPIUM BROMIDE AND ALBUTEROL SULFATE SCH ML: .5; 3 SOLUTION RESPIRATORY (INHALATION) at 07:43

## 2017-11-23 RX ADMIN — IPRATROPIUM BROMIDE AND ALBUTEROL SULFATE SCH ML: .5; 3 SOLUTION RESPIRATORY (INHALATION) at 19:37

## 2017-11-23 RX ADMIN — IPRATROPIUM BROMIDE AND ALBUTEROL SULFATE SCH ML: .5; 3 SOLUTION RESPIRATORY (INHALATION) at 11:02

## 2017-11-23 RX ADMIN — INSULIN LISPRO SCH UNIT: 100 INJECTION, SOLUTION INTRAVENOUS; SUBCUTANEOUS at 16:59

## 2017-11-23 RX ADMIN — IPRATROPIUM BROMIDE AND ALBUTEROL SULFATE SCH ML: .5; 3 SOLUTION RESPIRATORY (INHALATION) at 23:33

## 2017-11-23 NOTE — CP.CCUPN
CCU Subjective





- Physician Review


Subjective (Free Text): 





Sucessfully extubated yesterday after 8 duas on Vent including post-op time, 

still has periods of intermittent agitation and desaturation, improves with 

duonebs. [place] collar remains in place and maybe preventing oral secretion 

clearance. Lower lip still slightly swollen, but improving, voice quality still 

hoarse and low. Family gave her sips of coffee yesterday evening resulting in 

increase in coughing. 








Other vitals and I/O's reviewed: no new temp spikes.  Overall negative fluid 

balance of 3.5 liters.





ROS:  No other pertinent negs or positives on 10+  system review. 





PMSFH:    HTN, DMI II, COPD / asthma, osteoarthritis; no previous surgeries,   

non- smoker- Otherwise all Nursing and physician documentation reviewed to date

; no new pertinent info noted relevant to current medical problems.








IMPRESSION / MAJOR PROBLEMS NOW:


1.   Acute Type II Odontoid / C1-C2 fracture ( nondisplaced)- POD #5 Occiput-to-

C2 Fusion/Fixation


2.   Acute Hypercapneic resp failure 2 #1 and contribution from COPD 

exacerbation


3.   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


4.   Chronic Disease anemia








PLAN:


1.    Ongoing Duonebs and steroids. 


2.    Decrease Lasix: becoming hypernatremic, hemoconcentrated and BUN 

elevations noted out of proportion to Cr.


3.    ?? duration of AED therapy.


4.    No need for PRBCs today. Etiology of chronic anemia still unclear. 


5.    Dysphagia eval today.


6.    Physical therapy eval, try to get OOB. 








CCU Objective





- Vital Signs / Intake & Output


Vital Signs (Last 4 hours): 


Vital Signs











  Temp Pulse Resp BP Pulse Ox


 


 11/23/17 06:00   83  18  155/70 H  98


 


 11/23/17 04:17     172/100 H 


 


 11/23/17 04:00  98.6 F  85  20  159/69 H 











Intake and Output (Last 8hrs): 


 Intake & Output











 11/22/17 11/23/17 11/23/17





 22:59 06:59 14:59


 


Intake Total 120 90 


 


Output Total 2000 2200 


 


Balance -1880 -2110 


 


Intake:   


 


  IV 50 40 


 


  Intake, Piggyback 70 50 


 


Output:   


 


  Urine 2000 2200 


 


    Urethral (Mace) 2000 2200 


 


Other:   


 


  # Bowel Movements 0  














- Physical Exam


Head: Positive for: Normocephalic, Contusion, Swelling, Ecchymosis (bilateral 

orbital areas).  Negative for: Laceration


Pupils: Positive for: Sluggish, Pinpoint


Extroacular Muscles: Positive for: EOMI


Conjunctiva: Positive for: Injected.  Negative for: Icteric


Mouth: Positive for: Moist Mucous Membranes


Pharnyx: Positive for: Normal


Neck: Positive for: Other (Hard, fixed Cervical collar in place)


Respiratory/Chest: Positive for: Wheezes, Decreased Breath Sounds.  Negative for

: Accessory Muscle Use


Cardiovascular: Positive for: Irregular Rhythm, Bradycardic.  Negative for: 

Murmurs, Rub


Abdomen: Positive for: Normal Bowel Sounds.  Negative for: Tenderness, 

Distention, Mass/Organomegaly


Upper Extremity: Negative for: Edema


Lower Extremity: Positive for: Edema.  Negative for: CALF TENDERNESS, Cyanosis


Neurological: Negative for: GCS=15 (GCS= 6 ( [id number]))


Skin: Positive for: Warm, Dry.  Negative for: Rashes


Psychiatric: Positive for: Alert





- Medications


Active Medications: 


Active Medications











Generic Name Dose Route Start Last Admin





  Trade Name Freq  PRN Reason Stop Dose Admin


 


Acetaminophen  650 mg  11/18/17 06:16  11/21/17 16:11





  Tylenol 650mg/20.3ml Solution Ud  PO   650 mg





  Q4 PRN   Administration





  fever   


 


Albuterol/Ipratropium  3 ml  11/22/17 23:30  11/23/17 07:43





  Duoneb 3 Mg/0.5 Mg (3 Ml) Ud  INH   3 ml





  RQ4 MARIELY   Administration


 


Amlodipine Besylate  2.5 mg  11/15/17 09:00  11/22/17 08:45





  Norvasc  PO   2.5 mg





  DAILY MARIELY   Administration


 


Fosphenytoin Sodium  100 mg  11/15/17 17:00  11/23/17 00:54





  Cerebyx  IV   100 mg





  Q8 MARIELY   Administration


 


Furosemide  40 mg  11/20/17 17:00  11/23/17 04:17





  Lasix  IV  11/24/17 05:01  40 mg





  Q12H MARIELY   Administration


 


Hydromorphone HCl  0.5 mg  11/22/17 16:45  11/22/17 16:00





  Dilaudid  IVP   0.5 mg





  Q4H PRN   Administration





  Agitation   


 


Ceftriaxone Sodium 2 gm/  100 mls @ 100 mls/hr  11/21/17 09:00  11/22/17 08:43





  Sodium Chloride  IVPB   100 mls/hr





  DAILY MARIELY   Administration





  Protocol   


 


Azithromycin 500 mg/ Sodium  250 mls @ 250 mls/hr  11/21/17 09:00  11/22/17 09:

57





  Chloride  IVPB   250 mls/hr





  DAILY MARIELY   Administration





  Protocol   


 


Insulin Human Lispro  0 units  11/18/17 11:45  11/23/17 06:53





  Humalog  SC   1 unit





  Q6H MARIELY   Administration





  Protocol   


 


Losartan Potassium  50 mg  11/15/17 09:00  11/22/17 08:46





  Cozaar  PO   50 mg





  DAILY MARIELY   Administration


 


Methylprednisolone  40 mg  11/20/17 17:00  11/23/17 01:00





  Solu-Medrol  IVP   40 mg





  Q8 MARIELY   Administration


 


Montelukast Sodium  10 mg  11/15/17 22:00  11/22/17 22:14





  Singulair  PO   Not Given





  HS MARIELY   














- Patient Studies


Lab Studies: 


 Microbiology Studies











 11/18/17 19:04 Blood Culture - Preliminary





 Blood-Venous    NO GROWTH AFTER 4 DAYS








 Lab Studies











  11/23/17 11/23/17 11/23/17 Range/Units





  05:27 04:20 04:20 


 


WBC     (4.8-10.8)  K/uL


 


RBC     (3.80-5.20)  Mil/uL


 


Hgb     (12.0-16.0)  g/dL


 


Hct     (34.0-47.0)  %


 


MCV     (81.0-99.0)  fl


 


MCH     (27.0-31.0)  pg


 


MCHC     (33.0-37.0)  g/dL


 


RDW     (11.5-14.5)  %


 


Plt Count     (130-400)  K/uL


 


MPV     (7.2-11.7)  fl


 


Neut % (Auto)     (50.0-75.0)  %


 


Lymph % (Auto)     (20.0-40.0)  %


 


Mono % (Auto)     (0.0-10.0)  %


 


Eos % (Auto)     (0.0-4.0)  %


 


Baso % (Auto)     (0.0-2.0)  %


 


Neut #     (1.8-7.0)  K/uL


 


Lymph #     (1.0-4.3)  K/uL


 


Mono #     (0.0-0.8)  K/uL


 


Eos #     (0.0-0.7)  K/uL


 


Baso #     (0.0-0.2)  K/uL


 


pCO2     (35-45)  mm/Hg


 


pO2     ()  mm/Hg


 


HCO3     (21-28)  mmol/L


 


ABG pH     (7.35-7.45)  


 


ABG Total CO2     (22-28)  mmol/L


 


ABG O2 Saturation     (95-98)  %


 


ABG O2 Content     (15-23)  ML/dL


 


ABG Base Excess     (-2.0-3.0)  mmol/L


 


ABG Hemoglobin     (11.7-17.4)  g/dL


 


ABG Carboxyhemoglobin     (0.5-1.5)  %


 


POC ABG HHb (Measured)     (0.0-5.0)  %


 


ABG Methemoglobin     (0.0-3.0)  %


 


ABG O2 Capacity     (16-24)  mL/dL


 


Almas Test     


 


A-a O2 Difference     mm/Hg


 


Hgb O2 Saturation     (95.0-98.0)  %


 


FiO2     %


 


Pressure Support     


 


CPAP     


 


Sodium    157 H  (132-148)  mmol/l


 


Potassium    3.6  (3.6-5.0)  MMOL/L


 


Chloride    107  ()  mmol/L


 


Carbon Dioxide    36 H  (22-30)  mmol/L


 


Anion Gap    18  (10-20)  


 


BUN    54 H  (7-17)  mg/dl


 


Creatinine    1.5 H  (0.7-1.2)  mg/dl


 


Est GFR (African Amer)    40  


 


Est GFR (Non-Af Amer)    33  


 


POC Glucose (mg/dL)  155 H    ()  mg/dL


 


Random Glucose    162 H  ()  mg/dL


 


Calcium    8.5  (8.4-10.2)  mg/dL


 


Phosphorus    5.6 H  (2.5-4.5)  mg/dl


 


Magnesium    2.3  (1.6-2.3)  MG/DL


 


Total Bilirubin    0.4  (0.2-1.3)  mg/dl


 


AST    26  (14-36)  U/L


 


ALT    30  (9-52)  U/L


 


Alkaline Phosphatase    92  ()  U/L


 


Total Protein    7.5  (6.3-8.2)  G/DL


 


Albumin    3.6  (3.5-5.0)  g/dL


 


Globulin    3.9  (2.2-3.9)  gm/dL


 


Albumin/Globulin Ratio    0.9 L  (1.0-2.1)  


 


Blood Type   A POSITIVE   


 


Antibody Screen   Negative   


 


BBK History Checked   Patient has bt   














  11/23/17 11/22/17 11/22/17 Range/Units





  04:20 22:07 16:18 


 


WBC  10.2    (4.8-10.8)  K/uL


 


RBC  3.71 L    (3.80-5.20)  Mil/uL


 


Hgb  9.3 L    (12.0-16.0)  g/dL


 


Hct  30.8 L    (34.0-47.0)  %


 


MCV  83.0    (81.0-99.0)  fl


 


MCH  25.1 L    (27.0-31.0)  pg


 


MCHC  30.2 L    (33.0-37.0)  g/dL


 


RDW  18.3 H    (11.5-14.5)  %


 


Plt Count  264    (130-400)  K/uL


 


MPV  8.8    (7.2-11.7)  fl


 


Neut % (Auto)  85.3 H    (50.0-75.0)  %


 


Lymph % (Auto)  8.2 L    (20.0-40.0)  %


 


Mono % (Auto)  6.3    (0.0-10.0)  %


 


Eos % (Auto)  0.1    (0.0-4.0)  %


 


Baso % (Auto)  0.1    (0.0-2.0)  %


 


Neut #  8.7 H    (1.8-7.0)  K/uL


 


Lymph #  0.8 L    (1.0-4.3)  K/uL


 


Mono #  0.6    (0.0-0.8)  K/uL


 


Eos #  0.0    (0.0-0.7)  K/uL


 


Baso #  0.0    (0.0-0.2)  K/uL


 


pCO2     (35-45)  mm/Hg


 


pO2     ()  mm/Hg


 


HCO3     (21-28)  mmol/L


 


ABG pH     (7.35-7.45)  


 


ABG Total CO2     (22-28)  mmol/L


 


ABG O2 Saturation     (95-98)  %


 


ABG O2 Content     (15-23)  ML/dL


 


ABG Base Excess     (-2.0-3.0)  mmol/L


 


ABG Hemoglobin     (11.7-17.4)  g/dL


 


ABG Carboxyhemoglobin     (0.5-1.5)  %


 


POC ABG HHb (Measured)     (0.0-5.0)  %


 


ABG Methemoglobin     (0.0-3.0)  %


 


ABG O2 Capacity     (16-24)  mL/dL


 


Almas Test     


 


A-a O2 Difference     mm/Hg


 


Hgb O2 Saturation     (95.0-98.0)  %


 


FiO2     %


 


Pressure Support     


 


CPAP     


 


Sodium     (132-148)  mmol/l


 


Potassium     (3.6-5.0)  MMOL/L


 


Chloride     ()  mmol/L


 


Carbon Dioxide     (22-30)  mmol/L


 


Anion Gap     (10-20)  


 


BUN     (7-17)  mg/dl


 


Creatinine     (0.7-1.2)  mg/dl


 


Est GFR (African Amer)     


 


Est GFR (Non-Af Amer)     


 


POC Glucose (mg/dL)   150 H  182 H  ()  mg/dL


 


Random Glucose     ()  mg/dL


 


Calcium     (8.4-10.2)  mg/dL


 


Phosphorus     (2.5-4.5)  mg/dl


 


Magnesium     (1.6-2.3)  MG/DL


 


Total Bilirubin     (0.2-1.3)  mg/dl


 


AST     (14-36)  U/L


 


ALT     (9-52)  U/L


 


Alkaline Phosphatase     ()  U/L


 


Total Protein     (6.3-8.2)  G/DL


 


Albumin     (3.5-5.0)  g/dL


 


Globulin     (2.2-3.9)  gm/dL


 


Albumin/Globulin Ratio     (1.0-2.1)  


 


Blood Type     


 


Antibody Screen     


 


BBK History Checked     














  11/22/17 11/22/17 Range/Units





  11:51 08:30 


 


WBC    (4.8-10.8)  K/uL


 


RBC    (3.80-5.20)  Mil/uL


 


Hgb    (12.0-16.0)  g/dL


 


Hct    (34.0-47.0)  %


 


MCV    (81.0-99.0)  fl


 


MCH    (27.0-31.0)  pg


 


MCHC    (33.0-37.0)  g/dL


 


RDW    (11.5-14.5)  %


 


Plt Count    (130-400)  K/uL


 


MPV    (7.2-11.7)  fl


 


Neut % (Auto)    (50.0-75.0)  %


 


Lymph % (Auto)    (20.0-40.0)  %


 


Mono % (Auto)    (0.0-10.0)  %


 


Eos % (Auto)    (0.0-4.0)  %


 


Baso % (Auto)    (0.0-2.0)  %


 


Neut #    (1.8-7.0)  K/uL


 


Lymph #    (1.0-4.3)  K/uL


 


Mono #    (0.0-0.8)  K/uL


 


Eos #    (0.0-0.7)  K/uL


 


Baso #    (0.0-0.2)  K/uL


 


pCO2   51 H  (35-45)  mm/Hg


 


pO2   114 H  ()  mm/Hg


 


HCO3   31.0 H  (21-28)  mmol/L


 


ABG pH   7.42  (7.35-7.45)  


 


ABG Total CO2   34.7 H  (22-28)  mmol/L


 


ABG O2 Saturation   99.2 H  (95-98)  %


 


ABG O2 Content   11.2 L  (15-23)  ML/dL


 


ABG Base Excess   7.7 H  (-2.0-3.0)  mmol/L


 


ABG Hemoglobin   8.0 L  (11.7-17.4)  g/dL


 


ABG Carboxyhemoglobin   1.2  (0.5-1.5)  %


 


POC ABG HHb (Measured)   0.8  (0.0-5.0)  %


 


ABG Methemoglobin   0.6  (0.0-3.0)  %


 


ABG O2 Capacity   11.3 L  (16-24)  mL/dL


 


Almas Test   Yes  


 


A-a O2 Difference   107.0  mm/Hg


 


Hgb O2 Saturation   97.3  (95.0-98.0)  %


 


FiO2   40.0  %


 


Pressure Support   10  


 


CPAP   5  


 


Sodium    (132-148)  mmol/l


 


Potassium    (3.6-5.0)  MMOL/L


 


Chloride    ()  mmol/L


 


Carbon Dioxide    (22-30)  mmol/L


 


Anion Gap    (10-20)  


 


BUN    (7-17)  mg/dl


 


Creatinine    (0.7-1.2)  mg/dl


 


Est GFR (African Amer)    


 


Est GFR (Non-Af Amer)    


 


POC Glucose (mg/dL)  158 H   ()  mg/dL


 


Random Glucose    ()  mg/dL


 


Calcium    (8.4-10.2)  mg/dL


 


Phosphorus    (2.5-4.5)  mg/dl


 


Magnesium    (1.6-2.3)  MG/DL


 


Total Bilirubin    (0.2-1.3)  mg/dl


 


AST    (14-36)  U/L


 


ALT    (9-52)  U/L


 


Alkaline Phosphatase    ()  U/L


 


Total Protein    (6.3-8.2)  G/DL


 


Albumin    (3.5-5.0)  g/dL


 


Globulin    (2.2-3.9)  gm/dL


 


Albumin/Globulin Ratio    (1.0-2.1)  


 


Blood Type    


 


Antibody Screen    


 


BBK History Checked    








 Laboratory Results - last 24 hr











  11/22/17 11/22/17 11/22/17





  08:30 11:51 16:18


 


WBC   


 


RBC   


 


Hgb   


 


Hct   


 


MCV   


 


MCH   


 


MCHC   


 


RDW   


 


Plt Count   


 


MPV   


 


Neut % (Auto)   


 


Lymph % (Auto)   


 


Mono % (Auto)   


 


Eos % (Auto)   


 


Baso % (Auto)   


 


Neut #   


 


Lymph #   


 


Mono #   


 


Eos #   


 


Baso #   


 


pCO2  51 H  


 


pO2  114 H  


 


HCO3  31.0 H  


 


ABG pH  7.42  


 


ABG Total CO2  34.7 H  


 


ABG O2 Saturation  99.2 H  


 


ABG O2 Content  11.2 L  


 


ABG Base Excess  7.7 H  


 


ABG Hemoglobin  8.0 L  


 


ABG Carboxyhemoglobin  1.2  


 


POC ABG HHb (Measured)  0.8  


 


ABG Methemoglobin  0.6  


 


ABG O2 Capacity  11.3 L  


 


Almas Test  Yes  


 


A-a O2 Difference  107.0  


 


Hgb O2 Saturation  97.3  


 


FiO2  40.0  


 


Pressure Support  10  


 


CPAP  5  


 


Sodium   


 


Potassium   


 


Chloride   


 


Carbon Dioxide   


 


Anion Gap   


 


BUN   


 


Creatinine   


 


Est GFR ( Amer)   


 


Est GFR (Non-Af Amer)   


 


POC Glucose (mg/dL)   158 H  182 H


 


Random Glucose   


 


Calcium   


 


Phosphorus   


 


Magnesium   


 


Total Bilirubin   


 


AST   


 


ALT   


 


Alkaline Phosphatase   


 


Total Protein   


 


Albumin   


 


Globulin   


 


Albumin/Globulin Ratio   


 


Blood Type   


 


Antibody Screen   


 


BBK History Checked   














  11/22/17 11/23/17 11/23/17





  22:07 04:20 04:20


 


WBC   10.2 


 


RBC   3.71 L 


 


Hgb   9.3 L 


 


Hct   30.8 L 


 


MCV   83.0 


 


MCH   25.1 L 


 


MCHC   30.2 L 


 


RDW   18.3 H 


 


Plt Count   264 


 


MPV   8.8 


 


Neut % (Auto)   85.3 H 


 


Lymph % (Auto)   8.2 L 


 


Mono % (Auto)   6.3 


 


Eos % (Auto)   0.1 


 


Baso % (Auto)   0.1 


 


Neut #   8.7 H 


 


Lymph #   0.8 L 


 


Mono #   0.6 


 


Eos #   0.0 


 


Baso #   0.0 


 


pCO2   


 


pO2   


 


HCO3   


 


ABG pH   


 


ABG Total CO2   


 


ABG O2 Saturation   


 


ABG O2 Content   


 


ABG Base Excess   


 


ABG Hemoglobin   


 


ABG Carboxyhemoglobin   


 


POC ABG HHb (Measured)   


 


ABG Methemoglobin   


 


ABG O2 Capacity   


 


Almas Test   


 


A-a O2 Difference   


 


Hgb O2 Saturation   


 


FiO2   


 


Pressure Support   


 


CPAP   


 


Sodium    157 H


 


Potassium    3.6


 


Chloride    107


 


Carbon Dioxide    36 H


 


Anion Gap    18


 


BUN    54 H


 


Creatinine    1.5 H


 


Est GFR ( Amer)    40


 


Est GFR (Non-Af Amer)    33


 


POC Glucose (mg/dL)  150 H  


 


Random Glucose    162 H


 


Calcium    8.5


 


Phosphorus    5.6 H


 


Magnesium    2.3


 


Total Bilirubin    0.4


 


AST    26


 


ALT    30


 


Alkaline Phosphatase    92


 


Total Protein    7.5


 


Albumin    3.6


 


Globulin    3.9


 


Albumin/Globulin Ratio    0.9 L


 


Blood Type   


 


Antibody Screen   


 


BBK History Checked   














  11/23/17 11/23/17





  04:20 05:27


 


WBC  


 


RBC  


 


Hgb  


 


Hct  


 


MCV  


 


MCH  


 


MCHC  


 


RDW  


 


Plt Count  


 


MPV  


 


Neut % (Auto)  


 


Lymph % (Auto)  


 


Mono % (Auto)  


 


Eos % (Auto)  


 


Baso % (Auto)  


 


Neut #  


 


Lymph #  


 


Mono #  


 


Eos #  


 


Baso #  


 


pCO2  


 


pO2  


 


HCO3  


 


ABG pH  


 


ABG Total CO2  


 


ABG O2 Saturation  


 


ABG O2 Content  


 


ABG Base Excess  


 


ABG Hemoglobin  


 


ABG Carboxyhemoglobin  


 


POC ABG HHb (Measured)  


 


ABG Methemoglobin  


 


ABG O2 Capacity  


 


Almas Test  


 


A-a O2 Difference  


 


Hgb O2 Saturation  


 


FiO2  


 


Pressure Support  


 


CPAP  


 


Sodium  


 


Potassium  


 


Chloride  


 


Carbon Dioxide  


 


Anion Gap  


 


BUN  


 


Creatinine  


 


Est GFR ( Amer)  


 


Est GFR (Non-Af Amer)  


 


POC Glucose (mg/dL)   155 H


 


Random Glucose  


 


Calcium  


 


Phosphorus  


 


Magnesium  


 


Total Bilirubin  


 


AST  


 


ALT  


 


Alkaline Phosphatase  


 


Total Protein  


 


Albumin  


 


Globulin  


 


Albumin/Globulin Ratio  


 


Blood Type  A POSITIVE 


 


Antibody Screen  Negative 


 


BBK History Checked  Patient has bt 











Fingerstick Blood Sugar Results: 155





Review of Systems





- Review of Systems


All systems: reviewed and no additional remarkable complaints except (as above)

## 2017-11-23 NOTE — CP.PCM.PN
Subjective





- Subjective


Subjective: 





THIS NOTE IS TO BE DATED FOR YESTERDAY'S ENCOUNTER WITH THE PATIENT. I WAS 

UNABLE TO CONNECT. 





Acute Resp Failure: Extubated and tolerating it well. 


DMII


HTN


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


OA


Sz: combination of previous alkalemia from a resp alkalosis and possible brain 

trauma. 


Anemia.


ARF: 





S/ Patient is able to follow commands. No pain. Breathing is OK as per patient. 





O/ VSS Afebrile.


Head: Ecchymosis of orbits as well as other soft tissue swelling of face. Much 

improved.


Heart RRR, Ns1S2 Neg m


Lungs: Some Rhonci. 


Abdo, Soft, not Pos BS


Ext: No c,c, Edema.


Neuro: As aforementioned. 





Labs: as below


 


Cont High Flow O2, and monitor serial ABG's. Avoid alkalemia and hyperoxia. 

Cont Chest PT. S & S assessment. 


 Neurosurgery f/u. 


Cont anti Sz Rx and Neuro recommendations. 


Bg monitoring with SSI. 


Monitor Hgb. Transfuse PRN. 


Cont VERNA Q 4 hours. 


Cont anit DM/HTN rx. 


Cont analgesics for post op pain. Keep dose to a minimum. 


Cont care as per ICU team. 


Monitor Renal Fx and consult with Nephrology on call if Cr. rises and/or UO 

drops.


PUD & DVT Px. 


Please call my cell phone for all communications regarding my patient: [phone]








Objective





- Vital Signs/Intake and Output


Vital Signs (last 24 hours): 


 











Temp Pulse Resp BP Pulse Ox


 


 98.2 F   83   25 H  147/66   91 L


 


 11/23/17 08:00  11/23/17 10:00  11/23/17 11:51  11/23/17 10:00  11/23/17 10:00








Intake and Output: 


 











 11/23/17 11/23/17





 06:59 18:59


 


Intake Total 90 370


 


Output Total 2200 


 


Balance -2110 370














- Medications


Medications: 


 Current Medications





Acetaminophen (Tylenol 650mg/20.3ml Solution Ud)  650 mg PO Q4 PRN


   PRN Reason: fever


   Last Admin: 11/21/17 16:11 Dose:  650 mg


Acetylcysteine (Acetylcysteine 20%)  2 ml INH RBID MARIELY


   Last Admin: 11/23/17 12:01 Dose:  2 ml


Albuterol/Ipratropium (Duoneb 3 Mg/0.5 Mg (3 Ml) Ud)  3 ml INH RQ4 [facility]


   Last Admin: 11/23/17 11:02 Dose:  3 ml


Amlodipine Besylate (Norvasc)  2.5 mg PO DAILY [facility]


   Last Admin: 11/22/17 08:45 Dose:  2.5 mg


Fosphenytoin Sodium (Cerebyx)  100 mg IV Q8 [facility]


   Last Admin: 11/23/17 11:06 Dose:  100 mg


Furosemide (Lasix)  40 mg IV DAILY [facility]


   Last Admin: 11/23/17 08:32 Dose:  40 mg


Hydromorphone HCl (Dilaudid)  0.5 mg IVP Q4H PRN


   PRN Reason: Agitation


   Last Admin: 11/23/17 10:49 Dose:  0.5 mg


Ceftriaxone Sodium 2 gm/ (Sodium Chloride)  100 mls @ 100 mls/hr IVPB DAILY [facility]


   PRN Reason: Protocol


   Last Admin: 11/23/17 08:35 Dose:  100 mls/hr


Azithromycin 500 mg/ Sodium (Chloride)  250 mls @ 250 mls/hr IVPB DAILY MARIELY


   PRN Reason: Protocol


   Last Admin: 11/23/17 09:42 Dose:  250 mls/hr


Insulin Human Lispro (Humalog)  0 units SC Q6H MARIELY


   PRN Reason: Protocol


   Last Admin: 11/23/17 11:36 Dose:  Not Given


Losartan Potassium (Cozaar)  50 mg PO DAILY [facility]


   Last Admin: 11/22/17 08:46 Dose:  50 mg


Methylprednisolone (Solu-Medrol)  40 mg IVP Q8 [facility]


   Last Admin: 11/23/17 08:28 Dose:  40 mg


Montelukast Sodium (Singulair)  10 mg PO HS [facility]


   Last Admin: 11/22/17 22:14 Dose:  Not Given











- Labs


Labs: 


 





 11/23/17 04:20 





 11/23/17 04:20 





 











PT  12.6 Seconds (9.8-13.1)   11/17/17  04:47    


 


INR  1.1  (0.9-1.2)   11/17/17  04:47    


 


APTT  21.5 Seconds (25.6-37.1)  L  11/17/17  04:47

## 2017-11-23 NOTE — RAD
HISTORY:

Hypoxemia  



COMPARISON:

Yesterday 



FINDINGS:



LUNGS:

Patient has been extubated since prior study.  NG tube is also been 

removed.  There is significant increased atelectasis and volume loss 

in the right lung. Minor atelectasis is seen at the left lung base.



PLEURA:

There appears to be the suggestion of a stable right pleural 

effusion. Minimal left pleural fluid is not excluded.



CARDIOVASCULAR:

No CHF is seen. Heart is unchanged in size.



OSSEOUS STRUCTURES:

No significant abnormalities.



VISUALIZED UPPER ABDOMEN:

Normal.



OTHER FINDINGS:

Right PICC catheter is noted with its tip in the right subclavian 

vein region. Surgical clips are seen in the neck region.



IMPRESSION:

Status postextubation.  Moderate increased atelectasis in density in 

the right lung with right pleural effusion noted. Report was placed 

into a critical tag folder.

## 2017-11-23 NOTE — CP.PCM.PN
Subjective





- Subjective


Subjective: 





Acute Resp Failure: Extubated and tolerating it well. 


DMII


HTN


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


OA


Sz: combination of previous alkalemia from a resp alkalosis and possible brain 

trauma. 


Anemia.


ARF: 


Right Lung Atelectasis: 





S/ Patient is able to follow commands. No pain. Breathing is OK as per patient. 





O/ VSS Afebrile.


Head: Ecchymosis of orbits as well as other soft tissue swelling of face. Much 

improved.


Heart RRR, Ns1S2 Neg m


Lungs: Some Rhonci. Decreased sounds of rt lower hemithorax.


Abdo, Soft, not Pos BS


Ext: No c,c, Edema.


Neuro: As aforementioned. 





Labs: as below


 


Cont High Flow O2, and monitor serial ABG's. Avoid alkalemia and hyperoxia. 

Cont Chest PT and Mucomyst.. S & S assessment. Repeat x-ray is showing 

improvement of atelectasis. If it worsens again, will do bed side bronch. 


 Neurosurgery f/u. 


Cont anti Sz Rx and Neuro recommendations. 


Bg monitoring with SSI. 


Monitor Hgb. Transfuse PRN. 


Cont VERNA Q 4 hours. 


Cont anit DM/HTN rx. 


Cont analgesics for post op pain. Keep dose to a minimum. 


Cont care as per ICU team. 


Monitor Renal Fx and consult with Nephrology on call if Cr. rises and/or UO 

drops.


PUD & DVT Px. 


Please call my cell phone for all communications regarding my patient: [phone]





Objective





- Vital Signs/Intake and Output


Vital Signs (last 24 hours): 


 











Temp Pulse Resp BP Pulse Ox


 


 97.8 F   88   30 H  153/76 H  92 L


 


 11/23/17 19:49  11/23/17 19:49  11/23/17 19:49  11/23/17 19:49  11/23/17 19:49








Intake and Output: 


 











 11/23/17 11/24/17





 18:59 06:59


 


Intake Total 590 


 


Output Total 1800 120


 


Balance -1210 -120














- Medications


Medications: 


 Current Medications





Acetaminophen (Tylenol 650mg/20.3ml Solution Ud)  650 mg PO Q4 PRN


   PRN Reason: fever


   Last Admin: 11/21/17 16:11 Dose:  650 mg


Acetylcysteine (Acetylcysteine 20%)  2 ml INH RBID [facility]


   Last Admin: 11/23/17 19:37 Dose:  2 ml


Albuterol/Ipratropium (Duoneb 3 Mg/0.5 Mg (3 Ml) Ud)  3 ml INH RQ4 [facility]


   Last Admin: 11/23/17 19:37 Dose:  3 ml


Amlodipine Besylate (Norvasc)  2.5 mg PO DAILY [facility]


   Last Admin: 11/23/17 13:34 Dose:  Not Given


Fosphenytoin Sodium (Cerebyx)  100 mg IV Q8 [facility]


   Last Admin: 11/23/17 16:26 Dose:  100 mg


Furosemide (Lasix)  40 mg IV DAILY [facility]


   Last Admin: 11/23/17 08:32 Dose:  40 mg


Hydromorphone HCl (Dilaudid)  0.5 mg IVP Q4H PRN


   PRN Reason: Agitation


   Last Admin: 11/23/17 10:49 Dose:  0.5 mg


Ceftriaxone Sodium 2 gm/ (Sodium Chloride)  100 mls @ 100 mls/hr IVPB DAILY [facility]


   PRN Reason: Protocol


   Last Admin: 11/23/17 08:35 Dose:  100 mls/hr


Azithromycin 500 mg/ Sodium (Chloride)  250 mls @ 250 mls/hr IVPB DAILY [facility]


   PRN Reason: Protocol


   Last Admin: 11/23/17 09:42 Dose:  250 mls/hr


Insulin Human Lispro (Humalog)  0 units SC Q6H [facility]


   PRN Reason: Protocol


   Last Admin: 11/23/17 16:59 Dose:  1 unit


Losartan Potassium (Cozaar)  50 mg PO DAILY [facility]


   Last Admin: 11/23/17 13:34 Dose:  Not Given


Methylprednisolone (Solu-Medrol)  40 mg IVP Q8 [facility]


   Last Admin: 11/23/17 16:59 Dose:  40 mg


Montelukast Sodium (Singulair)  10 mg PO HS [facility]


   Last Admin: 11/23/17 21:31 Dose:  10 mg











- Labs


Labs: 


 





 11/23/17 04:20 





 11/23/17 04:20 





 











PT  12.6 Seconds (9.8-13.1)   11/17/17  04:47    


 


INR  1.1  (0.9-1.2)   11/17/17  04:47    


 


APTT  21.5 Seconds (25.6-37.1)  L  11/17/17  04:47

## 2017-11-24 LAB
ALBUMIN/GLOB SERPL: 0.9 {RATIO} (ref 1–2.1)
ALP SERPL-CCNC: 90 U/L (ref 38–126)
ALT SERPL-CCNC: 33 U/L (ref 9–52)
AST SERPL-CCNC: 26 U/L (ref 14–36)
BILIRUB SERPL-MCNC: 0.4 MG/DL (ref 0.2–1.3)
BUN SERPL-MCNC: 62 MG/DL (ref 7–17)
CALCIUM SERPL-MCNC: 8.3 MG/DL (ref 8.4–10.2)
CHLORIDE SERPL-SCNC: 109 MMOL/L (ref 98–107)
CO2 SERPL-SCNC: 41 MMOL/L (ref 22–30)
ERYTHROCYTE [DISTWIDTH] IN BLOOD BY AUTOMATED COUNT: 18 % (ref 11.5–14.5)
GLOBULIN SER-MCNC: 3.6 GM/DL (ref 2.2–3.9)
GLUCOSE SERPL-MCNC: 159 MG/DL (ref 65–105)
HCT VFR BLD CALC: 30.2 % (ref 34–47)
MCH RBC QN AUTO: 24.4 PG (ref 27–31)
MCHC RBC AUTO-ENTMCNC: 29.3 G/DL (ref 33–37)
MCV RBC AUTO: 83.2 FL (ref 81–99)
PLATELET # BLD: 280 K/UL (ref 130–400)
POTASSIUM SERPL-SCNC: 3.6 MMOL/L (ref 3.6–5)
PROT SERPL-MCNC: 6.8 G/DL (ref 6.3–8.2)
SODIUM SERPL-SCNC: 159 MMOL/L (ref 132–148)
WBC # BLD AUTO: 18.1 K/UL (ref 4.8–10.8)

## 2017-11-24 RX ADMIN — ACETYLCYSTEINE SCH ML: 200 INHALANT RESPIRATORY (INHALATION) at 19:18

## 2017-11-24 RX ADMIN — INSULIN LISPRO SCH UNIT: 100 INJECTION, SOLUTION INTRAVENOUS; SUBCUTANEOUS at 16:37

## 2017-11-24 RX ADMIN — ACETYLCYSTEINE SCH ML: 200 INHALANT RESPIRATORY (INHALATION) at 08:26

## 2017-11-24 RX ADMIN — ACETAMINOPHEN PRN MG: 160 SOLUTION ORAL at 16:19

## 2017-11-24 RX ADMIN — IPRATROPIUM BROMIDE AND ALBUTEROL SULFATE SCH ML: .5; 3 SOLUTION RESPIRATORY (INHALATION) at 23:10

## 2017-11-24 RX ADMIN — IPRATROPIUM BROMIDE AND ALBUTEROL SULFATE SCH ML: .5; 3 SOLUTION RESPIRATORY (INHALATION) at 15:27

## 2017-11-24 RX ADMIN — METHYLPREDNISOLONE SODIUM SUCCINATE SCH MG: 40 INJECTION, POWDER, FOR SOLUTION INTRAMUSCULAR; INTRAVENOUS at 08:43

## 2017-11-24 RX ADMIN — IPRATROPIUM BROMIDE AND ALBUTEROL SULFATE SCH ML: .5; 3 SOLUTION RESPIRATORY (INHALATION) at 19:19

## 2017-11-24 RX ADMIN — METHYLPREDNISOLONE SODIUM SUCCINATE SCH MG: 40 INJECTION, POWDER, FOR SOLUTION INTRAMUSCULAR; INTRAVENOUS at 16:20

## 2017-11-24 RX ADMIN — IPRATROPIUM BROMIDE AND ALBUTEROL SULFATE SCH ML: .5; 3 SOLUTION RESPIRATORY (INHALATION) at 08:27

## 2017-11-24 RX ADMIN — INSULIN LISPRO SCH UNIT: 100 INJECTION, SOLUTION INTRAVENOUS; SUBCUTANEOUS at 06:51

## 2017-11-24 RX ADMIN — IPRATROPIUM BROMIDE AND ALBUTEROL SULFATE SCH ML: .5; 3 SOLUTION RESPIRATORY (INHALATION) at 11:36

## 2017-11-24 RX ADMIN — FOSPHENYTOIN SODIUM SCH MG: 50 INJECTION, SOLUTION INTRAMUSCULAR; INTRAVENOUS at 01:52

## 2017-11-24 RX ADMIN — FOSPHENYTOIN SODIUM SCH MG: 50 INJECTION, SOLUTION INTRAMUSCULAR; INTRAVENOUS at 08:40

## 2017-11-24 RX ADMIN — FOSPHENYTOIN SODIUM SCH MG: 50 INJECTION, SOLUTION INTRAMUSCULAR; INTRAVENOUS at 16:19

## 2017-11-24 RX ADMIN — METHYLPREDNISOLONE SODIUM SUCCINATE SCH MG: 40 INJECTION, POWDER, FOR SOLUTION INTRAMUSCULAR; INTRAVENOUS at 01:47

## 2017-11-24 RX ADMIN — INSULIN LISPRO SCH UNIT: 100 INJECTION, SOLUTION INTRAVENOUS; SUBCUTANEOUS at 11:24

## 2017-11-24 RX ADMIN — IPRATROPIUM BROMIDE AND ALBUTEROL SULFATE SCH ML: .5; 3 SOLUTION RESPIRATORY (INHALATION) at 03:21

## 2017-11-24 NOTE — RAD
HISTORY:

f/u atelectasis  



COMPARISON:

Chest radiograph dated 11/23/2017. 



FINDINGS:



LUNGS:

Bibasilar atelectasis.  Pulmonary vascular congestion, stable.



PLEURA:

Small to moderate right pleural effusion, grossly unchanged. Stable 

small left pleural effusion. No pneumothorax apparent.



CARDIOVASCULAR:

Unchanged cardiomediastinal silhouette.



OSSEOUS STRUCTURES:

No significant abnormalities.



VISUALIZED UPPER ABDOMEN:

Normal.



OTHER FINDINGS:

Stable right upper extremity midline catheter with tip in the 

subclavian vein.



IMPRESSION:

Stable pulmonary vascular congestion with similar small to moderate 

right and small left pleural effusions.

## 2017-11-24 NOTE — PN
DATE:



NEUROLOGICAL PROBLEM:  Postconcussion syndrome, new onset of seizures with

C2 fracture, status post fusion.



PHYSICAL EXAMINATION:

VITAL SIGNS:  Blood pressure 130/59, mean arterial pressure of 82,

respiratory rate of 16, temperature afebrile.

NEUROLOGIC:  The patient is comfortably sitting in the cardiac chair with

cervical collar.  The patient has good eye contact.  Speech is there,

communicable only in Hebrew.  She moves all 4 extremities.

SKIN:  Ecchymoses and bruises all healing at present.



ASSESSMENT AND PLAN:  Continue the present management.  Continue Dilantin

for now.  The patient's EEG is still pending.  The patient will be followed

closely with you.



__________________________________________

Girish Huang MD





DD:  11/24/2017 15:32:12

DT:  11/24/2017 16:11:05

Job # [id number]

## 2017-11-24 NOTE — CP.PCM.PN
Subjective





- Subjective


Subjective: 





Acute Resp Failure: Extubated and tolerating it well. 


DMII


HTN


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


OA


Sz: combination of previous alkalemia from a resp alkalosis and possible brain 

trauma. 


Anemia.


ARF: 


Right Lung Atelectasis: Resolved with Mucomyst and chest PT. 





S/ Patient is able to follow commands. No pain. Breathing is OK as per patient. 





O/ VSS Afebrile.


Head: Ecchymosis of orbits as well as other soft tissue swelling of face. Much 

improved.


Heart RRR, Ns1S2 Neg m


Lungs: Some Rhonci. Bilateral air entry. 


Abdo, Soft, not Pos BS


Ext: No c,c, Edema.


Neuro: As aforementioned. 





Labs: as below


 


Cont Nasal Canula O2, and monitor serial ABG's. Avoid alkalemia and hyperoxia. 

Cont Chest PT and Mucomyst.. S & S assessment. (Passed.) Cont thickit with 

foods. Repeat x-ray is showing resolution.of atelectasis. If it worsens again, 

will do bed side bronch. 


 Neurosurgery f/u. 


Cont anti Sz Rx and Neuro recommendations. 


Bg monitoring with SSI. 


Monitor Hgb. Transfuse PRN. 


Cont VERNA Q 4 hours. 


Cont anit DM/HTN rx. 


Cont analgesics for post op pain. Keep dose to a minimum. 


Cont care as per ICU team. 


Monitor Renal Fx and consult with Nephrology on call if Cr. rises and/or UO 

drops.


Start PT. 


PUD & DVT Px. 


Please call my cell phone for all communications regarding my patient: [phone]








Objective





- Vital Signs/Intake and Output


Vital Signs (last 24 hours): 


 











Temp Pulse Resp BP Pulse Ox


 


 97.1 F L  85   24   129/70   100 


 


 11/24/17 16:00  11/24/17 18:00  11/24/17 18:00  11/24/17 18:00  11/24/17 18:00








Intake and Output: 


 











 11/24/17 11/25/17





 18:59 06:59


 


Intake Total 2455 


 


Output Total 400 


 


Balance 2055 














- Medications


Medications: 


 Current Medications





Acetaminophen (Tylenol 650mg/20.3ml Solution Ud)  650 mg PO Q4 PRN


   PRN Reason: fever


   Last Admin: 11/24/17 16:19 Dose:  650 mg


Acetylcysteine (Acetylcysteine 20%)  2 ml INH RBID [facility]


   Last Admin: 11/24/17 19:18 Dose:  2 ml


Albuterol/Ipratropium (Duoneb 3 Mg/0.5 Mg (3 Ml) Ud)  3 ml INH RQ4 [facility]


   Last Admin: 11/24/17 19:19 Dose:  3 ml


Amlodipine Besylate (Norvasc)  2.5 mg PO DAILY [facility]


   Last Admin: 11/24/17 08:41 Dose:  2.5 mg


Fosphenytoin Sodium (Cerebyx)  100 mg IV Q8 [facility]


   Last Admin: 11/24/17 16:19 Dose:  100 mg


Furosemide (Lasix)  40 mg IV DAILY [facility]


   Last Admin: 11/23/17 08:32 Dose:  40 mg


Hydromorphone HCl (Dilaudid)  0.5 mg IVP Q4H PRN


   PRN Reason: Agitation


   Last Admin: 11/23/17 10:49 Dose:  0.5 mg


Hydromorphone HCl (Dilaudid)  0.5 mg IVP Q4 PRN


   PRN Reason: Headache


   Stop: 11/26/17 04:40


   Last Admin: 11/24/17 05:14 Dose:  0.5 mg


Ceftriaxone Sodium 2 gm/ (Sodium Chloride)  100 mls @ 100 mls/hr IVPB DAILY [facility]


   PRN Reason: Protocol


   Last Admin: 11/24/17 08:41 Dose:  100 mls/hr


Azithromycin 500 mg/ Sodium (Chloride)  250 mls @ 250 mls/hr IVPB DAILY [facility]


   PRN Reason: Protocol


   Last Admin: 11/24/17 08:44 Dose:  250 mls/hr


Dextrose (Dextrose 5% In Water 1000 Ml)  1,000 mls @ 100 mls/hr IV .Q10H [facility]


   Stop: 11/25/17 06:32


   Last Admin: 11/24/17 08:41 Dose:  100 mls/hr


Insulin Human Lispro (Humalog)  0 units SC Q6H [facility]


   PRN Reason: Protocol


   Last Admin: 11/24/17 16:37 Dose:  4 unit


Losartan Potassium (Cozaar)  50 mg PO DAILY [facility]


   Last Admin: 11/24/17 08:39 Dose:  50 mg


Methylprednisolone (Solu-Medrol)  40 mg IVP Q8 [facility]


   Last Admin: 11/24/17 16:20 Dose:  40 mg


Montelukast Sodium (Singulair)  10 mg PO HS [facility]


   Last Admin: 11/23/17 21:31 Dose:  10 mg











- Labs


Labs: 


 





 11/24/17 05:40 





 11/24/17 05:40 





 











PT  12.6 Seconds (9.8-13.1)   11/17/17  04:47    


 


INR  1.1  (0.9-1.2)   11/17/17  04:47    


 


APTT  21.5 Seconds (25.6-37.1)  L  11/17/17  04:47

## 2017-11-24 NOTE — CP.CCUPN
CCU Subjective





- Physician Review


Subjective (Free Text): 





Stable overnight with no real episodes of resp distress and desaturation, 

remains dependent on HFNC at 70 % oxygen at 40 LPM. Passed bedside swaloow 

screen and tolerated apple sauce, jello and ice chips PO. c/o feeling hungry. 

Denies any recurrent headaches after brief headache overnight, not associated 

with other adverse neuro findings. 


Repeat CXR yesterday afternoon showed improved aeration of R lung; basilar 

opacification persists. 





Other vitals and I/O's reviewed: no new temp spikes.  Overall negative fluid 

balance of 1.9 liters.





ROS:  No other pertinent negs or positives on 10+  system review. 





PMSFH:    HTN, DMI II, COPD / asthma, osteoarthritis; no previous surgeries,   

non- smoker- Otherwise all Nursing and physician documentation reviewed to date

; no new pertinent info noted relevant to current medical problems.








IMPRESSION / MAJOR PROBLEMS NOW:


1.   Acute Type II Odontoid / C1-C2 fracture ( nondisplaced)- POD #7 Occiput-to-

C2 Fusion/Fixation


2.   Acute Hypercapneic resp failure 2 #1 and contribution from COPD 

exacerbation


3.   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


4.   Chronic Disease anemia








PLAN:


1.    Ongoing Duonebs and steroids. Check repeat CXR this AM. Try to wean down 

FiO2 as tolerated. Pulm to contemplate any need for FOB.


2.    Decrease or hold Lasix:,  becoming more hypernatremic, hemoconcentrated 

and BUN elevations noted out of proportion to Cr. Started D5W today. 


3.    ?? duration of AED therapy.


4.    No need for PRBCs today. Etiology of chronic anemia still unclear. 


5.    Dysphagia eval today, advance diet if no s/sx aspiration.


6.    Physical therapy eval, try to get OOB; discontinue Mace if tolerated. 











CCU Objective





- Vital Signs / Intake & Output


Vital Signs (Last 4 hours): 


Vital Signs











  Temp Pulse Resp BP Pulse Ox


 


 11/24/17 06:00   91 H  24  146/64  100


 


 11/24/17 04:00  98.7 F  81  27 H  156/71 H  100


 


 11/24/17 03:22    28 H  











Intake and Output (Last 8hrs): 


 Intake & Output











 11/23/17 11/23/17 11/24/17





 14:59 22:59 06:59


 


Intake Total 420 170 


 


Output Total  1920 640


 


Balance 420 -2876 -640


 


Intake:   


 


  Intake, Piggyback 420 50 


 


  Oral  120 


 


Output:   


 


  Urine  1920 640


 


    Urethral (Mace)  1920 640


 


Other:   


 


  # Bowel Movements 0 0 














- Physical Exam


Head: Positive for: Normocephalic, Contusion, Swelling, Ecchymosis (bilateral 

orbital areas), Other ([place] Collar in place. ).  Negative for: Laceration


Pupils: Positive for: Sluggish, Pinpoint


Extroacular Muscles: Positive for: EOMI


Conjunctiva: Positive for: Injected.  Negative for: Icteric


Mouth: Positive for: Moist Mucous Membranes, Other (lower lip swelling near-

normalized.)


Pharnyx: Positive for: Normal


Neck: Positive for: Other (Hard, fixed Cervical collar in place)


Respiratory/Chest: Positive for: Wheezes, Decreased Breath Sounds.  Negative for

: Accessory Muscle Use


Cardiovascular: Positive for: Irregular Rhythm, Bradycardic.  Negative for: 

Murmurs, Rub


Abdomen: Positive for: Normal Bowel Sounds.  Negative for: Tenderness, 

Distention, Mass/Organomegaly


Upper Extremity: Negative for: Edema


Lower Extremity: Positive for: Edema.  Negative for: CALF TENDERNESS, Cyanosis


Neurological: Negative for: GCS=15 (GCS= 6 ( E1V1M4))


Skin: Positive for: Warm, Dry.  Negative for: Rashes


Psychiatric: Positive for: Alert, Oriented x 3, Normal Affect, Normal Mood





- Medications


Active Medications: 


Active Medications











Generic Name Dose Route Start Last Admin





  Trade Name Freq  PRN Reason Stop Dose Admin


 


Acetaminophen  650 mg  11/18/17 06:16  11/21/17 16:11





  Tylenol 650mg/20.3ml Solution Ud  PO   650 mg





  Q4 PRN   Administration





  fever   


 


Acetylcysteine  2 ml  11/23/17 11:41  11/23/17 19:37





  Acetylcysteine 20%  INH   2 ml





  RBID [facility]   Administration


 


Albuterol/Ipratropium  3 ml  11/22/17 23:30  11/24/17 03:21





  Duoneb 3 Mg/0.5 Mg (3 Ml) Ud  INH   3 ml





  RQ4 [facility]   Administration


 


Amlodipine Besylate  2.5 mg  11/15/17 09:00  11/23/17 13:34





  Norvasc  PO   Not Given





  DAILY [facility]   


 


Fosphenytoin Sodium  100 mg  11/15/17 17:00  11/24/17 01:52





  Cerebyx  IV   100 mg





  Q8 [facility]   Administration


 


Furosemide  40 mg  11/23/17 09:00  11/23/17 08:32





  Lasix  IV   40 mg





  DAILY [facility]   Administration


 


Hydromorphone HCl  0.5 mg  11/22/17 16:45  11/23/17 10:49





  Dilaudid  IVP   0.5 mg





  Q4H PRN   Administration





  Agitation   


 


Hydromorphone HCl  0.5 mg  11/24/17 04:40  11/24/17 05:14





  Dilaudid  IVP  11/26/17 04:40  0.5 mg





  Q4 PRN   Administration





  Headache   


 


Ceftriaxone Sodium 2 gm/  100 mls @ 100 mls/hr  11/21/17 09:00  11/23/17 08:35





  Sodium Chloride  IVPB   100 mls/hr





  DAILY [facility]   Administration





  Protocol   


 


Azithromycin 500 mg/ Sodium  250 mls @ 250 mls/hr  11/21/17 09:00  11/23/17 09:

42





  Chloride  IVPB   250 mls/hr





  DAILY [facility]   Administration





  Protocol   


 


Dextrose  1,000 mls @ 100 mls/hr  11/24/17 06:45  





  Dextrose 5% In Water 1000 Ml  IV  11/25/17 06:32  





  .Q10H [facility]   


 


Insulin Human Lispro  0 units  11/18/17 11:45  11/24/17 06:51





  Humalog  SC   1 unit





  Q6H [facility]   Administration





  Protocol   


 


Losartan Potassium  50 mg  11/15/17 09:00  11/23/17 13:34





  Cozaar  PO   Not Given





  DAILY [facility]   


 


Methylprednisolone  40 mg  11/20/17 17:00  11/24/17 01:47





  Solu-Medrol  IVP   40 mg





  Q8 [facility]   Administration


 


Montelukast Sodium  10 mg  11/15/17 22:00  11/23/17 21:31





  Singulair  PO   10 mg





  HS [facility]   Administration














- Patient Studies


Lab Studies: 


 Microbiology Studies











 11/18/17 19:04 Blood Culture - Final





 Blood-Venous    NO GROWTH AFTER 5 DAYS





 Gram Stain - Final





    TEST NOT PERFORMED








 Lab Studies











  11/24/17 11/24/17 11/23/17 Range/Units





  05:40 05:40 21:14 


 


WBC   18.1 H D   (4.8-10.8)  K/uL


 


RBC   3.63 L   (3.80-5.20)  Mil/uL


 


Hgb   8.8 L   (12.0-16.0)  g/dL


 


Hct   30.2 L   (34.0-47.0)  %


 


MCV   83.2   (81.0-99.0)  fl


 


MCH   24.4 L   (27.0-31.0)  pg


 


MCHC   29.3 L   (33.0-37.0)  g/dL


 


RDW   18.0 H   (11.5-14.5)  %


 


Plt Count   280   (130-400)  K/uL


 


Neutrophils % (Manual)     (42-75)  %


 


Band Neutrophils %     (0-2)  %


 


Lymphocytes % (Manual)     (20-50)  %


 


Monocytes % (Manual)     (0-10)  %


 


Eosinophils % (Manual)     (0-7)  %


 


Toxic Granulation     


 


Platelet Estimate     (NORMAL)  


 


Hypochromasia (manual)     


 


Anisocytosis (manual)     


 


Target Cells     


 


Sodium  159 H    (132-148)  mmol/l


 


Potassium  3.6    (3.6-5.0)  MMOL/L


 


Chloride  109 H    ()  mmol/L


 


BUN  62 H    (7-17)  mg/dl


 


Creatinine  1.6 H    (0.7-1.2)  mg/dl


 


Est GFR (African Amer)  37    


 


Est GFR (Non-Af Amer)  30    


 


POC Glucose (mg/dL)    166 H  ()  mg/dL


 


Random Glucose  159 H    ()  mg/dL


 


Calcium  8.3 L    (8.4-10.2)  mg/dL


 


Total Bilirubin  0.4    (0.2-1.3)  mg/dl


 


AST  26    (14-36)  U/L


 


ALT  33    (9-52)  U/L


 


Alkaline Phosphatase  90    ()  U/L


 


Total Protein  6.8    (6.3-8.2)  G/DL


 


Albumin  3.2 L    (3.5-5.0)  g/dL


 


Globulin  3.6    (2.2-3.9)  gm/dL


 


Albumin/Globulin Ratio  0.9 L    (1.0-2.1)  


 


Blood Type     


 


Antibody Screen     














  11/23/17 11/23/17 11/23/17 Range/Units





  16:57 11:34 04:20 


 


WBC     (4.8-10.8)  K/uL


 


RBC     (3.80-5.20)  Mil/uL


 


Hgb     (12.0-16.0)  g/dL


 


Hct     (34.0-47.0)  %


 


MCV     (81.0-99.0)  fl


 


MCH     (27.0-31.0)  pg


 


MCHC     (33.0-37.0)  g/dL


 


RDW     (11.5-14.5)  %


 


Plt Count     (130-400)  K/uL


 


Neutrophils % (Manual)     (42-75)  %


 


Band Neutrophils %     (0-2)  %


 


Lymphocytes % (Manual)     (20-50)  %


 


Monocytes % (Manual)     (0-10)  %


 


Eosinophils % (Manual)     (0-7)  %


 


Toxic Granulation     


 


Platelet Estimate     (NORMAL)  


 


Hypochromasia (manual)     


 


Anisocytosis (manual)     


 


Target Cells     


 


Sodium     (132-148)  mmol/l


 


Potassium     (3.6-5.0)  MMOL/L


 


Chloride     ()  mmol/L


 


BUN     (7-17)  mg/dl


 


Creatinine     (0.7-1.2)  mg/dl


 


Est GFR (African Amer)     


 


Est GFR (Non-Af Amer)     


 


POC Glucose (mg/dL)  159 H  131 H   ()  mg/dL


 


Random Glucose     ()  mg/dL


 


Calcium     (8.4-10.2)  mg/dL


 


Total Bilirubin     (0.2-1.3)  mg/dl


 


AST     (14-36)  U/L


 


ALT     (9-52)  U/L


 


Alkaline Phosphatase     ()  U/L


 


Total Protein     (6.3-8.2)  G/DL


 


Albumin     (3.5-5.0)  g/dL


 


Globulin     (2.2-3.9)  gm/dL


 


Albumin/Globulin Ratio     (1.0-2.1)  


 


Blood Type    A POSITIVE  


 


Antibody Screen    Negative  














  11/23/17 Range/Units





  04:20 


 


WBC   (4.8-10.8)  K/uL


 


RBC   (3.80-5.20)  Mil/uL


 


Hgb   (12.0-16.0)  g/dL


 


Hct   (34.0-47.0)  %


 


MCV   (81.0-99.0)  fl


 


MCH   (27.0-31.0)  pg


 


MCHC   (33.0-37.0)  g/dL


 


RDW   (11.5-14.5)  %


 


Plt Count   (130-400)  K/uL


 


Neutrophils % (Manual)  88 H  (42-75)  %


 


Band Neutrophils %  1  (0-2)  %


 


Lymphocytes % (Manual)  5 L  (20-50)  %


 


Monocytes % (Manual)  5  (0-10)  %


 


Eosinophils % (Manual)  1  (0-7)  %


 


Toxic Granulation  Present  


 


Platelet Estimate  Normal  (NORMAL)  


 


Hypochromasia (manual)  Moderate  


 


Anisocytosis (manual)  Slight  


 


Target Cells  Slight  


 


Sodium   (132-148)  mmol/l


 


Potassium   (3.6-5.0)  MMOL/L


 


Chloride   ()  mmol/L


 


BUN   (7-17)  mg/dl


 


Creatinine   (0.7-1.2)  mg/dl


 


Est GFR (African Amer)   


 


Est GFR (Non-Af Amer)   


 


POC Glucose (mg/dL)   ()  mg/dL


 


Random Glucose   ()  mg/dL


 


Calcium   (8.4-10.2)  mg/dL


 


Total Bilirubin   (0.2-1.3)  mg/dl


 


AST   (14-36)  U/L


 


ALT   (9-52)  U/L


 


Alkaline Phosphatase   ()  U/L


 


Total Protein   (6.3-8.2)  G/DL


 


Albumin   (3.5-5.0)  g/dL


 


Globulin   (2.2-3.9)  gm/dL


 


Albumin/Globulin Ratio   (1.0-2.1)  


 


Blood Type   


 


Antibody Screen   








 Laboratory Results - last 24 hr











  11/23/17 11/23/17 11/23/17





  04:20 04:20 11:34


 


WBC   


 


RBC   


 


Hgb   


 


Hct   


 


MCV   


 


MCH   


 


MCHC   


 


RDW   


 


Plt Count   


 


Neutrophils % (Manual)  88 H  


 


Band Neutrophils %  1  


 


Lymphocytes % (Manual)  5 L  


 


Monocytes % (Manual)  5  


 


Eosinophils % (Manual)  1  


 


Toxic Granulation  Present  


 


Platelet Estimate  Normal  


 


Hypochromasia (manual)  Moderate  


 


Anisocytosis (manual)  Slight  


 


Target Cells  Slight  


 


Sodium   


 


Potassium   


 


Chloride   


 


BUN   


 


Creatinine   


 


Est GFR ( Amer)   


 


Est GFR (Non-Af Amer)   


 


POC Glucose (mg/dL)    131 H


 


Random Glucose   


 


Calcium   


 


Total Bilirubin   


 


AST   


 


ALT   


 


Alkaline Phosphatase   


 


Total Protein   


 


Albumin   


 


Globulin   


 


Albumin/Globulin Ratio   


 


Blood Type   A POSITIVE 


 


Antibody Screen   Negative 














  11/23/17 11/23/17 11/24/17





  16:57 21:14 05:40


 


WBC    18.1 H D


 


RBC    3.63 L


 


Hgb    8.8 L


 


Hct    30.2 L


 


MCV    83.2


 


MCH    24.4 L


 


MCHC    29.3 L


 


RDW    18.0 H


 


Plt Count    280


 


Neutrophils % (Manual)   


 


Band Neutrophils %   


 


Lymphocytes % (Manual)   


 


Monocytes % (Manual)   


 


Eosinophils % (Manual)   


 


Toxic Granulation   


 


Platelet Estimate   


 


Hypochromasia (manual)   


 


Anisocytosis (manual)   


 


Target Cells   


 


Sodium   


 


Potassium   


 


Chloride   


 


BUN   


 


Creatinine   


 


Est GFR ( Amer)   


 


Est GFR (Non-Af Amer)   


 


POC Glucose (mg/dL)  159 H  166 H 


 


Random Glucose   


 


Calcium   


 


Total Bilirubin   


 


AST   


 


ALT   


 


Alkaline Phosphatase   


 


Total Protein   


 


Albumin   


 


Globulin   


 


Albumin/Globulin Ratio   


 


Blood Type   


 


Antibody Screen   














  11/24/17





  05:40


 


WBC 


 


RBC 


 


Hgb 


 


Hct 


 


MCV 


 


MCH 


 


MCHC 


 


RDW 


 


Plt Count 


 


Neutrophils % (Manual) 


 


Band Neutrophils % 


 


Lymphocytes % (Manual) 


 


Monocytes % (Manual) 


 


Eosinophils % (Manual) 


 


Toxic Granulation 


 


Platelet Estimate 


 


Hypochromasia (manual) 


 


Anisocytosis (manual) 


 


Target Cells 


 


Sodium  159 H


 


Potassium  3.6


 


Chloride  109 H


 


BUN  62 H


 


Creatinine  1.6 H


 


Est GFR ( Amer)  37


 


Est GFR (Non-Af Amer)  30


 


POC Glucose (mg/dL) 


 


Random Glucose  159 H


 


Calcium  8.3 L


 


Total Bilirubin  0.4


 


AST  26


 


ALT  33


 


Alkaline Phosphatase  90


 


Total Protein  6.8


 


Albumin  3.2 L


 


Globulin  3.6


 


Albumin/Globulin Ratio  0.9 L


 


Blood Type 


 


Antibody Screen 











Fingerstick Blood Sugar Results: 156





Review of Systems





- Review of Systems


All systems: reviewed and no additional remarkable complaints except (as above)





Critical Care Progress Note





- Nutrition


Nutrition: 


 Nutrition











 Category Date Time Status


 


 Dysphagia/Modified Consistency Diet [DIET] Diets  11/24/17 Breakfast Active

## 2017-11-24 NOTE — RAD
HISTORY:

follow up  



COMPARISON:

Chest radiograph performed approximately 10 hours prior. 



FINDINGS:



LUNGS:

Bibasilar atelectasis versus infiltrate.  Pulmonary vascular 

congestion, stable



PLEURA:

Slight interval decrease in size of small to moderate right pleural 

effusion. Stable small left pleural effusion. No pneumothorax 

apparent.



CARDIOVASCULAR:

Normal.



OSSEOUS STRUCTURES:

No significant abnormalities.



VISUALIZED UPPER ABDOMEN:

Normal.



OTHER FINDINGS:

Stable right upper extremity midline catheter with tip in the 

subclavian vein.



IMPRESSION:

Stable pulmonary vascular congestion with slight interval decrease in 

size of small to moderate right pleural effusion.  Stable small left 

pleural effusion.

## 2017-11-25 RX ADMIN — INSULIN LISPRO SCH UNIT: 100 INJECTION, SOLUTION INTRAVENOUS; SUBCUTANEOUS at 12:00

## 2017-11-25 RX ADMIN — INSULIN LISPRO SCH UNIT: 100 INJECTION, SOLUTION INTRAVENOUS; SUBCUTANEOUS at 07:30

## 2017-11-25 RX ADMIN — ACETYLCYSTEINE SCH ML: 200 INHALANT RESPIRATORY (INHALATION) at 19:26

## 2017-11-25 RX ADMIN — FOSPHENYTOIN SODIUM SCH: 50 INJECTION, SOLUTION INTRAMUSCULAR; INTRAVENOUS at 17:44

## 2017-11-25 RX ADMIN — IPRATROPIUM BROMIDE AND ALBUTEROL SULFATE SCH ML: .5; 3 SOLUTION RESPIRATORY (INHALATION) at 04:54

## 2017-11-25 RX ADMIN — METHYLPREDNISOLONE SODIUM SUCCINATE SCH MG: 40 INJECTION, POWDER, FOR SOLUTION INTRAMUSCULAR; INTRAVENOUS at 09:58

## 2017-11-25 RX ADMIN — IPRATROPIUM BROMIDE AND ALBUTEROL SULFATE SCH ML: .5; 3 SOLUTION RESPIRATORY (INHALATION) at 15:36

## 2017-11-25 RX ADMIN — INSULIN LISPRO SCH UNIT: 100 INJECTION, SOLUTION INTRAVENOUS; SUBCUTANEOUS at 17:47

## 2017-11-25 RX ADMIN — IPRATROPIUM BROMIDE AND ALBUTEROL SULFATE SCH ML: .5; 3 SOLUTION RESPIRATORY (INHALATION) at 08:02

## 2017-11-25 RX ADMIN — METHYLPREDNISOLONE SODIUM SUCCINATE SCH MG: 40 INJECTION, POWDER, FOR SOLUTION INTRAMUSCULAR; INTRAVENOUS at 00:30

## 2017-11-25 RX ADMIN — ACETYLCYSTEINE SCH ML: 200 INHALANT RESPIRATORY (INHALATION) at 08:02

## 2017-11-25 RX ADMIN — INSULIN LISPRO SCH: 100 INJECTION, SOLUTION INTRAVENOUS; SUBCUTANEOUS at 23:03

## 2017-11-25 RX ADMIN — METHYLPREDNISOLONE SODIUM SUCCINATE SCH MG: 40 INJECTION, POWDER, FOR SOLUTION INTRAMUSCULAR; INTRAVENOUS at 17:48

## 2017-11-25 RX ADMIN — IPRATROPIUM BROMIDE AND ALBUTEROL SULFATE SCH ML: .5; 3 SOLUTION RESPIRATORY (INHALATION) at 23:33

## 2017-11-25 RX ADMIN — FOSPHENYTOIN SODIUM SCH MG: 50 INJECTION, SOLUTION INTRAMUSCULAR; INTRAVENOUS at 15:45

## 2017-11-25 RX ADMIN — INSULIN LISPRO SCH UNIT: 100 INJECTION, SOLUTION INTRAVENOUS; SUBCUTANEOUS at 00:30

## 2017-11-25 RX ADMIN — FOSPHENYTOIN SODIUM SCH MG: 50 INJECTION, SOLUTION INTRAMUSCULAR; INTRAVENOUS at 00:33

## 2017-11-25 RX ADMIN — IPRATROPIUM BROMIDE AND ALBUTEROL SULFATE SCH ML: .5; 3 SOLUTION RESPIRATORY (INHALATION) at 19:26

## 2017-11-25 RX ADMIN — FOSPHENYTOIN SODIUM SCH: 50 INJECTION, SOLUTION INTRAMUSCULAR; INTRAVENOUS at 10:00

## 2017-11-25 RX ADMIN — IPRATROPIUM BROMIDE AND ALBUTEROL SULFATE SCH ML: .5; 3 SOLUTION RESPIRATORY (INHALATION) at 11:24

## 2017-11-25 NOTE — CP.PCM.PN
Subjective





- Subjective


Subjective: 





Acute Resp Failure: Extubated and tolerating it well. 


DMII


HTN


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


OA


Sz: combination of previous alkalemia from a resp alkalosis and possible brain 

trauma. 


Anemia.


ARF: 


Right Lung Atelectasis: Resolved with Mucomyst and chest PT. 





S/ Patient is able to follow commands. No pain. Breathing is OK as per patient. 





O/ VSS Afebrile.


Head: Ecchymosis of orbits as well as other soft tissue swelling of face. Much 

improved.


Heart RRR, Ns1S2 Neg m


Lungs: Some Rhonci. Bilateral air entry. 


Abdo, Soft, not Pos BS


Ext: No c,c, Edema.


Neuro: As aforementioned. 





Labs: as below


 


Cont Nasal Canula O2, and monitor serial ABG's. Avoid alkalemia and hyperoxia. 

Cont Chest PT and Mucomyst.. S & S assessment. (Passed.) Cont thickit with 

foods. Repeat x-ray is showing resolution.of atelectasis. If it worsens again, 

will do bed side bronch. 


 Neurosurgery f/u. 


Cont anti Sz Rx and Neuro recommendations. 


Bg monitoring with SSI. 


Monitor Hgb. Transfuse PRN. 


Cont VERNA Q 4 hours. 


Cont anit DM/HTN rx. 


Cont analgesics for post op pain. Keep dose to a minimum. 


Cont care as per ICU team. 


Monitor Renal Fx and consult with Nephrology on call if Cr. rises and/or UO 

drops.


Start PT. 


PUD & DVT Px. 


Please call my cell phone for all communications regarding my patient: [phone]





Objective





- Vital Signs/Intake and Output


Vital Signs (last 24 hours): 


 











Temp Pulse Resp BP Pulse Ox


 


 98.3 F   64   22   116/53 L  100 


 


 11/25/17 17:00  11/25/17 17:00  11/25/17 17:00  11/25/17 17:00  11/25/17 17:00








Intake and Output: 


 











 11/25/17 11/26/17





 18:59 06:59


 


Intake Total 1350 


 


Output Total 450 


 


Balance 900 














- Medications


Medications: 


 Current Medications





Acetaminophen (Tylenol 650mg/20.3ml Solution Ud)  650 mg PO Q4 PRN


   PRN Reason: fever


   Last Admin: 11/24/17 16:19 Dose:  650 mg


Acetylcysteine (Acetylcysteine 20%)  2 ml INH RBID [facility]


   Last Admin: 11/25/17 19:26 Dose:  2 ml


Albuterol/Ipratropium (Duoneb 3 Mg/0.5 Mg (3 Ml) Ud)  3 ml INH RQ4 [facility]


   Last Admin: 11/25/17 19:26 Dose:  3 ml


Amlodipine Besylate (Norvasc)  2.5 mg PO DAILY [facility]


   Last Admin: 11/25/17 09:56 Dose:  2.5 mg


Fosphenytoin Sodium (Cerebyx)  100 mg IV Q8 [facility]


   Last Admin: 11/25/17 17:44 Dose:  Not Given


Furosemide (Lasix)  40 mg IV DAILY [facility]


   Last Admin: 11/23/17 08:32 Dose:  40 mg


Hydromorphone HCl (Dilaudid)  0.5 mg IVP Q4H PRN


   PRN Reason: Agitation


   Last Admin: 11/23/17 10:49 Dose:  0.5 mg


Hydromorphone HCl (Dilaudid)  0.5 mg IVP Q4 PRN


   PRN Reason: Headache


   Stop: 11/26/17 04:40


   Last Admin: 11/24/17 05:14 Dose:  0.5 mg


Ceftriaxone Sodium 2 gm/ (Sodium Chloride)  100 mls @ 100 mls/hr IVPB DAILY [facility]


   PRN Reason: Protocol


   Last Admin: 11/25/17 09:57 Dose:  100 mls/hr


Azithromycin 500 mg/ Sodium (Chloride)  250 mls @ 250 mls/hr IVPB DAILY [facility]


   PRN Reason: Protocol


   Last Admin: 11/25/17 09:59 Dose:  250 mls/hr


Insulin Human Lispro (Humalog)  0 units SC Q6H [facility]


   PRN Reason: Protocol


   Last Admin: 11/25/17 17:47 Dose:  3 unit


Losartan Potassium (Cozaar)  50 mg PO DAILY [facility]


   Last Admin: 11/25/17 15:44 Dose:  50 mg


Methylprednisolone (Solu-Medrol)  40 mg IVP Q8 [facility]


   Last Admin: 11/25/17 17:48 Dose:  40 mg


Montelukast Sodium (Singulair)  10 mg PO HS [facility]


   Last Admin: 11/24/17 22:14 Dose:  10 mg











- Labs


Labs: 


 





 11/24/17 05:40 





 11/24/17 05:40 





 











PT  12.6 Seconds (9.8-13.1)   11/17/17  04:47    


 


INR  1.1  (0.9-1.2)   11/17/17  04:47    


 


APTT  21.5 Seconds (25.6-37.1)  L  11/17/17  04:47

## 2017-11-26 RX ADMIN — INSULIN LISPRO SCH UNIT: 100 INJECTION, SOLUTION INTRAVENOUS; SUBCUTANEOUS at 08:26

## 2017-11-26 RX ADMIN — INSULIN LISPRO SCH: 100 INJECTION, SOLUTION INTRAVENOUS; SUBCUTANEOUS at 23:18

## 2017-11-26 RX ADMIN — FOSPHENYTOIN SODIUM SCH MG: 50 INJECTION, SOLUTION INTRAMUSCULAR; INTRAVENOUS at 16:53

## 2017-11-26 RX ADMIN — IPRATROPIUM BROMIDE AND ALBUTEROL SULFATE SCH ML: .5; 3 SOLUTION RESPIRATORY (INHALATION) at 07:38

## 2017-11-26 RX ADMIN — ACETYLCYSTEINE SCH ML: 200 INHALANT RESPIRATORY (INHALATION) at 07:38

## 2017-11-26 RX ADMIN — METHYLPREDNISOLONE SODIUM SUCCINATE SCH MG: 40 INJECTION, POWDER, FOR SOLUTION INTRAMUSCULAR; INTRAVENOUS at 08:28

## 2017-11-26 RX ADMIN — IPRATROPIUM BROMIDE AND ALBUTEROL SULFATE SCH ML: .5; 3 SOLUTION RESPIRATORY (INHALATION) at 19:44

## 2017-11-26 RX ADMIN — IPRATROPIUM BROMIDE AND ALBUTEROL SULFATE SCH ML: .5; 3 SOLUTION RESPIRATORY (INHALATION) at 11:12

## 2017-11-26 RX ADMIN — FOSPHENYTOIN SODIUM SCH MG: 50 INJECTION, SOLUTION INTRAMUSCULAR; INTRAVENOUS at 00:55

## 2017-11-26 RX ADMIN — IPRATROPIUM BROMIDE AND ALBUTEROL SULFATE SCH ML: .5; 3 SOLUTION RESPIRATORY (INHALATION) at 04:06

## 2017-11-26 RX ADMIN — INSULIN LISPRO SCH UNIT: 100 INJECTION, SOLUTION INTRAVENOUS; SUBCUTANEOUS at 17:07

## 2017-11-26 RX ADMIN — FOSPHENYTOIN SODIUM SCH MG: 50 INJECTION, SOLUTION INTRAMUSCULAR; INTRAVENOUS at 08:23

## 2017-11-26 RX ADMIN — INSULIN LISPRO SCH UNIT: 100 INJECTION, SOLUTION INTRAVENOUS; SUBCUTANEOUS at 12:02

## 2017-11-26 RX ADMIN — METHYLPREDNISOLONE SODIUM SUCCINATE SCH MG: 40 INJECTION, POWDER, FOR SOLUTION INTRAMUSCULAR; INTRAVENOUS at 00:55

## 2017-11-26 RX ADMIN — IPRATROPIUM BROMIDE AND ALBUTEROL SULFATE SCH ML: .5; 3 SOLUTION RESPIRATORY (INHALATION) at 15:51

## 2017-11-26 RX ADMIN — METHYLPREDNISOLONE SODIUM SUCCINATE SCH MG: 40 INJECTION, POWDER, FOR SOLUTION INTRAMUSCULAR; INTRAVENOUS at 16:43

## 2017-11-26 RX ADMIN — ACETYLCYSTEINE SCH ML: 200 INHALANT RESPIRATORY (INHALATION) at 19:44

## 2017-11-26 NOTE — CP.PCM.PN
Subjective





- Subjective


Subjective: 





Acute Resp Failure: Extubated and tolerating it well. 


DMII


HTN


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


OA


Sz: combination of previous alkalemia from a resp alkalosis and possible brain 

trauma. 


Anemia.


ARF: 


Right Lung Atelectasis: Resolved with Mucomyst and chest PT. 





S/ Patient is able to follow commands. No pain. Breathing is OK as per patient. 

Was able to sit on chair for several hours today. Transferred to [facility] 4 N. 





O/ VSS Afebrile.


Head: Neg Adeno. 


Heart RRR, Ns1S2 Neg m


Lungs: Some Rhonci. Bilateral air entry. 


Abdo, Soft, not Pos BS


Ext: No c,c, Edema.


Neuro: As aforementioned. 





Labs: as below


 


Cont Nasal Canula O2, and monitor serial ABG's. Avoid alkalemia and hyperoxia. 

Cont Chest PT and Mucomyst.. S & S assessment. (Passed.) Cont thickit with 

foods. Repeat x-ray is showing resolution.of atelectasis. 


 Neurosurgery f/u. 


Cont anti Sz Rx and Neuro recommendations. 


Bg monitoring with SSI. 


Monitor Hgb. Transfuse PRN. 


Cont VERNA Q 4 hours. 


Cont anit DM/HTN rx. 


Cont analgesics for post op pain. Keep dose to a minimum. 


Cont care as per ICU team. 


Monitor Renal Fx and consult with Nephrology on call if Cr. rises and/or UO 

drops.


Aggressive PT/OT.


PUD & DVT Px. 


Please call my cell phone for all communications regarding my patient: [phone]





Objective





- Vital Signs/Intake and Output


Vital Signs (last 24 hours): 


 











Temp Pulse Resp BP Pulse Ox


 


 97.6 F   52 L  20   157/77 H  100 


 


 11/26/17 19:54  11/26/17 19:54  11/26/17 19:54  11/26/17 19:54  11/26/17 19:54








Intake and Output: 


 











 11/26/17 11/27/17





 18:59 06:59


 


Intake Total 400 


 


Balance 400 














- Medications


Medications: 


 Current Medications





Acetaminophen (Tylenol 650mg/20.3ml Solution Ud)  650 mg PO Q4 PRN


   PRN Reason: fever


   Last Admin: 11/24/17 16:19 Dose:  650 mg


Acetylcysteine (Acetylcysteine 20%)  2 ml INH RBID [facility]


   Last Admin: 11/26/17 19:44 Dose:  2 ml


Albuterol/Ipratropium (Duoneb 3 Mg/0.5 Mg (3 Ml) Ud)  3 ml INH RQ4 [facility]


   Last Admin: 11/26/17 19:44 Dose:  3 ml


Amlodipine Besylate (Norvasc)  2.5 mg PO DAILY [facility]


   Last Admin: 11/26/17 08:27 Dose:  2.5 mg


Fosphenytoin Sodium (Cerebyx)  100 mg IV Q8 [facility]


   Last Admin: 11/26/17 16:53 Dose:  100 mg


Furosemide (Lasix)  40 mg IV DAILY [facility]


   Last Admin: 11/23/17 08:32 Dose:  40 mg


Insulin Human Lispro (Humalog)  0 units SC Q6H [facility]


   PRN Reason: Protocol


   Last Admin: 11/26/17 17:07 Dose:  1 unit


Losartan Potassium (Cozaar)  50 mg PO DAILY [facility]


   Last Admin: 11/26/17 08:25 Dose:  50 mg


Methylprednisolone (Solu-Medrol)  40 mg IVP Q8 [facility]


   Last Admin: 11/26/17 16:43 Dose:  40 mg


Montelukast Sodium (Singulair)  10 mg PO HS [facility]


   Last Admin: 11/26/17 21:55 Dose:  10 mg











- Labs


Labs: 


 





 11/24/17 05:40 





 11/24/17 05:40 





 











PT  12.6 Seconds (9.8-13.1)   11/17/17  04:47    


 


INR  1.1  (0.9-1.2)   11/17/17  04:47    


 


APTT  21.5 Seconds (25.6-37.1)  L  11/17/17  04:47

## 2017-11-27 LAB
BUN SERPL-MCNC: 39 MG/DL (ref 7–17)
CALCIUM SERPL-MCNC: 8.2 MG/DL (ref 8.4–10.2)
CHLORIDE SERPL-SCNC: 100 MMOL/L (ref 98–107)
CO2 SERPL-SCNC: 39 MMOL/L (ref 22–30)
ERYTHROCYTE [DISTWIDTH] IN BLOOD BY AUTOMATED COUNT: 18.1 % (ref 11.5–14.5)
GLUCOSE SERPL-MCNC: 150 MG/DL (ref 65–105)
HCT VFR BLD CALC: 26.2 % (ref 34–47)
MCH RBC QN AUTO: 24.9 PG (ref 27–31)
MCHC RBC AUTO-ENTMCNC: 29.9 G/DL (ref 33–37)
MCV RBC AUTO: 83.1 FL (ref 81–99)
PLATELET # BLD: 272 K/UL (ref 130–400)
POTASSIUM SERPL-SCNC: 4 MMOL/L (ref 3.6–5)
SODIUM SERPL-SCNC: 144 MMOL/L (ref 132–148)
WBC # BLD AUTO: 9 K/UL (ref 4.8–10.8)

## 2017-11-27 RX ADMIN — INSULIN LISPRO SCH UNIT: 100 INJECTION, SOLUTION INTRAVENOUS; SUBCUTANEOUS at 11:30

## 2017-11-27 RX ADMIN — FOSPHENYTOIN SODIUM SCH MG: 50 INJECTION, SOLUTION INTRAMUSCULAR; INTRAVENOUS at 09:03

## 2017-11-27 RX ADMIN — FOSPHENYTOIN SODIUM SCH: 50 INJECTION, SOLUTION INTRAMUSCULAR; INTRAVENOUS at 17:10

## 2017-11-27 RX ADMIN — ACETYLCYSTEINE SCH ML: 200 INHALANT RESPIRATORY (INHALATION) at 19:24

## 2017-11-27 RX ADMIN — INSULIN LISPRO SCH UNIT: 100 INJECTION, SOLUTION INTRAVENOUS; SUBCUTANEOUS at 17:16

## 2017-11-27 RX ADMIN — METHYLPREDNISOLONE SODIUM SUCCINATE SCH MG: 40 INJECTION, POWDER, FOR SOLUTION INTRAMUSCULAR; INTRAVENOUS at 09:07

## 2017-11-27 RX ADMIN — INSULIN LISPRO SCH: 100 INJECTION, SOLUTION INTRAVENOUS; SUBCUTANEOUS at 23:44

## 2017-11-27 RX ADMIN — METHYLPREDNISOLONE SODIUM SUCCINATE SCH: 40 INJECTION, POWDER, FOR SOLUTION INTRAMUSCULAR; INTRAVENOUS at 17:11

## 2017-11-27 RX ADMIN — METHYLPREDNISOLONE SODIUM SUCCINATE SCH MG: 40 INJECTION, POWDER, FOR SOLUTION INTRAMUSCULAR; INTRAVENOUS at 00:18

## 2017-11-27 RX ADMIN — IPRATROPIUM BROMIDE AND ALBUTEROL SULFATE SCH ML: .5; 3 SOLUTION RESPIRATORY (INHALATION) at 23:23

## 2017-11-27 RX ADMIN — IPRATROPIUM BROMIDE AND ALBUTEROL SULFATE SCH ML: .5; 3 SOLUTION RESPIRATORY (INHALATION) at 00:20

## 2017-11-27 RX ADMIN — INSULIN LISPRO SCH UNIT: 100 INJECTION, SOLUTION INTRAVENOUS; SUBCUTANEOUS at 06:35

## 2017-11-27 RX ADMIN — IPRATROPIUM BROMIDE AND ALBUTEROL SULFATE SCH ML: .5; 3 SOLUTION RESPIRATORY (INHALATION) at 07:19

## 2017-11-27 RX ADMIN — IPRATROPIUM BROMIDE AND ALBUTEROL SULFATE SCH ML: .5; 3 SOLUTION RESPIRATORY (INHALATION) at 19:24

## 2017-11-27 RX ADMIN — FOSPHENYTOIN SODIUM SCH MG: 50 INJECTION, SOLUTION INTRAMUSCULAR; INTRAVENOUS at 00:18

## 2017-11-27 RX ADMIN — ACETYLCYSTEINE SCH ML: 200 INHALANT RESPIRATORY (INHALATION) at 07:19

## 2017-11-27 RX ADMIN — INSULIN LISPRO SCH UNIT: 100 INJECTION, SOLUTION INTRAVENOUS; SUBCUTANEOUS at 17:15

## 2017-11-27 RX ADMIN — IPRATROPIUM BROMIDE AND ALBUTEROL SULFATE SCH ML: .5; 3 SOLUTION RESPIRATORY (INHALATION) at 11:15

## 2017-11-27 RX ADMIN — IPRATROPIUM BROMIDE AND ALBUTEROL SULFATE SCH ML: .5; 3 SOLUTION RESPIRATORY (INHALATION) at 04:56

## 2017-11-27 RX ADMIN — IPRATROPIUM BROMIDE AND ALBUTEROL SULFATE SCH ML: .5; 3 SOLUTION RESPIRATORY (INHALATION) at 15:44

## 2017-11-27 NOTE — CP.PCM.PN
Subjective





- Subjective


Subjective: 





Acute Resp Failure: Extubated and tolerating it well. 


DMII


HTN


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


OA


Sz: combination of previous alkalemia from a resp alkalosis and possible brain 

trauma. 


Anemia.


ARF: 


Right Lung Atelectasis: Resolved with Mucomyst and chest PT. 





S/ Patient is able to follow commands. No pain. Breathing is OK as per patient. 

Was able to sit on chair for several hours today. Transferred to [facility] 4 N. 





O/ VSS Afebrile.


Head: Neg Adeno. 


Heart RRR, Ns1S2 Neg m


Lungs: Some Rhonci. Bilateral air entry. 


Abdo, Soft, not Pos BS


Ext: No c,c, Edema.


Neuro: As aforementioned. 





Labs: as below


 


Cont Nasal Canula O2, and monitor serial ABG's. Avoid alkalemia and hyperoxia. 

Cont Chest PT. Cont thickit with foods. Repeat x-ray in am. 


 Neurosurgery f/u. 


Cont anti Sz Rx and Neuro recommendations. 


Bg monitoring with SSI. 


Monitor Hgb. Transfuse PRN. 


Cont VERNA Q 4 hours. 


Cont anit DM/HTN rx. 


Cont analgesics for post op pain. Keep dose to a minimum. 


Cont care as per ICU team. 


Monitor Renal Fx and consult with Nephrology on call if Cr. rises and/or UO 

drops.


Aggressive PT/OT.


PUD & DVT Px. 


Please call my cell phone for all communications regarding my patient: [phone]








Objective





- Vital Signs/Intake and Output


Vital Signs (last 24 hours): 


 











Temp Pulse Resp BP Pulse Ox


 


 98.8 F   71   20   150/73   98 


 


 11/27/17 19:34  11/27/17 19:34  11/27/17 19:34  11/27/17 19:34  11/27/17 19:34








Intake and Output: 


 











 11/27/17 11/28/17





 18:59 06:59


 


Intake Total 1575 


 


Output Total 900 


 


Balance 675 














- Medications


Medications: 


 Current Medications





Acetaminophen (Tylenol 650mg/20.3ml Solution Ud)  650 mg PO Q4 PRN


   PRN Reason: fever


   Last Admin: 11/24/17 16:19 Dose:  650 mg


Acetylcysteine (Acetylcysteine 20%)  2 ml INH RBID [facility]


   Last Admin: 11/27/17 19:24 Dose:  2 ml


Albuterol/Ipratropium (Duoneb 3 Mg/0.5 Mg (3 Ml) Ud)  3 ml INH RQ4 [facility]


   Last Admin: 11/27/17 19:24 Dose:  3 ml


Amlodipine Besylate (Norvasc)  2.5 mg PO DAILY [facility]


   Last Admin: 11/27/17 09:06 Dose:  2.5 mg


Fosphenytoin Sodium (Cerebyx)  100 mg IV Q8 [facility]


   Last Admin: 11/27/17 17:10 Dose:  Not Given


Furosemide (Lasix)  40 mg IV DAILY [facility]


   Last Admin: 11/23/17 08:32 Dose:  40 mg


Insulin Human Lispro (Humalog)  0 units SC Q6H [facility]


   PRN Reason: Protocol


   Last Admin: 11/27/17 17:16 Dose:  2 unit


Losartan Potassium (Cozaar)  50 mg PO DAILY [facility]


   Last Admin: 11/27/17 09:05 Dose:  50 mg


Methylprednisolone (Solu-Medrol)  40 mg IVP Q8 [facility]


   Last Admin: 11/27/17 17:11 Dose:  Not Given


Montelukast Sodium (Singulair)  10 mg PO HS [facility]


   Last Admin: 11/26/17 21:55 Dose:  10 mg











- Labs


Labs: 


 





 11/27/17 10:21 





 11/27/17 10:21 





 











PT  12.6 Seconds (9.8-13.1)   11/17/17  04:47    


 


INR  1.1  (0.9-1.2)   11/17/17  04:47    


 


APTT  21.5 Seconds (25.6-37.1)  L  11/17/17  04:47

## 2017-11-28 VITALS — RESPIRATION RATE: 18 BRPM

## 2017-11-28 LAB
BUN SERPL-MCNC: 36 MG/DL (ref 7–17)
CALCIUM SERPL-MCNC: 8.3 MG/DL (ref 8.4–10.2)
CHLORIDE SERPL-SCNC: 102 MMOL/L (ref 98–107)
CO2 SERPL-SCNC: 42 MMOL/L (ref 22–30)
ERYTHROCYTE [DISTWIDTH] IN BLOOD BY AUTOMATED COUNT: 17.1 % (ref 11.5–14.5)
GLUCOSE SERPL-MCNC: 139 MG/DL (ref 65–105)
HCT VFR BLD CALC: 34.8 % (ref 34–47)
MCH RBC QN AUTO: 26.4 PG (ref 27–31)
MCHC RBC AUTO-ENTMCNC: 31.1 G/DL (ref 33–37)
MCV RBC AUTO: 84.9 FL (ref 81–99)
PLATELET # BLD: 285 K/UL (ref 130–400)
POTASSIUM SERPL-SCNC: 4.7 MMOL/L (ref 3.6–5)
SODIUM SERPL-SCNC: 146 MMOL/L (ref 132–148)
WBC # BLD AUTO: 9.5 K/UL (ref 4.8–10.8)

## 2017-11-28 RX ADMIN — IPRATROPIUM BROMIDE AND ALBUTEROL SULFATE SCH ML: .5; 3 SOLUTION RESPIRATORY (INHALATION) at 15:47

## 2017-11-28 RX ADMIN — METHYLPREDNISOLONE SODIUM SUCCINATE SCH MG: 40 INJECTION, POWDER, FOR SOLUTION INTRAMUSCULAR; INTRAVENOUS at 09:19

## 2017-11-28 RX ADMIN — FOSPHENYTOIN SODIUM SCH MG: 50 INJECTION, SOLUTION INTRAMUSCULAR; INTRAVENOUS at 17:22

## 2017-11-28 RX ADMIN — FOSPHENYTOIN SODIUM SCH MG: 50 INJECTION, SOLUTION INTRAMUSCULAR; INTRAVENOUS at 17:23

## 2017-11-28 RX ADMIN — INSULIN LISPRO SCH UNIT: 100 INJECTION, SOLUTION INTRAVENOUS; SUBCUTANEOUS at 11:30

## 2017-11-28 RX ADMIN — INSULIN LISPRO SCH: 100 INJECTION, SOLUTION INTRAVENOUS; SUBCUTANEOUS at 05:43

## 2017-11-28 RX ADMIN — ACETYLCYSTEINE SCH ML: 200 INHALANT RESPIRATORY (INHALATION) at 07:45

## 2017-11-28 RX ADMIN — METHYLPREDNISOLONE SODIUM SUCCINATE SCH MG: 40 INJECTION, POWDER, FOR SOLUTION INTRAMUSCULAR; INTRAVENOUS at 17:22

## 2017-11-28 RX ADMIN — METHYLPREDNISOLONE SODIUM SUCCINATE SCH MG: 40 INJECTION, POWDER, FOR SOLUTION INTRAMUSCULAR; INTRAVENOUS at 00:30

## 2017-11-28 RX ADMIN — FOSPHENYTOIN SODIUM SCH MG: 50 INJECTION, SOLUTION INTRAMUSCULAR; INTRAVENOUS at 00:30

## 2017-11-28 RX ADMIN — IPRATROPIUM BROMIDE AND ALBUTEROL SULFATE SCH ML: .5; 3 SOLUTION RESPIRATORY (INHALATION) at 07:44

## 2017-11-28 RX ADMIN — IPRATROPIUM BROMIDE AND ALBUTEROL SULFATE SCH ML: .5; 3 SOLUTION RESPIRATORY (INHALATION) at 23:58

## 2017-11-28 RX ADMIN — IPRATROPIUM BROMIDE AND ALBUTEROL SULFATE SCH ML: .5; 3 SOLUTION RESPIRATORY (INHALATION) at 11:13

## 2017-11-28 RX ADMIN — IPRATROPIUM BROMIDE AND ALBUTEROL SULFATE SCH ML: .5; 3 SOLUTION RESPIRATORY (INHALATION) at 19:30

## 2017-11-28 RX ADMIN — IPRATROPIUM BROMIDE AND ALBUTEROL SULFATE SCH ML: .5; 3 SOLUTION RESPIRATORY (INHALATION) at 05:23

## 2017-11-28 RX ADMIN — ACETYLCYSTEINE SCH ML: 200 INHALANT RESPIRATORY (INHALATION) at 19:30

## 2017-11-28 RX ADMIN — INSULIN LISPRO SCH UNIT: 100 INJECTION, SOLUTION INTRAVENOUS; SUBCUTANEOUS at 17:30

## 2017-11-28 RX ADMIN — INSULIN LISPRO SCH UNIT: 100 INJECTION, SOLUTION INTRAVENOUS; SUBCUTANEOUS at 11:00

## 2017-11-28 NOTE — CP.PCM.PCO
Assessment & Plan





- Assessment and Plan (Free Text)


Assessment: 





88 yr old F with pmhx htn, DMII, OA, anemia admitted with Acute Resp Failure s/

p extubation, Odontoid Fx s/p fixation


pt. doing well today sitting oob to chair with daughter  at bedside


chest xray repeated- improved


anemia, improved s/p 2 u prbc


pt. denies sob, cp, vss, afebrile


Surgical site post c spine clean dry intact, lawson intact, [place] J collar in 

place


pt. cleared for discharge to [facility] today by  and 


Cerebyx switched to Dilantin 100 mg po q8 as per  recommendation


Lasix dose decreased to cont. 20 mg po daily with monitoring bmp


cont. medrol dose taper ( see reconciliation)


pt. will be admitted under  in [place] (  notified)


pt. needs f/u EEG outpatient


f/u with [facility]  outpatient


cont. J collar


D/c emelina

## 2017-11-28 NOTE — RAD
HISTORY:

f/u effusion  



COMPARISON:

Portable chest 01/24/2017. 



FINDINGS:

Right PICC unchanged in position. 



LUNGS:

Underlying infiltrates are not excluded both lung bases.



PLEURA:

Moderate right and mild left pleural effusions are unchanged.



CARDIOVASCULAR:

Pulmonary venous congestion pattern is diminished significantly with 

mild residual noted at this time.



OSSEOUS STRUCTURES:

No significant abnormalities.



VISUALIZED UPPER ABDOMEN:

Normal.



OTHER FINDINGS:

None.



IMPRESSION:

Improved CHF with significant residual bilateral pleural effusions 

and pulmonary venous congestion. Underlying infiltrates at the bases 

are not excluded.

## 2017-11-29 VITALS
SYSTOLIC BLOOD PRESSURE: 161 MMHG | OXYGEN SATURATION: 100 % | TEMPERATURE: 97.9 F | HEART RATE: 61 BPM | DIASTOLIC BLOOD PRESSURE: 73 MMHG

## 2017-11-29 RX ADMIN — FOSPHENYTOIN SODIUM SCH MG: 50 INJECTION, SOLUTION INTRAMUSCULAR; INTRAVENOUS at 09:47

## 2017-11-29 RX ADMIN — ACETYLCYSTEINE SCH ML: 200 INHALANT RESPIRATORY (INHALATION) at 07:59

## 2017-11-29 RX ADMIN — METHYLPREDNISOLONE SODIUM SUCCINATE SCH MG: 40 INJECTION, POWDER, FOR SOLUTION INTRAMUSCULAR; INTRAVENOUS at 01:59

## 2017-11-29 RX ADMIN — IPRATROPIUM BROMIDE AND ALBUTEROL SULFATE SCH ML: .5; 3 SOLUTION RESPIRATORY (INHALATION) at 05:27

## 2017-11-29 RX ADMIN — IPRATROPIUM BROMIDE AND ALBUTEROL SULFATE SCH ML: .5; 3 SOLUTION RESPIRATORY (INHALATION) at 11:10

## 2017-11-29 RX ADMIN — METHYLPREDNISOLONE SODIUM SUCCINATE SCH MG: 40 INJECTION, POWDER, FOR SOLUTION INTRAMUSCULAR; INTRAVENOUS at 09:08

## 2017-11-29 RX ADMIN — INSULIN LISPRO SCH UNIT: 100 INJECTION, SOLUTION INTRAVENOUS; SUBCUTANEOUS at 07:36

## 2017-11-29 RX ADMIN — IPRATROPIUM BROMIDE AND ALBUTEROL SULFATE SCH ML: .5; 3 SOLUTION RESPIRATORY (INHALATION) at 07:59

## 2017-11-29 RX ADMIN — FOSPHENYTOIN SODIUM SCH MG: 50 INJECTION, SOLUTION INTRAMUSCULAR; INTRAVENOUS at 01:59

## 2017-11-30 NOTE — PQF GENQUE
Dr. Pretty pt underwent posterior spinal fusion. Did pt have a posterior 
approach anterior column or posterior approach  posterior column fusion? 



***** This form is a permanent part of the medical record*****





          

Clarification of your documentation is requested to better reflect the severity 
of illness and intensity of treatment of your patient.  



Indicators present      



[]  Specify: []

         



[]  Specify: []         

 



[]  Specify: []         





[]  Specify: []         





Location in the medical record that reflects the above clinical findings:  []

 



Treatment Provided:  []

  

PHYSICIAN'S RESPONSE





Based on your medical judgment of the clinical indicators outlined above please 
clarify the following:



[]  Practitioner response 



[]  If unable to determine, please check the box, sign and date.  





Present On Admission (POA) Indicator:





[] Present at the time of admission 



[] Not present at the time of admission



[] Clinically Undetermined









In responding to this query, please exercise your independent professional 
judgment.  The fact that a question is asked does not imply that any particular 
answer is desired or expected.  Thank you for your clarification on this 
documentation.



If you have any questions please call:[ ]

* Thank you,

   [ ]Katia Bentley

   HIM Coder
[place]

## 2018-02-24 ENCOUNTER — HOSPITAL ENCOUNTER (EMERGENCY)
Dept: HOSPITAL 14 - H.ER | Age: 83
Discharge: HOME | End: 2018-02-24
Payer: MEDICARE

## 2018-02-24 VITALS — HEART RATE: 74 BPM | RESPIRATION RATE: 16 BRPM | DIASTOLIC BLOOD PRESSURE: 74 MMHG | SYSTOLIC BLOOD PRESSURE: 143 MMHG

## 2018-02-24 VITALS — OXYGEN SATURATION: 100 %

## 2018-02-24 VITALS — TEMPERATURE: 97 F

## 2018-02-24 DIAGNOSIS — Z79.84: ICD-10-CM

## 2018-02-24 DIAGNOSIS — I49.3: ICD-10-CM

## 2018-02-24 DIAGNOSIS — I10: ICD-10-CM

## 2018-02-24 DIAGNOSIS — J44.9: ICD-10-CM

## 2018-02-24 DIAGNOSIS — G40.909: Primary | ICD-10-CM

## 2018-02-24 DIAGNOSIS — E11.9: ICD-10-CM

## 2018-02-24 LAB
ALBUMIN SERPL-MCNC: 3.8 G/DL (ref 3.5–5)
ALBUMIN/GLOB SERPL: 1 {RATIO} (ref 1–2.1)
ALT SERPL-CCNC: 73 U/L (ref 9–52)
AST SERPL-CCNC: 65 U/L (ref 14–36)
BASOPHILS # BLD AUTO: 0 K/UL (ref 0–0.2)
BASOPHILS NFR BLD: 0.4 % (ref 0–2)
BNP SERPL-MCNC: 589 PG/ML (ref 0–900)
BUN SERPL-MCNC: 29 MG/DL (ref 7–17)
CALCIUM SERPL-MCNC: 8.9 MG/DL (ref 8.4–10.2)
EOSINOPHIL # BLD AUTO: 0.1 K/UL (ref 0–0.7)
EOSINOPHIL NFR BLD: 1.7 % (ref 0–4)
ERYTHROCYTE [DISTWIDTH] IN BLOOD BY AUTOMATED COUNT: 15.4 % (ref 11.5–14.5)
GFR NON-AFRICAN AMERICAN: 47
HGB BLD-MCNC: 10.9 G/DL (ref 12–16)
LYMPHOCYTES # BLD AUTO: 1.6 K/UL (ref 1–4.3)
LYMPHOCYTES NFR BLD AUTO: 25 % (ref 20–40)
MAGNESIUM SERPL-MCNC: 2.1 MG/DL (ref 1.6–2.3)
MCH RBC QN AUTO: 30.1 PG (ref 27–31)
MCHC RBC AUTO-ENTMCNC: 32.1 G/DL (ref 33–37)
MCV RBC AUTO: 93.7 FL (ref 81–99)
MONOCYTES # BLD: 0.5 K/UL (ref 0–0.8)
MONOCYTES NFR BLD: 8.2 % (ref 0–10)
NEUTROPHILS # BLD: 4 K/UL (ref 1.8–7)
NEUTROPHILS NFR BLD AUTO: 64.7 % (ref 50–75)
NRBC BLD AUTO-RTO: 0.1 % (ref 0–0)
PLATELET # BLD: 186 K/UL (ref 130–400)
PMV BLD AUTO: 9.1 FL (ref 7.2–11.7)
RBC # BLD AUTO: 3.61 MIL/UL (ref 3.8–5.2)
WBC # BLD AUTO: 6.2 K/UL (ref 4.8–10.8)

## 2018-02-24 PROCEDURE — 85025 COMPLETE CBC W/AUTO DIFF WBC: CPT

## 2018-02-24 PROCEDURE — 96365 THER/PROPH/DIAG IV INF INIT: CPT

## 2018-02-24 PROCEDURE — 99284 EMERGENCY DEPT VISIT MOD MDM: CPT

## 2018-02-24 PROCEDURE — 82948 REAGENT STRIP/BLOOD GLUCOSE: CPT

## 2018-02-24 PROCEDURE — 70450 CT HEAD/BRAIN W/O DYE: CPT

## 2018-02-24 PROCEDURE — 71045 X-RAY EXAM CHEST 1 VIEW: CPT

## 2018-02-24 PROCEDURE — 80185 ASSAY OF PHENYTOIN TOTAL: CPT

## 2018-02-24 PROCEDURE — 83735 ASSAY OF MAGNESIUM: CPT

## 2018-02-24 PROCEDURE — 83880 ASSAY OF NATRIURETIC PEPTIDE: CPT

## 2018-02-24 PROCEDURE — 80053 COMPREHEN METABOLIC PANEL: CPT

## 2018-02-24 PROCEDURE — 93005 ELECTROCARDIOGRAM TRACING: CPT

## 2018-02-24 NOTE — ED PDOC
HPI: Seizure


Time Seen by Provider: 02/24/18 10:20


Chief Complaint (Nursing): Weakness/Neurological Deficit


History Per: Patient


Associated Symptoms: denies: Bit Tongue


Additional Complaint(s): 





Lexy Herndon is an 88 year old female, whose past medical history includes 

COPD, seizure disorder, HTN, diabetes, and recent cervical spine surgery due to 

fracture, who presents to the emergency department complaining of a possible 

seizure. Patient's daughter witnessed patient's upper right arm shaking. She 

denies any generalized seizure or loss of consciousness. Patient is currently 

asymptomatic and denies prodromal chest pain, shortness of breath, dizziness, 

lightheadedness or palpitations. Patient denies any headache, nausea, vomiting, 

diarrhea, fever, chills, cough, visual changes, tongue biting, bowel/bladder 

incontinence or head injury. Additionally, she adds being compliant with 

medication. 





PMD: Dr. Garber


Neurologist: Dr. Huang





Past Medical History


Reviewed: Historical Data, Nursing Documentation, Vital Signs


Vital Signs: 


 Last Vital Signs











Temp  97.0 F L  02/24/18 10:23


 


Pulse  66   02/24/18 14:46


 


Resp  12   02/24/18 14:46


 


BP  148/71   02/24/18 14:46


 


Pulse Ox  100   02/24/18 15:56














- Medical History


PMH: Arthritis, Asthma, COPD, Diabetes, HTN, Osteoporosis


   Denies: Atrial Fibrillation, Cardia Arrhythmia, CHF, HIV, 

Hypercholesterolemia, Chronic Kidney Disease, Seizures, TIA





- Surgical History


Surgical History: No Surg Hx


   Denies: Pacemaker





- Family History


Family History: States: Unknown Family Hx





- Home Medications


Home Medications: 


 Ambulatory Orders











 Medication  Instructions  Recorded


 


Albuterol Sulfate [Proair Hfa] 2 puff IH Q6 01/28/17


 


Budesonide/Formoterol Fumarate 1 puff PO Q12 01/28/17





[Symbicort 160-4.5 Mcg Inhaler]  


 


Losartan [Cozaar] 50 mg PO DAILY 01/28/17


 


Montelukast [Singulair] 10 mg PO HS 01/28/17


 


amLODIPine [Norvasc] 2.5 mg PO DAILY 01/28/17


 


Glipizide [Glipizide ER] 2.5 mg PO DAILY 11/14/17


 


Albuterol/Ipratropium [Duoneb 3 3 ml INH RQ4  neb 11/28/17





mg/0.5 mg (3 ml) UD]  


 


Furosemide [Lasix] 20 mg PO DAILY #30 tablet 11/28/17


 


Phenytoin, Extended [Dilantin 100 mg PO TID #60 cer 11/28/17





Kapseals]  


 


Divalproex [Depakote ER] 500 mg PO BID #60 ter 02/24/18


 


Multivitamin [Daily Eitan] 1 tab PO DAILY 02/24/18


 


predniSONE [Prednisone] 10 mg PO DAILY 02/24/18














- Allergies


Allergies/Adverse Reactions: 


 Allergies











Allergy/AdvReac Type Severity Reaction Status Date / Time


 


No Known Allergies Allergy   Verified 02/24/18 10:22














Review of Systems


ROS Statement: Except As Marked, All Systems Reviewed And Found Negative


Constitutional: Negative for: Fever


Cardiovascular: Negative for: Chest Pain


Neurological: Positive for: Seizures





Physical Exam





- Reviewed


Nursing Documentation Reviewed: Yes


Vital Signs Reviewed: Yes





- Physical Exam


Appears: Positive for: Well, Non-toxic, No Acute Distress


Head Exam: Positive for: ATRAUMATIC, NORMAL INSPECTION, NORMOCEPHALIC


Skin: Positive for: Normal Color, Warm, DRY


Eye Exam: Positive for: EOMI, Normal appearance, PERRL


Neck: Positive for: Normal, Painless ROM


Cardiovascular/Chest: Positive for: Regular Rate, Rhythm.  Negative for: Murmur


Respiratory: Positive for: Normal Breath Sounds.  Negative for: Decreased 

Breath Sounds, Crackles, Rales, Rhonchi


Gastrointestinal/Abdominal: Positive for: Normal Exam, Bowel Sounds, Soft.  

Negative for: Tenderness


Extremity: Positive for: Normal ROM.  Negative for: Tenderness, Deformity, 

Swelling


Neurologic/Psych: Positive for: Alert, CNs II-XII, Oriented.  Negative for: 

Motor/Sensory Deficits, Aphasia





- Laboratory Results


Result Diagrams: 


 02/24/18 11:33





 02/24/18 11:33





- ECG


O2 Sat by Pulse Oximetry: 100 (normal)


Pulse Ox Interpretation: Normal





Medical Decision Making


Medical Decision Making: 








Impression:


 89 y/o female with focal seizure episode.





Differential Diagnosis included but are not limited to: focal seizure


 


Plan:


-- EKG


-- CXR


-- Labs


-- Reassess and disposition








Progress Notes: 





Head CT: Creator : Beto Hernandez MD


Dictator : Beto Hernandez MD


COMPARISON: Unenhanced head CT 11/15/2017. 


FINDINGS:


HEMORRHAGE: No intracranial hemorrhage. 


BRAIN: Trace chronic microangiopathy is appreciated. Gray-white matter 

differentiation remains well preserved.  There is no mass effect or suspicious 

extra-axial fluid collection appreciated the posterior fossa contents are 

stable including the brainstem.  Midline brain anatomy is unremarkable grossly. 


VENTRICLES: Unremarkable. No hydrocephalus. 


CALVARIUM: No fracture or destructive bony lesion is appreciated. Prior right 

parietal scalp hematoma has resolved in the interval.


PARANASAL SINUSES: Extensive multifocal ethmoid sinusitis changes are 

identified also involve the inferior margins of the bilateral frontal sinuses.  

Bilateral sphenoid sinusitis noted as well as left maxillary sinusitis. Small 

polyp or cyst seen at the alveolar recess right maxillary sinus.


MASTOID AIR CELLS: Unremarkable as visualized. No inflammatory changes.


OTHER FINDINGS: Interval postoperative fusion changes are seen at 

cervicocranial junction.


IMPRESSION: Minimal age related neuro degenerative changes are identified with 

the brain otherwise unremarkable grossly. No acute intracranial findings as 

discussed above. MRI is available follow-up or CT if clinically warranted. 

Resolution of prior right parietal scalp hematoma. Near pan sinusitis as 

discussed above.





Chest X-ray: Creator : Beto Hernandez MD


Dictator : Beto Hernandez MD


COMPARISON: Portable chest 11/20/2017


FINDINGS:


LUNGS: Interval clearing of bilateral basilar opacity with no acute infiltrate 

identified bilaterally.


PLEURA: No pneumothorax or pleural fluid seen.


CARDIOVASCULAR: Stable cardiomegaly. Diminished hilar vascular markings.


OSSEOUS STRUCTURES: Scoliotic thoracic spinal deformity appears stable.


VISUALIZED UPPER ABDOMEN: Normal.


OTHER FINDINGS: None. 


IMPRESSION: Resolution of prior bilateral basilar airspace disease and pleural 

effusions. Cardiomegaly is stable with diminished perihilar vascular markings 

in the interval. No definitive acute CHF pattern appreciable this at time.





Dr Sal recinos. He called back and notified the  he is out of town 

and neuro on call to be consulted. Discussed the case in details with Dr Fields 

who recommends loading dose of valproic acid 1000 mg IV. She recommends 

discharge if patient is stable with depakote 500 mg BID.





Scribe Attestation:


Documented by Marisol Bowmanibe Attestation:


All medical record entries made by the Scribe were at my direction and 

personally dictated by me. I have reviewed the chart and agree that the record 

accurately reflects my personal performance of the history, physical exam, 

medical decision making, and the department course for this patient. I have 

also personally directed, reviewed, and agree with the discharge instructions 

and disposition.





Disposition





- Clinical Impression


Clinical Impression: 


 Seizure








- Patient ED Disposition


Is Patient to be Admitted: No


Doctor Will See Patient In The: Office


Counseled Patient/Family Regarding: Studies Performed, Diagnosis, Need For 

Followup





- Disposition


Referrals: 


Girish Huang MD [Medical Doctor] - 


Kike Sandoval MD [Staff Provider] - 


Disposition: Routine/Home


Disposition Time: 15:53


Condition: GOOD


Additional Instructions: 


Take depakote for seizures in addition to dilantin. Follow up with your 

neurologist regarding stopping dilantin. Return for recurrent seizures. 


Prescriptions: 


Divalproex [Depakote ER] 500 mg PO BID #60 ter


Instructions:  Seizures, Adult (DC)


Print Language: Guinean

## 2018-02-24 NOTE — CT
PROCEDURE:  CT HEAD WITHOUT CONTRAST.



HISTORY:

seizure



COMPARISON:

Unenhanced head CT 11/15/2017. 



TECHNIQUE:

Axial computed tomography images were obtained through the head/brain 

without intravenous contrast.  



Radiation dose:



Total exam DLP = 1326.13 mGy-cm.



This CT exam was performed using one or more of the following dose 

reduction techniques: Automated exposure control, adjustment of the 

mA and/or kV according to patient size, and/or use of iterative 

reconstruction technique.



FINDINGS:



HEMORRHAGE:

No intracranial hemorrhage. 



BRAIN:

Trace chronic microangiopathy is appreciated. Gray-white matter 

differentiation remains well preserved.  There is no mass effect or 

suspicious extra-axial fluid collection appreciated the posterior 

fossa contents are stable including the brainstem.  Midline brain 

anatomy is unremarkable grossly. 



VENTRICLES:

Unremarkable. No hydrocephalus. 



CALVARIUM:

No fracture or destructive bony lesion is appreciated. Prior right 

parietal scalp hematoma has resolved in the interval.



PARANASAL SINUSES:

Extensive multifocal ethmoid sinusitis changes are identified also 

involve the inferior margins of the bilateral frontal sinuses.  

Bilateral sphenoid sinusitis noted as well as left maxillary 

sinusitis. Small polyp or cyst seen at the alveolar recess right 

maxillary sinus.



MASTOID AIR CELLS:

Unremarkable as visualized. No inflammatory changes.



OTHER FINDINGS:

Interval postoperative fusion changes are seen at cervicocranial 

junction.



IMPRESSION:

Minimal age related neuro degenerative changes are identified with 

the brain otherwise unremarkable grossly. No acute intracranial 

findings as discussed above. MRI is available follow-up or CT if 

clinically warranted.



Resolution of prior right parietal scalp hematoma.



Near pan sinusitis as discussed above.

## 2018-02-24 NOTE — CARD
--------------- APPROVED REPORT --------------





EKG Measurement

Heart [id number]

WV 182P

[id number]

[id number]

[id number]



<Conclusion>

Sinus rhythm with occasional premature ventricular complexes

Incomplete right bundle branch block

Septal infarct, age undetermined

Abnormal ECG

## 2018-02-24 NOTE — RAD
PROCEDURE:  CHEST RADIOGRAPH, 1 VIEW



HISTORY:

copd



COMPARISON:

Portable chest 11/20/2017



FINDINGS:



LUNGS:

Interval clearing of bilateral basilar opacity with no acute 

infiltrate identified bilaterally.



PLEURA:

No pneumothorax or pleural fluid seen.



CARDIOVASCULAR:

Stable cardiomegaly. Diminished hilar vascular markings.



OSSEOUS STRUCTURES:

Scoliotic thoracic spinal deformity appears stable.



VISUALIZED UPPER ABDOMEN:

Normal.



OTHER FINDINGS:

None. 



IMPRESSION:

Resolution of prior bilateral basilar airspace disease and pleural 

effusions. Cardiomegaly is stable with diminished perihilar vascular 

markings in the interval. No definitive acute CHF pattern appreciable 

this at time.

## 2018-04-11 ENCOUNTER — HOSPITAL ENCOUNTER (INPATIENT)
Dept: HOSPITAL 14 - H.ER | Age: 83
LOS: 8 days | Discharge: NURSING, REHAB OR LONG-TERM CARE | DRG: 871 | End: 2018-04-19
Attending: INTERNAL MEDICINE | Admitting: INTERNAL MEDICINE
Payer: MEDICARE

## 2018-04-11 DIAGNOSIS — A41.9: Primary | ICD-10-CM

## 2018-04-11 DIAGNOSIS — J96.01: ICD-10-CM

## 2018-04-11 DIAGNOSIS — E66.9: ICD-10-CM

## 2018-04-11 DIAGNOSIS — R13.19: ICD-10-CM

## 2018-04-11 DIAGNOSIS — J44.1: ICD-10-CM

## 2018-04-11 DIAGNOSIS — M81.0: ICD-10-CM

## 2018-04-11 DIAGNOSIS — J69.0: ICD-10-CM

## 2018-04-11 DIAGNOSIS — I11.9: ICD-10-CM

## 2018-04-11 DIAGNOSIS — G92: ICD-10-CM

## 2018-04-11 DIAGNOSIS — G40.909: ICD-10-CM

## 2018-04-11 DIAGNOSIS — J90: ICD-10-CM

## 2018-04-11 DIAGNOSIS — Z79.51: ICD-10-CM

## 2018-04-11 DIAGNOSIS — J96.02: ICD-10-CM

## 2018-04-11 DIAGNOSIS — Z79.84: ICD-10-CM

## 2018-04-11 DIAGNOSIS — M19.90: ICD-10-CM

## 2018-04-11 DIAGNOSIS — E11.9: ICD-10-CM

## 2018-04-11 DIAGNOSIS — Z98.1: ICD-10-CM

## 2018-04-11 DIAGNOSIS — D64.9: ICD-10-CM

## 2018-04-11 LAB
ALBUMIN SERPL-MCNC: 3.9 G/DL (ref 3.5–5)
ALBUMIN/GLOB SERPL: 1.2 {RATIO} (ref 1–2.1)
ALT SERPL-CCNC: 53 U/L (ref 9–52)
APTT BLD: 25.9 SECONDS (ref 25.6–37.1)
ARTERIAL BLOOD GAS HEMOGLOBIN: 9.9 G/DL (ref 11.7–17.4)
ARTERIAL BLOOD GAS O2 SAT: 90.1 % (ref 95–98)
ARTERIAL BLOOD GAS PCO2: 100 MM/HG (ref 35–45)
ARTERIAL BLOOD GAS TCO2: 50.1 MMOL/L (ref 22–28)
ARTERIAL PATENCY WRIST A: YES
AST SERPL-CCNC: 52 U/L (ref 14–36)
BASE EXCESS BLDV CALC-SCNC: 14.4 MMOL/L (ref 0–2)
BASOPHILS # BLD AUTO: 0 K/UL (ref 0–0.2)
BASOPHILS NFR BLD: 0.1 % (ref 0–2)
BUN SERPL-MCNC: 24 MG/DL (ref 7–17)
CALCIUM SERPL-MCNC: 8.3 MG/DL (ref 8.4–10.2)
EOSINOPHIL # BLD AUTO: 0 K/UL (ref 0–0.7)
EOSINOPHIL NFR BLD: 0.2 % (ref 0–4)
ERYTHROCYTE [DISTWIDTH] IN BLOOD BY AUTOMATED COUNT: 13.7 % (ref 11.5–14.5)
GFR NON-AFRICAN AMERICAN: 52
HCO3 BLDA-SCNC: 37.9 MMOL/L (ref 21–28)
HGB BLD-MCNC: 10.1 G/DL (ref 12–16)
INHALED O2 CONCENTRATION: 32 %
INR PPP: 0.9 (ref 0.9–1.2)
LYMPHOCYTES # BLD AUTO: 1 K/UL (ref 1–4.3)
LYMPHOCYTES NFR BLD AUTO: 11.9 % (ref 20–40)
MCH RBC QN AUTO: 30.7 PG (ref 27–31)
MCHC RBC AUTO-ENTMCNC: 32.4 G/DL (ref 33–37)
MCV RBC AUTO: 94.7 FL (ref 81–99)
MONOCYTES # BLD: 0.7 K/UL (ref 0–0.8)
MONOCYTES NFR BLD: 8.7 % (ref 0–10)
NEUTROPHILS # BLD: 6.5 K/UL (ref 1.8–7)
NEUTROPHILS NFR BLD AUTO: 79.1 % (ref 50–75)
NRBC BLD AUTO-RTO: 0.2 % (ref 0–0)
O2 CAP BLDA-SCNC: 13.1 ML/DL (ref 16–24)
O2 CT BLDA-SCNC: 11.8 ML/DL (ref 15–23)
PCO2 BLDV: 116 MMHG (ref 40–60)
PH BLDA: 7.28 [PH] (ref 7.35–7.45)
PH BLDV: 7.22 [PH] (ref 7.32–7.43)
PLATELET # BLD: 182 K/UL (ref 130–400)
PMV BLD AUTO: 9.5 FL (ref 7.2–11.7)
PO2 BLDA: 44 MM/HG (ref 80–100)
PROTHROMBIN TIME: 10.1 SECONDS (ref 9.8–13.1)
RBC # BLD AUTO: 3.28 MIL/UL (ref 3.8–5.2)
VENOUS BLOOD FIO2: 21 %
VENOUS BLOOD GAS PO2: 26 MM/HG (ref 30–55)
WBC # BLD AUTO: 8.2 K/UL (ref 4.8–10.8)

## 2018-04-11 PROCEDURE — 5A09557 ASSISTANCE WITH RESPIRATORY VENTILATION, GREATER THAN 96 CONSECUTIVE HOURS, CONTINUOUS POSITIVE AIRWAY PRESSURE: ICD-10-PCS | Performed by: INTERNAL MEDICINE

## 2018-04-11 NOTE — ED PDOC
HPI: Seizure


Time Seen by Provider: 04/11/18 19:07


Chief Complaint (Nursing): Seizure


Chief Complaint (Provider): Seizure


History Per: Family (daughter)


Recent Seizure Activity Began: Just Before Arrival


Number Of Seizures: One


Length Of Seizures (Duration): Minutes (5)


Additional Complaint(s): 





Lexy Herndon is an 88 year old female, with a past medical history of HTN, 

and borderline diabetes, who was brought to the emergency department by EMS for 

a seizure onset just prior to arrival. Per daughter, patient was being fed 

apple sauce when she suddenly started spitting the apple sauce, eyes rolled 

back and was very stiff. Daughter states it lasted for about x5 min and seemed 

very lethargic after. Patient also appears to be SOB which daughter reports it 

started while it was here and may be because she is due for albuterol. Daughter 

reports otherwise patient had a normal day and took Dilantin as prescribed. 

Last seizure was at the end of February, at that time she was started on 

Depakote but neurologist changed it back to Dilantin on a follow up visit. 

History obtained from daughter. 








PMD: Kike Sandoval


Neurologist: Dr. Huang 





Past Medical History


Reviewed: Historical Data, Nursing Documentation, Vital Signs


Vital Signs: 


 Last Vital Signs











Temp  96.7 F L  04/11/18 18:55


 


Pulse  95 H  04/11/18 18:55


 


Resp  16   04/11/18 18:55


 


BP  151/73 H  04/11/18 18:55


 


Pulse Ox  97   04/11/18 21:48














- Medical History


PMH: Arthritis, Asthma, COPD, Diabetes, HTN, Osteoporosis


   Denies: Atrial Fibrillation, Cardia Arrhythmia, CHF, HIV, 

Hypercholesterolemia, Chronic Kidney Disease, Seizures, TIA





- Surgical History


Surgical History: No Surg Hx


   Denies: Pacemaker





- Family History


Family History: States: Unknown Family Hx





- Social History


Current smoker - smoking cessation education provided: No


Alcohol: None


Drugs: Denies





- Home Medications


Home Medications: 


 Ambulatory Orders











 Medication  Instructions  Recorded


 


Albuterol Sulfate [Proair Hfa] 2 puff IH Q6 01/28/17


 


Budesonide/Formoterol Fumarate 1 puff PO Q12 01/28/17





[Symbicort 160-4.5 Mcg Inhaler]  


 


Losartan [Cozaar] 50 mg PO DAILY 01/28/17


 


Montelukast [Singulair] 10 mg PO HS 01/28/17


 


amLODIPine [Norvasc] 2.5 mg PO DAILY 01/28/17


 


Glipizide [Glipizide ER] 2.5 mg PO DAILY 11/14/17


 


Albuterol/Ipratropium [Duoneb 3 3 ml INH RQ4  neb 11/28/17





mg/0.5 mg (3 ml) UD]  


 


Furosemide [Lasix] 20 mg PO DAILY #30 tablet 11/28/17


 


Phenytoin, Extended [Dilantin 100 mg PO TID #60 cer 11/28/17





Kapseals]  


 


Divalproex [Depakote ER] 500 mg PO BID #60 ter 02/24/18


 


Multivitamin [Daily Eitan] 1 tab PO DAILY 02/24/18


 


predniSONE [Prednisone] 10 mg PO DAILY 02/24/18














- Allergies


Allergies/Adverse Reactions: 


 Allergies











Allergy/AdvReac Type Severity Reaction Status Date / Time


 


No Known Allergies Allergy   Verified 04/11/18 18:55














Review of Systems


ROS Statement: Except As Marked, All Systems Reviewed And Found Negative


Respiratory: Positive for: Shortness of Breath


Neurological: Positive for: Seizures





Physical Exam





- Reviewed


Nursing Documentation Reviewed: Yes


Vital Signs Reviewed: Yes





- Physical Exam


Appears: Positive for: Uncomfortable, In Acute Distress


Head Exam: Positive for: ATRAUMATIC, NORMOCEPHALIC


Skin: Positive for: Pallor


Eye Exam: Positive for: Normal appearance


ENT: Positive for: Pharynx Is (clear), Other (tacky mucous membranes)


Neck: Positive for: Painless ROM, Supple


Cardiovascular/Chest: Positive for: Regular Rate, Rhythm.  Negative for: Murmur


Respiratory: Positive for: Rhonchi (faint diffusely with poor air movement), 

Respiratory Distress


Gastrointestinal/Abdominal: Positive for: Soft.  Negative for: Tenderness


Back: Positive for: Normal Inspection.  Negative for: Decreased ROM


Extremity: Positive for: Normal ROM.  Negative for: Deformity


Lymphatic: Negative for: Adenopathy


Neurologic/Psych: Positive for: Alert (awake).  Negative for: Oriented, Motor/

Sensory Deficits





- Laboratory Results


Result Diagrams: 


 04/11/18 19:55





 04/11/18 19:55





- ECG


O2 Sat by Pulse Oximetry: 97 (RA)


Pulse Ox Interpretation: Normal





- Critical Care


Total Time (In Min): 30





Medical Decision Making


Medical Decision Making: 





Initial Impression: COPD exacerbation and seizure. Differential includes but 

not limited to subtherapeutic medication, electrolyte abnormality, dehydration, 

PNA





Initial Plan:





--Type and screen


--ABG


--VBG


--EKG


--CMP


--Creatine Phosphokinase


--Dilantin


--Magnesium


--Phosphorus


--Troponin I


--Valproic Acid


--Urine dipstick


--CBC w/ differential


--PTT


--PT


--Chest portable [RAD]


--Glucose, Blood, POC


--Reevaluation








21:35


-Lung sounds and oxygen improved. Patient's labs demonstrate subtherapeutic 

dilantin levels. Xray demonstrate infiltrate right upper lobe and left lobe 

with possible aspiration PNA.





-Discussed Dr. Huang, neurology, who wants increased loading dose of Dilantin 

to achieve therapeutic levels. 





-Discussed Dr. Sandoval, PMD, who recommends Meropenem and Vancomycin to treat 

the PNA, given that patient also had a recent hospital stay for neck fracture.





-Discussed with family findings and plan of care





21:47


ABG w/ marked abnormalities, inconsistent with patient's improvement after 

treatment in ER. Discussed with respiratory tech and possibility that ABG was 

venous blood. 





--------------------------------------------------------------------------------

-----------------   


Scribe Attestation:


Documented by Vitaly Wilkins, acting as a scribe for Courtney House MD





Provider Scribe Attestation:


All medical record entries made by the Scribe were at my direction and 

personally dictated by me. I have reviewed the chart and agree that the record 

accurately reflects my personal performance of the history, physical exam, 

medical decision making, and the department course for this patient. I have 

also personally directed, reviewed, and agree with the discharge instructions 

and disposition.





Disposition





- Clinical Impression


Clinical Impression: 


 Recurrent seizures, Aspiration pneumonia, Subtherapeutic serum dilantin level





Counseled Patient/Family Regarding: Studies Performed, Diagnosis





- Disposition


Disposition Time: 21:00


Condition: GUARDED





- Pt Status Changed To:


Hospital Disposition Of: Inpatient





- Admit Certification


Admit to Inpatient:: After my assessment, the patient will require 

hospitalization for at least two midnights.  This is because of the severity of 

symptoms shown, intensity of services needed, and/or the medical risk in this 

patient being treated as an outpatient.





- POA


Present On Arrival: Falls Or Trauma (risk)

## 2018-04-12 LAB
ALBUMIN SERPL-MCNC: 3.4 G/DL (ref 3.5–5)
ALBUMIN/GLOB SERPL: 1.1 {RATIO} (ref 1–2.1)
ALT SERPL-CCNC: 53 U/L (ref 9–52)
ARTERIAL BLOOD GAS HEMOGLOBIN: 9.1 G/DL (ref 11.7–17.4)
ARTERIAL BLOOD GAS O2 SAT: 98.5 % (ref 95–98)
ARTERIAL BLOOD GAS PCO2: 95 MM/HG (ref 35–45)
ARTERIAL BLOOD GAS TCO2: 51.8 MMOL/L (ref 22–28)
ARTERIAL PATENCY WRIST A: YES
AST SERPL-CCNC: 46 U/L (ref 14–36)
BUN SERPL-MCNC: 26 MG/DL (ref 7–17)
CALCIUM SERPL-MCNC: 7.9 MG/DL (ref 8.4–10.2)
ERYTHROCYTE [DISTWIDTH] IN BLOOD BY AUTOMATED COUNT: 13.3 % (ref 11.5–14.5)
GFR NON-AFRICAN AMERICAN: 39
HCO3 BLDA-SCNC: 40.2 MMOL/L (ref 21–28)
HGB BLD-MCNC: 8.8 G/DL (ref 12–16)
INHALED O2 CONCENTRATION: 40 %
MCH RBC QN AUTO: 30.5 PG (ref 27–31)
MCHC RBC AUTO-ENTMCNC: 32.3 G/DL (ref 33–37)
MCV RBC AUTO: 94.5 FL (ref 81–99)
O2 CAP BLDA-SCNC: 12.6 ML/DL (ref 16–24)
O2 CT BLDA-SCNC: 12.4 ML/DL (ref 15–23)
PH BLDA: 7.32 [PH] (ref 7.35–7.45)
PLATELET # BLD: 164 K/UL (ref 130–400)
PO2 BLDA: 67 MM/HG (ref 80–100)
RBC # BLD AUTO: 2.89 MIL/UL (ref 3.8–5.2)
WBC # BLD AUTO: 7.8 K/UL (ref 4.8–10.8)

## 2018-04-12 RX ADMIN — POTASSIUM CHLORIDE SCH MEQ: 20 TABLET, EXTENDED RELEASE ORAL at 16:11

## 2018-04-12 RX ADMIN — GLIPIZIDE SCH MG: 2.5 TABLET, EXTENDED RELEASE ORAL at 10:16

## 2018-04-12 RX ADMIN — ENOXAPARIN SODIUM SCH MG: 40 INJECTION SUBCUTANEOUS at 10:19

## 2018-04-12 RX ADMIN — Medication SCH TAB: at 10:21

## 2018-04-12 NOTE — RAD
HISTORY:

aspiration pneumonia  



COMPARISON:

4/11/2018 



FINDINGS:



LUNGS:

No active pulmonary disease.



PLEURA:

Blunting of left costophrenic angle may reflect small pleural 

effusion.  No right pleural effusion. No pneumothorax.



CARDIOVASCULAR:

Normal.



OSSEOUS STRUCTURES:

No significant abnormalities.



VISUALIZED UPPER ABDOMEN:

Normal.



OTHER FINDINGS:

None.



IMPRESSION:

Possible small left pleural effusion. No acute infiltrate.

## 2018-04-12 NOTE — CP.PCM.CON
History of Present Illness





- History of Present Illness


History of Present Illness: 


PMD: Kike Sandoval





Neurologist: Dr Huang





Reason for Consult: critical care management





Chief complaint: Worsening SOB





The Patient was Seen and Evaluated in the ICU





HPI: The hx was obtained from the patient's daughter and after evaluation of 

the medical record. She is an 88 years old female with hx of seizure, asthma, 

COPD who was admitted on 4/12/18 after she suffered what appeared to be a 

seizure while eating.In the ED both her she developed SOB and had to be placed 

on BIPAP, with CXR showing probably signs of aspiration Pneumonia.Valproic Acid 

and Phenytoin levels were low. she was started on Antibiotics.


She is transferred to ICU because of the Hypercarbic respiratory failure  and 

impending respiratory arrest.











PMH: Arthritis; Asthma; COPD; DM II; HTN; Osteopenia; Seizure on Dilantin; 

cervical spine fx; Shingles





PSH: Spinal Fx





SH; No illegal drug use; No Smoking: no alcohol





FH: State: No known family hx





Allergies: NKDA





Medication: reviewed














Review of Systems





- Review of Systems


Review of Systems: 





Review of system is limited as the patient is on BIPAP





Past Patient History





- Infectious Disease


Hx of Infectious Diseases: None





- Tetanus Immunizations


Tetanus Immunization: Unknown





- Past Medical History & Family History


Past Medical History?: Yes





- Past Social History


Alcohol: None


Drugs: Denies


Home Situation {Lives}: With Family





- CARDIAC


Hx Cardiac Disorders: Yes





- PULMONARY


Hx Respiratory Disorders: Yes





- NEUROLOGICAL


Hx Neurological Disorder: Yes





- HEENT


Hx HEENT Problems: No





- RENAL


Hx Chronic Kidney Disease: No





- ENDOCRINE/METABOLIC


Hx Endocrine Disorders: Yes (DM)


Hx Diabetes Mellitus Type 2: Yes





- HEMATOLOGICAL/ONCOLOGICAL


Hx Blood Disorders: No





- INTEGUMENTARY


Hx Dermatological Problems: No





- MUSCULOSKELETAL/RHEUMATOLOGICAL


Hx Musculoskeletal Disorders: Yes





- GASTROINTESTINAL


Hx Gastrointestinal Disorders: Yes


Hx Constipation: Yes





- GENITOURINARY/GYNECOLOGICAL


Hx Genitourinary Disorders: No





- PSYCHIATRIC


Hx Psychophysiologic Disorder: No


Hx Substance Use: No





- SURGICAL HISTORY


Hx Surgeries: Yes


Hx Tubal Ligation: Yes


Other/Comment: neck surgery 11/17/17.





- ANESTHESIA


Hx Anesthesia: Yes


Hx Anesthesia Reactions: No





Meds


Allergies/Adverse Reactions: 


 Allergies











Allergy/AdvReac Type Severity Reaction Status Date / Time


 


No Known Allergies Allergy   Verified 04/11/18 18:55














- Medications


Medications: 


 Current Medications





Acetaminophen (Tylenol 325mg Tab)  975 mg PO Q6 PRN


   PRN Reason: Fever- T- 101 or above


Amlodipine Besylate (Norvasc)  2.5 mg PO DAILY [facility]


   Last Admin: 04/12/18 10:20 Dose:  2.5 mg


Enoxaparin Sodium (Lovenox)  40 mg SC DAILY [facility]


   PRN Reason: Protocol


   Last Admin: 04/12/18 10:19 Dose:  40 mg


Furosemide (Lasix)  20 mg PO DAILY [facility]


   Last Admin: 04/12/18 10:16 Dose:  20 mg


Glipizide (Glucotrol Xl)  2.5 mg PO DAILY [facility]


   Last Admin: 04/12/18 10:16 Dose:  2.5 mg


Vancomycin HCl 1 gm/ Sodium (Chloride)  250 mls @ 166.667 mls/hr IVPB Q12 [facility]


   PRN Reason: Protocol


   Stop: 04/14/18 13:00


   Last Admin: 04/12/18 14:59 Dose:  166.667 mls/hr


Piperacillin Sod/Tazobactam (Sod 2.25 gm/ Sodium Chloride)  100 mls @ 100 mls/

hr IVPB Q6 [facility]


   PRN Reason: Protocol


   Last Admin: 04/12/18 21:33 Dose:  100 mls/hr


Dextrose (Dextrose 10% In Water)  1,000 mls @ 40 mls/hr IV .Q24H [facility]


   Stop: 04/13/18 21:21


   Last Admin: 04/12/18 22:19 Dose:  40 mls/hr


Montelukast Sodium (Singulair)  10 mg PO HS [facility]


   Last Admin: 04/12/18 21:37 Dose:  Not Given


Multivitamins/Minerals (Therapeutic-M Tab)  1 tab PO DAILY [facility]


   Last Admin: 04/12/18 10:21 Dose:  1 tab


Phenytoin Sodium (Dilantin)  100 mg PO TID [facility]


   Last Admin: 04/12/18 17:49 Dose:  100 mg


Potassium Chloride (K-Dur 20 Meq Er Tab)  20 meq PO BID [facility]


   Last Admin: 04/12/18 16:11 Dose:  20 meq











Physical Exam





- Constitutional


Appears: Confused





- Head Exam


Head Exam: ATRAUMATIC, NORMAL INSPECTION, NORMOCEPHALIC





- Eye Exam


Eye Exam: EOMI, Normal appearance


Pupil Exam: NORMAL ACCOMODATION, PERRL





- ENT Exam


ENT Exam: Mucous Membranes Moist, Normal Exam, Normal External Ear Exam





- Neck Exam


Neck exam: Positive for: Full Rom, Normal Inspection.  Negative for: 

Lymphadenopathy, Tenderness





- Respiratory Exam


Additional comments: 





Distant breath sounds, no rales, no wheezes





- Cardiovascular Exam


Cardiovascular Exam: REGULAR RHYTHM, RRR, +S1, +S2





- GI/Abdominal Exam


GI & Abdominal Exam: Normal Bowel Sounds, Soft.  absent: Mass, Organomegaly, 

Tenderness





- Rectal Exam


Rectal Exam: Deferred





- Extremities Exam


Extremities exam: Positive for: normal inspection.  Negative for: joint swelling

, tenderness





- Back Exam


Back exam: NORMAL INSPECTION.  absent: CVA tenderness (L), CVA tenderness (R)





- Neurological Exam


Additional comments: 





Awake but lethargic with BIPAP connected, No facial droop, No apparent focal 

neurological findings





- Psychiatric Exam


Psychiatric exam: Flat Affect





- Skin


Skin Exam: Dry, Intact, Normal Color, Warm





Results





- Vital Signs


Recent Vital Signs: 


 Last Vital Signs











Temp  99.4 F   04/12/18 22:35


 


Pulse  75   04/12/18 22:35


 


Resp  26 H  04/12/18 22:35


 


BP  155/62 H  04/12/18 22:35


 


Pulse Ox  100   04/12/18 22:35














- Labs


Result Diagrams: 


 04/13/18 04:20





 04/13/18 04:20


Labs: 


 Laboratory Results - last 24 hr











  04/12/18 04/12/18 04/12/18





  07:41 07:41 13:06


 


WBC  7.8  


 


RBC  2.89 L  


 


Hgb  8.8 L  


 


Hct  27.3 L  


 


MCV  94.5  


 


MCH  30.5  


 


MCHC  32.3 L  


 


RDW  13.3  


 


Plt Count  164  


 


pCO2   


 


pO2   


 


HCO3   


 


ABG pH   


 


ABG Total CO2   


 


ABG O2 Saturation   


 


ABG O2 Content   


 


ABG Base Excess   


 


ABG Hemoglobin   


 


ABG Carboxyhemoglobin   


 


POC ABG HHb (Measured)   


 


ABG Methemoglobin   


 


ABG O2 Capacity   


 


Almas Test   


 


A-a O2 Difference   


 


Hgb O2 Saturation   


 


Vent Mode   


 


FiO2   


 


Inspiratory BiPAP   


 


Expiratory BiPAP   


 


Crit Value Called To   


 


Crit Value Called By   


 


Crit Value Read Back   


 


Blood Gas Notified Time   


 


Sodium   136 


 


Potassium   4.9 


 


Chloride   84 L 


 


Carbon Dioxide   40 H* 


 


Anion Gap   17 


 


BUN   26 H 


 


Creatinine   1.3 H 


 


Est GFR ( Amer)   47 


 


Est GFR (Non-Af Amer)   39 


 


POC Glucose (mg/dL)    142 H


 


Random Glucose   135 H 


 


Calcium   7.9 L 


 


Total Bilirubin   0.3 


 


AST   46 H 


 


ALT   53 H 


 


Alkaline Phosphatase   90 


 


Total Protein   6.4 


 


Albumin   3.4 L 


 


Globulin   3.0 


 


Albumin/Globulin Ratio   1.1 


 


Vancomycin Peak   














  04/12/18 04/12/18 04/12/18





  18:07 20:00 21:15


 


WBC   


 


RBC   


 


Hgb   


 


Hct   


 


MCV   


 


MCH   


 


MCHC   


 


RDW   


 


Plt Count   


 


pCO2   95 H* 


 


pO2   67 L 


 


HCO3   40.2 H* 


 


ABG pH   7.32 L 


 


ABG Total CO2   51.8 H 


 


ABG O2 Saturation   98.5 H 


 


ABG O2 Content   12.4 L 


 


ABG Base Excess   19.6 H 


 


ABG Hemoglobin   9.1 L 


 


ABG Carboxyhemoglobin   1.8 H 


 


POC ABG HHb (Measured)   1.5 


 


ABG Methemoglobin   0.7 


 


ABG O2 Capacity   12.6 L 


 


Almas Test   Yes 


 


A-a O2 Difference   99.0 


 


Hgb O2 Saturation   96.0 


 


Vent Mode   Bipap 


 


FiO2   40.0 


 


Inspiratory BiPAP   12 


 


Expiratory BiPAP   6 


 


Crit Value Called To   Rn raquel dimitris 


 


Crit Value Called By   Rt 


 


Crit Value Read Back   Y 


 


Blood Gas Notified Time   2011 


 


Sodium   


 


Potassium   


 


Chloride   


 


Carbon Dioxide   


 


Anion Gap   


 


BUN   


 


Creatinine   


 


Est GFR ( Amer)   


 


Est GFR (Non-Af Amer)   


 


POC Glucose (mg/dL)    63 L


 


Random Glucose   


 


Calcium   


 


Total Bilirubin   


 


AST   


 


ALT   


 


Alkaline Phosphatase   


 


Total Protein   


 


Albumin   


 


Globulin   


 


Albumin/Globulin Ratio   


 


Vancomycin Peak  15.9 L  














  04/12/18





  22:44


 


WBC 


 


RBC 


 


Hgb 


 


Hct 


 


MCV 


 


MCH 


 


MCHC 


 


RDW 


 


Plt Count 


 


pCO2 


 


pO2 


 


HCO3 


 


ABG pH 


 


ABG Total CO2 


 


ABG O2 Saturation 


 


ABG O2 Content 


 


ABG Base Excess 


 


ABG Hemoglobin 


 


ABG Carboxyhemoglobin 


 


POC ABG HHb (Measured) 


 


ABG Methemoglobin 


 


ABG O2 Capacity 


 


Almas Test 


 


A-a O2 Difference 


 


Hgb O2 Saturation 


 


Vent Mode 


 


FiO2 


 


Inspiratory BiPAP 


 


Expiratory BiPAP 


 


Crit Value Called To 


 


Crit Value Called By 


 


Crit Value Read Back 


 


Blood Gas Notified Time 


 


Sodium 


 


Potassium 


 


Chloride 


 


Carbon Dioxide 


 


Anion Gap 


 


BUN 


 


Creatinine 


 


Est GFR ( Amer) 


 


Est GFR (Non-Af Amer) 


 


POC Glucose (mg/dL)  126 H


 


Random Glucose 


 


Calcium 


 


Total Bilirubin 


 


AST 


 


ALT 


 


Alkaline Phosphatase 


 


Total Protein 


 


Albumin 


 


Globulin 


 


Albumin/Globulin Ratio 


 


Vancomycin Peak 














Assessment & Plan





- Assessment and Plan (Free Text)


Assessment: 





#. Hypercapneic Respiratory Failure


#. COPD


#. Anemia


#. Seizure


#. Azotemia


#. DM II


Plan: 


88 years old female with hx of seizure, asthma, COPD who was admitted on 4/12/

18 after she suffered what appeared to be a seizure while eating.In the ED both 

her she developed SOB and had to be placed on BIPAP, with CXR showing probably 

signs of aspiration Pneumonia.Valproic Acid and Phenytoin levels were low. she 

was started on Antibiotics.


She is transferred to ICU because of the Hypercarbic respiratory failure  FOR 

CLOSE MONITORING.





#. Hypercapneic Respiratory Failure with COPD exacerbation and impending 

respiratory arrest.


- BIPAP


- Duoneb





#. Aspiration pneumonia


- Dr Somers


- Vancomycin


- zosyn





#. Seizure


- Seizure Precaution


- Dr Means consult


- Phenytoin





#. Azotemia


- Follow  renal labs





#. DM II


- Regular Insulin sliding scale according to Accucheck


- Glipizide





#. DVT Prophylaxis: Lovenox





- Date & Time


Date: 04/12/18


Time: 23:15

## 2018-04-12 NOTE — CARD
--------------- APPROVED REPORT --------------





EKG Measurement

Heart [id number]

SC [id number]

[id number]

[id number]

[id number]



<Conclusion>

Sinus rhythm with frequent premature ventricular complexes

Septal infarct, age undetermined

Abnormal ECG

## 2018-04-12 NOTE — CP.PCM.HP
History of Present Illness





- History of Present Illness


History of Present Illness: 








Patient seen, chart reviewed, full H & P to be dictated. 





Acute Resp Failure


B. Asthma


PNA / Sepsis.


r/o Aspiration. 


DM


Encephalopathy. 


Sz disorder. 





Cont present treatment plan.





Requested to transfer to ICU. 


May need intubation. Discussed Life Support with daughter. Wants everything 

done. 


Neuro, ID consults. Speech and Swallow evaluation when more awake. 





Please call my cell at: [phone] for any questions or communications, 

regarding my patient.  





Past Patient History





- Infectious Disease


Hx of Infectious Diseases: None





- Tetanus Immunizations


Tetanus Immunization: Unknown





- Past Medical History & Family History


Past Medical History?: Yes





- Past Social History


Alcohol: None


Drugs: Denies


Home Situation {Lives}: With Family





- CARDIAC


Hx Cardiac Disorders: Yes





- PULMONARY


Hx Respiratory Disorders: Yes





- NEUROLOGICAL


Hx Neurological Disorder: Yes





- HEENT


Hx HEENT Problems: No





- RENAL


Hx Chronic Kidney Disease: No





- ENDOCRINE/METABOLIC


Hx Endocrine Disorders: Yes (DM)


Hx Diabetes Mellitus Type 2: Yes





- HEMATOLOGICAL/ONCOLOGICAL


Hx Blood Disorders: No





- INTEGUMENTARY


Hx Dermatological Problems: No





- MUSCULOSKELETAL/RHEUMATOLOGICAL


Hx Musculoskeletal Disorders: Yes





- GASTROINTESTINAL


Hx Gastrointestinal Disorders: Yes


Hx Constipation: Yes





- GENITOURINARY/GYNECOLOGICAL


Hx Genitourinary Disorders: No





- PSYCHIATRIC


Hx Psychophysiologic Disorder: No


Hx Substance Use: No





- SURGICAL HISTORY


Hx Surgeries: Yes


Hx Tubal Ligation: Yes


Other/Comment: neck surgery 11/17/17.





- ANESTHESIA


Hx Anesthesia: Yes


Hx Anesthesia Reactions: No





Meds


Allergies/Adverse Reactions: 


 Allergies











Allergy/AdvReac Type Severity Reaction Status Date / Time


 


No Known Allergies Allergy   Verified 04/11/18 18:55














Results





- Vital Signs


Recent Vital Signs: 





 Last Vital Signs











Temp  97.3 F L  04/12/18 20:09


 


Pulse  55 L  04/12/18 20:09


 


Resp  18   04/12/18 20:09


 


BP  111/55 L  04/12/18 20:09


 


Pulse Ox  100   04/12/18 20:09














- Labs


Result Diagrams: 


 04/12/18 07:41





 04/12/18 07:41


Labs: 





 Laboratory Results - last 24 hr











  04/11/18 04/12/18 04/12/18





  22:08 07:41 07:41


 


WBC   7.8 


 


RBC   2.89 L 


 


Hgb   8.8 L 


 


Hct   27.3 L 


 


MCV   94.5 


 


MCH   30.5 


 


MCHC   32.3 L 


 


RDW   13.3 


 


Plt Count   164 


 


pCO2   


 


pO2   


 


HCO3   


 


ABG pH   


 


ABG Total CO2   


 


ABG O2 Saturation   


 


ABG O2 Content   


 


ABG Base Excess   


 


ABG Hemoglobin   


 


ABG Carboxyhemoglobin   


 


POC ABG HHb (Measured)   


 


ABG Methemoglobin   


 


ABG O2 Capacity   


 


Almas Test   


 


A-a O2 Difference   


 


Hgb O2 Saturation   


 


Vent Mode   


 


FiO2   


 


Inspiratory BiPAP   


 


Expiratory BiPAP   


 


Crit Value Called To   


 


Crit Value Called By   


 


Crit Value Read Back   


 


Blood Gas Notified Time   


 


Sodium    136


 


Potassium    4.9


 


Chloride    84 L


 


Carbon Dioxide    40 H*


 


Anion Gap    17


 


BUN    26 H


 


Creatinine    1.3 H


 


Est GFR ( Amer)    47


 


Est GFR (Non-Af Amer)    39


 


POC Glucose (mg/dL)   


 


Random Glucose    135 H


 


Calcium    7.9 L


 


Total Bilirubin    0.3


 


AST    46 H


 


ALT    53 H


 


Alkaline Phosphatase    90


 


Total Protein    6.4


 


Albumin    3.4 L


 


Globulin    3.0


 


Albumin/Globulin Ratio    1.1


 


Vancomycin Peak   


 


Influenza Typ A,B (EIA)  Negative for flu a/b  














  04/12/18 04/12/18 04/12/18





  13:06 18:07 20:00


 


WBC   


 


RBC   


 


Hgb   


 


Hct   


 


MCV   


 


MCH   


 


MCHC   


 


RDW   


 


Plt Count   


 


pCO2    95 H*


 


pO2    67 L


 


HCO3    40.2 H*


 


ABG pH    7.32 L


 


ABG Total CO2    51.8 H


 


ABG O2 Saturation    98.5 H


 


ABG O2 Content    12.4 L


 


ABG Base Excess    19.6 H


 


ABG Hemoglobin    9.1 L


 


ABG Carboxyhemoglobin    1.8 H


 


POC ABG HHb (Measured)    1.5


 


ABG Methemoglobin    0.7


 


ABG O2 Capacity    12.6 L


 


Almas Test    Yes


 


A-a O2 Difference    99.0


 


Hgb O2 Saturation    96.0


 


Vent Mode    Bipap


 


FiO2    40.0


 


Inspiratory BiPAP    12


 


Expiratory BiPAP    6


 


Crit Value Called To    Rn raquel dimitris


 


Crit Value Called By    Rt


 


Crit Value Read Back    Y


 


Blood Gas Notified Time    2011


 


Sodium   


 


Potassium   


 


Chloride   


 


Carbon Dioxide   


 


Anion Gap   


 


BUN   


 


Creatinine   


 


Est GFR ( Amer)   


 


Est GFR (Non-Af Amer)   


 


POC Glucose (mg/dL)  142 H  


 


Random Glucose   


 


Calcium   


 


Total Bilirubin   


 


AST   


 


ALT   


 


Alkaline Phosphatase   


 


Total Protein   


 


Albumin   


 


Globulin   


 


Albumin/Globulin Ratio   


 


Vancomycin Peak   15.9 L 


 


Influenza Typ A,B (EIA)

## 2018-04-12 NOTE — RAD
HISTORY:

sob  



COMPARISON:

02/24/2018 



FINDINGS:



LUNGS:

No active pulmonary disease.



PLEURA:

No significant pleural effusion identified, no pneumothorax apparent.



CARDIOVASCULAR:

Mild cardiomegaly. Mild vascular congestion



OSSEOUS STRUCTURES:

No significant abnormalities.



VISUALIZED UPPER ABDOMEN:

Normal.



OTHER FINDINGS:

None.



IMPRESSION:

Mild cardiomegaly and mild vascular congestion

## 2018-04-12 NOTE — CP.PCM.CON
History of Present Illness





- History of Present Illness


History of Present Illness: 





Infectious Disease Consultation Note-





 asked to see this patient at the request of  for aspiration 

pneumonia and help with antibiotic management.





HPI-


History obtained from patient's daughter who is at her bedside and also her 

caregiver since pt. is not verbalizing at the moment.


patient is a 88 year old female with PMH of cervical spine fracture s/p spinal 

fusion surgery in 11/2017, DM II, HTN who was brought in by EMS as per daughter 

because she was trying to feed her mother and then she started to spit up the 

food and her eyes rolled back and looked like she was having seizure episode 

and became lethargic after and hence she was brought in for evaluation and 

treatment.


as per daughter pt. also 3 days prior to admission slid and fell on her walker 

at home but the daughter was there and able to help her back up , did not hit 

her head as per daughter .


 as per daughter she has some bruises on the right arm where she held the 

walker and right lower leg.


 also as per pt's daughter she has thick cough since yesterday but is unable to 

expectorate.


 as per pt's daughter she developed seizures post her cervical spine surgery 

and is f/u routinely by neurologist and is on meds for this.


 aslo as per her daughter after she was d/c last time to rehab she head 

developed shingles in the SHELBI but that is resolved now and she had small sacral 

decub which has also healed.


no fever or chills noted as per pt's daughter.


 also no loss of bowel or bladder and no diarrhea as per pt's daughter.











Review of Systems





- Review of Systems


Review of Systems: 





ROS- limited as pt. is not answering questions at the moment is somewhat drowsy

  but has thick cough, please see HPI for more .





Past Patient History





- Infectious Disease


Hx of Infectious Diseases: None





- Tetanus Immunizations


Tetanus Immunization: Unknown





- Past Medical History & Family History


Past Medical History?: Yes





- Past Social History


Alcohol: None


Drugs: Denies


Home Situation {Lives}: With Family





- CARDIAC


Hx Cardiac Disorders: Yes





- PULMONARY


Hx Respiratory Disorders: Yes





- NEUROLOGICAL


Hx Neurological Disorder: Yes





- HEENT


Hx HEENT Problems: No





- RENAL


Hx Chronic Kidney Disease: No





- ENDOCRINE/METABOLIC


Hx Endocrine Disorders: Yes (DM)


Hx Diabetes Mellitus Type 2: Yes





- HEMATOLOGICAL/ONCOLOGICAL


Hx Blood Disorders: No





- INTEGUMENTARY


Hx Dermatological Problems: No





- MUSCULOSKELETAL/RHEUMATOLOGICAL


Hx Musculoskeletal Disorders: Yes





- GASTROINTESTINAL


Hx Gastrointestinal Disorders: Yes


Hx Constipation: Yes





- GENITOURINARY/GYNECOLOGICAL


Hx Genitourinary Disorders: No





- PSYCHIATRIC


Hx Psychophysiologic Disorder: No


Hx Substance Use: No





- SURGICAL HISTORY


Hx Surgeries: Yes


Hx Tubal Ligation: Yes


Other/Comment: neck surgery 11/17/17.





- ANESTHESIA


Hx Anesthesia: Yes


Hx Anesthesia Reactions: No





Meds


Allergies/Adverse Reactions: 


 Allergies











Allergy/AdvReac Type Severity Reaction Status Date / Time


 


No Known Allergies Allergy   Verified 04/11/18 18:55














- Medications


Medications: 


 Current Medications





Acetaminophen (Tylenol 325mg Tab)  975 mg PO Q6 PRN


   PRN Reason: Fever- T- 101 or above


Amlodipine Besylate (Norvasc)  2.5 mg PO DAILY [facility]


   Last Admin: 04/12/18 10:20 Dose:  2.5 mg


Enoxaparin Sodium (Lovenox)  40 mg SC DAILY [facility]


   PRN Reason: Protocol


   Last Admin: 04/12/18 10:19 Dose:  40 mg


Furosemide (Lasix)  20 mg PO DAILY [facility]


   Last Admin: 04/12/18 10:16 Dose:  20 mg


Glipizide (Glucotrol Xl)  2.5 mg PO DAILY [facility]


   Last Admin: 04/12/18 10:16 Dose:  2.5 mg


Vancomycin HCl 1 gm/ Sodium (Chloride)  250 mls @ 166.667 mls/hr IVPB Q12 [facility]


   PRN Reason: Protocol


   Stop: 04/14/18 13:00


Meropenem 1 gm/ Sodium (Chloride)  100 mls @ 100 mls/hr IVPB Q12H [facility]


   PRN Reason: Protocol


   Stop: 04/13/18 12:59


Montelukast Sodium (Singulair)  10 mg PO HS [facility]


Multivitamins/Minerals (Therapeutic-M Tab)  1 tab PO DAILY [facility]


   Last Admin: 04/12/18 10:21 Dose:  1 tab


Phenytoin Sodium (Dilantin)  100 mg PO TID [facility]


   Last Admin: 04/12/18 10:15 Dose:  100 mg


Potassium Phosphate (Potassium Phosphate)  1 mg PO TID [facility]











Physical Exam





- Constitutional


Appears: No Acute Distress, Chronically Ill





- ENT Exam


Additional comments: 





has oxygen mask in place, does not open her mouth for exam





- Neck Exam


Neck exam: Positive for: Full Rom


Additional comments: 





erior cervical spine surgical site clean, no erythema





- Respiratory Exam


Additional comments: 





slight tachypne


+ coarse cough


decreased breath sounds at bases, no wheezing





- Cardiovascular Exam


Cardiovascular Exam: RRR, +S1, +S2


Additional comments: 





3/6 ejection murmur heard at LSB





- GI/Abdominal Exam


GI & Abdominal Exam: Normal Bowel Sounds, Soft


Additional comments: 





NT, ND





- Extremities Exam


Extremities exam: Positive for: normal inspection





- Neurological Exam


Additional comments: 





lethargic but open s her eyes when name is called but does not answer questions





- Skin


Additional comments: 





no open sacral wounds








Results





- Vital Signs


Recent Vital Signs: 


 Last Vital Signs











Temp  98.8 F   04/12/18 00:28


 


Pulse  71   04/12/18 10:20


 


Resp  20   04/12/18 07:30


 


BP  122/86   04/12/18 10:20


 


Pulse Ox  100   04/12/18 07:30














- Labs


Result Diagrams: 


 04/12/18 07:41





 04/12/18 07:41


Labs: 


 Laboratory Results - last 24 hr











  04/11/18 04/11/18 04/11/18





  19:55 19:55 19:55


 


WBC  8.2  


 


RBC  3.28 L  


 


Hgb  10.1 L  


 


Hct  31.0 L  


 


MCV  94.7  


 


MCH  30.7  


 


MCHC  32.4 L  


 


RDW  13.7  


 


Plt Count  182  


 


MPV  9.5  


 


Neut % (Auto)  79.1 H  


 


Lymph % (Auto)  11.9 L  


 


Mono % (Auto)  8.7  


 


Eos % (Auto)  0.2  


 


Baso % (Auto)  0.1  


 


Neut # (Auto)  6.5  


 


Lymph # (Auto)  1.0  


 


Mono # (Auto)  0.7  


 


Eos # (Auto)  0.0  


 


Baso # (Auto)  0.0  


 


PT    10.1


 


INR    0.9


 


APTT    25.9


 


pCO2   


 


pO2   


 


HCO3   


 


ABG pH   


 


ABG Total CO2   


 


ABG O2 Saturation   


 


ABG O2 Content   


 


ABG Base Excess   


 


ABG Hemoglobin   


 


ABG Carboxyhemoglobin   


 


POC ABG HHb (Measured)   


 


ABG Methemoglobin   


 


ABG O2 Capacity   


 


Almas Test   


 


VBG pH   


 


VBG pCO2   


 


VBG HCO3   


 


VBG Total CO2   


 


VBG O2 Sat (Calc)   


 


VBG Base Excess   


 


VBG Potassium   


 


A-a O2 Difference   


 


Hgb O2 Saturation   


 


Sodium   132 


 


Chloride   81 L 


 


Glucose   


 


Lactate   


 


FiO2   


 


Crit Value Called To   


 


Crit Value Called By   


 


Crit Value Read Back   


 


Blood Gas Notified Time   


 


Potassium   4.7 


 


Carbon Dioxide   36 H 


 


Anion Gap   20 


 


BUN   24 H 


 


Creatinine   1.0 


 


Est GFR ( Amer)   > 60 


 


Est GFR (Non-Af Amer)   52 


 


POC Glucose (mg/dL)   


 


Random Glucose   188 H 


 


Calcium   8.3 L 


 


Phosphorus   6.4 H 


 


Magnesium   2.2 


 


Total Bilirubin   0.4 


 


AST   52 H 


 


ALT   53 H D 


 


Alkaline Phosphatase   104 


 


Total Creatine Kinase   37 


 


Troponin I   0.0450 


 


Total Protein   7.1 


 


Albumin   3.9 


 


Globulin   3.2 


 


Albumin/Globulin Ratio   1.2 


 


Venous Blood Potassium   


 


Phenytoin   


 


Valproic Acid   


 


Influenza Typ A,B (EIA)   


 


Blood Type   


 


Antibody Screen   


 


BBK History Checked   














  04/11/18 04/11/18 04/11/18





  19:55 19:55 20:00


 


WBC   


 


RBC   


 


Hgb   


 


Hct   


 


MCV   


 


MCH   


 


MCHC   


 


RDW   


 


Plt Count   


 


MPV   


 


Neut % (Auto)   


 


Lymph % (Auto)   


 


Mono % (Auto)   


 


Eos % (Auto)   


 


Baso % (Auto)   


 


Neut # (Auto)   


 


Lymph # (Auto)   


 


Mono # (Auto)   


 


Eos # (Auto)   


 


Baso # (Auto)   


 


PT   


 


INR   


 


APTT   


 


pCO2   


 


pO2   26 L 


 


HCO3   


 


ABG pH   


 


ABG Total CO2   


 


ABG O2 Saturation   


 


ABG O2 Content   


 


ABG Base Excess   


 


ABG Hemoglobin   


 


ABG Carboxyhemoglobin   


 


POC ABG HHb (Measured)   


 


ABG Methemoglobin   


 


ABG O2 Capacity   


 


Almas Test   


 


VBG pH   7.22 L 


 


VBG pCO2   116 H* 


 


VBG HCO3   34.5 


 


VBG Total CO2   51.1 H 


 


VBG O2 Sat (Calc)   59.9 


 


VBG Base Excess   14.4 H 


 


VBG Potassium   4.6 


 


A-a O2 Difference   


 


Hgb O2 Saturation   


 


Sodium   128.0 L 


 


Chloride   86.0 L 


 


Glucose   209 H 


 


Lactate   3.2 H 


 


FiO2   21.0 


 


Crit Value Called To   Latanya meyer 


 


Crit Value Called By   23 


 


Crit Value Read Back   Y 


 


Blood Gas Notified Time   2000 


 


Potassium   


 


Carbon Dioxide   


 


Anion Gap   


 


BUN   


 


Creatinine   


 


Est GFR ( Amer)   


 


Est GFR (Non-Af Amer)   


 


POC Glucose (mg/dL)   


 


Random Glucose   


 


Calcium   


 


Phosphorus   


 


Magnesium   


 


Total Bilirubin   


 


AST   


 


ALT   


 


Alkaline Phosphatase   


 


Total Creatine Kinase   


 


Troponin I   


 


Total Protein   


 


Albumin   


 


Globulin   


 


Albumin/Globulin Ratio   


 


Venous Blood Potassium   4.6 


 


Phenytoin   


 


Valproic Acid    < 10.0 L


 


Influenza Typ A,B (EIA)   


 


Blood Type  A POSITIVE  


 


Antibody Screen  Negative  


 


BBK History Checked  Patient has bt  














  04/11/18 04/11/18 04/11/18





  20:46 21:05 21:05


 


WBC   


 


RBC   


 


Hgb   


 


Hct   


 


MCV   


 


MCH   


 


MCHC   


 


RDW   


 


Plt Count   


 


MPV   


 


Neut % (Auto)   


 


Lymph % (Auto)   


 


Mono % (Auto)   


 


Eos % (Auto)   


 


Baso % (Auto)   


 


Neut # (Auto)   


 


Lymph # (Auto)   


 


Mono # (Auto)   


 


Eos # (Auto)   


 


Baso # (Auto)   


 


PT   


 


INR   


 


APTT   


 


pCO2   100 H* 


 


pO2   44 L* 


 


HCO3   37.9 H 


 


ABG pH   7.28 L 


 


ABG Total CO2   50.1 H 


 


ABG O2 Saturation   90.1 L 


 


ABG O2 Content   11.8 L 


 


ABG Base Excess   16.9 H 


 


ABG Hemoglobin   9.9 L 


 


ABG Carboxyhemoglobin   3.4 H 


 


POC ABG HHb (Measured)   9.4 H 


 


ABG Methemoglobin   2.2 


 


ABG O2 Capacity   13.1 L 


 


Almas Test   Yes 


 


VBG pH   


 


VBG pCO2   


 


VBG HCO3   


 


VBG Total CO2   


 


VBG O2 Sat (Calc)   


 


VBG Base Excess   


 


VBG Potassium   


 


A-a O2 Difference   59.0 


 


Hgb O2 Saturation   85.1 L 


 


Sodium   


 


Chloride   


 


Glucose   


 


Lactate   


 


FiO2   32.0 


 


Crit Value Called To   Latanya perez 


 


Crit Value Called By   23 


 


Crit Value Read Back   Y 


 


Blood Gas Notified Time   2120 


 


Potassium   


 


Carbon Dioxide   


 


Anion Gap   


 


BUN   


 


Creatinine   


 


Est GFR ( Amer)   


 


Est GFR (Non-Af Amer)   


 


POC Glucose (mg/dL)    138 H


 


Random Glucose   


 


Calcium   


 


Phosphorus   


 


Magnesium   


 


Total Bilirubin   


 


AST   


 


ALT   


 


Alkaline Phosphatase   


 


Total Creatine Kinase   


 


Troponin I   


 


Total Protein   


 


Albumin   


 


Globulin   


 


Albumin/Globulin Ratio   


 


Venous Blood Potassium   


 


Phenytoin  5.5 L  


 


Valproic Acid   


 


Influenza Typ A,B (EIA)   


 


Blood Type   


 


Antibody Screen   


 


BBK History Checked   














  04/11/18 04/12/18 04/12/18





  22:08 07:41 07:41


 


WBC   7.8 


 


RBC   2.89 L 


 


Hgb   8.8 L 


 


Hct   27.3 L 


 


MCV   94.5 


 


MCH   30.5 


 


MCHC   32.3 L 


 


RDW   13.3 


 


Plt Count   164 


 


MPV   


 


Neut % (Auto)   


 


Lymph % (Auto)   


 


Mono % (Auto)   


 


Eos % (Auto)   


 


Baso % (Auto)   


 


Neut # (Auto)   


 


Lymph # (Auto)   


 


Mono # (Auto)   


 


Eos # (Auto)   


 


Baso # (Auto)   


 


PT   


 


INR   


 


APTT   


 


pCO2   


 


pO2   


 


HCO3   


 


ABG pH   


 


ABG Total CO2   


 


ABG O2 Saturation   


 


ABG O2 Content   


 


ABG Base Excess   


 


ABG Hemoglobin   


 


ABG Carboxyhemoglobin   


 


POC ABG HHb (Measured)   


 


ABG Methemoglobin   


 


ABG O2 Capacity   


 


Almas Test   


 


VBG pH   


 


VBG pCO2   


 


VBG HCO3   


 


VBG Total CO2   


 


VBG O2 Sat (Calc)   


 


VBG Base Excess   


 


VBG Potassium   


 


A-a O2 Difference   


 


Hgb O2 Saturation   


 


Sodium    136


 


Chloride    84 L


 


Glucose   


 


Lactate   


 


FiO2   


 


Crit Value Called To   


 


Crit Value Called By   


 


Crit Value Read Back   


 


Blood Gas Notified Time   


 


Potassium    4.9


 


Carbon Dioxide    40 H*


 


Anion Gap    17


 


BUN    26 H


 


Creatinine    1.3 H


 


Est GFR ( Amer)    47


 


Est GFR (Non-Af Amer)    39


 


POC Glucose (mg/dL)   


 


Random Glucose    135 H


 


Calcium    7.9 L


 


Phosphorus   


 


Magnesium   


 


Total Bilirubin    0.3


 


AST    46 H


 


ALT    53 H


 


Alkaline Phosphatase    90


 


Total Creatine Kinase   


 


Troponin I   


 


Total Protein    6.4


 


Albumin    3.4 L


 


Globulin    3.0


 


Albumin/Globulin Ratio    1.1


 


Venous Blood Potassium   


 


Phenytoin   


 


Valproic Acid   


 


Influenza Typ A,B (EIA)  Negative for flu a/b  


 


Blood Type   


 


Antibody Screen   


 


BBK History Checked   








 Laboratory Results - last 72 hr











  04/11/18 04/11/18 04/11/18





  19:55 19:55 19:55


 


WBC  8.2  


 


RBC  3.28 L  


 


Hgb  10.1 L  


 


Hct  31.0 L  


 


MCV  94.7  


 


MCH  30.7  


 


MCHC  32.4 L  


 


RDW  13.7  


 


Plt Count  182  


 


MPV  9.5  


 


Neut % (Auto)  79.1 H  


 


Lymph % (Auto)  11.9 L  


 


Mono % (Auto)  8.7  


 


Eos % (Auto)  0.2  


 


Baso % (Auto)  0.1  


 


Neut # (Auto)  6.5  


 


Lymph # (Auto)  1.0  


 


Mono # (Auto)  0.7  


 


Eos # (Auto)  0.0  


 


Baso # (Auto)  0.0  


 


PT    10.1


 


INR    0.9


 


APTT    25.9


 


pCO2   


 


pO2   


 


HCO3   


 


ABG pH   


 


ABG Total CO2   


 


ABG O2 Saturation   


 


ABG O2 Content   


 


ABG Base Excess   


 


ABG Hemoglobin   


 


ABG Carboxyhemoglobin   


 


POC ABG HHb (Measured)   


 


ABG Methemoglobin   


 


ABG O2 Capacity   


 


Almas Test   


 


VBG pH   


 


VBG pCO2   


 


VBG HCO3   


 


VBG Total CO2   


 


VBG O2 Sat (Calc)   


 


VBG Base Excess   


 


VBG Potassium   


 


A-a O2 Difference   


 


Hgb O2 Saturation   


 


Sodium   132 


 


Chloride   81 L 


 


Glucose   


 


Lactate   


 


FiO2   


 


Crit Value Called To   


 


Crit Value Called By   


 


Crit Value Read Back   


 


Blood Gas Notified Time   


 


Potassium   4.7 


 


Carbon Dioxide   36 H 


 


Anion Gap   20 


 


BUN   24 H 


 


Creatinine   1.0 


 


Est GFR ( Amer)   > 60 


 


Est GFR (Non-Af Amer)   52 


 


POC Glucose (mg/dL)   


 


Random Glucose   188 H 


 


Calcium   8.3 L 


 


Phosphorus   6.4 H 


 


Magnesium   2.2 


 


Total Bilirubin   0.4 


 


AST   52 H 


 


ALT   53 H D 


 


Alkaline Phosphatase   104 


 


Total Creatine Kinase   37 


 


Troponin I   0.0450 


 


Total Protein   7.1 


 


Albumin   3.9 


 


Globulin   3.2 


 


Albumin/Globulin Ratio   1.2 


 


Venous Blood Potassium   


 


Phenytoin   


 


Valproic Acid   


 


Influenza Typ A,B (EIA)   


 


Blood Type   


 


Antibody Screen   


 


BBK History Checked   














  04/11/18 04/11/18 04/11/18





  19:55 19:55 20:00


 


WBC   


 


RBC   


 


Hgb   


 


Hct   


 


MCV   


 


MCH   


 


MCHC   


 


RDW   


 


Plt Count   


 


MPV   


 


Neut % (Auto)   


 


Lymph % (Auto)   


 


Mono % (Auto)   


 


Eos % (Auto)   


 


Baso % (Auto)   


 


Neut # (Auto)   


 


Lymph # (Auto)   


 


Mono # (Auto)   


 


Eos # (Auto)   


 


Baso # (Auto)   


 


PT   


 


INR   


 


APTT   


 


pCO2   


 


pO2   26 L 


 


HCO3   


 


ABG pH   


 


ABG Total CO2   


 


ABG O2 Saturation   


 


ABG O2 Content   


 


ABG Base Excess   


 


ABG Hemoglobin   


 


ABG Carboxyhemoglobin   


 


POC ABG HHb (Measured)   


 


ABG Methemoglobin   


 


ABG O2 Capacity   


 


Almas Test   


 


VBG pH   7.22 L 


 


VBG pCO2   116 H* 


 


VBG HCO3   34.5 


 


VBG Total CO2   51.1 H 


 


VBG O2 Sat (Calc)   59.9 


 


VBG Base Excess   14.4 H 


 


VBG Potassium   4.6 


 


A-a O2 Difference   


 


Hgb O2 Saturation   


 


Sodium   128.0 L 


 


Chloride   86.0 L 


 


Glucose   209 H 


 


Lactate   3.2 H 


 


FiO2   21.0 


 


Crit Value Called To   Latanya meyer 


 


Crit Value Called By   23 


 


Crit Value Read Back   Y 


 


Blood Gas Notified Time   2000 


 


Potassium   


 


Carbon Dioxide   


 


Anion Gap   


 


BUN   


 


Creatinine   


 


Est GFR ( Amer)   


 


Est GFR (Non-Af Amer)   


 


POC Glucose (mg/dL)   


 


Random Glucose   


 


Calcium   


 


Phosphorus   


 


Magnesium   


 


Total Bilirubin   


 


AST   


 


ALT   


 


Alkaline Phosphatase   


 


Total Creatine Kinase   


 


Troponin I   


 


Total Protein   


 


Albumin   


 


Globulin   


 


Albumin/Globulin Ratio   


 


Venous Blood Potassium   4.6 


 


Phenytoin   


 


Valproic Acid    < 10.0 L


 


Influenza Typ A,B (EIA)   


 


Blood Type  A POSITIVE  


 


Antibody Screen  Negative  


 


BBK History Checked  Patient has bt  














  04/11/18 04/11/18 04/11/18





  20:46 21:05 21:05


 


WBC   


 


RBC   


 


Hgb   


 


Hct   


 


MCV   


 


MCH   


 


MCHC   


 


RDW   


 


Plt Count   


 


MPV   


 


Neut % (Auto)   


 


Lymph % (Auto)   


 


Mono % (Auto)   


 


Eos % (Auto)   


 


Baso % (Auto)   


 


Neut # (Auto)   


 


Lymph # (Auto)   


 


Mono # (Auto)   


 


Eos # (Auto)   


 


Baso # (Auto)   


 


PT   


 


INR   


 


APTT   


 


pCO2   100 H* 


 


pO2   44 L* 


 


HCO3   37.9 H 


 


ABG pH   7.28 L 


 


ABG Total CO2   50.1 H 


 


ABG O2 Saturation   90.1 L 


 


ABG O2 Content   11.8 L 


 


ABG Base Excess   16.9 H 


 


ABG Hemoglobin   9.9 L 


 


ABG Carboxyhemoglobin   3.4 H 


 


POC ABG HHb (Measured)   9.4 H 


 


ABG Methemoglobin   2.2 


 


ABG O2 Capacity   13.1 L 


 


Almas Test   Yes 


 


VBG pH   


 


VBG pCO2   


 


VBG HCO3   


 


VBG Total CO2   


 


VBG O2 Sat (Calc)   


 


VBG Base Excess   


 


VBG Potassium   


 


A-a O2 Difference   59.0 


 


Hgb O2 Saturation   85.1 L 


 


Sodium   


 


Chloride   


 


Glucose   


 


Lactate   


 


FiO2   32.0 


 


Crit Value Called To   Latanya perez 


 


Crit Value Called By   23 


 


Crit Value Read Back   Y 


 


Blood Gas Notified Time   2120 


 


Potassium   


 


Carbon Dioxide   


 


Anion Gap   


 


BUN   


 


Creatinine   


 


Est GFR ( Amer)   


 


Est GFR (Non-Af Amer)   


 


POC Glucose (mg/dL)    138 H


 


Random Glucose   


 


Calcium   


 


Phosphorus   


 


Magnesium   


 


Total Bilirubin   


 


AST   


 


ALT   


 


Alkaline Phosphatase   


 


Total Creatine Kinase   


 


Troponin I   


 


Total Protein   


 


Albumin   


 


Globulin   


 


Albumin/Globulin Ratio   


 


Venous Blood Potassium   


 


Phenytoin  5.5 L  


 


Valproic Acid   


 


Influenza Typ A,B (EIA)   


 


Blood Type   


 


Antibody Screen   


 


BBK History Checked   














  04/11/18 04/12/18 04/12/18





  22:08 07:41 07:41


 


WBC   7.8 


 


RBC   2.89 L 


 


Hgb   8.8 L 


 


Hct   27.3 L 


 


MCV   94.5 


 


MCH   30.5 


 


MCHC   32.3 L 


 


RDW   13.3 


 


Plt Count   164 


 


MPV   


 


Neut % (Auto)   


 


Lymph % (Auto)   


 


Mono % (Auto)   


 


Eos % (Auto)   


 


Baso % (Auto)   


 


Neut # (Auto)   


 


Lymph # (Auto)   


 


Mono # (Auto)   


 


Eos # (Auto)   


 


Baso # (Auto)   


 


PT   


 


INR   


 


APTT   


 


pCO2   


 


pO2   


 


HCO3   


 


ABG pH   


 


ABG Total CO2   


 


ABG O2 Saturation   


 


ABG O2 Content   


 


ABG Base Excess   


 


ABG Hemoglobin   


 


ABG Carboxyhemoglobin   


 


POC ABG HHb (Measured)   


 


ABG Methemoglobin   


 


ABG O2 Capacity   


 


Almas Test   


 


VBG pH   


 


VBG pCO2   


 


VBG HCO3   


 


VBG Total CO2   


 


VBG O2 Sat (Calc)   


 


VBG Base Excess   


 


VBG Potassium   


 


A-a O2 Difference   


 


Hgb O2 Saturation   


 


Sodium    136


 


Chloride    84 L


 


Glucose   


 


Lactate   


 


FiO2   


 


Crit Value Called To   


 


Crit Value Called By   


 


Crit Value Read Back   


 


Blood Gas Notified Time   


 


Potassium    4.9


 


Carbon Dioxide    40 H*


 


Anion Gap    17


 


BUN    26 H


 


Creatinine    1.3 H


 


Est GFR ( Amer)    47


 


Est GFR (Non-Af Amer)    39


 


POC Glucose (mg/dL)   


 


Random Glucose    135 H


 


Calcium    7.9 L


 


Phosphorus   


 


Magnesium   


 


Total Bilirubin    0.3


 


AST    46 H


 


ALT    53 H


 


Alkaline Phosphatase    90


 


Total Creatine Kinase   


 


Troponin I   


 


Total Protein    6.4


 


Albumin    3.4 L


 


Globulin    3.0


 


Albumin/Globulin Ratio    1.1


 


Venous Blood Potassium   


 


Phenytoin   


 


Valproic Acid   


 


Influenza Typ A,B (EIA)  Negative for flu a/b  


 


Blood Type   


 


Antibody Screen   


 


BBK History Checked   








 





 





Accession No. : [id number]


Patient Name / ID : ELAN SOSA  / [id number]


Exam Date : 04/12/2018 09:59:46 ( Approved )


Study Comment : 


Sex / Age : F  / 88Y





Creator : Jonas Villafuerte MD


Dictator : Jonas Villafuerte MD


 : 


Approver : Jonas Villafuerte MD


Approver2 : 





Report Date : 04/12/2018 10:30:48


My Comment : 


********************************************************************************

***





HISTORY:


aspiration pneumonia  





COMPARISON:


4/11/2018 





FINDINGS:





LUNGS:


No active pulmonary disease.





PLEURA:


Blunting of left costophrenic angle may reflect small pleural effusion.  No 

right pleural effusion. No pneumothorax.





CARDIOVASCULAR:


Normal.





OSSEOUS STRUCTURES:


No significant abnormalities.





VISUALIZED UPPER ABDOMEN:


Normal.





OTHER FINDINGS:


None.





IMPRESSION:


Possible small left pleural effusion. No acute infiltrate.





Assessment & Plan


(1) Recurrent seizures


Status: Acute   





(2) Pleural effusion


Status: Acute   





(3) Cough


Status: Acute   





- Assessment and Plan (Free Text)


Assessment: 





A/P-


88 year old female with DM II, HTN, recent cervical fracture s/p spinal fusion 

in 11/2017 and seizures admitted with recurrent seizure and cough ? aspiration 

pneumonitis.


currently pt. is afebrile and has normal wbc and no evidence of sepsis


cxr as per report- no infiltrate, left pleural effusion and cardiomegaly.





plan-


no sign of sepsis , however, based on the history of spitting up food with the 

seizure there is  possibility of aspiration pneumonitis .


also in light of recent SHELBI would advise to cover for nosocomial pathogens 

pending further results.


check sputum cx.


check ECHO in light of loud murmur and left pleural effusion r/o CHF as well.


pt. already had received 1 dose of meropenm in ED and vancomycin.


No need for further  meropenem as pt.is afebrile, normal wbc and no evidence of 

asp pneumonitis on cxr , however would advise to place on zosyn to cover for 

nosocomial pathogens and ?? asp pneumoonitis.


no objection to continuing  with IV vanco for few days pending further results.


monitor aspiration precautions.


seizure management as per neurology.








 al above d/w patient's daughter at length and her questions were answered.





Thank you for allowing me to take part in the care of this patient.

## 2018-04-13 LAB
ARTERIAL BLOOD GAS HEMOGLOBIN: 8 G/DL (ref 11.7–17.4)
ARTERIAL BLOOD GAS HEMOGLOBIN: 8.4 G/DL (ref 11.7–17.4)
ARTERIAL BLOOD GAS HEMOGLOBIN: 8.5 G/DL (ref 11.7–17.4)
ARTERIAL BLOOD GAS O2 SAT: 100.2 % (ref 95–98)
ARTERIAL BLOOD GAS O2 SAT: 100.3 % (ref 95–98)
ARTERIAL BLOOD GAS O2 SAT: 100.6 % (ref 95–98)
ARTERIAL BLOOD GAS PCO2: 115 MM/HG (ref 35–45)
ARTERIAL BLOOD GAS PCO2: 94 MM/HG (ref 35–45)
ARTERIAL BLOOD GAS PCO2: 95 MM/HG (ref 35–45)
ARTERIAL BLOOD GAS TCO2: 52.5 MMOL/L (ref 22–28)
ARTERIAL BLOOD GAS TCO2: 52.8 MMOL/L (ref 22–28)
ARTERIAL BLOOD GAS TCO2: 53 MMOL/L (ref 22–28)
ARTERIAL PATENCY WRIST A: YES
BUN SERPL-MCNC: 32 MG/DL (ref 7–17)
CALCIUM SERPL-MCNC: 8 MG/DL (ref 8.4–10.2)
ERYTHROCYTE [DISTWIDTH] IN BLOOD BY AUTOMATED COUNT: 14.2 % (ref 11.5–14.5)
GFR NON-AFRICAN AMERICAN: 39
HCO3 BLDA-SCNC: 39.8 MMOL/L (ref 21–28)
HCO3 BLDA-SCNC: 41 MMOL/L (ref 21–28)
HCO3 BLDA-SCNC: 41.4 MMOL/L (ref 21–28)
HGB BLD-MCNC: 8.8 G/DL (ref 12–16)
INHALED O2 CONCENTRATION: 50 %
INHALED O2 CONCENTRATION: 50 %
INHALED O2 CONCENTRATION: 60 %
MCH RBC QN AUTO: 30.2 PG (ref 27–31)
MCHC RBC AUTO-ENTMCNC: 31.4 G/DL (ref 33–37)
MCV RBC AUTO: 96.1 FL (ref 81–99)
O2 CAP BLDA-SCNC: 11.2 ML/DL (ref 16–24)
O2 CAP BLDA-SCNC: 11.8 ML/DL (ref 16–24)
O2 CAP BLDA-SCNC: 11.9 ML/DL (ref 16–24)
O2 CT BLDA-SCNC: 11.3 ML/DL (ref 15–23)
O2 CT BLDA-SCNC: 11.8 ML/DL (ref 15–23)
O2 CT BLDA-SCNC: 11.9 ML/DL (ref 15–23)
PH BLDA: 7.24 [PH] (ref 7.35–7.45)
PH BLDA: 7.33 [PH] (ref 7.35–7.45)
PH BLDA: 7.33 [PH] (ref 7.35–7.45)
PLATELET # BLD: 143 K/UL (ref 130–400)
PO2 BLDA: 113 MM/HG (ref 80–100)
PO2 BLDA: 134 MM/HG (ref 80–100)
PO2 BLDA: 148 MM/HG (ref 80–100)
RBC # BLD AUTO: 2.9 MIL/UL (ref 3.8–5.2)
WBC # BLD AUTO: 7.6 K/UL (ref 4.8–10.8)

## 2018-04-13 RX ADMIN — GLIPIZIDE SCH MG: 2.5 TABLET, EXTENDED RELEASE ORAL at 09:58

## 2018-04-13 RX ADMIN — Medication SCH: at 09:05

## 2018-04-13 RX ADMIN — POTASSIUM CHLORIDE SCH MEQ: 20 TABLET, EXTENDED RELEASE ORAL at 09:57

## 2018-04-13 RX ADMIN — IPRATROPIUM BROMIDE AND ALBUTEROL SULFATE SCH ML: .5; 3 SOLUTION RESPIRATORY (INHALATION) at 16:08

## 2018-04-13 RX ADMIN — IPRATROPIUM BROMIDE AND ALBUTEROL SULFATE SCH ML: .5; 3 SOLUTION RESPIRATORY (INHALATION) at 19:22

## 2018-04-13 RX ADMIN — GLIPIZIDE SCH: 2.5 TABLET, EXTENDED RELEASE ORAL at 09:05

## 2018-04-13 RX ADMIN — ENOXAPARIN SODIUM SCH MG: 40 INJECTION SUBCUTANEOUS at 09:57

## 2018-04-13 RX ADMIN — Medication SCH TAB: at 09:58

## 2018-04-13 RX ADMIN — POTASSIUM CHLORIDE SCH: 20 TABLET, EXTENDED RELEASE ORAL at 18:31

## 2018-04-13 RX ADMIN — PHENYTOIN SODIUM SCH MG: 50 INJECTION INTRAMUSCULAR; INTRAVENOUS at 17:35

## 2018-04-13 RX ADMIN — POTASSIUM CHLORIDE SCH: 20 TABLET, EXTENDED RELEASE ORAL at 09:00

## 2018-04-13 NOTE — CT
PROCEDURE:  CT Cervical Spine without contrast



HISTORY:

Pain, prior neck fracture



COMPARISON:

Unenhanced cervical spine CT 11/15/2017.







TECHNIQUE:

Axial computed tomography images were obtained of the cervical spine 

without the use of intravenous contrast. Coronal and sagittal 

reformatted images were created and reviewed.



Radiation dose: 



Total exam DLP = 1085.47 mGy-cm.



This CT exam was performed using one or more of the following dose 

reduction techniques: Automated exposure control, adjustment of the 

mA and/or kV according to patient size, and/or use of iterative 

reconstruction technique.



FINDINGS:



VERTEBRAE:

Type 2 odontoid fracture is now corticated along prior fracture plane 

compatible with ununited fracture with posterior fusion of C2 with 

the posterior cortex of the foramen magnum now identified. No 

subluxation is appreciated and the fracture appears unchanged in 

position in the interval.  Degenerative C1-2 changes are reiterated. 

Normal curvature is preserved.  Advanced multilevel cervical 

spondylosis is reiterated, predominantly anteriorly. Multilevel facet 

joint degenerative changes are stable concentrated at the upper 

levels with prevertebral and paraspinal soft tissues unremarkable 

exclusive of limited postoperative fibrosis posterior to C1 and C2 

levels.



DISCS/SPINAL CANAL/NEURAL FORAMINA:

No significant central canal stenosis noted in the interval with 

facet joint and uncovertebral joint arthrosis conspiring to result in 

limited variable left neural foraminal stenosis from left C2 through 

C5 root foraminal once again.  None appear severe once again.



PARASPINAL SOFT TISSUES:

As per above. 



OTHER FINDINGS:

None



IMPRESSION:

Ununited type 2 C2 odontoid fracture stabilized by posterior C2 

fusion with the occiput/anterior foramen magnum cortex. The fracture 

plane is now corticated on both sides and stable in position. No 

interval spondylolisthesis throughout.



Multilevel degenerative disc disease, facet and uncovertebral joint 

arthropathy as discussed above. No significant interval central canal 

stenosis. Variable limited upper to mid cervical left neural 

foraminal stenoses, degenerative.

## 2018-04-13 NOTE — CP.PCM.CON
History of Present Illness





- History of Present Illness


History of Present Illness: 





Mrs. Herndon is an 88-year-old woman with a past medical history of epilepsy, 

cared for by Dr. Huang, who had a witnessed generlized tonic seizure at home 

and was brought in to the ED.  She is on dilantin, but her serum levels were 

5.5 when she came in.  The patient subsequently developed respiratory distress, 

possibly due to aspiration, and now is doing well after coughing out a plug.  

Currently, she is saturating well, but she had not had any of her oral 

medications.  When I saw the patient, she was not very responsive, and had 

continuous facial twitching that was concerning for a focal seizure.  





Review of Systems





- Review of Systems


All systems: reviewed and no additional remarkable complaints except





Past Patient History





- Infectious Disease


Hx of Infectious Diseases: None





- Tetanus Immunizations


Tetanus Immunization: Unknown





- Past Medical History & Family History


Past Medical History?: Yes





- Past Social History


Alcohol: None


Drugs: Denies


Home Situation {Lives}: With Family





- CARDIAC


Hx Cardiac Disorders: Yes





- PULMONARY


Hx Respiratory Disorders: Yes





- NEUROLOGICAL


Hx Neurological Disorder: Yes





- HEENT


Hx HEENT Problems: No





- RENAL


Hx Chronic Kidney Disease: No





- ENDOCRINE/METABOLIC


Hx Endocrine Disorders: Yes (DM)


Hx Diabetes Mellitus Type 2: Yes





- HEMATOLOGICAL/ONCOLOGICAL


Hx Blood Disorders: No





- INTEGUMENTARY


Hx Dermatological Problems: No





- MUSCULOSKELETAL/RHEUMATOLOGICAL


Hx Musculoskeletal Disorders: Yes





- GASTROINTESTINAL


Hx Gastrointestinal Disorders: Yes


Hx Constipation: Yes





- GENITOURINARY/GYNECOLOGICAL


Hx Genitourinary Disorders: No





- PSYCHIATRIC


Hx Psychophysiologic Disorder: No


Hx Substance Use: No





- SURGICAL HISTORY


Hx Surgeries: Yes


Hx Tubal Ligation: Yes


Other/Comment: neck surgery 11/17/17.





- ANESTHESIA


Hx Anesthesia: Yes


Hx Anesthesia Reactions: No





Meds


Allergies/Adverse Reactions: 


 Allergies











Allergy/AdvReac Type Severity Reaction Status Date / Time


 


No Known Allergies Allergy   Verified 04/11/18 18:55














- Medications


Medications: 


 Current Medications





Acetaminophen (Tylenol 325mg Tab)  975 mg PO Q6 PRN


   PRN Reason: Fever- T- 101 or above


Amlodipine Besylate (Norvasc)  2.5 mg PO DAILY [facility]


   Last Admin: 04/13/18 09:59 Dose:  2.5 mg


Enoxaparin Sodium (Lovenox)  40 mg SC DAILY [facility]


   PRN Reason: Protocol


   Last Admin: 04/13/18 09:57 Dose:  40 mg


Furosemide (Lasix)  20 mg PO DAILY [facility]


   Last Admin: 04/13/18 09:57 Dose:  20 mg


Glipizide (Glucotrol Xl)  2.5 mg PO DAILY [facility]


   Last Admin: 04/13/18 09:58 Dose:  2.5 mg


Piperacillin Sod/Tazobactam (Sod 2.25 gm/ Sodium Chloride)  100 mls @ 100 mls/

hr IVPB Q6 [facility]


   PRN Reason: Protocol


   Last Admin: 04/13/18 10:01 Dose:  100 mls/hr


Dextrose (Dextrose 10% In Water)  1,000 mls @ 40 mls/hr IV .Q24H [facility]


   Stop: 04/13/18 21:21


   Last Admin: 04/12/18 22:19 Dose:  40 mls/hr


Vancomycin HCl 1 gm/ Sodium (Chloride)  250 mls @ 166.667 mls/hr IVPB Q12@0300,

1500 [facility]


   PRN Reason: Protocol


   Last Admin: 04/13/18 02:11 Dose:  166.667 mls/hr


Montelukast Sodium (Singulair)  10 mg PO HS [facility]


   Last Admin: 04/12/18 21:37 Dose:  Not Given


Multivitamins/Minerals (Therapeutic-M Tab)  1 tab PO DAILY [facility]


   Last Admin: 04/13/18 09:58 Dose:  1 tab


Phenytoin Sodium (Dilantin)  100 mg PO TID [facility]


   Last Admin: 04/13/18 09:57 Dose:  100 mg


Potassium Chloride (K-Dur 20 Meq Er Tab)  20 meq PO BID [facility]


   Last Admin: 04/13/18 09:57 Dose:  20 meq











Physical Exam





- Constitutional


Appears: Well





- Head Exam


Head Exam: ATRAUMATIC, NORMAL INSPECTION, NORMOCEPHALIC





- Neurological Exam


Neurological exam: Altered, CN II-XII Intact, Reflexes Normal


Additional comments: 





Somnolent, opens eyes to pain and moves extremities.  Does not speak or offer 

any speech.  She had continuous right facial rhythmic movements. 





Results





- Vital Signs


Recent Vital Signs: 


 Last Vital Signs











Temp  98.1 F   04/13/18 08:00


 


Pulse  65   04/13/18 10:00


 


Resp  20   04/13/18 10:00


 


BP  167/76 H  04/13/18 10:00


 


Pulse Ox  100   04/13/18 10:00














- Labs


Result Diagrams: 


 04/13/18 04:20





 04/13/18 04:20


Labs: 


 Laboratory Results - last 24 hr











  04/12/18 04/12/18 04/12/18





  13:06 18:07 20:00


 


WBC   


 


RBC   


 


Hgb   


 


Hct   


 


MCV   


 


MCH   


 


MCHC   


 


RDW   


 


Plt Count   


 


pCO2    95 H*


 


pO2    67 L


 


HCO3    40.2 H*


 


ABG pH    7.32 L


 


ABG Total CO2    51.8 H


 


ABG O2 Saturation    98.5 H


 


ABG O2 Content    12.4 L


 


ABG Base Excess    19.6 H


 


ABG Hemoglobin    9.1 L


 


ABG Carboxyhemoglobin    1.8 H


 


POC ABG HHb (Measured)    1.5


 


ABG Methemoglobin    0.7


 


ABG O2 Capacity    12.6 L


 


Almas Test    Yes


 


A-a O2 Difference    99.0


 


Hgb O2 Saturation    96.0


 


Vent Mode    Bipap


 


Mechanical Rate   


 


FiO2    40.0


 


Inspiratory BiPAP    12


 


Expiratory BiPAP    6


 


Crit Value Called To    Rn raquel dimitris


 


Crit Value Called By    Rt


 


Crit Value Read Back    Y


 


Blood Gas Notified Time    2011


 


Sodium   


 


Potassium   


 


Chloride   


 


Carbon Dioxide   


 


Anion Gap   


 


BUN   


 


Creatinine   


 


Est GFR ( Amer)   


 


Est GFR (Non-Af Amer)   


 


POC Glucose (mg/dL)  142 H  


 


Random Glucose   


 


Calcium   


 


Vancomycin Peak   15.9 L 














  04/12/18 04/12/18 04/13/18





  21:15 22:44 03:17


 


WBC   


 


RBC   


 


Hgb   


 


Hct   


 


MCV   


 


MCH   


 


MCHC   


 


RDW   


 


Plt Count   


 


pCO2   


 


pO2   


 


HCO3   


 


ABG pH   


 


ABG Total CO2   


 


ABG O2 Saturation   


 


ABG O2 Content   


 


ABG Base Excess   


 


ABG Hemoglobin   


 


ABG Carboxyhemoglobin   


 


POC ABG HHb (Measured)   


 


ABG Methemoglobin   


 


ABG O2 Capacity   


 


Almas Test   


 


A-a O2 Difference   


 


Hgb O2 Saturation   


 


Vent Mode   


 


Mechanical Rate   


 


FiO2   


 


Inspiratory BiPAP   


 


Expiratory BiPAP   


 


Crit Value Called To   


 


Crit Value Called By   


 


Crit Value Read Back   


 


Blood Gas Notified Time   


 


Sodium   


 


Potassium   


 


Chloride   


 


Carbon Dioxide   


 


Anion Gap   


 


BUN   


 


Creatinine   


 


Est GFR ( Amer)   


 


Est GFR (Non-Af Amer)   


 


POC Glucose (mg/dL)  63 L  126 H  127 H


 


Random Glucose   


 


Calcium   


 


Vancomycin Peak   














  04/13/18 04/13/18 04/13/18





  04:20 04:20 04:20


 


WBC  7.6  


 


RBC  2.90 L  


 


Hgb  8.8 L  


 


Hct  27.9 L  


 


MCV  96.1  


 


MCH  30.2  


 


MCHC  31.4 L  


 


RDW  14.2  


 


Plt Count  143  


 


pCO2   


 


pO2   


 


HCO3   


 


ABG pH   


 


ABG Total CO2   


 


ABG O2 Saturation   


 


ABG O2 Content   


 


ABG Base Excess   


 


ABG Hemoglobin   


 


ABG Carboxyhemoglobin   


 


POC ABG HHb (Measured)   


 


ABG Methemoglobin   


 


ABG O2 Capacity   


 


Almas Test   


 


A-a O2 Difference   


 


Hgb O2 Saturation   


 


Vent Mode   


 


Mechanical Rate   


 


FiO2   


 


Inspiratory BiPAP   


 


Expiratory BiPAP   


 


Crit Value Called To   


 


Crit Value Called By   


 


Crit Value Read Back   


 


Blood Gas Notified Time   


 


Sodium   138 


 


Potassium   4.8 


 


Chloride   87 L 


 


Carbon Dioxide   38 H 


 


Anion Gap   18 


 


BUN   32 H 


 


Creatinine   1.3 H 


 


Est GFR ( Amer)   47 


 


Est GFR (Non-Af Amer)   39 


 


POC Glucose (mg/dL)   


 


Random Glucose   121 H 


 


Calcium   8.0 L 


 


Vancomycin Peak    19.7 L














  04/13/18 04/13/18 04/13/18





  04:42 04:59 07:07


 


WBC   


 


RBC   


 


Hgb   


 


Hct   


 


MCV   


 


MCH   


 


MCHC   


 


RDW   


 


Plt Count   


 


pCO2  95 H*   94 H*


 


pO2  113 H   134 H


 


HCO3  41.4 H*   41.0 H*


 


ABG pH  7.33 L   7.33 L


 


ABG Total CO2  53.0 H   52.5 H


 


ABG O2 Saturation  100.3 H   100.2 H


 


ABG O2 Content  11.8 L   11.9 L


 


ABG Base Excess  21.1 H   20.6 H


 


ABG Hemoglobin  8.4 L   8.5 L


 


ABG Carboxyhemoglobin  1.8 H   1.7 H


 


POC ABG HHb (Measured)  -0.3 L   -0.2 L


 


ABG Methemoglobin  0.7   0.9


 


ABG O2 Capacity  11.8 L   11.9 L


 


Almas Test  Yes   Yes


 


A-a O2 Difference  125.0   105.0


 


Hgb O2 Saturation  97.8   97.5


 


Vent Mode  Bipap  


 


Mechanical Rate  12   16


 


FiO2  50.0   50.0


 


Inspiratory BiPAP  14   20


 


Expiratory BiPAP  7   8


 


Crit Value Called To  alexia Lee Dr, md


 


Crit Value Called By  302   15


 


Crit Value Read Back  Y   Y


 


Blood Gas Notified Time  447   741


 


Sodium   


 


Potassium   


 


Chloride   


 


Carbon Dioxide   


 


Anion Gap   


 


BUN   


 


Creatinine   


 


Est GFR ( Amer)   


 


Est GFR (Non-Af Amer)   


 


POC Glucose (mg/dL)   115 H 


 


Random Glucose   


 


Calcium   


 


Vancomycin Peak   














  04/13/18 04/13/18





  08:48 11:08


 


WBC  


 


RBC  


 


Hgb  


 


Hct  


 


MCV  


 


MCH  


 


MCHC  


 


RDW  


 


Plt Count  


 


pCO2  


 


pO2  


 


HCO3  


 


ABG pH  


 


ABG Total CO2  


 


ABG O2 Saturation  


 


ABG O2 Content  


 


ABG Base Excess  


 


ABG Hemoglobin  


 


ABG Carboxyhemoglobin  


 


POC ABG HHb (Measured)  


 


ABG Methemoglobin  


 


ABG O2 Capacity  


 


Almas Test  


 


A-a O2 Difference  


 


Hgb O2 Saturation  


 


Vent Mode  


 


Mechanical Rate  


 


FiO2  


 


Inspiratory BiPAP  


 


Expiratory BiPAP  


 


Crit Value Called To  


 


Crit Value Called By  


 


Crit Value Read Back  


 


Blood Gas Notified Time  


 


Sodium  


 


Potassium  


 


Chloride  


 


Carbon Dioxide  


 


Anion Gap  


 


BUN  


 


Creatinine  


 


Est GFR ( Amer)  


 


Est GFR (Non-Af Amer)  


 


POC Glucose (mg/dL)  157 H  151 H


 


Random Glucose  


 


Calcium  


 


Vancomycin Peak  














Assessment & Plan


(1) Recurrent seizures


Assessment and Plan: 


The patient is unlikely to be therapeutic on dilantin since she has not had it 

in two days due to no oral intake.  There is concern that she is currently 

having a focal seizure with secondarily generalized onset.  I recommend loading 

with 600 mg IV now and continuing 100 mg Q8 IV, until she is able to take in 

oral medications.  When she begins to take in oral medications, we can start 

oral dosing.  After speaking with Dr. Huang, we will increase the daily dose to 

350 mg of dilantin.  





THank you. 


Status: Acute   Priority: High

## 2018-04-13 NOTE — RAD
HISTORY:

NGT Placement  



COMPARISON:

Portable chest 04/12/2018. 



FINDINGS:



LUNGS:

Somewhat diminished airspace disease seen the left base with improved 

definition of left hemidiaphragm noted. Mild residual however is 

apparent with remaining lung fields clear.



PLEURA:

No pneumothorax bilaterally.  No definite right pleural effusion.  

Trace left pleural effusion may remain.



CARDIOVASCULAR:

Cardiomegaly appears stable. No definite active CHF pattern 

appreciable.



OSSEOUS STRUCTURES:

No significant abnormalities.



VISUALIZED UPPER ABDOMEN:

Normal.



OTHER FINDINGS:

None.



IMPRESSION:

Improving left basilar airspace disease with trace left pleural 

effusion remaining. Stable cardiomegaly.

## 2018-04-13 NOTE — CP.PCM.PN
Subjective





- Date & Time of Evaluation


Date of Evaluation: 04/13/18


Time of Evaluation: 10:49





- Subjective


Subjective: 





ID Note-





Pt. seen and examined today in ICU.


pt. apparently became hypercapneic last night and was placed on BIAPAP nad 

transferred to ICU.


currently pt. on NC oxygen and saturating 100%.


 asper nurse she suctioned thick phlegm from patient earlier today adn she has 

been saturating well after that,however, pt. still drwosy and letharguc.





she opens her eyes when her name is called.








her daughter is at her bedside.





Objective





- Vital Signs/Intake and Output


Vital Signs (last 24 hours): 


 











Temp Pulse Resp BP Pulse Ox


 


 98.1 F   65   20   167/76 H  100 


 


 04/13/18 08:00  04/13/18 10:00  04/13/18 10:00  04/13/18 10:00  04/13/18 10:00








Intake and Output: 


 











 04/13/18 04/13/18





 06:59 18:59


 


Intake Total 590 280


 


Output Total 0 


 


Balance 590 280














- Medications


Medications: 


 Current Medications





Acetaminophen (Tylenol 325mg Tab)  975 mg PO Q6 PRN


   PRN Reason: Fever- T- 101 or above


Amlodipine Besylate (Norvasc)  2.5 mg PO DAILY [facility]


   Last Admin: 04/13/18 09:59 Dose:  2.5 mg


Enoxaparin Sodium (Lovenox)  40 mg SC DAILY [facility]


   PRN Reason: Protocol


   Last Admin: 04/13/18 09:57 Dose:  40 mg


Furosemide (Lasix)  20 mg PO DAILY [facility]


   Last Admin: 04/13/18 09:57 Dose:  20 mg


Glipizide (Glucotrol Xl)  2.5 mg PO DAILY [facility]


   Last Admin: 04/13/18 09:58 Dose:  2.5 mg


Piperacillin Sod/Tazobactam (Sod 2.25 gm/ Sodium Chloride)  100 mls @ 100 mls/

hr IVPB Q6 [facility]


   PRN Reason: Protocol


   Last Admin: 04/13/18 10:01 Dose:  100 mls/hr


Dextrose (Dextrose 10% In Water)  1,000 mls @ 40 mls/hr IV .Q24H [facility]


   Stop: 04/13/18 21:21


   Last Admin: 04/12/18 22:19 Dose:  40 mls/hr


Vancomycin HCl 1 gm/ Sodium (Chloride)  250 mls @ 166.667 mls/hr IVPB Q12@0300,

1500 [facility]


   PRN Reason: Protocol


   Last Admin: 04/13/18 02:11 Dose:  166.667 mls/hr


Montelukast Sodium (Singulair)  10 mg PO HS [facility]


   Last Admin: 04/12/18 21:37 Dose:  Not Given


Multivitamins/Minerals (Therapeutic-M Tab)  1 tab PO DAILY [facility]


   Last Admin: 04/13/18 09:58 Dose:  1 tab


Phenytoin Sodium (Dilantin)  100 mg PO TID [facility]


   Last Admin: 04/13/18 09:57 Dose:  100 mg


Potassium Chloride (K-Dur 20 Meq Er Tab)  20 meq PO BID [facility]


   Last Admin: 04/13/18 09:57 Dose:  20 meq











- Labs


Labs: 


 








- Additional Findings


Additional findings: 





- Constitutional


Appears: No Acute Distress, Chronically Ill








- Neck Exam


Neck exam: Positive for: Full Rom


Additional comments: 





poserior cervical spine surgical site clean, no erythema





- Respiratory Exam


Additional comments: 





slight tachypnea





decreased breath sounds at bases, no wheezing





- Cardiovascular Exam


Cardiovascular Exam: RRR, +S1, +S2


Additional comments: 





3/6 ejection murmur heard at LSB





- GI/Abdominal Exam


GI & Abdominal Exam: Normal Bowel Sounds, Soft


Additional comments: 





NT, ND





- Extremities Exam


Extremities exam: Positive for: normal inspection





- Neurological Exam


Additional comments: 





lethargic but opens her eyes when name is called but does not answer questions





 


 Laboratory Results - last 72 hr











  04/11/18 04/11/18 04/11/18





  19:55 19:55 19:55


 


WBC  8.2  


 


RBC  3.28 L  


 


Hgb  10.1 L  


 


Hct  31.0 L  


 


MCV  94.7  


 


MCH  30.7  


 


MCHC  32.4 L  


 


RDW  13.7  


 


Plt Count  182  


 


MPV  9.5  


 


Neut % (Auto)  79.1 H  


 


Lymph % (Auto)  11.9 L  


 


Mono % (Auto)  8.7  


 


Eos % (Auto)  0.2  


 


Baso % (Auto)  0.1  


 


Neut # (Auto)  6.5  


 


Lymph # (Auto)  1.0  


 


Mono # (Auto)  0.7  


 


Eos # (Auto)  0.0  


 


Baso # (Auto)  0.0  


 


PT    10.1


 


INR    0.9


 


APTT    25.9


 


pCO2   


 


pO2   


 


HCO3   


 


ABG pH   


 


ABG Total CO2   


 


ABG O2 Saturation   


 


ABG O2 Content   


 


ABG Base Excess   


 


ABG Hemoglobin   


 


ABG Carboxyhemoglobin   


 


POC ABG HHb (Measured)   


 


ABG Methemoglobin   


 


ABG O2 Capacity   


 


Almas Test   


 


VBG pH   


 


VBG pCO2   


 


VBG HCO3   


 


VBG Total CO2   


 


VBG O2 Sat (Calc)   


 


VBG Base Excess   


 


VBG Potassium   


 


A-a O2 Difference   


 


Hgb O2 Saturation   


 


Sodium   132 


 


Chloride   81 L 


 


Glucose   


 


Lactate   


 


Vent Mode   


 


Mechanical Rate   


 


FiO2   


 


Inspiratory BiPAP   


 


Expiratory BiPAP   


 


Crit Value Called To   


 


Crit Value Called By   


 


Crit Value Read Back   


 


Blood Gas Notified Time   


 


Potassium   4.7 


 


Carbon Dioxide   36 H 


 


Anion Gap   20 


 


BUN   24 H 


 


Creatinine   1.0 


 


Est GFR ( Amer)   > 60 


 


Est GFR (Non-Af Amer)   52 


 


POC Glucose (mg/dL)   


 


Random Glucose   188 H 


 


Calcium   8.3 L 


 


Phosphorus   6.4 H 


 


Magnesium   2.2 


 


Total Bilirubin   0.4 


 


AST   52 H 


 


ALT   53 H D 


 


Alkaline Phosphatase   104 


 


Total Creatine Kinase   37 


 


Troponin I   0.0450 


 


Total Protein   7.1 


 


Albumin   3.9 


 


Globulin   3.2 


 


Albumin/Globulin Ratio   1.2 


 


Venous Blood Potassium   


 


Vancomycin Peak   


 


Phenytoin   


 


Valproic Acid   


 


Influenza Typ A,B (EIA)   


 


Blood Type   


 


Antibody Screen   


 


BBK History Checked   














  04/11/18 04/11/18 04/11/18





  19:55 19:55 20:00


 


WBC   


 


RBC   


 


Hgb   


 


Hct   


 


MCV   


 


MCH   


 


MCHC   


 


RDW   


 


Plt Count   


 


MPV   


 


Neut % (Auto)   


 


Lymph % (Auto)   


 


Mono % (Auto)   


 


Eos % (Auto)   


 


Baso % (Auto)   


 


Neut # (Auto)   


 


Lymph # (Auto)   


 


Mono # (Auto)   


 


Eos # (Auto)   


 


Baso # (Auto)   


 


PT   


 


INR   


 


APTT   


 


pCO2   


 


pO2   26 L 


 


HCO3   


 


ABG pH   


 


ABG Total CO2   


 


ABG O2 Saturation   


 


ABG O2 Content   


 


ABG Base Excess   


 


ABG Hemoglobin   


 


ABG Carboxyhemoglobin   


 


POC ABG HHb (Measured)   


 


ABG Methemoglobin   


 


ABG O2 Capacity   


 


Almas Test   


 


VBG pH   7.22 L 


 


VBG pCO2   116 H* 


 


VBG HCO3   34.5 


 


VBG Total CO2   51.1 H 


 


VBG O2 Sat (Calc)   59.9 


 


VBG Base Excess   14.4 H 


 


VBG Potassium   4.6 


 


A-a O2 Difference   


 


Hgb O2 Saturation   


 


Sodium   128.0 L 


 


Chloride   86.0 L 


 


Glucose   209 H 


 


Lactate   3.2 H 


 


Vent Mode   


 


Mechanical Rate   


 


FiO2   21.0 


 


Inspiratory BiPAP   


 


Expiratory BiPAP   


 


Crit Value Called To   Alexia meyer 


 


Crit Value Called By   23 


 


Crit Value Read Back   Y 


 


Blood Gas Notified Time   2000 


 


Potassium   


 


Carbon Dioxide   


 


Anion Gap   


 


BUN   


 


Creatinine   


 


Est GFR ( Amer)   


 


Est GFR (Non-Af Amer)   


 


POC Glucose (mg/dL)   


 


Random Glucose   


 


Calcium   


 


Phosphorus   


 


Magnesium   


 


Total Bilirubin   


 


AST   


 


ALT   


 


Alkaline Phosphatase   


 


Total Creatine Kinase   


 


Troponin I   


 


Total Protein   


 


Albumin   


 


Globulin   


 


Albumin/Globulin Ratio   


 


Venous Blood Potassium   4.6 


 


Vancomycin Peak   


 


Phenytoin   


 


Valproic Acid    < 10.0 L


 


Influenza Typ A,B (EIA)   


 


Blood Type  A POSITIVE  


 


Antibody Screen  Negative  


 


BBK History Checked  Patient has bt  














  04/11/18 04/11/18 04/11/18





  20:46 21:05 21:05


 


WBC   


 


RBC   


 


Hgb   


 


Hct   


 


MCV   


 


MCH   


 


MCHC   


 


RDW   


 


Plt Count   


 


MPV   


 


Neut % (Auto)   


 


Lymph % (Auto)   


 


Mono % (Auto)   


 


Eos % (Auto)   


 


Baso % (Auto)   


 


Neut # (Auto)   


 


Lymph # (Auto)   


 


Mono # (Auto)   


 


Eos # (Auto)   


 


Baso # (Auto)   


 


PT   


 


INR   


 


APTT   


 


pCO2   100 H* 


 


pO2   44 L* 


 


HCO3   37.9 H 


 


ABG pH   7.28 L 


 


ABG Total CO2   50.1 H 


 


ABG O2 Saturation   90.1 L 


 


ABG O2 Content   11.8 L 


 


ABG Base Excess   16.9 H 


 


ABG Hemoglobin   9.9 L 


 


ABG Carboxyhemoglobin   3.4 H 


 


POC ABG HHb (Measured)   9.4 H 


 


ABG Methemoglobin   2.2 


 


ABG O2 Capacity   13.1 L 


 


Almas Test   Yes 


 


VBG pH   


 


VBG pCO2   


 


VBG HCO3   


 


VBG Total CO2   


 


VBG O2 Sat (Calc)   


 


VBG Base Excess   


 


VBG Potassium   


 


A-a O2 Difference   59.0 


 


Hgb O2 Saturation   85.1 L 


 


Sodium   


 


Chloride   


 


Glucose   


 


Lactate   


 


Vent Mode   


 


Mechanical Rate   


 


FiO2   32.0 


 


Inspiratory BiPAP   


 


Expiratory BiPAP   


 


Crit Value Called To   Alexia perez 


 


Crit Value Called By   23 


 


Crit Value Read Back   Y 


 


Blood Gas Notified Time   2120 


 


Potassium   


 


Carbon Dioxide   


 


Anion Gap   


 


BUN   


 


Creatinine   


 


Est GFR ( Amer)   


 


Est GFR (Non-Af Amer)   


 


POC Glucose (mg/dL)    138 H


 


Random Glucose   


 


Calcium   


 


Phosphorus   


 


Magnesium   


 


Total Bilirubin   


 


AST   


 


ALT   


 


Alkaline Phosphatase   


 


Total Creatine Kinase   


 


Troponin I   


 


Total Protein   


 


Albumin   


 


Globulin   


 


Albumin/Globulin Ratio   


 


Venous Blood Potassium   


 


Vancomycin Peak   


 


Phenytoin  5.5 L  


 


Valproic Acid   


 


Influenza Typ A,B (EIA)   


 


Blood Type   


 


Antibody Screen   


 


BBK History Checked   














  04/11/18 04/12/18 04/12/18





  22:08 07:41 07:41


 


WBC   7.8 


 


RBC   2.89 L 


 


Hgb   8.8 L 


 


Hct   27.3 L 


 


MCV   94.5 


 


MCH   30.5 


 


MCHC   32.3 L 


 


RDW   13.3 


 


Plt Count   164 


 


MPV   


 


Neut % (Auto)   


 


Lymph % (Auto)   


 


Mono % (Auto)   


 


Eos % (Auto)   


 


Baso % (Auto)   


 


Neut # (Auto)   


 


Lymph # (Auto)   


 


Mono # (Auto)   


 


Eos # (Auto)   


 


Baso # (Auto)   


 


PT   


 


INR   


 


APTT   


 


pCO2   


 


pO2   


 


HCO3   


 


ABG pH   


 


ABG Total CO2   


 


ABG O2 Saturation   


 


ABG O2 Content   


 


ABG Base Excess   


 


ABG Hemoglobin   


 


ABG Carboxyhemoglobin   


 


POC ABG HHb (Measured)   


 


ABG Methemoglobin   


 


ABG O2 Capacity   


 


Almas Test   


 


VBG pH   


 


VBG pCO2   


 


VBG HCO3   


 


VBG Total CO2   


 


VBG O2 Sat (Calc)   


 


VBG Base Excess   


 


VBG Potassium   


 


A-a O2 Difference   


 


Hgb O2 Saturation   


 


Sodium    136


 


Chloride    84 L


 


Glucose   


 


Lactate   


 


Vent Mode   


 


Mechanical Rate   


 


FiO2   


 


Inspiratory BiPAP   


 


Expiratory BiPAP   


 


Crit Value Called To   


 


Crit Value Called By   


 


Crit Value Read Back   


 


Blood Gas Notified Time   


 


Potassium    4.9


 


Carbon Dioxide    40 H*


 


Anion Gap    17


 


BUN    26 H


 


Creatinine    1.3 H


 


Est GFR ( Amer)    47


 


Est GFR (Non-Af Amer)    39


 


POC Glucose (mg/dL)   


 


Random Glucose    135 H


 


Calcium    7.9 L


 


Phosphorus   


 


Magnesium   


 


Total Bilirubin    0.3


 


AST    46 H


 


ALT    53 H


 


Alkaline Phosphatase    90


 


Total Creatine Kinase   


 


Troponin I   


 


Total Protein    6.4


 


Albumin    3.4 L


 


Globulin    3.0


 


Albumin/Globulin Ratio    1.1


 


Venous Blood Potassium   


 


Vancomycin Peak   


 


Phenytoin   


 


Valproic Acid   


 


Influenza Typ A,B (EIA)  Negative for flu a/b  


 


Blood Type   


 


Antibody Screen   


 


BBK History Checked   














  04/12/18 04/12/18 04/12/18





  13:06 18:07 20:00


 


WBC   


 


RBC   


 


Hgb   


 


Hct   


 


MCV   


 


MCH   


 


MCHC   


 


RDW   


 


Plt Count   


 


MPV   


 


Neut % (Auto)   


 


Lymph % (Auto)   


 


Mono % (Auto)   


 


Eos % (Auto)   


 


Baso % (Auto)   


 


Neut # (Auto)   


 


Lymph # (Auto)   


 


Mono # (Auto)   


 


Eos # (Auto)   


 


Baso # (Auto)   


 


PT   


 


INR   


 


APTT   


 


pCO2    95 H*


 


pO2    67 L


 


HCO3    40.2 H*


 


ABG pH    7.32 L


 


ABG Total CO2    51.8 H


 


ABG O2 Saturation    98.5 H


 


ABG O2 Content    12.4 L


 


ABG Base Excess    19.6 H


 


ABG Hemoglobin    9.1 L


 


ABG Carboxyhemoglobin    1.8 H


 


POC ABG HHb (Measured)    1.5


 


ABG Methemoglobin    0.7


 


ABG O2 Capacity    12.6 L


 


Almas Test    Yes


 


VBG pH   


 


VBG pCO2   


 


VBG HCO3   


 


VBG Total CO2   


 


VBG O2 Sat (Calc)   


 


VBG Base Excess   


 


VBG Potassium   


 


A-a O2 Difference    99.0


 


Hgb O2 Saturation    96.0


 


Sodium   


 


Chloride   


 


Glucose   


 


Lactate   


 


Vent Mode    Bipap


 


Mechanical Rate   


 


FiO2    40.0


 


Inspiratory BiPAP    12


 


Expiratory BiPAP    6


 


Crit Value Called To    Rn raquel dimitris


 


Crit Value Called By    Rt


 


Crit Value Read Back    Y


 


Blood Gas Notified Time    2011


 


Potassium   


 


Carbon Dioxide   


 


Anion Gap   


 


BUN   


 


Creatinine   


 


Est GFR ( Amer)   


 


Est GFR (Non-Af Amer)   


 


POC Glucose (mg/dL)  142 H  


 


Random Glucose   


 


Calcium   


 


Phosphorus   


 


Magnesium   


 


Total Bilirubin   


 


AST   


 


ALT   


 


Alkaline Phosphatase   


 


Total Creatine Kinase   


 


Troponin I   


 


Total Protein   


 


Albumin   


 


Globulin   


 


Albumin/Globulin Ratio   


 


Venous Blood Potassium   


 


Vancomycin Peak   15.9 L 


 


Phenytoin   


 


Valproic Acid   


 


Influenza Typ A,B (EIA)   


 


Blood Type   


 


Antibody Screen   


 


BBK History Checked   














  04/12/18 04/12/18 04/13/18





  21:15 22:44 03:17


 


WBC   


 


RBC   


 


Hgb   


 


Hct   


 


MCV   


 


MCH   


 


MCHC   


 


RDW   


 


Plt Count   


 


MPV   


 


Neut % (Auto)   


 


Lymph % (Auto)   


 


Mono % (Auto)   


 


Eos % (Auto)   


 


Baso % (Auto)   


 


Neut # (Auto)   


 


Lymph # (Auto)   


 


Mono # (Auto)   


 


Eos # (Auto)   


 


Baso # (Auto)   


 


PT   


 


INR   


 


APTT   


 


pCO2   


 


pO2   


 


HCO3   


 


ABG pH   


 


ABG Total CO2   


 


ABG O2 Saturation   


 


ABG O2 Content   


 


ABG Base Excess   


 


ABG Hemoglobin   


 


ABG Carboxyhemoglobin   


 


POC ABG HHb (Measured)   


 


ABG Methemoglobin   


 


ABG O2 Capacity   


 


Almas Test   


 


VBG pH   


 


VBG pCO2   


 


VBG HCO3   


 


VBG Total CO2   


 


VBG O2 Sat (Calc)   


 


VBG Base Excess   


 


VBG Potassium   


 


A-a O2 Difference   


 


Hgb O2 Saturation   


 


Sodium   


 


Chloride   


 


Glucose   


 


Lactate   


 


Vent Mode   


 


Mechanical Rate   


 


FiO2   


 


Inspiratory BiPAP   


 


Expiratory BiPAP   


 


Crit Value Called To   


 


Crit Value Called By   


 


Crit Value Read Back   


 


Blood Gas Notified Time   


 


Potassium   


 


Carbon Dioxide   


 


Anion Gap   


 


BUN   


 


Creatinine   


 


Est GFR ( Amer)   


 


Est GFR (Non-Af Amer)   


 


POC Glucose (mg/dL)  63 L  126 H  127 H


 


Random Glucose   


 


Calcium   


 


Phosphorus   


 


Magnesium   


 


Total Bilirubin   


 


AST   


 


ALT   


 


Alkaline Phosphatase   


 


Total Creatine Kinase   


 


Troponin I   


 


Total Protein   


 


Albumin   


 


Globulin   


 


Albumin/Globulin Ratio   


 


Venous Blood Potassium   


 


Vancomycin Peak   


 


Phenytoin   


 


Valproic Acid   


 


Influenza Typ A,B (EIA)   


 


Blood Type   


 


Antibody Screen   


 


BBK History Checked   














  04/13/18 04/13/18 04/13/18





  04:20 04:20 04:20


 


WBC  7.6  


 


RBC  2.90 L  


 


Hgb  8.8 L  


 


Hct  27.9 L  


 


MCV  96.1  


 


MCH  30.2  


 


MCHC  31.4 L  


 


RDW  14.2  


 


Plt Count  143  


 


MPV   


 


Neut % (Auto)   


 


Lymph % (Auto)   


 


Mono % (Auto)   


 


Eos % (Auto)   


 


Baso % (Auto)   


 


Neut # (Auto)   


 


Lymph # (Auto)   


 


Mono # (Auto)   


 


Eos # (Auto)   


 


Baso # (Auto)   


 


PT   


 


INR   


 


APTT   


 


pCO2   


 


pO2   


 


HCO3   


 


ABG pH   


 


ABG Total CO2   


 


ABG O2 Saturation   


 


ABG O2 Content   


 


ABG Base Excess   


 


ABG Hemoglobin   


 


ABG Carboxyhemoglobin   


 


POC ABG HHb (Measured)   


 


ABG Methemoglobin   


 


ABG O2 Capacity   


 


Almas Test   


 


VBG pH   


 


VBG pCO2   


 


VBG HCO3   


 


VBG Total CO2   


 


VBG O2 Sat (Calc)   


 


VBG Base Excess   


 


VBG Potassium   


 


A-a O2 Difference   


 


Hgb O2 Saturation   


 


Sodium   138 


 


Chloride   87 L 


 


Glucose   


 


Lactate   


 


Vent Mode   


 


Mechanical Rate   


 


FiO2   


 


Inspiratory BiPAP   


 


Expiratory BiPAP   


 


Crit Value Called To   


 


Crit Value Called By   


 


Crit Value Read Back   


 


Blood Gas Notified Time   


 


Potassium   4.8 


 


Carbon Dioxide   38 H 


 


Anion Gap   18 


 


BUN   32 H 


 


Creatinine   1.3 H 


 


Est GFR ( Amer)   47 


 


Est GFR (Non-Af Amer)   39 


 


POC Glucose (mg/dL)   


 


Random Glucose   121 H 


 


Calcium   8.0 L 


 


Phosphorus   


 


Magnesium   


 


Total Bilirubin   


 


AST   


 


ALT   


 


Alkaline Phosphatase   


 


Total Creatine Kinase   


 


Troponin I   


 


Total Protein   


 


Albumin   


 


Globulin   


 


Albumin/Globulin Ratio   


 


Venous Blood Potassium   


 


Vancomycin Peak    19.7 L


 


Phenytoin   


 


Valproic Acid   


 


Influenza Typ A,B (EIA)   


 


Blood Type   


 


Antibody Screen   


 


BBK History Checked   














  04/13/18 04/13/18 04/13/18





  04:42 04:59 07:07


 


WBC   


 


RBC   


 


Hgb   


 


Hct   


 


MCV   


 


MCH   


 


MCHC   


 


RDW   


 


Plt Count   


 


MPV   


 


Neut % (Auto)   


 


Lymph % (Auto)   


 


Mono % (Auto)   


 


Eos % (Auto)   


 


Baso % (Auto)   


 


Neut # (Auto)   


 


Lymph # (Auto)   


 


Mono # (Auto)   


 


Eos # (Auto)   


 


Baso # (Auto)   


 


PT   


 


INR   


 


APTT   


 


pCO2  95 H*   94 H*


 


pO2  113 H   134 H


 


HCO3  41.4 H*   41.0 H*


 


ABG pH  7.33 L   7.33 L


 


ABG Total CO2  53.0 H   52.5 H


 


ABG O2 Saturation  100.3 H   100.2 H


 


ABG O2 Content  11.8 L   11.9 L


 


ABG Base Excess  21.1 H   20.6 H


 


ABG Hemoglobin  8.4 L   8.5 L


 


ABG Carboxyhemoglobin  1.8 H   1.7 H


 


POC ABG HHb (Measured)  -0.3 L   -0.2 L


 


ABG Methemoglobin  0.7   0.9


 


ABG O2 Capacity  11.8 L   11.9 L


 


Almas Test  Yes   Yes


 


VBG pH   


 


VBG pCO2   


 


VBG HCO3   


 


VBG Total CO2   


 


VBG O2 Sat (Calc)   


 


VBG Base Excess   


 


VBG Potassium   


 


A-a O2 Difference  125.0   105.0


 


Hgb O2 Saturation  97.8   97.5


 


Sodium   


 


Chloride   


 


Glucose   


 


Lactate   


 


Vent Mode  Bipap  


 


Mechanical Rate  12   16


 


FiO2  50.0   50.0


 


Inspiratory BiPAP  14   20


 


Expiratory BiPAP  7   8


 


Crit Value Called To  alexia Lee Dr, md


 


Crit Value Called By  302   15


 


Crit Value Read Back  Y   Y


 


Blood Gas Notified Time  447   741


 


Potassium   


 


Carbon Dioxide   


 


Anion Gap   


 


BUN   


 


Creatinine   


 


Est GFR ( Amer)   


 


Est GFR (Non-Af Amer)   


 


POC Glucose (mg/dL)   115 H 


 


Random Glucose   


 


Calcium   


 


Phosphorus   


 


Magnesium   


 


Total Bilirubin   


 


AST   


 


ALT   


 


Alkaline Phosphatase   


 


Total Creatine Kinase   


 


Troponin I   


 


Total Protein   


 


Albumin   


 


Globulin   


 


Albumin/Globulin Ratio   


 


Venous Blood Potassium   


 


Vancomycin Peak   


 


Phenytoin   


 


Valproic Acid   


 


Influenza Typ A,B (EIA)   


 


Blood Type   


 


Antibody Screen   


 


BBK History Checked   














  04/13/18 04/13/18 04/13/18





  08:48 11:08 12:07


 


WBC   


 


RBC   


 


Hgb   


 


Hct   


 


MCV   


 


MCH   


 


MCHC   


 


RDW   


 


Plt Count   


 


MPV   


 


Neut % (Auto)   


 


Lymph % (Auto)   


 


Mono % (Auto)   


 


Eos % (Auto)   


 


Baso % (Auto)   


 


Neut # (Auto)   


 


Lymph # (Auto)   


 


Mono # (Auto)   


 


Eos # (Auto)   


 


Baso # (Auto)   


 


PT   


 


INR   


 


APTT   


 


pCO2   


 


pO2   


 


HCO3   


 


ABG pH   


 


ABG Total CO2   


 


ABG O2 Saturation   


 


ABG O2 Content   


 


ABG Base Excess   


 


ABG Hemoglobin   


 


ABG Carboxyhemoglobin   


 


POC ABG HHb (Measured)   


 


ABG Methemoglobin   


 


ABG O2 Capacity   


 


Almas Test   


 


VBG pH   


 


VBG pCO2   


 


VBG HCO3   


 


VBG Total CO2   


 


VBG O2 Sat (Calc)   


 


VBG Base Excess   


 


VBG Potassium   


 


A-a O2 Difference   


 


Hgb O2 Saturation   


 


Sodium   


 


Chloride   


 


Glucose   


 


Lactate   


 


Vent Mode   


 


Mechanical Rate   


 


FiO2   


 


Inspiratory BiPAP   


 


Expiratory BiPAP   


 


Crit Value Called To   


 


Crit Value Called By   


 


Crit Value Read Back   


 


Blood Gas Notified Time   


 


Potassium   


 


Carbon Dioxide   


 


Anion Gap   


 


BUN   


 


Creatinine   


 


Est GFR ( Amer)   


 


Est GFR (Non-Af Amer)   


 


POC Glucose (mg/dL)  157 H  151 H 


 


Random Glucose   


 


Calcium   


 


Phosphorus   


 


Magnesium   


 


Total Bilirubin   


 


AST   


 


ALT   


 


Alkaline Phosphatase   


 


Total Creatine Kinase   


 


Troponin I   


 


Total Protein   


 


Albumin   


 


Globulin   


 


Albumin/Globulin Ratio   


 


Venous Blood Potassium   


 


Vancomycin Peak   


 


Phenytoin    13.3


 


Valproic Acid   


 


Influenza Typ A,B (EIA)   


 


Blood Type   


 


Antibody Screen   


 


BBK History Checked   








 Microbiology





04/11/18 22:00   Blood-Venous   Blood Culture - Preliminary


                            NO GROWTH AFTER 24 HOURS


04/12/18 15:10   Sputum   Gram Stain - Final





 


 





Accession No. : [id number]


Patient Name / ID : ELAN TYLER  / [id number]


Exam Date : 04/12/2018 22:58:20 ( Approved )


Study Comment : 


Sex / Age : F  / 88Y





Creator : Beto Hernandez MD


Dictator : Beto Hernandez MD


 : 


Approver : Beto Hernandez MD


Approver2 : 





Report Date : 04/13/2018 07:37:18


My Comment : 


********************************************************************************

***





HISTORY:


NGT Placement  





COMPARISON:


Portable chest 04/12/2018. 





FINDINGS:





LUNGS:


Somewhat diminished airspace disease seen the left base with improved 

definition of left hemidiaphragm noted. Mild residual however is apparent with 

remaining lung fields clear.





PLEURA:


No pneumothorax bilaterally.  No definite right pleural effusion.  Trace left 

pleural effusion may remain.





CARDIOVASCULAR:


Cardiomegaly appears stable. No definite active CHF pattern appreciable.





OSSEOUS STRUCTURES:


No significant abnormalities.





VISUALIZED UPPER ABDOMEN:


Normal.





OTHER FINDINGS:


None.





IMPRESSION:


Improving left basilar airspace disease with trace left pleural effusion 

remaining. Stable cardiomegaly.











Assessment and Plan


(1) Recurrent seizures


Status: Acute   





(2) Pleural effusion


Status: Acute   





(3) Cough


Status: Acute   





- Assessment and Plan (Free Text)


Assessment: 








A/P-


88 year old female with DM II, HTN, recent cervical fracture s/p spinal fusion 

in 11/2017 and seizures admitted with recurrent seizure and cough ? aspiration 

pneumonitis.








afebrile


wbc normal value


sputum cx- pending


blood cx- neg





plan-


continue with IV zosyn and vanco for nosocomial and asp pneumonitis.


keep vanco trough <15.


as per nurse today's trough not real trough as it was drawn 2 hours after the 

does was given.


hence check trough tomm am.


f/u sputum cx result.


monitor aspiration precautions.


seizure management as per neurology.


may need brain CT if remains lethargic.





 all above d/w patient's daughter who is at bedside at length.








ICU time 50 minutes.

## 2018-04-13 NOTE — CT
PROCEDURE:  CT HEAD WITHOUT CONTRAST.



HISTORY:

lethargy



COMPARISON:

Unenhanced head CT 02/24/2018. 



TECHNIQUE:

Axial computed tomography images were obtained through the head/brain 

without intravenous contrast.  



Radiation dose:



Total exam DLP = 1657.74 mGy-cm.



This CT exam was performed using one or more of the following dose 

reduction techniques: Automated exposure control, adjustment of the 

mA and/or kV according to patient size, and/or use of iterative 

reconstruction technique.



FINDINGS:

Motion artifacts limit the exam mildly. 



HEMORRHAGE:

No intracranial hemorrhage. 



BRAIN:

Stable diffuse cerebral atrophy and chronic microangiopathy 

identified without definite mass effect, extra-axial fluid collection 

or cortical edema identified in the interval. Midline brain anatomy 

is unremarkable in the posterior fossa contents appear stable 

including the brainstem.



VENTRICLES:

Unremarkable. No hydrocephalus. 



CALVARIUM:

No destructive bony lesion or displaced fracture identified including 

through the skullbase.



PARANASAL SINUSES:

Marked improvement and near pansinusitis with limited left sphenoid 

and bilateral ethmoid sinus disease remaining.



MASTOID AIR CELLS:

Unremarkable as visualized. No inflammatory changes.



OTHER FINDINGS:

Posterior atlantoaxial fusion hardware reiterated.



IMPRESSION:

Stable limited age-related neuro degenerative changes are identified. 

No acute intracranial hemorrhage, mass effect or cortical edema.  

Limited motion artifact.

## 2018-04-13 NOTE — CP.PCM.PN
Subjective





- Subjective


Subjective: 








Acute Resp Failure (on BiPaP and HFO2)


Aspiration Pna / Sepsis


Sz


Cervical Fx, s/p fixation (on last admission)


DMII


HTN


Dysphagia





S/ patine responding to yes and no to simple questions. 





O VSS Afebrile





Head: Neg adeno pos ila


Heart: ns1s2, neg me


Lungs: Crackles on both bases. Neg wheezing. 


Abdo: s, nt pos bs


Ext No c,c,e


Neuro, Alerto to person, not to place nor time. 





Labs: See Below





Plant





Cont ICU monitoring. 





Cont BiPaP for nocturnal use, and supplmental o2 for o2 sat of 89, 90, 91 Max. 

Avoid Hyperoxia. 


Cont IV abx as per ID. Monitor WBC#, Temp curve, and micro studies. 


Cont Dilantin, neuro f/u, monitor levels. 


S & S assessment. Start NGT feeds. 


Bg moniotring with SSI. 


Repeat Portable X-ray in am. 


PUD and DVT Px. 





Dr. Sandoval [phone]





Objective





- Vital Signs/Intake and Output


Vital Signs (last 24 hours): 


 











Temp Pulse Resp BP Pulse Ox


 


 97.0 F L  67   22   113/49 L  100 


 


 04/13/18 16:00  04/13/18 21:58  04/13/18 19:33  04/13/18 18:00  04/13/18 18:00








Intake and Output: 


 











 04/13/18 04/14/18





 18:59 06:59


 


Intake Total 720 


 


Balance 720 














- Medications


Medications: 


 Current Medications





Acetaminophen (Tylenol 325mg Tab)  975 mg PO Q6 PRN


   PRN Reason: Fever- T- 101 or above


Albuterol/Ipratropium (Duoneb 3 Mg/0.5 Mg (3 Ml) Ud)  3 ml INH RQID [facility]


   Last Admin: 04/13/18 19:22 Dose:  3 ml


Amlodipine Besylate (Norvasc)  2.5 mg PO DAILY [facility]


   Last Admin: 04/13/18 09:05 Dose:  Not Given


Enoxaparin Sodium (Lovenox)  40 mg SC DAILY [facility]


   PRN Reason: Protocol


   Last Admin: 04/13/18 09:57 Dose:  40 mg


Furosemide (Lasix)  20 mg PO DAILY [facility]


   Last Admin: 04/13/18 09:05 Dose:  Not Given


Glipizide (Glucotrol Xl)  2.5 mg PO DAILY [facility]


   Last Admin: 04/13/18 09:05 Dose:  Not Given


Piperacillin Sod/Tazobactam (Sod 2.25 gm/ Sodium Chloride)  100 mls @ 100 mls/

hr IVPB Q6 MARIELY


   PRN Reason: Protocol


   Last Admin: 04/13/18 22:29 Dose:  100 mls/hr


Vancomycin HCl 750 mg/ Sodium (Chloride)  250 mls @ 166.667 mls/hr IVPB Q12 MARIELY


   PRN Reason: Protocol


   Last Admin: 04/13/18 20:38 Dose:  166.667 mls/hr


Montelukast Sodium (Singulair)  10 mg PO HS [facility]


   Last Admin: 04/13/18 22:27 Dose:  Not Given


Multivitamins/Minerals (Therapeutic-M Tab)  1 tab PO DAILY [facility]


   Last Admin: 04/13/18 09:05 Dose:  Not Given


Phenytoin (Dilantin)  100 mg IVP Q8 [facility]


   Last Admin: 04/13/18 17:35 Dose:  100 mg


Potassium Chloride (K-Dur 20 Meq Er Tab)  20 meq PO BID [facility]


   Last Admin: 04/13/18 18:31 Dose:  Not Given











- Labs


Labs: 


 





 04/13/18 04:20 





 04/13/18 04:20 





 











PT  10.1 Seconds (9.8-13.1)   04/11/18  19:55    


 


INR  0.9  (0.9-1.2)   04/11/18  19:55    


 


APTT  25.9 Seconds (25.6-37.1)   04/11/18  19:55

## 2018-04-13 NOTE — CP.CCUPN
CCU Subjective





- Physician Review


Subjective (Free Text): 


On BIPAP with IPAP level of 20, average TV is up to 637 ml, and she us easily 

arousable from sleep, she has been on BiPAP all the time overnight. Also 

getting IVFs at 40ml/hr. 





Other Vitals and I/Os reviewed.  





ROS:  No other pertinent negs or positives on 10+  system review-sedated.





PMSFH:    All other Nursing and physician documentation reviewed to date; no 

new pertinent info noted relevant to current medical problems.





EXAM-


HEENT: no icterus, no gaze preference, pupils equal and reactive, no icterus


NECK: No JVD, supple, carotids equal upstroke bilat/no bruits


CHEST: few crackles with decreased BS bases bilaterally, no wheezes audible


HEART:  regular distant, S1S2, no rubs.


ABD:  soft, no distention, no tympany, no palp tenderness, BS hypoactive. 


EXT:   no edema bilat. No peripheral/ digital cyanosis, no calf tenderness or 

palpable cords, distal pulses intact and symmetrical. SCDs on bilaterally. 


NEURO:  no gross focal motor deficits.


SKIN:   no rashes,  warm and dry.





LABS:  





WBC= 7.6


HGB= 8.8


PLTs= 143K





Na= 138


K= 4.8


CL= 87


HCO3= 38


BUN/Cr= 32/1.3


BS= 121





7.33/95/113, repeated=  7.33/94/134








CXR:  RUL/LLL interstitial changes (my interp). 








IMPRESSION / MAJOR PROBLEMS NOW:


1.  Acute Hypercapneic Resp Failure


2.  Bilateral  Multilobar Pneumonia: possible Aspiration event.


3.  Seizure Disorder


4.  h/o Odontoid / C2 fracture with spinal fusion surgery approx. 6 months ago.


5.  h/o recurrent falls at home








PLAN:


1.  Continue BiPAP support for now with reprieves off BiPAP mask q4h as 

tolerated with trials on HFNC.


2.   Empiric Abx coverage as per ID.


3.   Daughter mentions noting patient appears more sedate than ususal after 

Phenytoin administration. Neuro eval. 


4.   Maintain normoglycemia.





CCU Objective





- Vital Signs / Intake & Output


Vital Signs (Last 4 hours): 


Vital Signs











  Temp Pulse Resp BP Pulse Ox


 


 04/13/18 08:00  98.1 F  65  36 H  133/61  98


 


 04/13/18 07:52   63   


 


 04/13/18 06:27   71  22  142/91 H  100


 


 04/13/18 06:00   94 H  21  158/62 H  100


 


 04/13/18 05:00   71  16  140/70  100











Intake and Output (Last 8hrs): 


 Intake & Output











 04/12/18 04/13/18 04/13/18





 22:59 06:59 14:59


 


Intake Total 350 590 


 


Output Total  0 


 


Balance 350 590 


 


Intake:   


 


  IV  240 


 


  Intake, Piggyback 350 350 


 


  Oral  0 


 


Output:   


 


  Urine  0 


 


    Urine, Voided  0 


 


Other:   


 


  # Bowel Movements 1 1 














- Medications


Active Medications: 


Active Medications











Generic Name Dose Route Start Last Admin





  Trade Name Freq  PRN Reason Stop Dose Admin


 


Acetaminophen  975 mg  04/12/18 01:51  





  Tylenol 325mg Tab  PO   





  Q6 PRN   





  Fever- T- 101 or above   


 


Amlodipine Besylate  2.5 mg  04/12/18 09:00  04/12/18 10:20





  Norvasc  PO   2.5 mg





  DAILY MARIELY   Administration


 


Enoxaparin Sodium  40 mg  04/12/18 09:00  04/12/18 10:19





  Lovenox  SC   40 mg





  DAILY MARIELY   Administration





  Protocol   


 


Furosemide  20 mg  04/12/18 09:00  04/12/18 10:16





  Lasix  PO   20 mg





  DAILY MARIELY   Administration


 


Glipizide  2.5 mg  04/12/18 09:00  04/12/18 10:16





  Glucotrol Xl  PO   2.5 mg





  DAILY MARIELY   Administration


 


Piperacillin Sod/Tazobactam  100 mls @ 100 mls/hr  04/12/18 16:00  04/13/18 03:

02





  Sod 2.25 gm/ Sodium Chloride  IVPB   100 mls/hr





  Q6 MARIELY   Administration





  Protocol   


 


Dextrose  1,000 mls @ 40 mls/hr  04/12/18 22:00  04/12/18 22:19





  Dextrose 10% In Water  IV  04/13/18 21:21  40 mls/hr





  .Q24H MARIELY   Administration


 


Vancomycin HCl 1 gm/ Sodium  250 mls @ 166.667 mls/hr  04/13/18 03:00  04/13/18 

02:11





  Chloride  IVPB   166.667 mls/hr





  Q12@0300,1500 MARIELY   Administration





  Protocol   


 


Montelukast Sodium  10 mg  04/12/18 22:00  04/12/18 21:37





  Singulair  PO   Not Given





  HS MARIELY   


 


Multivitamins/Minerals  1 tab  04/12/18 09:00  04/12/18 10:21





  Therapeutic-M Tab  PO   1 tab





  DAILY MARIELY   Administration


 


Phenytoin Sodium  100 mg  04/12/18 09:00  04/12/18 17:49





  Dilantin  PO   100 mg





  TID MARIELY   Administration


 


Potassium Chloride  20 meq  04/12/18 17:00  04/12/18 16:11





  K-Dur 20 Meq Er Tab  PO   20 meq





  BID MARIELY   Administration














- Patient Studies


Lab Studies: 


 Microbiology Studies











 04/11/18 22:00 Blood Culture - Preliminary





 Blood-Venous    NO GROWTH AFTER 24 HOURS


 


 04/12/18 15:10 Gram Stain - Final





 Sputum 








 Lab Studies











  04/13/18 04/13/18 04/13/18 Range/Units





  07:07 04:59 04:42 


 


WBC     (4.8-10.8)  K/uL


 


RBC     (3.80-5.20)  Mil/uL


 


Hgb     (12.0-16.0)  g/dL


 


Hct     (34.0-47.0)  %


 


MCV     (81.0-99.0)  fl


 


MCH     (27.0-31.0)  pg


 


MCHC     (33.0-37.0)  g/dL


 


RDW     (11.5-14.5)  %


 


Plt Count     (130-400)  K/uL


 


pCO2  94 H*   95 H*  (35-45)  mm/Hg


 


pO2  134 H   113 H  ()  mm/Hg


 


HCO3  41.0 H*   41.4 H*  (21-28)  mmol/L


 


ABG pH  7.33 L   7.33 L  (7.35-7.45)  


 


ABG Total CO2  52.5 H   53.0 H  (22-28)  mmol/L


 


ABG O2 Saturation  100.2 H   100.3 H  (95-98)  %


 


ABG O2 Content  11.9 L   11.8 L  (15-23)  ML/dL


 


ABG Base Excess  20.6 H   21.1 H  (-2.0-3.0)  mmol/L


 


ABG Hemoglobin  8.5 L   8.4 L  (11.7-17.4)  g/dL


 


ABG Carboxyhemoglobin  1.7 H   1.8 H  (0.5-1.5)  %


 


POC ABG HHb (Measured)  -0.2 L   -0.3 L  (0.0-5.0)  %


 


ABG Methemoglobin  0.9   0.7  (0.0-3.0)  %


 


ABG O2 Capacity  11.9 L   11.8 L  (16-24)  mL/dL


 


Almas Test  Yes   Yes  


 


A-a O2 Difference  105.0   125.0  mm/Hg


 


Hgb O2 Saturation  97.5   97.8  (95.0-98.0)  %


 


Vent Mode    Bipap  


 


Mechanical Rate  16   12  


 


FiO2  50.0   50.0  %


 


Inspiratory BiPAP  20   14  


 


Expiratory BiPAP  8   7  


 


Crit Value Called To  md Doris Sharpe, rn  


 


Crit Value Called By  15   302  


 


Crit Value Read Back  Y   Y  


 


Blood Gas Notified Time  741   447  


 


Sodium     (132-148)  mmol/l


 


Potassium     (3.6-5.0)  MMOL/L


 


Chloride     ()  mmol/L


 


Carbon Dioxide     (22-30)  mmol/L


 


Anion Gap     (10-20)  


 


BUN     (7-17)  mg/dl


 


Creatinine     (0.7-1.2)  mg/dl


 


Est GFR (African Amer)     


 


Est GFR (Non-Af Amer)     


 


POC Glucose (mg/dL)   115 H   ()  mg/dL


 


Random Glucose     ()  mg/dL


 


Calcium     (8.4-10.2)  mg/dL


 


Vancomycin Peak     (30.0-40.0)  ug/mL














  04/13/18 04/13/18 04/13/18 Range/Units





  04:20 04:20 04:20 


 


WBC    7.6  (4.8-10.8)  K/uL


 


RBC    2.90 L  (3.80-5.20)  Mil/uL


 


Hgb    8.8 L  (12.0-16.0)  g/dL


 


Hct    27.9 L  (34.0-47.0)  %


 


MCV    96.1  (81.0-99.0)  fl


 


MCH    30.2  (27.0-31.0)  pg


 


MCHC    31.4 L  (33.0-37.0)  g/dL


 


RDW    14.2  (11.5-14.5)  %


 


Plt Count    143  (130-400)  K/uL


 


pCO2     (35-45)  mm/Hg


 


pO2     ()  mm/Hg


 


HCO3     (21-28)  mmol/L


 


ABG pH     (7.35-7.45)  


 


ABG Total CO2     (22-28)  mmol/L


 


ABG O2 Saturation     (95-98)  %


 


ABG O2 Content     (15-23)  ML/dL


 


ABG Base Excess     (-2.0-3.0)  mmol/L


 


ABG Hemoglobin     (11.7-17.4)  g/dL


 


ABG Carboxyhemoglobin     (0.5-1.5)  %


 


POC ABG HHb (Measured)     (0.0-5.0)  %


 


ABG Methemoglobin     (0.0-3.0)  %


 


ABG O2 Capacity     (16-24)  mL/dL


 


Almas Test     


 


A-a O2 Difference     mm/Hg


 


Hgb O2 Saturation     (95.0-98.0)  %


 


Vent Mode     


 


Mechanical Rate     


 


FiO2     %


 


Inspiratory BiPAP     


 


Expiratory BiPAP     


 


Crit Value Called To     


 


Crit Value Called By     


 


Crit Value Read Back     


 


Blood Gas Notified Time     


 


Sodium   138   (132-148)  mmol/l


 


Potassium   4.8   (3.6-5.0)  MMOL/L


 


Chloride   87 L   ()  mmol/L


 


Carbon Dioxide   38 H   (22-30)  mmol/L


 


Anion Gap   18   (10-20)  


 


BUN   32 H   (7-17)  mg/dl


 


Creatinine   1.3 H   (0.7-1.2)  mg/dl


 


Est GFR ( Amer)   47   


 


Est GFR (Non-Af Amer)   39   


 


POC Glucose (mg/dL)     ()  mg/dL


 


Random Glucose   121 H   ()  mg/dL


 


Calcium   8.0 L   (8.4-10.2)  mg/dL


 


Vancomycin Peak  19.7 L    (30.0-40.0)  ug/mL














  04/13/18 04/12/18 04/12/18 Range/Units





  03:17 22:44 21:15 


 


WBC     (4.8-10.8)  K/uL


 


RBC     (3.80-5.20)  Mil/uL


 


Hgb     (12.0-16.0)  g/dL


 


Hct     (34.0-47.0)  %


 


MCV     (81.0-99.0)  fl


 


MCH     (27.0-31.0)  pg


 


MCHC     (33.0-37.0)  g/dL


 


RDW     (11.5-14.5)  %


 


Plt Count     (130-400)  K/uL


 


pCO2     (35-45)  mm/Hg


 


pO2     ()  mm/Hg


 


HCO3     (21-28)  mmol/L


 


ABG pH     (7.35-7.45)  


 


ABG Total CO2     (22-28)  mmol/L


 


ABG O2 Saturation     (95-98)  %


 


ABG O2 Content     (15-23)  ML/dL


 


ABG Base Excess     (-2.0-3.0)  mmol/L


 


ABG Hemoglobin     (11.7-17.4)  g/dL


 


ABG Carboxyhemoglobin     (0.5-1.5)  %


 


POC ABG HHb (Measured)     (0.0-5.0)  %


 


ABG Methemoglobin     (0.0-3.0)  %


 


ABG O2 Capacity     (16-24)  mL/dL


 


Almas Test     


 


A-a O2 Difference     mm/Hg


 


Hgb O2 Saturation     (95.0-98.0)  %


 


Vent Mode     


 


Mechanical Rate     


 


FiO2     %


 


Inspiratory BiPAP     


 


Expiratory BiPAP     


 


Crit Value Called To     


 


Crit Value Called By     


 


Crit Value Read Back     


 


Blood Gas Notified Time     


 


Sodium     (132-148)  mmol/l


 


Potassium     (3.6-5.0)  MMOL/L


 


Chloride     ()  mmol/L


 


Carbon Dioxide     (22-30)  mmol/L


 


Anion Gap     (10-20)  


 


BUN     (7-17)  mg/dl


 


Creatinine     (0.7-1.2)  mg/dl


 


Est GFR (African Amer)     


 


Est GFR (Non-Af Amer)     


 


POC Glucose (mg/dL)  127 H  126 H  63 L  ()  mg/dL


 


Random Glucose     ()  mg/dL


 


Calcium     (8.4-10.2)  mg/dL


 


Vancomycin Peak     (30.0-40.0)  ug/mL














  04/12/18 04/12/18 04/12/18 Range/Units





  20:00 18:07 13:06 


 


WBC     (4.8-10.8)  K/uL


 


RBC     (3.80-5.20)  Mil/uL


 


Hgb     (12.0-16.0)  g/dL


 


Hct     (34.0-47.0)  %


 


MCV     (81.0-99.0)  fl


 


MCH     (27.0-31.0)  pg


 


MCHC     (33.0-37.0)  g/dL


 


RDW     (11.5-14.5)  %


 


Plt Count     (130-400)  K/uL


 


pCO2  95 H*    (35-45)  mm/Hg


 


pO2  67 L    ()  mm/Hg


 


HCO3  40.2 H*    (21-28)  mmol/L


 


ABG pH  7.32 L    (7.35-7.45)  


 


ABG Total CO2  51.8 H    (22-28)  mmol/L


 


ABG O2 Saturation  98.5 H    (95-98)  %


 


ABG O2 Content  12.4 L    (15-23)  ML/dL


 


ABG Base Excess  19.6 H    (-2.0-3.0)  mmol/L


 


ABG Hemoglobin  9.1 L    (11.7-17.4)  g/dL


 


ABG Carboxyhemoglobin  1.8 H    (0.5-1.5)  %


 


POC ABG HHb (Measured)  1.5    (0.0-5.0)  %


 


ABG Methemoglobin  0.7    (0.0-3.0)  %


 


ABG O2 Capacity  12.6 L    (16-24)  mL/dL


 


Almas Test  Yes    


 


A-a O2 Difference  99.0    mm/Hg


 


Hgb O2 Saturation  96.0    (95.0-98.0)  %


 


Vent Mode  Bipap    


 


Mechanical Rate     


 


FiO2  40.0    %


 


Inspiratory BiPAP  12    


 


Expiratory BiPAP  6    


 


Crit Value Called To  Rn raquel dimitris    


 


Crit Value Called By  Rt    


 


Crit Value Read Back  Y    


 


Blood Gas Notified Time  2011    


 


Sodium     (132-148)  mmol/l


 


Potassium     (3.6-5.0)  MMOL/L


 


Chloride     ()  mmol/L


 


Carbon Dioxide     (22-30)  mmol/L


 


Anion Gap     (10-20)  


 


BUN     (7-17)  mg/dl


 


Creatinine     (0.7-1.2)  mg/dl


 


Est GFR (African Amer)     


 


Est GFR (Non-Af Amer)     


 


POC Glucose (mg/dL)    142 H  ()  mg/dL


 


Random Glucose     ()  mg/dL


 


Calcium     (8.4-10.2)  mg/dL


 


Vancomycin Peak   15.9 L   (30.0-40.0)  ug/mL








 Laboratory Results - last 24 hr











  04/12/18 04/12/18 04/12/18





  13:06 18:07 20:00


 


WBC   


 


RBC   


 


Hgb   


 


Hct   


 


MCV   


 


MCH   


 


MCHC   


 


RDW   


 


Plt Count   


 


pCO2    95 H*


 


pO2    67 L


 


HCO3    40.2 H*


 


ABG pH    7.32 L


 


ABG Total CO2    51.8 H


 


ABG O2 Saturation    98.5 H


 


ABG O2 Content    12.4 L


 


ABG Base Excess    19.6 H


 


ABG Hemoglobin    9.1 L


 


ABG Carboxyhemoglobin    1.8 H


 


POC ABG HHb (Measured)    1.5


 


ABG Methemoglobin    0.7


 


ABG O2 Capacity    12.6 L


 


Almas Test    Yes


 


A-a O2 Difference    99.0


 


Hgb O2 Saturation    96.0


 


Vent Mode    Bipap


 


Mechanical Rate   


 


FiO2    40.0


 


Inspiratory BiPAP    12


 


Expiratory BiPAP    6


 


Crit Value Called To    Rn raquel dimitris


 


Crit Value Called By    Rt


 


Crit Value Read Back    Y


 


Blood Gas Notified Time    2011


 


Sodium   


 


Potassium   


 


Chloride   


 


Carbon Dioxide   


 


Anion Gap   


 


BUN   


 


Creatinine   


 


Est GFR ( Amer)   


 


Est GFR (Non-Af Amer)   


 


POC Glucose (mg/dL)  142 H  


 


Random Glucose   


 


Calcium   


 


Vancomycin Peak   15.9 L 














  04/12/18 04/12/18 04/13/18





  21:15 22:44 03:17


 


WBC   


 


RBC   


 


Hgb   


 


Hct   


 


MCV   


 


MCH   


 


MCHC   


 


RDW   


 


Plt Count   


 


pCO2   


 


pO2   


 


HCO3   


 


ABG pH   


 


ABG Total CO2   


 


ABG O2 Saturation   


 


ABG O2 Content   


 


ABG Base Excess   


 


ABG Hemoglobin   


 


ABG Carboxyhemoglobin   


 


POC ABG HHb (Measured)   


 


ABG Methemoglobin   


 


ABG O2 Capacity   


 


Almas Test   


 


A-a O2 Difference   


 


Hgb O2 Saturation   


 


Vent Mode   


 


Mechanical Rate   


 


FiO2   


 


Inspiratory BiPAP   


 


Expiratory BiPAP   


 


Crit Value Called To   


 


Crit Value Called By   


 


Crit Value Read Back   


 


Blood Gas Notified Time   


 


Sodium   


 


Potassium   


 


Chloride   


 


Carbon Dioxide   


 


Anion Gap   


 


BUN   


 


Creatinine   


 


Est GFR ( Amer)   


 


Est GFR (Non-Af Amer)   


 


POC Glucose (mg/dL)  63 L  126 H  127 H


 


Random Glucose   


 


Calcium   


 


Vancomycin Peak   














  04/13/18 04/13/18 04/13/18





  04:20 04:20 04:20


 


WBC  7.6  


 


RBC  2.90 L  


 


Hgb  8.8 L  


 


Hct  27.9 L  


 


MCV  96.1  


 


MCH  30.2  


 


MCHC  31.4 L  


 


RDW  14.2  


 


Plt Count  143  


 


pCO2   


 


pO2   


 


HCO3   


 


ABG pH   


 


ABG Total CO2   


 


ABG O2 Saturation   


 


ABG O2 Content   


 


ABG Base Excess   


 


ABG Hemoglobin   


 


ABG Carboxyhemoglobin   


 


POC ABG HHb (Measured)   


 


ABG Methemoglobin   


 


ABG O2 Capacity   


 


Almas Test   


 


A-a O2 Difference   


 


Hgb O2 Saturation   


 


Vent Mode   


 


Mechanical Rate   


 


FiO2   


 


Inspiratory BiPAP   


 


Expiratory BiPAP   


 


Crit Value Called To   


 


Crit Value Called By   


 


Crit Value Read Back   


 


Blood Gas Notified Time   


 


Sodium   138 


 


Potassium   4.8 


 


Chloride   87 L 


 


Carbon Dioxide   38 H 


 


Anion Gap   18 


 


BUN   32 H 


 


Creatinine   1.3 H 


 


Est GFR ( Amer)   47 


 


Est GFR (Non-Af Amer)   39 


 


POC Glucose (mg/dL)   


 


Random Glucose   121 H 


 


Calcium   8.0 L 


 


Vancomycin Peak    19.7 L














  04/13/18 04/13/18 04/13/18





  04:42 04:59 07:07


 


WBC   


 


RBC   


 


Hgb   


 


Hct   


 


MCV   


 


MCH   


 


MCHC   


 


RDW   


 


Plt Count   


 


pCO2  95 H*   94 H*


 


pO2  113 H   134 H


 


HCO3  41.4 H*   41.0 H*


 


ABG pH  7.33 L   7.33 L


 


ABG Total CO2  53.0 H   52.5 H


 


ABG O2 Saturation  100.3 H   100.2 H


 


ABG O2 Content  11.8 L   11.9 L


 


ABG Base Excess  21.1 H   20.6 H


 


ABG Hemoglobin  8.4 L   8.5 L


 


ABG Carboxyhemoglobin  1.8 H   1.7 H


 


POC ABG HHb (Measured)  -0.3 L   -0.2 L


 


ABG Methemoglobin  0.7   0.9


 


ABG O2 Capacity  11.8 L   11.9 L


 


Almas Test  Yes   Yes


 


A-a O2 Difference  125.0   105.0


 


Hgb O2 Saturation  97.8   97.5


 


Vent Mode  Bipap  


 


Mechanical Rate  12   16


 


FiO2  50.0   50.0


 


Inspiratory BiPAP  14   20


 


Expiratory BiPAP  7   8


 


Crit Value Called To  alexia Lee Dr, md


 


Crit Value Called By  302   15


 


Crit Value Read Back  Y   Y


 


Blood Gas Notified Time  [phone]   [phone]


 


Sodium   


 


Potassium   


 


Chloride   


 


Carbon Dioxide   


 


Anion Gap   


 


BUN   


 


Creatinine   


 


Est GFR ( Amer)   


 


Est GFR (Non-Af Amer)   


 


POC Glucose (mg/dL)   115 H 


 


Random Glucose   


 


Calcium   


 


Vancomycin Peak   











Fingerstick Blood Sugar Results: 115





Critical Care Progress Note





- Nutrition


Nutrition: 


 Nutrition











 Category Date Time Status


 


 Regular Diet [DIET] Diets  04/12/18 Breakfast Active

## 2018-04-14 LAB
ARTERIAL BLOOD GAS HEMOGLOBIN: 8 G/DL (ref 11.7–17.4)
ARTERIAL BLOOD GAS HEMOGLOBIN: 8.2 G/DL (ref 11.7–17.4)
ARTERIAL BLOOD GAS O2 SAT: 100.6 % (ref 95–98)
ARTERIAL BLOOD GAS O2 SAT: 99.9 % (ref 95–98)
ARTERIAL BLOOD GAS PCO2: 106 MM/HG (ref 35–45)
ARTERIAL BLOOD GAS PCO2: 78 MM/HG (ref 35–45)
ARTERIAL BLOOD GAS TCO2: 50.7 MMOL/L (ref 22–28)
ARTERIAL BLOOD GAS TCO2: 53.1 MMOL/L (ref 22–28)
ARTERIAL PATENCY WRIST A: YES
ARTERIAL PATENCY WRIST A: YES
BASOPHILS # BLD AUTO: 0 K/UL (ref 0–0.2)
BASOPHILS NFR BLD: 0.2 % (ref 0–2)
BASOPHILS NFR BLD: 1 % (ref 0–2)
BUN SERPL-MCNC: 32 MG/DL (ref 7–17)
CALCIUM SERPL-MCNC: 7.8 MG/DL (ref 8.4–10.2)
EOSINOPHIL # BLD AUTO: 0 K/UL (ref 0–0.7)
EOSINOPHIL NFR BLD: 0.8 % (ref 0–4)
EOSINOPHIL NFR BLD: 1 % (ref 0–7)
ERYTHROCYTE [DISTWIDTH] IN BLOOD BY AUTOMATED COUNT: 14.3 % (ref 11.5–14.5)
GFR NON-AFRICAN AMERICAN: 42
HCO3 BLDA-SCNC: 40.7 MMOL/L (ref 21–28)
HCO3 BLDA-SCNC: 41.2 MMOL/L (ref 21–28)
HGB BLD-MCNC: 8.2 G/DL (ref 12–16)
HYPOCHROMIC: (no result)
INHALED O2 CONCENTRATION: 40 %
INHALED O2 CONCENTRATION: 60 %
LYMPHOCYTE: 10 % (ref 20–50)
LYMPHOCYTES # BLD AUTO: 0.4 K/UL (ref 1–4.3)
LYMPHOCYTES NFR BLD AUTO: 8.3 % (ref 20–40)
MACROCYTES BLD QL SMEAR: SLIGHT
MCH RBC QN AUTO: 30.2 PG (ref 27–31)
MCHC RBC AUTO-ENTMCNC: 30.9 G/DL (ref 33–37)
MCV RBC AUTO: 97.8 FL (ref 81–99)
MONOCYTE: 8 % (ref 0–10)
MONOCYTES # BLD: 0.3 K/UL (ref 0–0.8)
MONOCYTES NFR BLD: 7 % (ref 0–10)
NEUTROPHILS # BLD: 3.9 K/UL (ref 1.8–7)
NEUTROPHILS NFR BLD AUTO: 80 % (ref 42–75)
NEUTROPHILS NFR BLD AUTO: 83.7 % (ref 50–75)
NRBC BLD AUTO-RTO: 0.1 % (ref 0–0)
O2 CAP BLDA-SCNC: 11.3 ML/DL (ref 16–24)
O2 CAP BLDA-SCNC: 11.5 ML/DL (ref 16–24)
O2 CT BLDA-SCNC: 11.4 ML/DL (ref 15–23)
O2 CT BLDA-SCNC: 11.5 ML/DL (ref 15–23)
PH BLDA: 7.28 [PH] (ref 7.35–7.45)
PH BLDA: 7.4 [PH] (ref 7.35–7.45)
PLATELET # BLD EST: NORMAL 10*3/UL
PLATELET # BLD: 145 K/UL (ref 130–400)
PMV BLD AUTO: 9.7 FL (ref 7.2–11.7)
PO2 BLDA: 100 MM/HG (ref 80–100)
PO2 BLDA: 197 MM/HG (ref 80–100)
RBC # BLD AUTO: 2.7 MIL/UL (ref 3.8–5.2)
STOMATOCYTES BLD QL SMEAR: (no result)
TARGETS BLD QL SMEAR: SLIGHT
TOTAL CELLS COUNTED BLD: 100
WBC # BLD AUTO: 4.7 K/UL (ref 4.8–10.8)

## 2018-04-14 RX ADMIN — IPRATROPIUM BROMIDE AND ALBUTEROL SULFATE SCH ML: .5; 3 SOLUTION RESPIRATORY (INHALATION) at 08:19

## 2018-04-14 RX ADMIN — GLIPIZIDE SCH: 2.5 TABLET, EXTENDED RELEASE ORAL at 12:08

## 2018-04-14 RX ADMIN — Medication SCH: at 16:34

## 2018-04-14 RX ADMIN — IPRATROPIUM BROMIDE AND ALBUTEROL SULFATE SCH ML: .5; 3 SOLUTION RESPIRATORY (INHALATION) at 16:20

## 2018-04-14 RX ADMIN — IPRATROPIUM BROMIDE AND ALBUTEROL SULFATE SCH ML: .5; 3 SOLUTION RESPIRATORY (INHALATION) at 19:56

## 2018-04-14 RX ADMIN — POTASSIUM CHLORIDE SCH: 20 TABLET, EXTENDED RELEASE ORAL at 08:53

## 2018-04-14 RX ADMIN — IPRATROPIUM BROMIDE AND ALBUTEROL SULFATE SCH ML: .5; 3 SOLUTION RESPIRATORY (INHALATION) at 11:17

## 2018-04-14 RX ADMIN — PHENYTOIN SODIUM SCH MG: 50 INJECTION INTRAMUSCULAR; INTRAVENOUS at 08:40

## 2018-04-14 RX ADMIN — PHENYTOIN SODIUM SCH MG: 50 INJECTION INTRAMUSCULAR; INTRAVENOUS at 16:44

## 2018-04-14 RX ADMIN — ENOXAPARIN SODIUM SCH MG: 40 INJECTION SUBCUTANEOUS at 08:52

## 2018-04-14 RX ADMIN — PHENYTOIN SODIUM SCH MG: 50 INJECTION INTRAMUSCULAR; INTRAVENOUS at 00:39

## 2018-04-14 RX ADMIN — POTASSIUM CHLORIDE SCH: 20 TABLET, EXTENDED RELEASE ORAL at 16:45

## 2018-04-14 NOTE — CP.PCM.PN
Subjective





- Subjective


Subjective: 








Acute Resp Failure (on BiPaP and HFO2)


Aspiration Pna / Sepsis


Sz


Cervical Fx, s/p fixation (on last admission)


DMII


HTN


Dysphagia





S/ patine responding to yes and no to simple questions. 





O VSS Afebrile





Head: Neg adeno pos ila


Heart: ns1s2, neg me


Lungs: Crackles on both bases. Neg wheezing. 


Abdo: s, nt pos bs


Ext No c,c,e


Neuro, Alerto to person, not to place nor time. 





Labs: See Below





Plant





Cont ICU monitoring. 





Cont BiPaP for nocturnal use, and supplmental o2 for o2 sat of 89, 90, 91 Max. 

Avoid Hyperoxia. 


Cont IV abx as per ID. Monitor WBC#, Temp curve, and micro studies. 


Cont Dilantin, neuro f/u, monitor levels. 


S & S assessment. Start NGT feeds. 


Bg moniotring with SSI. 


Repeat Portable X-ray in am. 


PUD and DVT Px. 





Dr. Sandoval [phone]











Objective





- Vital Signs/Intake and Output


Vital Signs (last 24 hours): 


 











Temp Pulse Resp BP Pulse Ox


 


 99.7 F H  52 L  20   119/54 L  100 


 


 04/14/18 20:00  04/14/18 20:00  04/14/18 20:00  04/14/18 20:00  04/14/18 20:00








Intake and Output: 


 











 04/14/18 04/15/18





 18:59 06:59


 


Intake Total 260 4


 


Balance 260 4














- Medications


Medications: 


 Current Medications





Acetaminophen (Tylenol 325mg Tab)  975 mg PO Q6 PRN


   PRN Reason: Fever- T- 101 or above


Albuterol/Ipratropium (Duoneb 3 Mg/0.5 Mg (3 Ml) Ud)  3 ml INH RQID [facility]


   Last Admin: 04/14/18 19:56 Dose:  3 ml


Amlodipine Besylate (Norvasc)  2.5 mg PO DAILY [facility]


   Last Admin: 04/14/18 16:53 Dose:  2.5 mg


Enoxaparin Sodium (Lovenox)  40 mg SC DAILY [facility]


   PRN Reason: Protocol


   Last Admin: 04/14/18 08:52 Dose:  40 mg


Furosemide (Lasix)  20 mg PO DAILY [facility]


   Last Admin: 04/14/18 16:53 Dose:  20 mg


Glipizide (Glucotrol Xl)  2.5 mg PO DAILY [facility]


   Last Admin: 04/14/18 12:08 Dose:  Not Given


Piperacillin Sod/Tazobactam (Sod 2.25 gm/ Sodium Chloride)  100 mls @ 100 mls/

hr IVPB Q6 MARIELY


   PRN Reason: Protocol


   Last Admin: 04/14/18 21:17 Dose:  100 mls/hr


Vancomycin HCl 750 mg/ Sodium (Chloride)  250 mls @ 166.667 mls/hr IVPB Q12 MARIELY


   PRN Reason: Protocol


   Last Admin: 04/14/18 10:58 Dose:  Not Given


Montelukast Sodium (Singulair)  10 mg PO HS [facility]


   Last Admin: 04/13/18 22:27 Dose:  Not Given


Multivitamins/Minerals (Therapeutic-M Tab)  1 tab PO DAILY [facility]


   Last Admin: 04/14/18 16:34 Dose:  Not Given


Phenytoin (Dilantin)  100 mg IVP Q8 [facility]


   Last Admin: 04/14/18 16:44 Dose:  100 mg











- Labs


Labs: 


 





 04/14/18 04:25 





 04/14/18 04:25 





 











PT  10.1 Seconds (9.8-13.1)   04/11/18  19:55    


 


INR  0.9  (0.9-1.2)   04/11/18  19:55    


 


APTT  25.9 Seconds (25.6-37.1)   04/11/18  19:55

## 2018-04-14 NOTE — CP.PCM.PN
Subjective





- Date & Time of Evaluation


Date of Evaluation: 04/14/18


Time of Evaluation: 14:40





- Subjective


Subjective: 





ID Note-





Pt. seen and examined today in ICU with her daughter at her bedside.


pt. more alert today.


is on BIPAP.








Objective





- Vital Signs/Intake and Output


Vital Signs (last 24 hours): 


 











Temp Pulse Resp BP Pulse Ox


 


 99.4 F   56 L  24   154/80 H  100 


 


 04/14/18 12:00  04/14/18 14:00  04/14/18 14:00  04/14/18 14:00  04/14/18 14:00








Intake and Output: 


 











 04/14/18 04/14/18





 06:59 18:59


 


Intake Total 752 120


 


Balance 752 120














- Medications


Medications: 


 Current Medications





Acetaminophen (Tylenol 325mg Tab)  975 mg PO Q6 PRN


   PRN Reason: Fever- T- 101 or above


Albuterol/Ipratropium (Duoneb 3 Mg/0.5 Mg (3 Ml) Ud)  3 ml INH RQID [facility]


   Last Admin: 04/14/18 11:17 Dose:  3 ml


Amlodipine Besylate (Norvasc)  2.5 mg PO DAILY [facility]


   Last Admin: 04/13/18 09:05 Dose:  Not Given


Enoxaparin Sodium (Lovenox)  40 mg SC DAILY [facility]


   PRN Reason: Protocol


   Last Admin: 04/14/18 08:52 Dose:  40 mg


Furosemide (Lasix)  20 mg PO DAILY [facility]


   Last Admin: 04/13/18 09:05 Dose:  Not Given


Glipizide (Glucotrol Xl)  2.5 mg PO DAILY [facility]


   Last Admin: 04/14/18 12:08 Dose:  Not Given


Piperacillin Sod/Tazobactam (Sod 2.25 gm/ Sodium Chloride)  100 mls @ 100 mls/

hr IVPB Q6 [facility]


   PRN Reason: Protocol


   Last Admin: 04/14/18 08:59 Dose:  100 mls/hr


Vancomycin HCl 750 mg/ Sodium (Chloride)  250 mls @ 166.667 mls/hr IVPB Q12 [facility]


   PRN Reason: Protocol


   Last Admin: 04/14/18 10:58 Dose:  Not Given


Montelukast Sodium (Singulair)  10 mg PO HS [facility]


   Last Admin: 04/13/18 22:27 Dose:  Not Given


Multivitamins/Minerals (Therapeutic-M Tab)  1 tab PO DAILY [facility]


   Last Admin: 04/13/18 09:05 Dose:  Not Given


Phenytoin (Dilantin)  100 mg IVP Q8 [facility]


   Last Admin: 04/14/18 08:40 Dose:  100 mg


Potassium Chloride (K-Dur 20 Meq Er Tab)  20 meq PO BID [facility]


   Last Admin: 04/14/18 08:53 Dose:  Not Given











- Labs


Labs: 


 





 





- Additional Findings


Additional findings: 








- Constitutional


Appears: No Acute Distress, Chronically Ill








- Neck Exam


Neck exam: Positive for: Full Rom


Additional comments: 





posterior cervical spine surgical site clean, no erythema





- Respiratory Exam


Additional comments: 





slight tachypnea





decreased breath sounds at bases but better aeration compared to before , no 

wheezing





- Cardiovascular Exam


Cardiovascular Exam: RRR, +S1, +S2


Additional comments: 





3/6 ejection murmur heard at LSB





- GI/Abdominal Exam


GI & Abdominal Exam: Normal Bowel Sounds, Soft


Additional comments: 





NT, ND





- Extremities Exam


Extremities exam: Positive for: normal inspection





- Neurological Exam


Additional comments: 





more awake today





 Laboratory Results - last 72 hr











  04/11/18 04/11/18 04/11/18





  19:55 19:55 19:55


 


WBC  8.2  


 


RBC  3.28 L  


 


Hgb  10.1 L  


 


Hct  31.0 L  


 


MCV  94.7  


 


MCH  30.7  


 


MCHC  32.4 L  


 


RDW  13.7  


 


Plt Count  182  


 


MPV  9.5  


 


Neut % (Auto)  79.1 H  


 


Lymph % (Auto)  11.9 L  


 


Mono % (Auto)  8.7  


 


Eos % (Auto)  0.2  


 


Baso % (Auto)  0.1  


 


Neut # (Auto)  6.5  


 


Lymph # (Auto)  1.0  


 


Mono # (Auto)  0.7  


 


Eos # (Auto)  0.0  


 


Baso # (Auto)  0.0  


 


Neutrophils % (Manual)   


 


Lymphocytes % (Manual)   


 


Monocytes % (Manual)   


 


Eosinophils % (Manual)   


 


Basophils % (Manual)   


 


Platelet Estimate   


 


Hypochromasia (manual)   


 


Macrocytosis (manual)   


 


Target Cells   


 


Stomatocytes   


 


PT    10.1


 


INR    0.9


 


APTT    25.9


 


pCO2   


 


pO2   


 


HCO3   


 


ABG pH   


 


ABG Total CO2   


 


ABG O2 Saturation   


 


ABG O2 Content   


 


ABG Base Excess   


 


ABG Hemoglobin   


 


ABG Carboxyhemoglobin   


 


POC ABG HHb (Measured)   


 


ABG Methemoglobin   


 


ABG O2 Capacity   


 


Almas Test   


 


VBG pH   


 


VBG pCO2   


 


VBG HCO3   


 


VBG Total CO2   


 


VBG O2 Sat (Calc)   


 


VBG Base Excess   


 


VBG Potassium   


 


A-a O2 Difference   


 


Hgb O2 Saturation   


 


Sodium   132 


 


Chloride   81 L 


 


Glucose   


 


Lactate   


 


Vent Mode   


 


Mechanical Rate   


 


FiO2   


 


Inspiratory BiPAP   


 


Expiratory BiPAP   


 


Crit Value Called To   


 


Crit Value Called By   


 


Crit Value Read Back   


 


Blood Gas Notified Time   


 


Potassium   4.7 


 


Carbon Dioxide   36 H 


 


Anion Gap   20 


 


BUN   24 H 


 


Creatinine   1.0 


 


Est GFR ( Amer)   > 60 


 


Est GFR (Non-Af Amer)   52 


 


POC Glucose (mg/dL)   


 


Random Glucose   188 H 


 


Calcium   8.3 L 


 


Phosphorus   6.4 H 


 


Magnesium   2.2 


 


Total Bilirubin   0.4 


 


AST   52 H 


 


ALT   53 H D 


 


Alkaline Phosphatase   104 


 


Total Creatine Kinase   37 


 


Troponin I   0.0450 


 


Total Protein   7.1 


 


Albumin   3.9 


 


Globulin   3.2 


 


Albumin/Globulin Ratio   1.2 


 


Venous Blood Potassium   


 


Vancomycin Peak   


 


Vancomycin Trough   


 


Phenytoin   


 


Valproic Acid   


 


Influenza Typ A,B (EIA)   


 


Blood Type   


 


Antibody Screen   


 


BBK History Checked   














  04/11/18 04/11/18 04/11/18





  19:55 19:55 20:00


 


WBC   


 


RBC   


 


Hgb   


 


Hct   


 


MCV   


 


MCH   


 


MCHC   


 


RDW   


 


Plt Count   


 


MPV   


 


Neut % (Auto)   


 


Lymph % (Auto)   


 


Mono % (Auto)   


 


Eos % (Auto)   


 


Baso % (Auto)   


 


Neut # (Auto)   


 


Lymph # (Auto)   


 


Mono # (Auto)   


 


Eos # (Auto)   


 


Baso # (Auto)   


 


Neutrophils % (Manual)   


 


Lymphocytes % (Manual)   


 


Monocytes % (Manual)   


 


Eosinophils % (Manual)   


 


Basophils % (Manual)   


 


Platelet Estimate   


 


Hypochromasia (manual)   


 


Macrocytosis (manual)   


 


Target Cells   


 


Stomatocytes   


 


PT   


 


INR   


 


APTT   


 


pCO2   


 


pO2   26 L 


 


HCO3   


 


ABG pH   


 


ABG Total CO2   


 


ABG O2 Saturation   


 


ABG O2 Content   


 


ABG Base Excess   


 


ABG Hemoglobin   


 


ABG Carboxyhemoglobin   


 


POC ABG HHb (Measured)   


 


ABG Methemoglobin   


 


ABG O2 Capacity   


 


Almas Test   


 


VBG pH   7.22 L 


 


VBG pCO2   116 H* 


 


VBG HCO3   34.5 


 


VBG Total CO2   51.1 H 


 


VBG O2 Sat (Calc)   59.9 


 


VBG Base Excess   14.4 H 


 


VBG Potassium   4.6 


 


A-a O2 Difference   


 


Hgb O2 Saturation   


 


Sodium   128.0 L 


 


Chloride   86.0 L 


 


Glucose   209 H 


 


Lactate   3.2 H 


 


Vent Mode   


 


Mechanical Rate   


 


FiO2   21.0 


 


Inspiratory BiPAP   


 


Expiratory BiPAP   


 


Crit Value Called To   Alexia meyer 


 


Crit Value Called By   23 


 


Crit Value Read Back   Y 


 


Blood Gas Notified Time   2000 


 


Potassium   


 


Carbon Dioxide   


 


Anion Gap   


 


BUN   


 


Creatinine   


 


Est GFR ( Amer)   


 


Est GFR (Non-Af Amer)   


 


POC Glucose (mg/dL)   


 


Random Glucose   


 


Calcium   


 


Phosphorus   


 


Magnesium   


 


Total Bilirubin   


 


AST   


 


ALT   


 


Alkaline Phosphatase   


 


Total Creatine Kinase   


 


Troponin I   


 


Total Protein   


 


Albumin   


 


Globulin   


 


Albumin/Globulin Ratio   


 


Venous Blood Potassium   4.6 


 


Vancomycin Peak   


 


Vancomycin Trough   


 


Phenytoin   


 


Valproic Acid    < 10.0 L


 


Influenza Typ A,B (EIA)   


 


Blood Type  A POSITIVE  


 


Antibody Screen  Negative  


 


BBK History Checked  Patient has bt  














  04/11/18 04/11/18 04/11/18





  20:46 21:05 21:05


 


WBC   


 


RBC   


 


Hgb   


 


Hct   


 


MCV   


 


MCH   


 


MCHC   


 


RDW   


 


Plt Count   


 


MPV   


 


Neut % (Auto)   


 


Lymph % (Auto)   


 


Mono % (Auto)   


 


Eos % (Auto)   


 


Baso % (Auto)   


 


Neut # (Auto)   


 


Lymph # (Auto)   


 


Mono # (Auto)   


 


Eos # (Auto)   


 


Baso # (Auto)   


 


Neutrophils % (Manual)   


 


Lymphocytes % (Manual)   


 


Monocytes % (Manual)   


 


Eosinophils % (Manual)   


 


Basophils % (Manual)   


 


Platelet Estimate   


 


Hypochromasia (manual)   


 


Macrocytosis (manual)   


 


Target Cells   


 


Stomatocytes   


 


PT   


 


INR   


 


APTT   


 


pCO2   100 H* 


 


pO2   44 L* 


 


HCO3   37.9 H 


 


ABG pH   7.28 L 


 


ABG Total CO2   50.1 H 


 


ABG O2 Saturation   90.1 L 


 


ABG O2 Content   11.8 L 


 


ABG Base Excess   16.9 H 


 


ABG Hemoglobin   9.9 L 


 


ABG Carboxyhemoglobin   3.4 H 


 


POC ABG HHb (Measured)   9.4 H 


 


ABG Methemoglobin   2.2 


 


ABG O2 Capacity   13.1 L 


 


Almas Test   Yes 


 


VBG pH   


 


VBG pCO2   


 


VBG HCO3   


 


VBG Total CO2   


 


VBG O2 Sat (Calc)   


 


VBG Base Excess   


 


VBG Potassium   


 


A-a O2 Difference   59.0 


 


Hgb O2 Saturation   85.1 L 


 


Sodium   


 


Chloride   


 


Glucose   


 


Lactate   


 


Vent Mode   


 


Mechanical Rate   


 


FiO2   32.0 


 


Inspiratory BiPAP   


 


Expiratory BiPAP   


 


Crit Value Called To   Alexia perez 


 


Crit Value Called By   23 


 


Crit Value Read Back   Y 


 


Blood Gas Notified Time   2120 


 


Potassium   


 


Carbon Dioxide   


 


Anion Gap   


 


BUN   


 


Creatinine   


 


Est GFR ( Amer)   


 


Est GFR (Non-Af Amer)   


 


POC Glucose (mg/dL)    138 H


 


Random Glucose   


 


Calcium   


 


Phosphorus   


 


Magnesium   


 


Total Bilirubin   


 


AST   


 


ALT   


 


Alkaline Phosphatase   


 


Total Creatine Kinase   


 


Troponin I   


 


Total Protein   


 


Albumin   


 


Globulin   


 


Albumin/Globulin Ratio   


 


Venous Blood Potassium   


 


Vancomycin Peak   


 


Vancomycin Trough   


 


Phenytoin  5.5 L  


 


Valproic Acid   


 


Influenza Typ A,B (EIA)   


 


Blood Type   


 


Antibody Screen   


 


BBK History Checked   














  04/11/18 04/12/18 04/12/18





  22:08 07:41 07:41


 


WBC   7.8 


 


RBC   2.89 L 


 


Hgb   8.8 L 


 


Hct   27.3 L 


 


MCV   94.5 


 


MCH   30.5 


 


MCHC   32.3 L 


 


RDW   13.3 


 


Plt Count   164 


 


MPV   


 


Neut % (Auto)   


 


Lymph % (Auto)   


 


Mono % (Auto)   


 


Eos % (Auto)   


 


Baso % (Auto)   


 


Neut # (Auto)   


 


Lymph # (Auto)   


 


Mono # (Auto)   


 


Eos # (Auto)   


 


Baso # (Auto)   


 


Neutrophils % (Manual)   


 


Lymphocytes % (Manual)   


 


Monocytes % (Manual)   


 


Eosinophils % (Manual)   


 


Basophils % (Manual)   


 


Platelet Estimate   


 


Hypochromasia (manual)   


 


Macrocytosis (manual)   


 


Target Cells   


 


Stomatocytes   


 


PT   


 


INR   


 


APTT   


 


pCO2   


 


pO2   


 


HCO3   


 


ABG pH   


 


ABG Total CO2   


 


ABG O2 Saturation   


 


ABG O2 Content   


 


ABG Base Excess   


 


ABG Hemoglobin   


 


ABG Carboxyhemoglobin   


 


POC ABG HHb (Measured)   


 


ABG Methemoglobin   


 


ABG O2 Capacity   


 


Almas Test   


 


VBG pH   


 


VBG pCO2   


 


VBG HCO3   


 


VBG Total CO2   


 


VBG O2 Sat (Calc)   


 


VBG Base Excess   


 


VBG Potassium   


 


A-a O2 Difference   


 


Hgb O2 Saturation   


 


Sodium    136


 


Chloride    84 L


 


Glucose   


 


Lactate   


 


Vent Mode   


 


Mechanical Rate   


 


FiO2   


 


Inspiratory BiPAP   


 


Expiratory BiPAP   


 


Crit Value Called To   


 


Crit Value Called By   


 


Crit Value Read Back   


 


Blood Gas Notified Time   


 


Potassium    4.9


 


Carbon Dioxide    40 H*


 


Anion Gap    17


 


BUN    26 H


 


Creatinine    1.3 H


 


Est GFR ( Amer)    47


 


Est GFR (Non-Af Amer)    39


 


POC Glucose (mg/dL)   


 


Random Glucose    135 H


 


Calcium    7.9 L


 


Phosphorus   


 


Magnesium   


 


Total Bilirubin    0.3


 


AST    46 H


 


ALT    53 H


 


Alkaline Phosphatase    90


 


Total Creatine Kinase   


 


Troponin I   


 


Total Protein    6.4


 


Albumin    3.4 L


 


Globulin    3.0


 


Albumin/Globulin Ratio    1.1


 


Venous Blood Potassium   


 


Vancomycin Peak   


 


Vancomycin Trough   


 


Phenytoin   


 


Valproic Acid   


 


Influenza Typ A,B (EIA)  Negative for flu a/b  


 


Blood Type   


 


Antibody Screen   


 


BBK History Checked   














  04/12/18 04/12/18 04/12/18





  13:06 18:07 20:00


 


WBC   


 


RBC   


 


Hgb   


 


Hct   


 


MCV   


 


MCH   


 


MCHC   


 


RDW   


 


Plt Count   


 


MPV   


 


Neut % (Auto)   


 


Lymph % (Auto)   


 


Mono % (Auto)   


 


Eos % (Auto)   


 


Baso % (Auto)   


 


Neut # (Auto)   


 


Lymph # (Auto)   


 


Mono # (Auto)   


 


Eos # (Auto)   


 


Baso # (Auto)   


 


Neutrophils % (Manual)   


 


Lymphocytes % (Manual)   


 


Monocytes % (Manual)   


 


Eosinophils % (Manual)   


 


Basophils % (Manual)   


 


Platelet Estimate   


 


Hypochromasia (manual)   


 


Macrocytosis (manual)   


 


Target Cells   


 


Stomatocytes   


 


PT   


 


INR   


 


APTT   


 


pCO2    95 H*


 


pO2    67 L


 


HCO3    40.2 H*


 


ABG pH    7.32 L


 


ABG Total CO2    51.8 H


 


ABG O2 Saturation    98.5 H


 


ABG O2 Content    12.4 L


 


ABG Base Excess    19.6 H


 


ABG Hemoglobin    9.1 L


 


ABG Carboxyhemoglobin    1.8 H


 


POC ABG HHb (Measured)    1.5


 


ABG Methemoglobin    0.7


 


ABG O2 Capacity    12.6 L


 


Almas Test    Yes


 


VBG pH   


 


VBG pCO2   


 


VBG HCO3   


 


VBG Total CO2   


 


VBG O2 Sat (Calc)   


 


VBG Base Excess   


 


VBG Potassium   


 


A-a O2 Difference    99.0


 


Hgb O2 Saturation    96.0


 


Sodium   


 


Chloride   


 


Glucose   


 


Lactate   


 


Vent Mode    Bipap


 


Mechanical Rate   


 


FiO2    40.0


 


Inspiratory BiPAP    12


 


Expiratory BiPAP    6


 


Crit Value Called To    Rn raquel dimitris


 


Crit Value Called By    Rt


 


Crit Value Read Back    Y


 


Blood Gas Notified Time    2011


 


Potassium   


 


Carbon Dioxide   


 


Anion Gap   


 


BUN   


 


Creatinine   


 


Est GFR ( Amer)   


 


Est GFR (Non-Af Amer)   


 


POC Glucose (mg/dL)  142 H  


 


Random Glucose   


 


Calcium   


 


Phosphorus   


 


Magnesium   


 


Total Bilirubin   


 


AST   


 


ALT   


 


Alkaline Phosphatase   


 


Total Creatine Kinase   


 


Troponin I   


 


Total Protein   


 


Albumin   


 


Globulin   


 


Albumin/Globulin Ratio   


 


Venous Blood Potassium   


 


Vancomycin Peak   15.9 L 


 


Vancomycin Trough   


 


Phenytoin   


 


Valproic Acid   


 


Influenza Typ A,B (EIA)   


 


Blood Type   


 


Antibody Screen   


 


BBK History Checked   














  04/12/18 04/12/18 04/13/18





  21:15 22:44 03:17


 


WBC   


 


RBC   


 


Hgb   


 


Hct   


 


MCV   


 


MCH   


 


MCHC   


 


RDW   


 


Plt Count   


 


MPV   


 


Neut % (Auto)   


 


Lymph % (Auto)   


 


Mono % (Auto)   


 


Eos % (Auto)   


 


Baso % (Auto)   


 


Neut # (Auto)   


 


Lymph # (Auto)   


 


Mono # (Auto)   


 


Eos # (Auto)   


 


Baso # (Auto)   


 


Neutrophils % (Manual)   


 


Lymphocytes % (Manual)   


 


Monocytes % (Manual)   


 


Eosinophils % (Manual)   


 


Basophils % (Manual)   


 


Platelet Estimate   


 


Hypochromasia (manual)   


 


Macrocytosis (manual)   


 


Target Cells   


 


Stomatocytes   


 


PT   


 


INR   


 


APTT   


 


pCO2   


 


pO2   


 


HCO3   


 


ABG pH   


 


ABG Total CO2   


 


ABG O2 Saturation   


 


ABG O2 Content   


 


ABG Base Excess   


 


ABG Hemoglobin   


 


ABG Carboxyhemoglobin   


 


POC ABG HHb (Measured)   


 


ABG Methemoglobin   


 


ABG O2 Capacity   


 


Almas Test   


 


VBG pH   


 


VBG pCO2   


 


VBG HCO3   


 


VBG Total CO2   


 


VBG O2 Sat (Calc)   


 


VBG Base Excess   


 


VBG Potassium   


 


A-a O2 Difference   


 


Hgb O2 Saturation   


 


Sodium   


 


Chloride   


 


Glucose   


 


Lactate   


 


Vent Mode   


 


Mechanical Rate   


 


FiO2   


 


Inspiratory BiPAP   


 


Expiratory BiPAP   


 


Crit Value Called To   


 


Crit Value Called By   


 


Crit Value Read Back   


 


Blood Gas Notified Time   


 


Potassium   


 


Carbon Dioxide   


 


Anion Gap   


 


BUN   


 


Creatinine   


 


Est GFR ( Amer)   


 


Est GFR (Non-Af Amer)   


 


POC Glucose (mg/dL)  63 L  126 H  127 H


 


Random Glucose   


 


Calcium   


 


Phosphorus   


 


Magnesium   


 


Total Bilirubin   


 


AST   


 


ALT   


 


Alkaline Phosphatase   


 


Total Creatine Kinase   


 


Troponin I   


 


Total Protein   


 


Albumin   


 


Globulin   


 


Albumin/Globulin Ratio   


 


Venous Blood Potassium   


 


Vancomycin Peak   


 


Vancomycin Trough   


 


Phenytoin   


 


Valproic Acid   


 


Influenza Typ A,B (EIA)   


 


Blood Type   


 


Antibody Screen   


 


BBK History Checked   














  04/13/18 04/13/18 04/13/18





  04:20 04:20 04:20


 


WBC  7.6  


 


RBC  2.90 L  


 


Hgb  8.8 L  


 


Hct  27.9 L  


 


MCV  96.1  


 


MCH  30.2  


 


MCHC  31.4 L  


 


RDW  14.2  


 


Plt Count  143  


 


MPV   


 


Neut % (Auto)   


 


Lymph % (Auto)   


 


Mono % (Auto)   


 


Eos % (Auto)   


 


Baso % (Auto)   


 


Neut # (Auto)   


 


Lymph # (Auto)   


 


Mono # (Auto)   


 


Eos # (Auto)   


 


Baso # (Auto)   


 


Neutrophils % (Manual)   


 


Lymphocytes % (Manual)   


 


Monocytes % (Manual)   


 


Eosinophils % (Manual)   


 


Basophils % (Manual)   


 


Platelet Estimate   


 


Hypochromasia (manual)   


 


Macrocytosis (manual)   


 


Target Cells   


 


Stomatocytes   


 


PT   


 


INR   


 


APTT   


 


pCO2   


 


pO2   


 


HCO3   


 


ABG pH   


 


ABG Total CO2   


 


ABG O2 Saturation   


 


ABG O2 Content   


 


ABG Base Excess   


 


ABG Hemoglobin   


 


ABG Carboxyhemoglobin   


 


POC ABG HHb (Measured)   


 


ABG Methemoglobin   


 


ABG O2 Capacity   


 


Almas Test   


 


VBG pH   


 


VBG pCO2   


 


VBG HCO3   


 


VBG Total CO2   


 


VBG O2 Sat (Calc)   


 


VBG Base Excess   


 


VBG Potassium   


 


A-a O2 Difference   


 


Hgb O2 Saturation   


 


Sodium   138 


 


Chloride   87 L 


 


Glucose   


 


Lactate   


 


Vent Mode   


 


Mechanical Rate   


 


FiO2   


 


Inspiratory BiPAP   


 


Expiratory BiPAP   


 


Crit Value Called To   


 


Crit Value Called By   


 


Crit Value Read Back   


 


Blood Gas Notified Time   


 


Potassium   4.8 


 


Carbon Dioxide   38 H 


 


Anion Gap   18 


 


BUN   32 H 


 


Creatinine   1.3 H 


 


Est GFR ( Amer)   47 


 


Est GFR (Non-Af Amer)   39 


 


POC Glucose (mg/dL)   


 


Random Glucose   121 H 


 


Calcium   8.0 L 


 


Phosphorus   


 


Magnesium   


 


Total Bilirubin   


 


AST   


 


ALT   


 


Alkaline Phosphatase   


 


Total Creatine Kinase   


 


Troponin I   


 


Total Protein   


 


Albumin   


 


Globulin   


 


Albumin/Globulin Ratio   


 


Venous Blood Potassium   


 


Vancomycin Peak    19.7 L


 


Vancomycin Trough   


 


Phenytoin   


 


Valproic Acid   


 


Influenza Typ A,B (EIA)   


 


Blood Type   


 


Antibody Screen   


 


BBK History Checked   














  04/13/18 04/13/18 04/13/18





  04:42 04:59 07:07


 


WBC   


 


RBC   


 


Hgb   


 


Hct   


 


MCV   


 


MCH   


 


MCHC   


 


RDW   


 


Plt Count   


 


MPV   


 


Neut % (Auto)   


 


Lymph % (Auto)   


 


Mono % (Auto)   


 


Eos % (Auto)   


 


Baso % (Auto)   


 


Neut # (Auto)   


 


Lymph # (Auto)   


 


Mono # (Auto)   


 


Eos # (Auto)   


 


Baso # (Auto)   


 


Neutrophils % (Manual)   


 


Lymphocytes % (Manual)   


 


Monocytes % (Manual)   


 


Eosinophils % (Manual)   


 


Basophils % (Manual)   


 


Platelet Estimate   


 


Hypochromasia (manual)   


 


Macrocytosis (manual)   


 


Target Cells   


 


Stomatocytes   


 


PT   


 


INR   


 


APTT   


 


pCO2  95 H*   94 H*


 


pO2  113 H   134 H


 


HCO3  41.4 H*   41.0 H*


 


ABG pH  7.33 L   7.33 L


 


ABG Total CO2  53.0 H   52.5 H


 


ABG O2 Saturation  100.3 H   100.2 H


 


ABG O2 Content  11.8 L   11.9 L


 


ABG Base Excess  21.1 H   20.6 H


 


ABG Hemoglobin  8.4 L   8.5 L


 


ABG Carboxyhemoglobin  1.8 H   1.7 H


 


POC ABG HHb (Measured)  -0.3 L   -0.2 L


 


ABG Methemoglobin  0.7   0.9


 


ABG O2 Capacity  11.8 L   11.9 L


 


Almas Test  Yes   Yes


 


VBG pH   


 


VBG pCO2   


 


VBG HCO3   


 


VBG Total CO2   


 


VBG O2 Sat (Calc)   


 


VBG Base Excess   


 


VBG Potassium   


 


A-a O2 Difference  125.0   105.0


 


Hgb O2 Saturation  97.8   97.5


 


Sodium   


 


Chloride   


 


Glucose   


 


Lactate   


 


Vent Mode  Bipap  


 


Mechanical Rate  12   16


 


FiO2  50.0   50.0


 


Inspiratory BiPAP  14   20


 


Expiratory BiPAP  7   8


 


Crit Value Called To  alexia Lee Dr, md


 


Crit Value Called By  302   15


 


Crit Value Read Back  Y   Y


 


Blood Gas Notified Time  447   741


 


Potassium   


 


Carbon Dioxide   


 


Anion Gap   


 


BUN   


 


Creatinine   


 


Est GFR ( Amer)   


 


Est GFR (Non-Af Amer)   


 


POC Glucose (mg/dL)   115 H 


 


Random Glucose   


 


Calcium   


 


Phosphorus   


 


Magnesium   


 


Total Bilirubin   


 


AST   


 


ALT   


 


Alkaline Phosphatase   


 


Total Creatine Kinase   


 


Troponin I   


 


Total Protein   


 


Albumin   


 


Globulin   


 


Albumin/Globulin Ratio   


 


Venous Blood Potassium   


 


Vancomycin Peak   


 


Vancomycin Trough   


 


Phenytoin   


 


Valproic Acid   


 


Influenza Typ A,B (EIA)   


 


Blood Type   


 


Antibody Screen   


 


BBK History Checked   














  04/13/18 04/13/18 04/13/18





  08:48 11:08 12:07


 


WBC   


 


RBC   


 


Hgb   


 


Hct   


 


MCV   


 


MCH   


 


MCHC   


 


RDW   


 


Plt Count   


 


MPV   


 


Neut % (Auto)   


 


Lymph % (Auto)   


 


Mono % (Auto)   


 


Eos % (Auto)   


 


Baso % (Auto)   


 


Neut # (Auto)   


 


Lymph # (Auto)   


 


Mono # (Auto)   


 


Eos # (Auto)   


 


Baso # (Auto)   


 


Neutrophils % (Manual)   


 


Lymphocytes % (Manual)   


 


Monocytes % (Manual)   


 


Eosinophils % (Manual)   


 


Basophils % (Manual)   


 


Platelet Estimate   


 


Hypochromasia (manual)   


 


Macrocytosis (manual)   


 


Target Cells   


 


Stomatocytes   


 


PT   


 


INR   


 


APTT   


 


pCO2   


 


pO2   


 


HCO3   


 


ABG pH   


 


ABG Total CO2   


 


ABG O2 Saturation   


 


ABG O2 Content   


 


ABG Base Excess   


 


ABG Hemoglobin   


 


ABG Carboxyhemoglobin   


 


POC ABG HHb (Measured)   


 


ABG Methemoglobin   


 


ABG O2 Capacity   


 


Almas Test   


 


VBG pH   


 


VBG pCO2   


 


VBG HCO3   


 


VBG Total CO2   


 


VBG O2 Sat (Calc)   


 


VBG Base Excess   


 


VBG Potassium   


 


A-a O2 Difference   


 


Hgb O2 Saturation   


 


Sodium   


 


Chloride   


 


Glucose   


 


Lactate   


 


Vent Mode   


 


Mechanical Rate   


 


FiO2   


 


Inspiratory BiPAP   


 


Expiratory BiPAP   


 


Crit Value Called To   


 


Crit Value Called By   


 


Crit Value Read Back   


 


Blood Gas Notified Time   


 


Potassium   


 


Carbon Dioxide   


 


Anion Gap   


 


BUN   


 


Creatinine   


 


Est GFR ( Amer)   


 


Est GFR (Non-Af Amer)   


 


POC Glucose (mg/dL)  157 H  151 H 


 


Random Glucose   


 


Calcium   


 


Phosphorus   


 


Magnesium   


 


Total Bilirubin   


 


AST   


 


ALT   


 


Alkaline Phosphatase   


 


Total Creatine Kinase   


 


Troponin I   


 


Total Protein   


 


Albumin   


 


Globulin   


 


Albumin/Globulin Ratio   


 


Venous Blood Potassium   


 


Vancomycin Peak   


 


Vancomycin Trough   


 


Phenytoin    13.3


 


Valproic Acid   


 


Influenza Typ A,B (EIA)   


 


Blood Type   


 


Antibody Screen   


 


BBK History Checked   














  04/13/18 04/13/18 04/13/18





  16:59 21:17 23:30


 


WBC   


 


RBC   


 


Hgb   


 


Hct   


 


MCV   


 


MCH   


 


MCHC   


 


RDW   


 


Plt Count   


 


MPV   


 


Neut % (Auto)   


 


Lymph % (Auto)   


 


Mono % (Auto)   


 


Eos % (Auto)   


 


Baso % (Auto)   


 


Neut # (Auto)   


 


Lymph # (Auto)   


 


Mono # (Auto)   


 


Eos # (Auto)   


 


Baso # (Auto)   


 


Neutrophils % (Manual)   


 


Lymphocytes % (Manual)   


 


Monocytes % (Manual)   


 


Eosinophils % (Manual)   


 


Basophils % (Manual)   


 


Platelet Estimate   


 


Hypochromasia (manual)   


 


Macrocytosis (manual)   


 


Target Cells   


 


Stomatocytes   


 


PT   


 


INR   


 


APTT   


 


pCO2    115 H*


 


pO2    148 H


 


HCO3    39.8 H


 


ABG pH    7.24 L


 


ABG Total CO2    52.8 H


 


ABG O2 Saturation    100.6 H


 


ABG O2 Content    11.3 L


 


ABG Base Excess    19.0 H


 


ABG Hemoglobin    8.0 L


 


ABG Carboxyhemoglobin    2.2 H


 


POC ABG HHb (Measured)    -0.6 L


 


ABG Methemoglobin    1.0


 


ABG O2 Capacity    11.2 L


 


Almas Test    Yes


 


VBG pH   


 


VBG pCO2   


 


VBG HCO3   


 


VBG Total CO2   


 


VBG O2 Sat (Calc)   


 


VBG Base Excess   


 


VBG Potassium   


 


A-a O2 Difference    136.0


 


Hgb O2 Saturation    97.4


 


Sodium   


 


Chloride   


 


Glucose   


 


Lactate   


 


Vent Mode    Bipap


 


Mechanical Rate    12


 


FiO2    60.0


 


Inspiratory BiPAP    10


 


Expiratory BiPAP    5


 


Crit Value Called To    Dr jessica valencia


 


Crit Value Called By    


 


Crit Value Read Back    Y


 


Blood Gas Notified Time    2332


 


Potassium   


 


Carbon Dioxide   


 


Anion Gap   


 


BUN   


 


Creatinine   


 


Est GFR ( Amer)   


 


Est GFR (Non-Af Amer)   


 


POC Glucose (mg/dL)  188 H  173 H 


 


Random Glucose   


 


Calcium   


 


Phosphorus   


 


Magnesium   


 


Total Bilirubin   


 


AST   


 


ALT   


 


Alkaline Phosphatase   


 


Total Creatine Kinase   


 


Troponin I   


 


Total Protein   


 


Albumin   


 


Globulin   


 


Albumin/Globulin Ratio   


 


Venous Blood Potassium   


 


Vancomycin Peak   


 


Vancomycin Trough   


 


Phenytoin   


 


Valproic Acid   


 


Influenza Typ A,B (EIA)   


 


Blood Type   


 


Antibody Screen   


 


BBK History Checked   














  04/14/18 04/14/18 04/14/18





  04:00 04:25 04:25


 


WBC    4.7 L


 


RBC    2.70 L


 


Hgb    8.2 L


 


Hct    26.4 L


 


MCV    97.8


 


MCH    30.2


 


MCHC    30.9 L


 


RDW    14.3


 


Plt Count    145


 


MPV    9.7


 


Neut % (Auto)    83.7 H


 


Lymph % (Auto)    8.3 L


 


Mono % (Auto)    7.0


 


Eos % (Auto)    0.8


 


Baso % (Auto)    0.2


 


Neut # (Auto)    3.9


 


Lymph # (Auto)    0.4 L


 


Mono # (Auto)    0.3


 


Eos # (Auto)    0.0


 


Baso # (Auto)    0.0


 


Neutrophils % (Manual)    80 H


 


Lymphocytes % (Manual)    10 L


 


Monocytes % (Manual)    8


 


Eosinophils % (Manual)    1


 


Basophils % (Manual)    1


 


Platelet Estimate    Normal


 


Hypochromasia (manual)    Marked


 


Macrocytosis (manual)    Slight


 


Target Cells    Slight


 


Stomatocytes    Moderate


 


PT   


 


INR   


 


APTT   


 


pCO2  106 H*  


 


pO2  197 H  


 


HCO3  40.7 H*  


 


ABG pH  7.28 L  


 


ABG Total CO2  53.1 H  


 


ABG O2 Saturation  99.9 H  


 


ABG O2 Content  11.5 L  


 


ABG Base Excess  20.2 H  


 


ABG Hemoglobin  8.0 L  


 


ABG Carboxyhemoglobin  1.3  


 


POC ABG HHb (Measured)  0.1  


 


ABG Methemoglobin  0.8  


 


ABG O2 Capacity  11.5 L  


 


Almas Test  Yes  


 


VBG pH   


 


VBG pCO2   


 


VBG HCO3   


 


VBG Total CO2   


 


VBG O2 Sat (Calc)   


 


VBG Base Excess   


 


VBG Potassium   


 


A-a O2 Difference  98.0  


 


Hgb O2 Saturation  97.8  


 


Sodium   


 


Chloride   


 


Glucose   


 


Lactate   


 


Vent Mode  Bipap  


 


Mechanical Rate  18  


 


FiO2  60.0  


 


Inspiratory BiPAP  20  


 


Expiratory BiPAP  8  


 


Crit Value Called To  Dr jessica valencia  


 


Crit Value Called By  Marv  


 


Crit Value Read Back  Y  


 


Blood Gas Notified Time  446  


 


Potassium   


 


Carbon Dioxide   


 


Anion Gap   


 


BUN   


 


Creatinine   


 


Est GFR ( Amer)   


 


Est GFR (Non-Af Amer)   


 


POC Glucose (mg/dL)   


 


Random Glucose   


 


Calcium   


 


Phosphorus   


 


Magnesium   


 


Total Bilirubin   


 


AST   


 


ALT   


 


Alkaline Phosphatase   


 


Total Creatine Kinase   


 


Troponin I   


 


Total Protein   


 


Albumin   


 


Globulin   


 


Albumin/Globulin Ratio   


 


Venous Blood Potassium   


 


Vancomycin Peak   


 


Vancomycin Trough   17.7 H 


 


Phenytoin   


 


Valproic Acid   


 


Influenza Typ A,B (EIA)   


 


Blood Type   


 


Antibody Screen   


 


BBK History Checked   














  04/14/18 04/14/18 04/14/18





  04:25 06:11 11:21


 


WBC   


 


RBC   


 


Hgb   


 


Hct   


 


MCV   


 


MCH   


 


MCHC   


 


RDW   


 


Plt Count   


 


MPV   


 


Neut % (Auto)   


 


Lymph % (Auto)   


 


Mono % (Auto)   


 


Eos % (Auto)   


 


Baso % (Auto)   


 


Neut # (Auto)   


 


Lymph # (Auto)   


 


Mono # (Auto)   


 


Eos # (Auto)   


 


Baso # (Auto)   


 


Neutrophils % (Manual)   


 


Lymphocytes % (Manual)   


 


Monocytes % (Manual)   


 


Eosinophils % (Manual)   


 


Basophils % (Manual)   


 


Platelet Estimate   


 


Hypochromasia (manual)   


 


Macrocytosis (manual)   


 


Target Cells   


 


Stomatocytes   


 


PT   


 


INR   


 


APTT   


 


pCO2   


 


pO2   


 


HCO3   


 


ABG pH   


 


ABG Total CO2   


 


ABG O2 Saturation   


 


ABG O2 Content   


 


ABG Base Excess   


 


ABG Hemoglobin   


 


ABG Carboxyhemoglobin   


 


POC ABG HHb (Measured)   


 


ABG Methemoglobin   


 


ABG O2 Capacity   


 


Almas Test   


 


VBG pH   


 


VBG pCO2   


 


VBG HCO3   


 


VBG Total CO2   


 


VBG O2 Sat (Calc)   


 


VBG Base Excess   


 


VBG Potassium   


 


A-a O2 Difference   


 


Hgb O2 Saturation   


 


Sodium  140  


 


Chloride  89 L  


 


Glucose   


 


Lactate   


 


Vent Mode   


 


Mechanical Rate   


 


FiO2   


 


Inspiratory BiPAP   


 


Expiratory BiPAP   


 


Crit Value Called To   


 


Crit Value Called By   


 


Crit Value Read Back   


 


Blood Gas Notified Time   


 


Potassium  4.6  


 


Carbon Dioxide  38 H  


 


Anion Gap  18  


 


BUN  32 H  


 


Creatinine  1.2  


 


Est GFR ( Amer)  51  


 


Est GFR (Non-Af Amer)  42  


 


POC Glucose (mg/dL)   109  97


 


Random Glucose  123 H  


 


Calcium  7.8 L  


 


Phosphorus   


 


Magnesium   


 


Total Bilirubin   


 


AST   


 


ALT   


 


Alkaline Phosphatase   


 


Total Creatine Kinase   


 


Troponin I   


 


Total Protein   


 


Albumin   


 


Globulin   


 


Albumin/Globulin Ratio   


 


Venous Blood Potassium   


 


Vancomycin Peak   


 


Vancomycin Trough   


 


Phenytoin   


 


Valproic Acid   


 


Influenza Typ A,B (EIA)   


 


Blood Type   


 


Antibody Screen   


 


BBK History Checked   














  04/14/18





  11:37


 


WBC 


 


RBC 


 


Hgb 


 


Hct 


 


MCV 


 


MCH 


 


MCHC 


 


RDW 


 


Plt Count 


 


MPV 


 


Neut % (Auto) 


 


Lymph % (Auto) 


 


Mono % (Auto) 


 


Eos % (Auto) 


 


Baso % (Auto) 


 


Neut # (Auto) 


 


Lymph # (Auto) 


 


Mono # (Auto) 


 


Eos # (Auto) 


 


Baso # (Auto) 


 


Neutrophils % (Manual) 


 


Lymphocytes % (Manual) 


 


Monocytes % (Manual) 


 


Eosinophils % (Manual) 


 


Basophils % (Manual) 


 


Platelet Estimate 


 


Hypochromasia (manual) 


 


Macrocytosis (manual) 


 


Target Cells 


 


Stomatocytes 


 


PT 


 


INR 


 


APTT 


 


pCO2  78 H*


 


pO2  100


 


HCO3  41.2 H*


 


ABG pH  7.40


 


ABG Total CO2  50.7 H


 


ABG O2 Saturation  100.6 H


 


ABG O2 Content  11.4 L


 


ABG Base Excess  20.8 H


 


ABG Hemoglobin  8.2 L


 


ABG Carboxyhemoglobin  2.2 H


 


POC ABG HHb (Measured)  -0.6 L


 


ABG Methemoglobin  0.7


 


ABG O2 Capacity  11.3 L


 


Almas Test  Yes


 


VBG pH 


 


VBG pCO2 


 


VBG HCO3 


 


VBG Total CO2 


 


VBG O2 Sat (Calc) 


 


VBG Base Excess 


 


VBG Potassium 


 


A-a O2 Difference  88.0


 


Hgb O2 Saturation  97.7


 


Sodium 


 


Chloride 


 


Glucose 


 


Lactate 


 


Vent Mode 


 


Mechanical Rate 


 


FiO2  40.0


 


Inspiratory BiPAP  16


 


Expiratory BiPAP  8


 


Crit Value Called To  Rn emma means


 


Crit Value Called By  15


 


Crit Value Read Back  Y


 


Blood Gas Notified Time  1141


 


Potassium 


 


Carbon Dioxide 


 


Anion Gap 


 


BUN 


 


Creatinine 


 


Est GFR ( Amer) 


 


Est GFR (Non-Af Amer) 


 


POC Glucose (mg/dL) 


 


Random Glucose 


 


Calcium 


 


Phosphorus 


 


Magnesium 


 


Total Bilirubin 


 


AST 


 


ALT 


 


Alkaline Phosphatase 


 


Total Creatine Kinase 


 


Troponin I 


 


Total Protein 


 


Albumin 


 


Globulin 


 


Albumin/Globulin Ratio 


 


Venous Blood Potassium 


 


Vancomycin Peak 


 


Vancomycin Trough 


 


Phenytoin 


 


Valproic Acid 


 


Influenza Typ A,B (EIA) 


 


Blood Type 


 


Antibody Screen 


 


BBK History Checked 








 Microbiology





04/12/18 15:10   Sputum   Gram Stain - Final


04/12/18 15:10   Sputum   Sputum Culture - Final


                            NORMAL ORAL CAITYLN


04/11/18 22:00   Blood-Venous   Blood Culture - Preliminary


                            NO GROWTH AFTER 48 HOURS





 


 











Assessment and Plan


(1) Recurrent seizures


Status: Acute   





(2) Pleural effusion


Status: Acute   





(3) Cough


Status: Acute   





- Assessment and Plan (Free Text)


Assessment: 





A/P-


88 year old female with DM II, HTN, recent cervical fracture s/p spinal fusion 

in 11/2017 and seizures admitted with recurrent seizure and cough ? aspiration 

pneumonitis.








afebrile


wbc normal value


sputum cx- neg


blood cx- neg





plan-


continue with IV zosyn and vanco for nosocomial and asp pneumonitis. day #3


keep vanco trough <15.


vanco trough 17 today, hold vanco today.


check trough in am and if <15 can redose.


monitor aspiration precautions.


seizure management as per neurology.








 all above d/w patient's daughter who is at bedside at length.








ICU time 45 minutes.

## 2018-04-14 NOTE — PN
CRITICAL CARE PROGRESS NOTE



DATE:  04/14/2018



LOCATION:  The patient is in ICU, bed 423.



TIME SPENT:  35 minutes.



SUBJECTIVE:  The patient is seen and evaluated at bedside.



Past medical, surgical, and social history reviewed.



An 88-year-old moderately obese female with a history significant for

chronic asthma/chronic obstructive pulmonary disease, hypertension, and

seizure disorder, admitted with hypercapnic-hypoxic respiratory failure,

overnight on BiPAP 60/8, FiO2 of 40%, rate 26,  tidal volume 310,

saturation 100%, remained normotensive, afebrile, and telemetry sinus

rhythm.



PHYSICAL EXAMINATION:

GENERAL:  This morning; alert and awake, on BiPAP, response to calling her

name, able to recognize the patient's daughter who is at the bedside, but

goes to sleep, but easily arousable.

VITAL SIGNS:  Temperature 99.4, heart rate 56 to 99 regular, blood pressure

123/72, mean arterial pressure 89, and oxygen saturation 100% on BiPAP. 

Intake 1472 and output not documented.

HEAD, EYES, EARS, NOSE AND THROAT:  Pupils reactive.  Conjunctivae pink. 

Sclerae white.

NECK:  Short neck.  Reduced oropharyngeal airspace.

CHEST:  Breath sounds distant.  Clear to auscultation anteriorly and

laterally.

HEART:  Rhythm regular.  S1 and S2 normal.  No audible murmur.

ABDOMEN:  Bowel sounds present and soft.

EXTREMITIES:  With dependent edema.  DP palpable.

SKIN:  With no rash or skin breakdown.

NEUROLOGIC:  Alert and awake, but lethargic.



MEDICATIONS:  Include Tylenol 975 p.o. every 6 hours p.r.n., DuoNeb 3 mL

via nebulizer four times daily, Norvasc 2.5 mg p.o. daily, Lovenox 40 mg

subcutaneously daily, Lasix 20 mg p.o. daily, glipizide 2.5 mg p.o. daily,

Singulair 10 mg p.o. daily, multivitamin 1 tablet p.o. daily, Dilantin 100

mg IV every 8 hours, Zosyn 2.25 g IV every 6 hours, vancomycin 750 mg every

12 hours, and potassium 20 mEq twice daily.



LABORATORY DATA:  WBC 4.7, hemoglobin 8.2, hematocrit 26.4, platelet count

145, neutrophils 83.7, lymphocytes 8.3, and monocytes 7.  PT 10.1, INR 0.9,

and PTT 25.9.  ABG; pH of 7.40, pCO2 of 78, pO2 of 100, and saturation 97%.

SMA-7; sodium 140, potassium 4.6, chloride 89, CO2 of 38, BUN 32,

creatinine 1.2, and random glucose 123.  Vancomycin trough level of 17.7,

phenytoin 13.3, and valproic acid less than 10.  Microbiology; blood

culture no growth reported.  Sputum culture, normal bacterial dominique.

Chest x-ray, hyperinflation with air trapping.



IMPRESSION AND PLAN:

1.  Neurologic.  Toxic metabolic encephalopathy; CO2 narcosis, improved on

bilevel positive airway pressure; history of seizure disorder, on Dilantin;

admitted with seizure, subtherapeutic Dilantin level , now in the

therapeutic range remains seizure free, status post fusion of cervical

vertebrae.

2.  Pulmonary.  Hypercapnic-hypoxic respiratory failure, history of chronic

asthma/chronic obstructive pulmonary disease, obesity, and possible

obstructive sleep apnea.  Continue bilevel positive airway pressure as

tolerated.  May switch to high-flow nasal oxygen as tolerated.  Continue

bronchodilator.

3.  Cardiac.  Hypertension controlled, on amlodipine 2.5 mg daily and Lasix

20 mg p.o. daily.

4.  Endocrine:  Diabetes mellitus type 2, on glipizide 2.5 mg daily.

5.  Infectious Disease, suspected aspiration pneumonia/mucus plug, seen by

Infectious Disease, on vancomycin and Zosyn.

6.  Gastrointestinal:  Resume feeding as tolerated.

7.  Renal.  Mild prerenal azotemia stable.  Continue deep venous thrombosis

and gastrointestinal prophylaxis.





__________________________________________

Edwar Navarro MD





DD:  04/14/2018 12:41:41

DT:  04/14/2018 21:22:23

Job # [id number]





MTDD

## 2018-04-15 LAB
ARTERIAL BLOOD GAS HEMOGLOBIN: 8 G/DL (ref 11.7–17.4)
ARTERIAL BLOOD GAS O2 SAT: 100.7 % (ref 95–98)
ARTERIAL BLOOD GAS PCO2: 80 MM/HG (ref 35–45)
ARTERIAL BLOOD GAS TCO2: 52.1 MMOL/L (ref 22–28)
ARTERIAL PATENCY WRIST A: YES
BASOPHILS # BLD AUTO: 0 K/UL (ref 0–0.2)
BASOPHILS NFR BLD: 0.6 % (ref 0–2)
BUN SERPL-MCNC: 27 MG/DL (ref 7–17)
CALCIUM SERPL-MCNC: 8.4 MG/DL (ref 8.4–10.2)
EOSINOPHIL # BLD AUTO: 0.1 K/UL (ref 0–0.7)
EOSINOPHIL NFR BLD: 3.1 % (ref 0–4)
ERYTHROCYTE [DISTWIDTH] IN BLOOD BY AUTOMATED COUNT: 13.9 % (ref 11.5–14.5)
GFR NON-AFRICAN AMERICAN: 39
HCO3 BLDA-SCNC: 42.1 MMOL/L (ref 21–28)
HGB BLD-MCNC: 8.4 G/DL (ref 12–16)
INHALED O2 CONCENTRATION: 40 %
LYMPHOCYTES # BLD AUTO: 0.7 K/UL (ref 1–4.3)
LYMPHOCYTES NFR BLD AUTO: 16.3 % (ref 20–40)
MCH RBC QN AUTO: 30.7 PG (ref 27–31)
MCHC RBC AUTO-ENTMCNC: 32.4 G/DL (ref 33–37)
MCV RBC AUTO: 94.5 FL (ref 81–99)
MONOCYTES # BLD: 0.5 K/UL (ref 0–0.8)
MONOCYTES NFR BLD: 11.1 % (ref 0–10)
NEUTROPHILS # BLD: 3 K/UL (ref 1.8–7)
NEUTROPHILS NFR BLD AUTO: 68.9 % (ref 50–75)
NRBC BLD AUTO-RTO: 0 % (ref 0–0)
O2 CAP BLDA-SCNC: 11.2 ML/DL (ref 16–24)
O2 CT BLDA-SCNC: 11.3 ML/DL (ref 15–23)
PH BLDA: 7.4 [PH] (ref 7.35–7.45)
PLATELET # BLD: 152 K/UL (ref 130–400)
PMV BLD AUTO: 9.3 FL (ref 7.2–11.7)
PO2 BLDA: 111 MM/HG (ref 80–100)
RBC # BLD AUTO: 2.73 MIL/UL (ref 3.8–5.2)
WBC # BLD AUTO: 4.4 K/UL (ref 4.8–10.8)

## 2018-04-15 RX ADMIN — IPRATROPIUM BROMIDE AND ALBUTEROL SULFATE SCH ML: .5; 3 SOLUTION RESPIRATORY (INHALATION) at 19:45

## 2018-04-15 RX ADMIN — IPRATROPIUM BROMIDE AND ALBUTEROL SULFATE SCH ML: .5; 3 SOLUTION RESPIRATORY (INHALATION) at 07:10

## 2018-04-15 RX ADMIN — ENOXAPARIN SODIUM SCH MG: 40 INJECTION SUBCUTANEOUS at 10:06

## 2018-04-15 RX ADMIN — IPRATROPIUM BROMIDE AND ALBUTEROL SULFATE SCH ML: .5; 3 SOLUTION RESPIRATORY (INHALATION) at 11:19

## 2018-04-15 RX ADMIN — SODIUM CHLORIDE SCH MLS/HR: 900 INJECTION, SOLUTION INTRAVENOUS at 09:37

## 2018-04-15 RX ADMIN — GLIPIZIDE SCH MG: 2.5 TABLET, EXTENDED RELEASE ORAL at 10:05

## 2018-04-15 RX ADMIN — Medication SCH TAB: at 10:07

## 2018-04-15 RX ADMIN — IPRATROPIUM BROMIDE AND ALBUTEROL SULFATE SCH ML: .5; 3 SOLUTION RESPIRATORY (INHALATION) at 16:08

## 2018-04-15 RX ADMIN — SODIUM CHLORIDE SCH MLS/HR: 900 INJECTION, SOLUTION INTRAVENOUS at 21:52

## 2018-04-15 RX ADMIN — PHENYTOIN SODIUM SCH MG: 50 INJECTION INTRAMUSCULAR; INTRAVENOUS at 09:38

## 2018-04-15 RX ADMIN — PHENYTOIN SODIUM SCH MG: 50 INJECTION INTRAMUSCULAR; INTRAVENOUS at 16:22

## 2018-04-15 RX ADMIN — ACETYLCYSTEINE SCH ML: 100 SOLUTION ORAL; RESPIRATORY (INHALATION) at 19:44

## 2018-04-15 RX ADMIN — PHENYTOIN SODIUM SCH MG: 50 INJECTION INTRAMUSCULAR; INTRAVENOUS at 01:47

## 2018-04-15 NOTE — RAD
HISTORY:

bipap  



COMPARISON:

04/12/2018 



FINDINGS:



LUNGS:

Mild bilateral interstitial changes.



PLEURA:

No significant pleural effusion identified, no pneumothorax apparent.



CARDIOVASCULAR:

Normal.



OSSEOUS STRUCTURES:

No significant abnormalities.



VISUALIZED UPPER ABDOMEN:

Normal.



OTHER FINDINGS:

None.



IMPRESSION:

Mild bilateral interstitial changes.

## 2018-04-15 NOTE — CP.PCM.PN
Subjective





- Date & Time of Evaluation


Date of Evaluation: 04/15/18


Time of Evaluation: 17:50





- Subjective


Subjective: 





Mrs. Herndon was seen and examined today at bedside.  She was significantly 

improved and was conversant, pleasant and followed commands appropriately.  She 

had no complaints.  She has started PO intake. 





Objective





- Vital Signs/Intake and Output


Vital Signs (last 24 hours): 


 











Temp Pulse Resp BP Pulse Ox


 


 99.3 F   51 L  19   148/65   100 


 


 04/15/18 17:02  04/15/18 17:02  04/15/18 17:02  04/15/18 17:02  04/15/18 17:02








Intake and Output: 


 











 04/15/18 04/15/18





 06:59 18:59


 


Intake Total 220 790


 


Output Total  2


 


Balance 220 788














- Medications


Medications: 


 Current Medications





Acetaminophen (Tylenol 325mg Tab)  975 mg PO Q6 PRN


   PRN Reason: Fever- T- 101 or above


Acetylcysteine (Mucomyst 10% 4ml)  2 ml IH RBID [facility]


Albuterol/Ipratropium (Duoneb 3 Mg/0.5 Mg (3 Ml) Ud)  3 ml INH RQID [facility]


   Last Admin: 04/15/18 16:08 Dose:  3 ml


Amlodipine Besylate (Norvasc)  2.5 mg PO DAILY [facility]


   Last Admin: 04/15/18 10:08 Dose:  2.5 mg


Furosemide (Lasix)  20 mg PO DAILY [facility]


   Last Admin: 04/15/18 10:07 Dose:  20 mg


Glipizide (Glucotrol Xl)  2.5 mg PO DAILY [facility]


   Last Admin: 04/15/18 10:05 Dose:  2.5 mg


Piperacillin Sod/Tazobactam (Sod 2.25 gm/ Sodium Chloride)  100 mls @ 100 mls/

hr IVPB Q6 [facility]


   PRN Reason: Protocol


   Last Admin: 04/15/18 16:23 Dose:  100 mls/hr


Vancomycin HCl 750 mg/ Sodium (Chloride)  250 mls @ 166.667 mls/hr IVPB Q12 [facility]


   PRN Reason: Protocol


   Last Admin: 04/15/18 09:39 Dose:  166.667 mls/hr


Acetazolamide 250 mg/ Sodium (Chloride)  50 mls @ 100 mls/hr IV Q12 [facility]


   Last Admin: 04/15/18 09:37 Dose:  100 mls/hr


Montelukast Sodium (Singulair)  10 mg PO HS [facility]


   Last Admin: 04/14/18 22:30 Dose:  10 mg


Multivitamins/Minerals (Therapeutic-M Tab)  1 tab PO DAILY [facility]


   Last Admin: 04/15/18 10:07 Dose:  1 tab











- Labs


Labs: 


 





 04/15/18 06:00 





 04/15/18 06:00 





 











PT  10.1 Seconds (9.8-13.1)   04/11/18  19:55    


 


INR  0.9  (0.9-1.2)   04/11/18  19:55    


 


APTT  25.9 Seconds (25.6-37.1)   04/11/18  19:55    














- Neurological Exam


Neurological Exam: Abnormal Gait, Alert, Awake, CN II-XII Intact, Oriented x3


Neuro motor strength exam: Left Upper Extremity: 4, Right Upper Extremity: 4, 

Left Lower Extremity: 4, Right Lower Extremity: 4





Assessment and Plan


(1) Recurrent seizures


Assessment & Plan: 


Will switch dilantin 100 mg IV to oral.  She will need PT/OT.  Continue medical 

management.  Neurology will follow.


Status: Acute

## 2018-04-15 NOTE — CP.PCM.PN
Subjective





- Subjective


Subjective: 








Acute Resp Failure (on BiPaP and HFO2)


Aspiration Pna / Sepsis


Sz


Cervical Fx, s/p fixation (on last admission)


DMII


HTN


Dysphagia





S/ Feeling better. 





O VSS Afebrile





Head: Neg adeno pos ila


Heart: ns1s2, neg me


Lungs: Crackles on both bases. Neg wheezing. 


Abdo: s, nt pos bs


Ext No c,c,e


Neuro, Alerto to person, and place, not to time. 





Labs: See Below





Plant





Cont ICU monitoring. 





Cont BiPaP for nocturnal use, and supplmental o2 for o2 sat of 89, 90, 91 Max. 

Avoid Hyperoxia. 


Cont IV abx as per ID. Monitor WBC#, Temp curve, and micro studies. 


Cont Dilantin, neuro f/u, monitor levels. 


S & S assessment. Puree diet tolerated. 


Bg moniotring with SSI. 


Repeat Portable X-ray in am. 


PUD and DVT Px. 





Dr. Sandoval [phone]





Objective





- Vital Signs/Intake and Output


Vital Signs (last 24 hours): 


 











Temp Pulse Resp BP Pulse Ox


 


 99.3 F   56 L  14   141/62   100 


 


 04/15/18 17:02  04/15/18 18:00  04/15/18 19:45  04/15/18 18:00  04/15/18 18:00








Intake and Output: 


 











 04/15/18 04/16/18





 18:59 06:59


 


Intake Total 1140 


 


Output Total 2 


 


Balance 1138 














- Medications


Medications: 


 Current Medications





Acetaminophen (Tylenol 325mg Tab)  975 mg PO Q6 PRN


   PRN Reason: Fever- T- 101 or above


Acetylcysteine (Mucomyst 10% 4ml)  2 ml IH RBID [facility]


   Last Admin: 04/15/18 19:44 Dose:  2 ml


Albuterol/Ipratropium (Duoneb 3 Mg/0.5 Mg (3 Ml) Ud)  3 ml INH RQID [facility]


   Last Admin: 04/15/18 19:45 Dose:  3 ml


Amlodipine Besylate (Norvasc)  2.5 mg PO DAILY [facility]


   Last Admin: 04/15/18 10:08 Dose:  2.5 mg


Furosemide (Lasix)  20 mg PO DAILY [facility]


   Last Admin: 04/15/18 10:07 Dose:  20 mg


Glipizide (Glucotrol Xl)  2.5 mg PO DAILY [facility]


   Last Admin: 04/15/18 10:05 Dose:  2.5 mg


Piperacillin Sod/Tazobactam (Sod 2.25 gm/ Sodium Chloride)  100 mls @ 100 mls/

hr IVPB Q6 [facility]


   PRN Reason: Protocol


   Last Admin: 04/15/18 21:58 Dose:  100 mls/hr


Vancomycin HCl 750 mg/ Sodium (Chloride)  250 mls @ 166.667 mls/hr IVPB Q12 [facility]


   PRN Reason: Protocol


   Last Admin: 04/15/18 21:52 Dose:  166.667 mls/hr


Acetazolamide 250 mg/ Sodium (Chloride)  50 mls @ 100 mls/hr IV Q12 [facility]


   Last Admin: 04/15/18 21:52 Dose:  100 mls/hr


Montelukast Sodium (Singulair)  10 mg PO HS [facility]


   Last Admin: 04/15/18 21:56 Dose:  10 mg


Multivitamins/Minerals (Therapeutic-M Tab)  1 tab PO DAILY [facility]


   Last Admin: 04/15/18 10:07 Dose:  1 tab


Pantoprazole Sodium (Protonix Inj)  40 mg IVP DAILY [facility]


Phenytoin (Dilantin)  100 mg PO Q8 [facility]











- Labs


Labs: 


 





 04/15/18 06:00 





 04/15/18 06:00 





 











PT  10.1 Seconds (9.8-13.1)   04/11/18  19:55    


 


INR  0.9  (0.9-1.2)   04/11/18  19:55    


 


APTT  25.9 Seconds (25.6-37.1)   04/11/18  19:55

## 2018-04-16 LAB
ARTERIAL BLOOD GAS HEMOGLOBIN: 8.5 G/DL (ref 11.7–17.4)
ARTERIAL BLOOD GAS O2 SAT: 97.5 % (ref 95–98)
ARTERIAL BLOOD GAS PCO2: 68 MM/HG (ref 35–45)
ARTERIAL BLOOD GAS TCO2: 43.3 MMOL/L (ref 22–28)
ARTERIAL PATENCY WRIST A: YES
BASOPHILS # BLD AUTO: 0 K/UL (ref 0–0.2)
BASOPHILS NFR BLD: 0.4 % (ref 0–2)
BUN SERPL-MCNC: 21 MG/DL (ref 7–17)
CALCIUM SERPL-MCNC: 8.3 MG/DL (ref 8.4–10.2)
EOSINOPHIL # BLD AUTO: 0.2 K/UL (ref 0–0.7)
EOSINOPHIL NFR BLD: 3.7 % (ref 0–4)
ERYTHROCYTE [DISTWIDTH] IN BLOOD BY AUTOMATED COUNT: 14.1 % (ref 11.5–14.5)
GFR NON-AFRICAN AMERICAN: 35
HCO3 BLDA-SCNC: 36.1 MMOL/L (ref 21–28)
HGB BLD-MCNC: 8.6 G/DL (ref 12–16)
INHALED O2 CONCENTRATION: 35 %
LYMPHOCYTES # BLD AUTO: 0.9 K/UL (ref 1–4.3)
LYMPHOCYTES NFR BLD AUTO: 18.4 % (ref 20–40)
MCH RBC QN AUTO: 30.5 PG (ref 27–31)
MCHC RBC AUTO-ENTMCNC: 31.7 G/DL (ref 33–37)
MCV RBC AUTO: 96.2 FL (ref 81–99)
MONOCYTES # BLD: 0.7 K/UL (ref 0–0.8)
MONOCYTES NFR BLD: 13.3 % (ref 0–10)
NEUTROPHILS # BLD: 3.2 K/UL (ref 1.8–7)
NEUTROPHILS NFR BLD AUTO: 64.2 % (ref 50–75)
NRBC BLD AUTO-RTO: 0.1 % (ref 0–0)
O2 CAP BLDA-SCNC: 11.7 ML/DL (ref 16–24)
O2 CT BLDA-SCNC: 11.4 ML/DL (ref 15–23)
PH BLDA: 7.39 [PH] (ref 7.35–7.45)
PLATELET # BLD: 163 K/UL (ref 130–400)
PMV BLD AUTO: 9.1 FL (ref 7.2–11.7)
PO2 BLDA: 61 MM/HG (ref 80–100)
RBC # BLD AUTO: 2.82 MIL/UL (ref 3.8–5.2)
WBC # BLD AUTO: 5 K/UL (ref 4.8–10.8)

## 2018-04-16 RX ADMIN — Medication SCH TAB: at 08:47

## 2018-04-16 RX ADMIN — IPRATROPIUM BROMIDE AND ALBUTEROL SULFATE SCH ML: .5; 3 SOLUTION RESPIRATORY (INHALATION) at 11:18

## 2018-04-16 RX ADMIN — SODIUM CHLORIDE SCH MLS/HR: 900 INJECTION, SOLUTION INTRAVENOUS at 08:47

## 2018-04-16 RX ADMIN — GLIPIZIDE SCH MG: 2.5 TABLET, EXTENDED RELEASE ORAL at 08:46

## 2018-04-16 RX ADMIN — IPRATROPIUM BROMIDE AND ALBUTEROL SULFATE SCH ML: .5; 3 SOLUTION RESPIRATORY (INHALATION) at 16:01

## 2018-04-16 RX ADMIN — ACETYLCYSTEINE SCH ML: 100 SOLUTION ORAL; RESPIRATORY (INHALATION) at 19:08

## 2018-04-16 RX ADMIN — IPRATROPIUM BROMIDE AND ALBUTEROL SULFATE SCH ML: .5; 3 SOLUTION RESPIRATORY (INHALATION) at 19:08

## 2018-04-16 RX ADMIN — IPRATROPIUM BROMIDE AND ALBUTEROL SULFATE SCH ML: .5; 3 SOLUTION RESPIRATORY (INHALATION) at 07:53

## 2018-04-16 RX ADMIN — ACETYLCYSTEINE SCH ML: 100 SOLUTION ORAL; RESPIRATORY (INHALATION) at 07:53

## 2018-04-16 RX ADMIN — SODIUM CHLORIDE SCH MLS/HR: 900 INJECTION, SOLUTION INTRAVENOUS at 21:40

## 2018-04-16 NOTE — US
EXAM:

  US Duplex Bilateral Upper Extremity Veins



CLINICAL HISTORY:

  88 years old, female; Signs and symptoms; Swelling of limb; Upper extremity, 

left; Additional info: Redness of the left upper extremity and swelling,



TECHNIQUE:

  Real-time duplex ultrasound scan of the bilateral upper extremity veins 

integrating B-mode two-dimensional vascular structure, Doppler spectral 

analysis, color flow Doppler imaging and compression.



COMPARISON:

  No relevant prior studies available.



FINDINGS:

  Right deep veins:  No DVT in the right internal jugular, subclavian, 

axillary, or brachial veins.  The veins are compressible with normal color flow 

and augmentation.

  Right superficial veins:  No thrombus in the visualized right basilic and 

cephalic veins.



  Left deep veins:  No DVT in the left internal jugular, subclavian, axillary, 

or brachial veins.  The veins are compressible with normal color flow and 

augmentation.

  Left superficial veins:  No thrombus in the visualized left basilic and 

cephalic veins.



  Soft tissues:  No fluid collection.



IMPRESSION:     

1.  No evidence of DVT within upper extremities.

## 2018-04-16 NOTE — PN
DATE:  04/15/2018



CRITICAL CARE PROGRESS NOTE



LOCATION:  The patient is in ICU bed 423.



Time spent 35 minutes.  The patient is seen and examined at bedside.  The

patient's daughter at bedside.



Past medical, surgical and social history reviewed.



SUBJECTIVE:  An 88-year-old moderately obese female with history

significant for chronic asthma/chronic obstructive pulmonary disease,

hypertension, and seizure disorder, admitted with hypercapnic and hypoxic

respiratory failure, overnight on BiPAP 16/8, respiratory rate of 80 and

FIO2 of 40%..  Overnight on _____ telemetry sinus rhythm, normotensive, and

afebrile.  This morning alert and awake, follows commands appropriate. 

Able to communicate with daughter in Persian.  She reports she feels better

and less some short of breath.  Denies chest pain or palpitation.  No

abdominal discomfort and tolerated p.o. feeds this morning.



PHYSICAL EXAMINATION:

VITAL SIGNS:  Temperature of 99.4, heart rate of 56 and regular,

respiratory rate of 17-25 thoracoabdominal, blood pressure of

161-179/70-79.  Weight of 165 pounds.

INTAKE AND OUTPUT:  Intake of 1472 and output not documented.

HEENT:  Examination of head, eyes, ears, nose and throat:  Pupils are

reactive.  Conjunctivae are pink.  Sclerae are anicteric.

NECK:  Supple and short neck.  Reduced oropharyngeal space.

CHEST:  Bilateral breath sounds distant.

LUNGS:  Clear to auscultation anteriorly and laterally.

HEART:  Rhythm regular.  S1 and S2 normal.  No audible murmur.

GASTROINTESTINAL:  Abdominal bowel sounds present.  Soft and pendulous.

EXTREMITIES:  Dependent edema.  DP palpable and symmetric.

SKIN:  Without rash.  No skin breakdown.

NEUROLOGIC:  Alert and awake, but lethargic, arousable and communicate with

the patient's daughter.



CURRENT MEDICATIONS  Tylenol 975 mg p.o. every 6 hours p.r.n.,

albuterol/Atrovent inhalation 3 mL every 6 hours, Norvasc 2.5 mg daily,

furosemide 20 mg daily, Glucotrol 2.5 mg daily, Singulair 10 mg daily,

multivitamins tablet daily, Dilantin 100 mg IV every 8 hours, Zosyn 2.25

grams IV every 6 hours, and vancomycin 750 mg IV every 12 hours.



LABORATORY DATA:  WBC of 4.4, hemoglobin of 8.4, hematocrit of 25.8, and

platelet count of 152 with neutrophils of 68.9, lymphocytes of 16.3, and

monocytes of 11.1.  PT of 10.1 and INR of 0.09.  ABGs; pH of 7.4, pCO2 of

80, pO2 of 111, oxygen on BiPAP 16/8, rate 18, and FIO2 of 40%.  SMA-7: 

Sodium of 143, potassium of 4.4, chloride of 89, CO2 of 42, blood urea

nitrogen of 27, and creatinine of 1.3.  Vancomycin trough level of 12.9 and

Dilantin of 13.3.  Microbiology:  Sputum culture normal low for nasal smear

and MRSA negative.  Blood culture no growth reported.



IMAGING DATA:   Chest x-ray on 04/14/2018, hyperinflation flattened

diaphragm and haziness left base.



IMPRESSION AND PLAN:

1.  Neurologic:  Toxic metabolic encephalopathy, CO2 narcosis improving on

bilevel positive airway pressure and history of seizure disorder on

Dilantin.  Dilantin level in the therapeutic range, now remains seizure

free and status post fusion of cervical vertebrae, stable.

2.  Pulmonary:  Hypercapnic hypoxic respiratory failure, history of chronic

asthma/chronic obstructive pulmonary disease, obesity, and possible

obstructive sleep apnea.  Continue bilevel positive airway pressure and

bronchodilator.  We will switch to high-flow nasal oxygen as tolerated.

3.  Cardiac:  Hypertension controlled on amlodipine 2.5 mg.  Continue Lasix

420 mg p.o. daily.

4.  Endocrine:  Diabetes mellitus type 2, on glipizide 2.5 mg daily.

5.  Infectious Disease:  Suspected aspiration pneumonia/mucus plug,

currently on vancomycin and Zosyn along with trough level less than 15.

6.  Gastrointestinal:  Resume feeding as tolerated.

7.  Renal:  Mild prerenal azotemia, stable neck.

8.  Prophylaxis:  Continue deep venous thrombosis and gastrointestinal

prophylaxis.







__________________________________________

Edwar Navarro MD



cc:



DD:  04/15/2018 11:06:41

DT:  04/15/2018 11:17:03

Job # [id number]

## 2018-04-16 NOTE — CP.PCM.PN
Subjective





- Date & Time of Evaluation


Date of Evaluation: 04/16/18


Time of Evaluation: 09:33





- Subjective


Subjective: 





Ms. Herndon  was seen and examined at the bedside in ICU. She is alert, 

oriented. She denies any headache, lightheadedness, blurred vision, diplopia, 

nausea, or vomiting. She is able to follow simple commands. She is able to 

tolerate po intake. Her left arm is swollen and with edema, being elevated with 

pillows.There was no untoward events overnight.          





Objective





- Vital Signs/Intake and Output


Vital Signs (last 24 hours): 


 











Temp Pulse Resp BP Pulse Ox


 


 98.4 F   63   14   132/67   100 


 


 04/16/18 08:00  04/16/18 08:46  04/16/18 08:12  04/16/18 08:50  04/16/18 08:00








Intake and Output: 


 











 04/16/18 04/16/18





 06:59 18:59


 


Intake Total 518 


 


Balance 518 














- Medications


Medications: 


 Current Medications





Acetaminophen (Tylenol 325mg Tab)  975 mg PO Q6 PRN


   PRN Reason: Fever- T- 101 or above


Acetylcysteine (Mucomyst 10% 4ml)  2 ml IH RBID [facility]


   Last Admin: 04/16/18 07:53 Dose:  2 ml


Albuterol/Ipratropium (Duoneb 3 Mg/0.5 Mg (3 Ml) Ud)  3 ml INH RQID [facility]


   Last Admin: 04/16/18 07:53 Dose:  3 ml


Amlodipine Besylate (Norvasc)  2.5 mg PO DAILY [facility]


   Last Admin: 04/16/18 08:46 Dose:  2.5 mg


Furosemide (Lasix)  20 mg PO DAILY [facility]


   Last Admin: 04/16/18 08:50 Dose:  20 mg


Glipizide (Glucotrol Xl)  2.5 mg PO DAILY [facility]


   Last Admin: 04/16/18 08:46 Dose:  2.5 mg


Piperacillin Sod/Tazobactam (Sod 2.25 gm/ Sodium Chloride)  100 mls @ 100 mls/

hr IVPB Q6 [facility]


   PRN Reason: Protocol


   Last Admin: 04/16/18 05:00 Dose:  100 mls/hr


Vancomycin HCl 750 mg/ Sodium (Chloride)  250 mls @ 166.667 mls/hr IVPB Q12 [facility]


   PRN Reason: Protocol


   Last Admin: 04/16/18 08:49 Dose:  166.667 mls/hr


Acetazolamide 250 mg/ Sodium (Chloride)  50 mls @ 100 mls/hr IV Q12 [facility]


   Last Admin: 04/16/18 08:47 Dose:  100 mls/hr


Montelukast Sodium (Singulair)  10 mg PO HS [facility]


   Last Admin: 04/15/18 21:56 Dose:  10 mg


Multivitamins/Minerals (Therapeutic-M Tab)  1 tab PO DAILY [facility]


   Last Admin: 04/16/18 08:47 Dose:  1 tab


Pantoprazole Sodium (Protonix Inj)  40 mg IVP DAILY [facility]


   Last Admin: 04/16/18 08:47 Dose:  40 mg


Phenytoin (Dilantin)  100 mg PO Q8 [facility]


   Last Admin: 04/16/18 08:46 Dose:  100 mg











- Labs


Labs: 


 





 04/16/18 04:20 





 04/16/18 04:20 





 











PT  10.1 Seconds (9.8-13.1)   04/11/18  19:55    


 


INR  0.9  (0.9-1.2)   04/11/18  19:55    


 


APTT  25.9 Seconds (25.6-37.1)   04/11/18  19:55    














- Constitutional


Appears: No Acute Distress





- Head Exam


Head Exam: NORMAL INSPECTION





- Neurological Exam


Neurological Exam: Alert, Awake


Neuro motor strength exam: Left Upper Extremity: 4, Right Upper Extremity: 4, 

Left Lower Extremity: 3, Right Lower Extremity: 3


Additional comments: 





She is alert, oriented, able to follow simple commands. Sensation is intact.





Assessment and Plan


(1) Recurrent seizures


Assessment & Plan: 


Case discussed with Dr. Means, continue all current medical including AED, 

physical, and occupational therapies. There is no new neurology recommendations.


Status: Acute

## 2018-04-16 NOTE — CP.PCM.PN
Subjective





- Date & Time of Evaluation


Date of Evaluation: 04/16/18


Time of Evaluation: 14:36





- Subjective


Subjective: 





ID Note-





Pt. seen and examined today in ICU.


pt. clinically much improved and is only on 3 L via NC .


resting but opens her eyes and more alert  than before.





 as per her daughter who is at her bedside pt. ate her lunch today and is 

breathing much more comfortably.





no diarrhea as per nurse.





Objective





- Vital Signs/Intake and Output


Vital Signs (last 24 hours): 


 











Temp Pulse Resp BP Pulse Ox


 


 98.6 F   53 L  17   191/77 H  100 


 


 04/16/18 12:00  04/16/18 12:00  04/16/18 12:00  04/16/18 12:00  04/16/18 12:00








Intake and Output: 


 











 04/16/18 04/16/18





 06:59 18:59


 


Intake Total 518 750


 


Balance 518 750














- Medications


Medications: 


 Current Medications





Acetaminophen (Tylenol 325mg Tab)  975 mg PO Q6 PRN


   PRN Reason: Fever- T- 101 or above


Acetylcysteine (Mucomyst 10% 4ml)  2 ml IH RBID [facility]


   Last Admin: 04/16/18 07:53 Dose:  2 ml


Albuterol/Ipratropium (Duoneb 3 Mg/0.5 Mg (3 Ml) Ud)  3 ml INH RQID [facility]


   Last Admin: 04/16/18 11:18 Dose:  3 ml


Amlodipine Besylate (Norvasc)  2.5 mg PO DAILY [facility]


   Last Admin: 04/16/18 08:46 Dose:  2.5 mg


Furosemide (Lasix)  20 mg PO DAILY [facility]


   Last Admin: 04/16/18 08:50 Dose:  20 mg


Glipizide (Glucotrol Xl)  2.5 mg PO DAILY [facility]


   Last Admin: 04/16/18 08:46 Dose:  2.5 mg


Piperacillin Sod/Tazobactam (Sod 2.25 gm/ Sodium Chloride)  100 mls @ 100 mls/

hr IVPB Q6 [facility]


   PRN Reason: Protocol


   Last Admin: 04/16/18 05:00 Dose:  100 mls/hr


Vancomycin HCl 750 mg/ Sodium (Chloride)  250 mls @ 166.667 mls/hr IVPB Q12 [facility]


   PRN Reason: Protocol


   Last Admin: 04/16/18 08:49 Dose:  166.667 mls/hr


Acetazolamide 250 mg/ Sodium (Chloride)  50 mls @ 100 mls/hr IV Q12 [facility]


   Last Admin: 04/16/18 08:47 Dose:  100 mls/hr


Montelukast Sodium (Singulair)  10 mg PO HS [facility]


   Last Admin: 04/15/18 21:56 Dose:  10 mg


Multivitamins/Minerals (Therapeutic-M Tab)  1 tab PO DAILY [facility]


   Last Admin: 04/16/18 08:47 Dose:  1 tab


Pantoprazole Sodium (Protonix Inj)  40 mg IVP DAILY [facility]


   Last Admin: 04/16/18 08:47 Dose:  40 mg


Phenytoin (Dilantin)  100 mg PO Q8 [facility]


   Last Admin: 04/16/18 08:46 Dose:  100 mg











- Labs


Labs: 


 





 04/16/18 04:20 





 04/16/18 04:20 





 











PT  10.1 Seconds (9.8-13.1)   04/11/18  19:55    


 


INR  0.9  (0.9-1.2)   04/11/18  19:55    


 


APTT  25.9 Seconds (25.6-37.1)   04/11/18  19:55    














- Additional Findings


Additional findings: 








- Constitutional


Appears: No Acute Distress, Chronically Ill








- Neck Exam


Neck exam: Positive for: Full Rom


Additional comments: 





posterior cervical spine surgical site clean, no erythema





- Respiratory Exam


Additional comments: 











decreased breath sounds at bases but better aeration compared to before , no 

wheezing





- Cardiovascular Exam


Cardiovascular Exam: RRR, +S1, +S2


Additional comments: 





3/6 ejection murmur heard at LSB





- GI/Abdominal Exam


GI & Abdominal Exam: Normal Bowel Sounds, Soft


Additional comments: 





NT, ND





- Extremities Exam


Extremities exam: Positive for: normal inspection





- Neurological Exam


Additional comments: 





more awake today





 Laboratory Results - last 72 hr











  04/13/18 04/13/18 04/13/18





  16:59 21:17 23:30


 


WBC   


 


RBC   


 


Hgb   


 


Hct   


 


MCV   


 


MCH   


 


MCHC   


 


RDW   


 


Plt Count   


 


MPV   


 


Neut % (Auto)   


 


Lymph % (Auto)   


 


Mono % (Auto)   


 


Eos % (Auto)   


 


Baso % (Auto)   


 


Neut # (Auto)   


 


Lymph # (Auto)   


 


Mono # (Auto)   


 


Eos # (Auto)   


 


Baso # (Auto)   


 


Neutrophils % (Manual)   


 


Lymphocytes % (Manual)   


 


Monocytes % (Manual)   


 


Eosinophils % (Manual)   


 


Basophils % (Manual)   


 


Platelet Estimate   


 


Hypochromasia (manual)   


 


Macrocytosis (manual)   


 


Target Cells   


 


Stomatocytes   


 


pCO2    115 H*


 


pO2    148 H


 


HCO3    39.8 H


 


ABG pH    7.24 L


 


ABG Total CO2    52.8 H


 


ABG O2 Saturation    100.6 H


 


ABG O2 Content    11.3 L


 


ABG Base Excess    19.0 H


 


ABG Hemoglobin    8.0 L


 


ABG Carboxyhemoglobin    2.2 H


 


POC ABG HHb (Measured)    -0.6 L


 


ABG Methemoglobin    1.0


 


ABG O2 Capacity    11.2 L


 


Almas Test    Yes


 


A-a O2 Difference    136.0


 


Hgb O2 Saturation    97.4


 


Vent Mode    Bipap


 


Mechanical Rate    12


 


FiO2    60.0


 


Inspiratory BiPAP    10


 


Expiratory BiPAP    5


 


Crit Value Called To    Dr jessica valencia


 


Crit Value Called By    Marv


 


Crit Value Read Back    Y


 


Blood Gas Notified Time    1474


 


Sodium   


 


Potassium   


 


Chloride   


 


Carbon Dioxide   


 


Anion Gap   


 


BUN   


 


Creatinine   


 


Est GFR ( Amer)   


 


Est GFR (Non-Af Amer)   


 


POC Glucose (mg/dL)  188 H  173 H 


 


Random Glucose   


 


Calcium   


 


Vancomycin Trough   


 


Phenytoin   














  04/14/18 04/14/18 04/14/18





  04:00 04:25 04:25


 


WBC    4.7 L


 


RBC    2.70 L


 


Hgb    8.2 L


 


Hct    26.4 L


 


MCV    97.8


 


MCH    30.2


 


MCHC    30.9 L


 


RDW    14.3


 


Plt Count    145


 


MPV    9.7


 


Neut % (Auto)    83.7 H


 


Lymph % (Auto)    8.3 L


 


Mono % (Auto)    7.0


 


Eos % (Auto)    0.8


 


Baso % (Auto)    0.2


 


Neut # (Auto)    3.9


 


Lymph # (Auto)    0.4 L


 


Mono # (Auto)    0.3


 


Eos # (Auto)    0.0


 


Baso # (Auto)    0.0


 


Neutrophils % (Manual)    80 H


 


Lymphocytes % (Manual)    10 L


 


Monocytes % (Manual)    8


 


Eosinophils % (Manual)    1


 


Basophils % (Manual)    1


 


Platelet Estimate    Normal


 


Hypochromasia (manual)    Marked


 


Macrocytosis (manual)    Slight


 


Target Cells    Slight


 


Stomatocytes    Moderate


 


pCO2  106 H*  


 


pO2  197 H  


 


HCO3  40.7 H*  


 


ABG pH  7.28 L  


 


ABG Total CO2  53.1 H  


 


ABG O2 Saturation  99.9 H  


 


ABG O2 Content  11.5 L  


 


ABG Base Excess  20.2 H  


 


ABG Hemoglobin  8.0 L  


 


ABG Carboxyhemoglobin  1.3  


 


POC ABG HHb (Measured)  0.1  


 


ABG Methemoglobin  0.8  


 


ABG O2 Capacity  11.5 L  


 


Almas Test  Yes  


 


A-a O2 Difference  98.0  


 


Hgb O2 Saturation  97.8  


 


Vent Mode  Bipap  


 


Mechanical Rate  18  


 


FiO2  60.0  


 


Inspiratory BiPAP  20  


 


Expiratory BiPAP  8  


 


Crit Value Called To  Dr jessica valencia  


 


Crit Value Called By    


 


Crit Value Read Back  Y  


 


Blood Gas Notified Time  446  


 


Sodium   


 


Potassium   


 


Chloride   


 


Carbon Dioxide   


 


Anion Gap   


 


BUN   


 


Creatinine   


 


Est GFR ( Amer)   


 


Est GFR (Non-Af Amer)   


 


POC Glucose (mg/dL)   


 


Random Glucose   


 


Calcium   


 


Vancomycin Trough   17.7 H 


 


Phenytoin   














  04/14/18 04/14/18 04/14/18





  04:25 06:11 11:21


 


WBC   


 


RBC   


 


Hgb   


 


Hct   


 


MCV   


 


MCH   


 


MCHC   


 


RDW   


 


Plt Count   


 


MPV   


 


Neut % (Auto)   


 


Lymph % (Auto)   


 


Mono % (Auto)   


 


Eos % (Auto)   


 


Baso % (Auto)   


 


Neut # (Auto)   


 


Lymph # (Auto)   


 


Mono # (Auto)   


 


Eos # (Auto)   


 


Baso # (Auto)   


 


Neutrophils % (Manual)   


 


Lymphocytes % (Manual)   


 


Monocytes % (Manual)   


 


Eosinophils % (Manual)   


 


Basophils % (Manual)   


 


Platelet Estimate   


 


Hypochromasia (manual)   


 


Macrocytosis (manual)   


 


Target Cells   


 


Stomatocytes   


 


pCO2   


 


pO2   


 


HCO3   


 


ABG pH   


 


ABG Total CO2   


 


ABG O2 Saturation   


 


ABG O2 Content   


 


ABG Base Excess   


 


ABG Hemoglobin   


 


ABG Carboxyhemoglobin   


 


POC ABG HHb (Measured)   


 


ABG Methemoglobin   


 


ABG O2 Capacity   


 


Almas Test   


 


A-a O2 Difference   


 


Hgb O2 Saturation   


 


Vent Mode   


 


Mechanical Rate   


 


FiO2   


 


Inspiratory BiPAP   


 


Expiratory BiPAP   


 


Crit Value Called To   


 


Crit Value Called By   


 


Crit Value Read Back   


 


Blood Gas Notified Time   


 


Sodium  140  


 


Potassium  4.6  


 


Chloride  89 L  


 


Carbon Dioxide  38 H  


 


Anion Gap  18  


 


BUN  32 H  


 


Creatinine  1.2  


 


Est GFR ( Amer)  51  


 


Est GFR (Non-Af Amer)  42  


 


POC Glucose (mg/dL)   109  97


 


Random Glucose  123 H  


 


Calcium  7.8 L  


 


Vancomycin Trough   


 


Phenytoin   














  04/14/18 04/14/18 04/14/18





  11:37 16:35 21:44


 


WBC   


 


RBC   


 


Hgb   


 


Hct   


 


MCV   


 


MCH   


 


MCHC   


 


RDW   


 


Plt Count   


 


MPV   


 


Neut % (Auto)   


 


Lymph % (Auto)   


 


Mono % (Auto)   


 


Eos % (Auto)   


 


Baso % (Auto)   


 


Neut # (Auto)   


 


Lymph # (Auto)   


 


Mono # (Auto)   


 


Eos # (Auto)   


 


Baso # (Auto)   


 


Neutrophils % (Manual)   


 


Lymphocytes % (Manual)   


 


Monocytes % (Manual)   


 


Eosinophils % (Manual)   


 


Basophils % (Manual)   


 


Platelet Estimate   


 


Hypochromasia (manual)   


 


Macrocytosis (manual)   


 


Target Cells   


 


Stomatocytes   


 


pCO2  78 H*  


 


pO2  100  


 


HCO3  41.2 H*  


 


ABG pH  7.40  


 


ABG Total CO2  50.7 H  


 


ABG O2 Saturation  100.6 H  


 


ABG O2 Content  11.4 L  


 


ABG Base Excess  20.8 H  


 


ABG Hemoglobin  8.2 L  


 


ABG Carboxyhemoglobin  2.2 H  


 


POC ABG HHb (Measured)  -0.6 L  


 


ABG Methemoglobin  0.7  


 


ABG O2 Capacity  11.3 L  


 


Almas Test  Yes  


 


A-a O2 Difference  88.0  


 


Hgb O2 Saturation  97.7  


 


Vent Mode   


 


Mechanical Rate   


 


FiO2  40.0  


 


Inspiratory BiPAP  16  


 


Expiratory BiPAP  8  


 


Crit Value Called To  Latanya means  


 


Crit Value Called By  15  


 


Crit Value Read Back  Y  


 


Blood Gas Notified Time  1141  


 


Sodium   


 


Potassium   


 


Chloride   


 


Carbon Dioxide   


 


Anion Gap   


 


BUN   


 


Creatinine   


 


Est GFR ( Amer)   


 


Est GFR (Non-Af Amer)   


 


POC Glucose (mg/dL)   88  136 H


 


Random Glucose   


 


Calcium   


 


Vancomycin Trough   


 


Phenytoin   














  04/15/18 04/15/18 04/15/18





  05:40 06:00 06:00


 


WBC    4.4 L


 


RBC    2.73 L


 


Hgb    8.4 L


 


Hct    25.8 L


 


MCV    94.5  D


 


MCH    30.7


 


MCHC    32.4 L


 


RDW    13.9


 


Plt Count    152


 


MPV    9.3


 


Neut % (Auto)    68.9


 


Lymph % (Auto)    16.3 L


 


Mono % (Auto)    11.1 H


 


Eos % (Auto)    3.1


 


Baso % (Auto)    0.6


 


Neut # (Auto)    3.0


 


Lymph # (Auto)    0.7 L


 


Mono # (Auto)    0.5


 


Eos # (Auto)    0.1


 


Baso # (Auto)    0.0


 


Neutrophils % (Manual)   


 


Lymphocytes % (Manual)   


 


Monocytes % (Manual)   


 


Eosinophils % (Manual)   


 


Basophils % (Manual)   


 


Platelet Estimate   


 


Hypochromasia (manual)   


 


Macrocytosis (manual)   


 


Target Cells   


 


Stomatocytes   


 


pCO2   


 


pO2   


 


HCO3   


 


ABG pH   


 


ABG Total CO2   


 


ABG O2 Saturation   


 


ABG O2 Content   


 


ABG Base Excess   


 


ABG Hemoglobin   


 


ABG Carboxyhemoglobin   


 


POC ABG HHb (Measured)   


 


ABG Methemoglobin   


 


ABG O2 Capacity   


 


Almas Test   


 


A-a O2 Difference   


 


Hgb O2 Saturation   


 


Vent Mode   


 


Mechanical Rate   


 


FiO2   


 


Inspiratory BiPAP   


 


Expiratory BiPAP   


 


Crit Value Called To   


 


Crit Value Called By   


 


Crit Value Read Back   


 


Blood Gas Notified Time   


 


Sodium   


 


Potassium   


 


Chloride   


 


Carbon Dioxide   


 


Anion Gap   


 


BUN   


 


Creatinine   


 


Est GFR ( Amer)   


 


Est GFR (Non-Af Amer)   


 


POC Glucose (mg/dL)  92  


 


Random Glucose   


 


Calcium   


 


Vancomycin Trough   12.9 H 


 


Phenytoin   














  04/15/18 04/15/18 04/15/18





  06:00 06:06 11:52


 


WBC   


 


RBC   


 


Hgb   


 


Hct   


 


MCV   


 


MCH   


 


MCHC   


 


RDW   


 


Plt Count   


 


MPV   


 


Neut % (Auto)   


 


Lymph % (Auto)   


 


Mono % (Auto)   


 


Eos % (Auto)   


 


Baso % (Auto)   


 


Neut # (Auto)   


 


Lymph # (Auto)   


 


Mono # (Auto)   


 


Eos # (Auto)   


 


Baso # (Auto)   


 


Neutrophils % (Manual)   


 


Lymphocytes % (Manual)   


 


Monocytes % (Manual)   


 


Eosinophils % (Manual)   


 


Basophils % (Manual)   


 


Platelet Estimate   


 


Hypochromasia (manual)   


 


Macrocytosis (manual)   


 


Target Cells   


 


Stomatocytes   


 


pCO2   80 H* 


 


pO2   111 H 


 


HCO3   42.1 H* 


 


ABG pH   7.40 


 


ABG Total CO2   52.1 H 


 


ABG O2 Saturation   100.7 H 


 


ABG O2 Content   11.3 L 


 


ABG Base Excess   22.0 H 


 


ABG Hemoglobin   8.0 L 


 


ABG Carboxyhemoglobin   2.2 H 


 


POC ABG HHb (Measured)   -0.7 L 


 


ABG Methemoglobin   0.3 


 


ABG O2 Capacity   11.2 L 


 


Almas Test   Yes 


 


A-a O2 Difference   74.0 


 


Hgb O2 Saturation   98.1 H 


 


Vent Mode   


 


Mechanical Rate   18 


 


FiO2   40.0 


 


Inspiratory BiPAP   16 


 


Expiratory BiPAP   8 


 


Crit Value Called To   Latanya nguyen 


 


Crit Value Called By   292 


 


Crit Value Read Back   Y 


 


Blood Gas Notified Time   613 


 


Sodium  143  


 


Potassium  4.4  


 


Chloride  89 L  


 


Carbon Dioxide  42 H*  


 


Anion Gap  16  


 


BUN  27 H  


 


Creatinine  1.3 H  


 


Est GFR ( Amer)  47  


 


Est GFR (Non-Af Amer)  39  


 


POC Glucose (mg/dL)    186 H


 


Random Glucose  102  


 


Calcium  8.4  


 


Vancomycin Trough   


 


Phenytoin   














  04/15/18 04/15/18 04/16/18





  17:19 21:58 04:20


 


WBC   


 


RBC   


 


Hgb   


 


Hct   


 


MCV   


 


MCH   


 


MCHC   


 


RDW   


 


Plt Count   


 


MPV   


 


Neut % (Auto)   


 


Lymph % (Auto)   


 


Mono % (Auto)   


 


Eos % (Auto)   


 


Baso % (Auto)   


 


Neut # (Auto)   


 


Lymph # (Auto)   


 


Mono # (Auto)   


 


Eos # (Auto)   


 


Baso # (Auto)   


 


Neutrophils % (Manual)   


 


Lymphocytes % (Manual)   


 


Monocytes % (Manual)   


 


Eosinophils % (Manual)   


 


Basophils % (Manual)   


 


Platelet Estimate   


 


Hypochromasia (manual)   


 


Macrocytosis (manual)   


 


Target Cells   


 


Stomatocytes   


 


pCO2   


 


pO2   


 


HCO3   


 


ABG pH   


 


ABG Total CO2   


 


ABG O2 Saturation   


 


ABG O2 Content   


 


ABG Base Excess   


 


ABG Hemoglobin   


 


ABG Carboxyhemoglobin   


 


POC ABG HHb (Measured)   


 


ABG Methemoglobin   


 


ABG O2 Capacity   


 


Almas Test   


 


A-a O2 Difference   


 


Hgb O2 Saturation   


 


Vent Mode   


 


Mechanical Rate   


 


FiO2   


 


Inspiratory BiPAP   


 


Expiratory BiPAP   


 


Crit Value Called To   


 


Crit Value Called By   


 


Crit Value Read Back   


 


Blood Gas Notified Time   


 


Sodium   


 


Potassium   


 


Chloride   


 


Carbon Dioxide   


 


Anion Gap   


 


BUN   


 


Creatinine   


 


Est GFR ( Amer)   


 


Est GFR (Non-Af Amer)   


 


POC Glucose (mg/dL)  97  102 


 


Random Glucose   


 


Calcium   


 


Vancomycin Trough   


 


Phenytoin    18.2














  04/16/18 04/16/18 04/16/18





  04:20 04:20 05:40


 


WBC  5.0  


 


RBC  2.82 L  


 


Hgb  8.6 L  


 


Hct  27.1 L  


 


MCV  96.2  


 


MCH  30.5  


 


MCHC  31.7 L  


 


RDW  14.1  


 


Plt Count  163  


 


MPV  9.1  


 


Neut % (Auto)  64.2  


 


Lymph % (Auto)  18.4 L  


 


Mono % (Auto)  13.3 H  


 


Eos % (Auto)  3.7  


 


Baso % (Auto)  0.4  


 


Neut # (Auto)  3.2  


 


Lymph # (Auto)  0.9 L  


 


Mono # (Auto)  0.7  


 


Eos # (Auto)  0.2  


 


Baso # (Auto)  0.0  


 


Neutrophils % (Manual)   


 


Lymphocytes % (Manual)   


 


Monocytes % (Manual)   


 


Eosinophils % (Manual)   


 


Basophils % (Manual)   


 


Platelet Estimate   


 


Hypochromasia (manual)   


 


Macrocytosis (manual)   


 


Target Cells   


 


Stomatocytes   


 


pCO2    68 H


 


pO2    61 L


 


HCO3    36.1 H


 


ABG pH    7.39


 


ABG Total CO2    43.3 H


 


ABG O2 Saturation    97.5


 


ABG O2 Content    11.4 L


 


ABG Base Excess    14.3 H


 


ABG Hemoglobin    8.5 L


 


ABG Carboxyhemoglobin    2.1 H


 


POC ABG HHb (Measured)    2.4


 


ABG Methemoglobin    0.6


 


ABG O2 Capacity    11.7 L


 


Almas Test    Yes


 


A-a O2 Difference    104.0


 


Hgb O2 Saturation    95.0


 


Vent Mode    Bipap


 


Mechanical Rate    18


 


FiO2    35.0


 


Inspiratory BiPAP    16


 


Expiratory BiPAP    8


 


Crit Value Called To   


 


Crit Value Called By   


 


Crit Value Read Back   


 


Blood Gas Notified Time   


 


Sodium   144 


 


Potassium   4.4 


 


Chloride   98 


 


Carbon Dioxide   35 H 


 


Anion Gap   15 


 


BUN   21 H 


 


Creatinine   1.4 H 


 


Est GFR ( Amer)   43 


 


Est GFR (Non-Af Amer)   35 


 


POC Glucose (mg/dL)   


 


Random Glucose   81 


 


Calcium   8.3 L 


 


Vancomycin Trough   


 


Phenytoin   














  04/16/18 04/16/18





  06:16 11:29


 


WBC  


 


RBC  


 


Hgb  


 


Hct  


 


MCV  


 


MCH  


 


MCHC  


 


RDW  


 


Plt Count  


 


MPV  


 


Neut % (Auto)  


 


Lymph % (Auto)  


 


Mono % (Auto)  


 


Eos % (Auto)  


 


Baso % (Auto)  


 


Neut # (Auto)  


 


Lymph # (Auto)  


 


Mono # (Auto)  


 


Eos # (Auto)  


 


Baso # (Auto)  


 


Neutrophils % (Manual)  


 


Lymphocytes % (Manual)  


 


Monocytes % (Manual)  


 


Eosinophils % (Manual)  


 


Basophils % (Manual)  


 


Platelet Estimate  


 


Hypochromasia (manual)  


 


Macrocytosis (manual)  


 


Target Cells  


 


Stomatocytes  


 


pCO2  


 


pO2  


 


HCO3  


 


ABG pH  


 


ABG Total CO2  


 


ABG O2 Saturation  


 


ABG O2 Content  


 


ABG Base Excess  


 


ABG Hemoglobin  


 


ABG Carboxyhemoglobin  


 


POC ABG HHb (Measured)  


 


ABG Methemoglobin  


 


ABG O2 Capacity  


 


Almas Test  


 


A-a O2 Difference  


 


Hgb O2 Saturation  


 


Vent Mode  


 


Mechanical Rate  


 


FiO2  


 


Inspiratory BiPAP  


 


Expiratory BiPAP  


 


Crit Value Called To  


 


Crit Value Called By  


 


Crit Value Read Back  


 


Blood Gas Notified Time  


 


Sodium  


 


Potassium  


 


Chloride  


 


Carbon Dioxide  


 


Anion Gap  


 


BUN  


 


Creatinine  


 


Est GFR ( Amer)  


 


Est GFR (Non-Af Amer)  


 


POC Glucose (mg/dL)  121 H  171 H


 


Random Glucose  


 


Calcium  


 


Vancomycin Trough  


 


Phenytoin  








 Microbiology





04/11/18 22:00   Blood-Venous   Blood Culture - Preliminary


                            NO GROWTH AFTER 4 DAYS


04/12/18 10:15   Nose   MRSA Culture (Admit) - Final


                            MRSA NOT DETECTED


04/12/18 15:10   Sputum   Gram Stain - Final


04/12/18 15:10   Sputum   Sputum Culture - Final


                            NORMAL ORAL CAITLYN





 


 











Assessment and Plan


(1) Recurrent seizures


Status: Acute   





(2) Pleural effusion


Status: Acute   





(3) Cough


Status: Acute   





- Assessment and Plan (Free Text)


Assessment: 





A/P-


88 year old female with DM II, HTN, recent cervical fracture s/p spinal fusion 

in 11/2017 and seizures admitted with recurrent seizure and cough ? aspiration 

pneumonitis.





clinically improved


afebrile


wbc normal value


sputum cx- neg


blood cx- neg





plan-


continue with IV zosyn and vanco for nosocomial and asp pneumonitis. day #5


keep vanco trough <15.


monitor aspiration precautions.


seizure management as per neurology.








 all above d/w patient's daughter who is at bedside at length.








ICU time 45 minutes.

## 2018-04-16 NOTE — CP.CCUPN
CCU Subjective





- Physician Review


Events Since Last Encounter (Free Text): 





04/16/18 15:48


The patient was Seen/interviewed and examined by me at the bedside during ICU 

round, 


Medical records reviewed and Management issues were discussed and formulated 

with the house staff.


Events reviewed 


Clinically improving, No Vasopressors 


Improved respiratory status, on BIPAP only at night and HFNC during the day, O2 

sat 100%. 


Breathing unlabored


Alert and oriented, Tolerating Puree diet


Denies any chest pain, SOB, headache, lightheadedness, dizziness, nausea, or 

vomiting. 


Afebrile, NSR on the monitor


Blood and sputum cx- neg


Left arm swelling, elevation and scheduled for V doppler




















CCU Objective





- Vital Signs / Intake & Output


Vital Signs (Last 4 hours): 


Vital Signs











  Temp Pulse Resp BP Pulse Ox


 


 04/16/18 06:00   62  18  178/77 H  96


 


 04/16/18 05:13   61   


 


 04/16/18 04:00  98.2 F  59 L  20  160/63 H  100











Intake and Output (Last 8hrs): 


 Intake & Output











 04/15/18 04/16/18 04/16/18





 22:59 06:59 14:59


 


Intake Total 358 510 


 


Balance 358 510 


 


Intake:   


 


   10 


 


  Intake, Piggyback  500 


 


  Oral 150  


 


Other:   


 


  # Voids   


 


    Urine, Voided 3  














- Physical Exam


Head: Positive for: Atraumatic, Normocephalic


Pupils: Positive for: PERRL


Extroacular Muscles: Positive for: EOMI


Conjunctiva: Positive for: Normal


Mouth: Positive for: Moist Mucous Membranes


Neck: Positive for: Normal Range of Motion, Trachea Midline.  Negative for: 

Meningeal Signs, MIDLINE TENDERNESS, Paraspinal Tenderness, JVD, Lymphadenopathy

, Bruit, Other


Respiratory/Chest: Positive for: Rales, Rhonchi.  Negative for: Respiratory 

Distress, Accessory Muscle Use


Cardiovascular: Positive for: Regular Rate and Rhythm, Normal S1, S2.  Negative 

for: Irregular Rhythm


Neurological: Positive for: GCS=15, CN II-XII Intact, Speech Normal, Motor Func 

Grossly Intact, Normal Sensory Function





- Medications


Active Medications: 


Active Medications











Generic Name Dose Route Start Last Admin





  Trade Name Freq  PRN Reason Stop Dose Admin


 


Acetaminophen  975 mg  04/12/18 01:51  





  Tylenol 325mg Tab  PO   





  Q6 PRN   





  Fever- T- 101 or above   


 


Acetylcysteine  2 ml  04/15/18 20:00  04/15/18 19:44





  Mucomyst 10% 4ml  IH   2 ml





  RBID MARIELY   Administration


 


Albuterol/Ipratropium  3 ml  04/13/18 16:00  04/15/18 19:45





  Duoneb 3 Mg/0.5 Mg (3 Ml) Ud  INH   3 ml





  RQID MARIELY   Administration


 


Amlodipine Besylate  2.5 mg  04/12/18 09:00  04/15/18 10:08





  Norvasc  PO   2.5 mg





  DAILY MARIELY   Administration


 


Furosemide  20 mg  04/12/18 09:00  04/15/18 10:07





  Lasix  PO   20 mg





  DAILY MARIELY   Administration


 


Glipizide  2.5 mg  04/12/18 09:00  04/15/18 10:05





  Glucotrol Xl  PO   2.5 mg





  DAILY MARIELY   Administration


 


Piperacillin Sod/Tazobactam  100 mls @ 100 mls/hr  04/12/18 16:00  04/16/18 05:

00





  Sod 2.25 gm/ Sodium Chloride  IVPB   100 mls/hr





  Q6 MARIELY   Administration





  Protocol   


 


Vancomycin HCl 750 mg/ Sodium  250 mls @ 166.667 mls/hr  04/13/18 21:00  04/15/

18 21:52





  Chloride  IVPB   166.667 mls/hr





  Q12 MARIELY   Administration





  Protocol   


 


Acetazolamide 250 mg/ Sodium  50 mls @ 100 mls/hr  04/15/18 09:30  04/15/18 21:

52





  Chloride  IV   100 mls/hr





  Q12 MARIELY   Administration


 


Montelukast Sodium  10 mg  04/12/18 22:00  04/15/18 21:56





  Singulair  PO   10 mg





  HS MARIELY   Administration


 


Multivitamins/Minerals  1 tab  04/12/18 09:00  04/15/18 10:07





  Therapeutic-M Tab  PO   1 tab





  DAILY MARIELY   Administration


 


Pantoprazole Sodium  40 mg  04/16/18 09:00  





  Protonix Inj  IVP   





  DAILY MARIELY   


 


Phenytoin  100 mg  04/16/18 09:00  





  Dilantin  PO   





  Q8 MARIELY   














- Patient Studies


Lab Studies: 


 Microbiology Studies











 04/11/18 22:00 Blood Culture - Preliminary





 Blood-Venous    NO GROWTH AFTER 4 DAYS








 Lab Studies











  04/16/18 04/16/18 04/16/18 Range/Units





  06:16 05:40 04:20 


 


WBC     (4.8-10.8)  K/uL


 


RBC     (3.80-5.20)  Mil/uL


 


Hgb     (12.0-16.0)  g/dL


 


Hct     (34.0-47.0)  %


 


MCV     (81.0-99.0)  fl


 


MCH     (27.0-31.0)  pg


 


MCHC     (33.0-37.0)  g/dL


 


RDW     (11.5-14.5)  %


 


Plt Count     (130-400)  K/uL


 


MPV     (7.2-11.7)  fl


 


Neut % (Auto)     (50.0-75.0)  %


 


Lymph % (Auto)     (20.0-40.0)  %


 


Mono % (Auto)     (0.0-10.0)  %


 


Eos % (Auto)     (0.0-4.0)  %


 


Baso % (Auto)     (0.0-2.0)  %


 


Neut # (Auto)     (1.8-7.0)  K/uL


 


Lymph # (Auto)     (1.0-4.3)  K/uL


 


Mono # (Auto)     (0.0-0.8)  K/uL


 


Eos # (Auto)     (0.0-0.7)  K/uL


 


Baso # (Auto)     (0.0-0.2)  K/uL


 


pCO2   68 H   (35-45)  mm/Hg


 


pO2   61 L   ()  mm/Hg


 


HCO3   36.1 H   (21-28)  mmol/L


 


ABG pH   7.39   (7.35-7.45)  


 


ABG Total CO2   43.3 H   (22-28)  mmol/L


 


ABG O2 Saturation   97.5   (95-98)  %


 


ABG O2 Content   11.4 L   (15-23)  ML/dL


 


ABG Base Excess   14.3 H   (-2.0-3.0)  mmol/L


 


ABG Hemoglobin   8.5 L   (11.7-17.4)  g/dL


 


ABG Carboxyhemoglobin   2.1 H   (0.5-1.5)  %


 


POC ABG HHb (Measured)   2.4   (0.0-5.0)  %


 


ABG Methemoglobin   0.6   (0.0-3.0)  %


 


ABG O2 Capacity   11.7 L   (16-24)  mL/dL


 


Almas Test   Yes   


 


A-a O2 Difference   104.0   mm/Hg


 


Hgb O2 Saturation   95.0   (95.0-98.0)  %


 


Vent Mode   Bipap   


 


Mechanical Rate   18   


 


FiO2   35.0   %


 


Inspiratory BiPAP   16   


 


Expiratory BiPAP   8   


 


Sodium    144  (132-148)  mmol/l


 


Potassium    4.4  (3.6-5.0)  MMOL/L


 


Chloride    98  ()  mmol/L


 


Carbon Dioxide    35 H  (22-30)  mmol/L


 


Anion Gap    15  (10-20)  


 


BUN    21 H  (7-17)  mg/dl


 


Creatinine    1.4 H  (0.7-1.2)  mg/dl


 


Est GFR (African Amer)    43  


 


Est GFR (Non-Af Amer)    35  


 


POC Glucose (mg/dL)  121 H    ()  mg/dL


 


Random Glucose    81  ()  mg/dL


 


Calcium    8.3 L  (8.4-10.2)  mg/dL


 


Vancomycin Trough     (5.0-10.0)  ug/mL


 


Phenytoin     (10-20)  ug/ML














  04/16/18 04/16/18 04/15/18 Range/Units





  04:20 04:20 21:58 


 


WBC  5.0    (4.8-10.8)  K/uL


 


RBC  2.82 L    (3.80-5.20)  Mil/uL


 


Hgb  8.6 L    (12.0-16.0)  g/dL


 


Hct  27.1 L    (34.0-47.0)  %


 


MCV  96.2    (81.0-99.0)  fl


 


MCH  30.5    (27.0-31.0)  pg


 


MCHC  31.7 L    (33.0-37.0)  g/dL


 


RDW  14.1    (11.5-14.5)  %


 


Plt Count  163    (130-400)  K/uL


 


MPV  9.1    (7.2-11.7)  fl


 


Neut % (Auto)  64.2    (50.0-75.0)  %


 


Lymph % (Auto)  18.4 L    (20.0-40.0)  %


 


Mono % (Auto)  13.3 H    (0.0-10.0)  %


 


Eos % (Auto)  3.7    (0.0-4.0)  %


 


Baso % (Auto)  0.4    (0.0-2.0)  %


 


Neut # (Auto)  3.2    (1.8-7.0)  K/uL


 


Lymph # (Auto)  0.9 L    (1.0-4.3)  K/uL


 


Mono # (Auto)  0.7    (0.0-0.8)  K/uL


 


Eos # (Auto)  0.2    (0.0-0.7)  K/uL


 


Baso # (Auto)  0.0    (0.0-0.2)  K/uL


 


pCO2     (35-45)  mm/Hg


 


pO2     ()  mm/Hg


 


HCO3     (21-28)  mmol/L


 


ABG pH     (7.35-7.45)  


 


ABG Total CO2     (22-28)  mmol/L


 


ABG O2 Saturation     (95-98)  %


 


ABG O2 Content     (15-23)  ML/dL


 


ABG Base Excess     (-2.0-3.0)  mmol/L


 


ABG Hemoglobin     (11.7-17.4)  g/dL


 


ABG Carboxyhemoglobin     (0.5-1.5)  %


 


POC ABG HHb (Measured)     (0.0-5.0)  %


 


ABG Methemoglobin     (0.0-3.0)  %


 


ABG O2 Capacity     (16-24)  mL/dL


 


Almas Test     


 


A-a O2 Difference     mm/Hg


 


Hgb O2 Saturation     (95.0-98.0)  %


 


Vent Mode     


 


Mechanical Rate     


 


FiO2     %


 


Inspiratory BiPAP     


 


Expiratory BiPAP     


 


Sodium     (132-148)  mmol/l


 


Potassium     (3.6-5.0)  MMOL/L


 


Chloride     ()  mmol/L


 


Carbon Dioxide     (22-30)  mmol/L


 


Anion Gap     (10-20)  


 


BUN     (7-17)  mg/dl


 


Creatinine     (0.7-1.2)  mg/dl


 


Est GFR (African Amer)     


 


Est GFR (Non-Af Amer)     


 


POC Glucose (mg/dL)    102  ()  mg/dL


 


Random Glucose     ()  mg/dL


 


Calcium     (8.4-10.2)  mg/dL


 


Vancomycin Trough     (5.0-10.0)  ug/mL


 


Phenytoin   18.2   (10-20)  ug/ML














  04/15/18 04/15/18 04/15/18 Range/Units





  17:19 11:52 06:00 


 


WBC     (4.8-10.8)  K/uL


 


RBC     (3.80-5.20)  Mil/uL


 


Hgb     (12.0-16.0)  g/dL


 


Hct     (34.0-47.0)  %


 


MCV     (81.0-99.0)  fl


 


MCH     (27.0-31.0)  pg


 


MCHC     (33.0-37.0)  g/dL


 


RDW     (11.5-14.5)  %


 


Plt Count     (130-400)  K/uL


 


MPV     (7.2-11.7)  fl


 


Neut % (Auto)     (50.0-75.0)  %


 


Lymph % (Auto)     (20.0-40.0)  %


 


Mono % (Auto)     (0.0-10.0)  %


 


Eos % (Auto)     (0.0-4.0)  %


 


Baso % (Auto)     (0.0-2.0)  %


 


Neut # (Auto)     (1.8-7.0)  K/uL


 


Lymph # (Auto)     (1.0-4.3)  K/uL


 


Mono # (Auto)     (0.0-0.8)  K/uL


 


Eos # (Auto)     (0.0-0.7)  K/uL


 


Baso # (Auto)     (0.0-0.2)  K/uL


 


pCO2     (35-45)  mm/Hg


 


pO2     ()  mm/Hg


 


HCO3     (21-28)  mmol/L


 


ABG pH     (7.35-7.45)  


 


ABG Total CO2     (22-28)  mmol/L


 


ABG O2 Saturation     (95-98)  %


 


ABG O2 Content     (15-23)  ML/dL


 


ABG Base Excess     (-2.0-3.0)  mmol/L


 


ABG Hemoglobin     (11.7-17.4)  g/dL


 


ABG Carboxyhemoglobin     (0.5-1.5)  %


 


POC ABG HHb (Measured)     (0.0-5.0)  %


 


ABG Methemoglobin     (0.0-3.0)  %


 


ABG O2 Capacity     (16-24)  mL/dL


 


Almas Test     


 


A-a O2 Difference     mm/Hg


 


Hgb O2 Saturation     (95.0-98.0)  %


 


Vent Mode     


 


Mechanical Rate     


 


FiO2     %


 


Inspiratory BiPAP     


 


Expiratory BiPAP     


 


Sodium    143  (132-148)  mmol/l


 


Potassium    4.4  (3.6-5.0)  MMOL/L


 


Chloride    89 L  ()  mmol/L


 


Carbon Dioxide    42 H*  (22-30)  mmol/L


 


Anion Gap    16  (10-20)  


 


BUN    27 H  (7-17)  mg/dl


 


Creatinine    1.3 H  (0.7-1.2)  mg/dl


 


Est GFR ( Amer)    47  


 


Est GFR (Non-Af Amer)    39  


 


POC Glucose (mg/dL)  97  186 H   ()  mg/dL


 


Random Glucose    102  ()  mg/dL


 


Calcium    8.4  (8.4-10.2)  mg/dL


 


Vancomycin Trough     (5.0-10.0)  ug/mL


 


Phenytoin     (10-20)  ug/ML














  04/15/18 Range/Units





  06:00 


 


WBC   (4.8-10.8)  K/uL


 


RBC   (3.80-5.20)  Mil/uL


 


Hgb   (12.0-16.0)  g/dL


 


Hct   (34.0-47.0)  %


 


MCV   (81.0-99.0)  fl


 


MCH   (27.0-31.0)  pg


 


MCHC   (33.0-37.0)  g/dL


 


RDW   (11.5-14.5)  %


 


Plt Count   (130-400)  K/uL


 


MPV   (7.2-11.7)  fl


 


Neut % (Auto)   (50.0-75.0)  %


 


Lymph % (Auto)   (20.0-40.0)  %


 


Mono % (Auto)   (0.0-10.0)  %


 


Eos % (Auto)   (0.0-4.0)  %


 


Baso % (Auto)   (0.0-2.0)  %


 


Neut # (Auto)   (1.8-7.0)  K/uL


 


Lymph # (Auto)   (1.0-4.3)  K/uL


 


Mono # (Auto)   (0.0-0.8)  K/uL


 


Eos # (Auto)   (0.0-0.7)  K/uL


 


Baso # (Auto)   (0.0-0.2)  K/uL


 


pCO2   (35-45)  mm/Hg


 


pO2   ()  mm/Hg


 


HCO3   (21-28)  mmol/L


 


ABG pH   (7.35-7.45)  


 


ABG Total CO2   (22-28)  mmol/L


 


ABG O2 Saturation   (95-98)  %


 


ABG O2 Content   (15-23)  ML/dL


 


ABG Base Excess   (-2.0-3.0)  mmol/L


 


ABG Hemoglobin   (11.7-17.4)  g/dL


 


ABG Carboxyhemoglobin   (0.5-1.5)  %


 


POC ABG HHb (Measured)   (0.0-5.0)  %


 


ABG Methemoglobin   (0.0-3.0)  %


 


ABG O2 Capacity   (16-24)  mL/dL


 


Almas Test   


 


A-a O2 Difference   mm/Hg


 


Hgb O2 Saturation   (95.0-98.0)  %


 


Vent Mode   


 


Mechanical Rate   


 


FiO2   %


 


Inspiratory BiPAP   


 


Expiratory BiPAP   


 


Sodium   (132-148)  mmol/l


 


Potassium   (3.6-5.0)  MMOL/L


 


Chloride   ()  mmol/L


 


Carbon Dioxide   (22-30)  mmol/L


 


Anion Gap   (10-20)  


 


BUN   (7-17)  mg/dl


 


Creatinine   (0.7-1.2)  mg/dl


 


Est GFR (African Amer)   


 


Est GFR (Non-Af Amer)   


 


POC Glucose (mg/dL)   ()  mg/dL


 


Random Glucose   ()  mg/dL


 


Calcium   (8.4-10.2)  mg/dL


 


Vancomycin Trough  12.9 H  (5.0-10.0)  ug/mL


 


Phenytoin   (10-20)  ug/ML








 Laboratory Results - last 24 hr











  04/15/18 04/15/18 04/15/18





  06:00 06:00 11:52


 


WBC   


 


RBC   


 


Hgb   


 


Hct   


 


MCV   


 


MCH   


 


MCHC   


 


RDW   


 


Plt Count   


 


MPV   


 


Neut % (Auto)   


 


Lymph % (Auto)   


 


Mono % (Auto)   


 


Eos % (Auto)   


 


Baso % (Auto)   


 


Neut # (Auto)   


 


Lymph # (Auto)   


 


Mono # (Auto)   


 


Eos # (Auto)   


 


Baso # (Auto)   


 


pCO2   


 


pO2   


 


HCO3   


 


ABG pH   


 


ABG Total CO2   


 


ABG O2 Saturation   


 


ABG O2 Content   


 


ABG Base Excess   


 


ABG Hemoglobin   


 


ABG Carboxyhemoglobin   


 


POC ABG HHb (Measured)   


 


ABG Methemoglobin   


 


ABG O2 Capacity   


 


Almas Test   


 


A-a O2 Difference   


 


Hgb O2 Saturation   


 


Vent Mode   


 


Mechanical Rate   


 


FiO2   


 


Inspiratory BiPAP   


 


Expiratory BiPAP   


 


Sodium   143 


 


Potassium   4.4 


 


Chloride   89 L 


 


Carbon Dioxide   42 H* 


 


Anion Gap   16 


 


BUN   27 H 


 


Creatinine   1.3 H 


 


Est GFR ( Amer)   47 


 


Est GFR (Non-Af Amer)   39 


 


POC Glucose (mg/dL)    186 H


 


Random Glucose   102 


 


Calcium   8.4 


 


Vancomycin Trough  12.9 H  


 


Phenytoin   














  04/15/18 04/15/18 04/16/18





  17:19 21:58 04:20


 


WBC   


 


RBC   


 


Hgb   


 


Hct   


 


MCV   


 


MCH   


 


MCHC   


 


RDW   


 


Plt Count   


 


MPV   


 


Neut % (Auto)   


 


Lymph % (Auto)   


 


Mono % (Auto)   


 


Eos % (Auto)   


 


Baso % (Auto)   


 


Neut # (Auto)   


 


Lymph # (Auto)   


 


Mono # (Auto)   


 


Eos # (Auto)   


 


Baso # (Auto)   


 


pCO2   


 


pO2   


 


HCO3   


 


ABG pH   


 


ABG Total CO2   


 


ABG O2 Saturation   


 


ABG O2 Content   


 


ABG Base Excess   


 


ABG Hemoglobin   


 


ABG Carboxyhemoglobin   


 


POC ABG HHb (Measured)   


 


ABG Methemoglobin   


 


ABG O2 Capacity   


 


Almas Test   


 


A-a O2 Difference   


 


Hgb O2 Saturation   


 


Vent Mode   


 


Mechanical Rate   


 


FiO2   


 


Inspiratory BiPAP   


 


Expiratory BiPAP   


 


Sodium   


 


Potassium   


 


Chloride   


 


Carbon Dioxide   


 


Anion Gap   


 


BUN   


 


Creatinine   


 


Est GFR ( Amer)   


 


Est GFR (Non-Af Amer)   


 


POC Glucose (mg/dL)  97  102 


 


Random Glucose   


 


Calcium   


 


Vancomycin Trough   


 


Phenytoin    18.2














  04/16/18 04/16/18 04/16/18





  04:20 04:20 05:40


 


WBC  5.0  


 


RBC  2.82 L  


 


Hgb  8.6 L  


 


Hct  27.1 L  


 


MCV  96.2  


 


MCH  30.5  


 


MCHC  31.7 L  


 


RDW  14.1  


 


Plt Count  163  


 


MPV  9.1  


 


Neut % (Auto)  64.2  


 


Lymph % (Auto)  18.4 L  


 


Mono % (Auto)  13.3 H  


 


Eos % (Auto)  3.7  


 


Baso % (Auto)  0.4  


 


Neut # (Auto)  3.2  


 


Lymph # (Auto)  0.9 L  


 


Mono # (Auto)  0.7  


 


Eos # (Auto)  0.2  


 


Baso # (Auto)  0.0  


 


pCO2    68 H


 


pO2    61 L


 


HCO3    36.1 H


 


ABG pH    7.39


 


ABG Total CO2    43.3 H


 


ABG O2 Saturation    97.5


 


ABG O2 Content    11.4 L


 


ABG Base Excess    14.3 H


 


ABG Hemoglobin    8.5 L


 


ABG Carboxyhemoglobin    2.1 H


 


POC ABG HHb (Measured)    2.4


 


ABG Methemoglobin    0.6


 


ABG O2 Capacity    11.7 L


 


Almas Test    Yes


 


A-a O2 Difference    104.0


 


Hgb O2 Saturation    95.0


 


Vent Mode    Bipap


 


Mechanical Rate    18


 


FiO2    35.0


 


Inspiratory BiPAP    16


 


Expiratory BiPAP    8


 


Sodium   144 


 


Potassium   4.4 


 


Chloride   98 


 


Carbon Dioxide   35 H 


 


Anion Gap   15 


 


BUN   21 H 


 


Creatinine   1.4 H 


 


Est GFR ( Amer)   43 


 


Est GFR (Non-Af Amer)   35 


 


POC Glucose (mg/dL)   


 


Random Glucose   81 


 


Calcium   8.3 L 


 


Vancomycin Trough   


 


Phenytoin   














  04/16/18





  06:16


 


WBC 


 


RBC 


 


Hgb 


 


Hct 


 


MCV 


 


MCH 


 


MCHC 


 


RDW 


 


Plt Count 


 


MPV 


 


Neut % (Auto) 


 


Lymph % (Auto) 


 


Mono % (Auto) 


 


Eos % (Auto) 


 


Baso % (Auto) 


 


Neut # (Auto) 


 


Lymph # (Auto) 


 


Mono # (Auto) 


 


Eos # (Auto) 


 


Baso # (Auto) 


 


pCO2 


 


pO2 


 


HCO3 


 


ABG pH 


 


ABG Total CO2 


 


ABG O2 Saturation 


 


ABG O2 Content 


 


ABG Base Excess 


 


ABG Hemoglobin 


 


ABG Carboxyhemoglobin 


 


POC ABG HHb (Measured) 


 


ABG Methemoglobin 


 


ABG O2 Capacity 


 


Almas Test 


 


A-a O2 Difference 


 


Hgb O2 Saturation 


 


Vent Mode 


 


Mechanical Rate 


 


FiO2 


 


Inspiratory BiPAP 


 


Expiratory BiPAP 


 


Sodium 


 


Potassium 


 


Chloride 


 


Carbon Dioxide 


 


Anion Gap 


 


BUN 


 


Creatinine 


 


Est GFR ( Amer) 


 


Est GFR (Non-Af Amer) 


 


POC Glucose (mg/dL)  121 H


 


Random Glucose 


 


Calcium 


 


Vancomycin Trough 


 


Phenytoin 











Fingerstick Blood Sugar Results: 102





Review of Systems





- Cardiovascular


Cardiovascular: absent: Acrocyanosis, Chest Pain, Chest Pain at Rest, Chest 

Pain with Activity, Claudication, Diaphoresis





- Respiratory


Respiratory: absent: Cough, Dyspnea, Hemoptysis, Dyspnea on Exertion, Wheezing





- Neurological


Neurological: absent: Dizziness, Numbness, Focal Weakness, Headaches





Critical Care Progress Note





- Nutrition


Nutrition: 


 Nutrition











 Category Date Time Status


 


 Consistent Carbohydrate [DIET] Diets  04/15/18 Dinner Active














Assessment/Plan


(1) Acute respiratory failure with hypercapnia


Current Visit: No   Status: Acute   Comment: 


Wean Off BIPAP/HFNC


Continue Solu-Medrol 40 mg IVP Q8 


Gentle diuresis


C/W Albuterol/Ipratropium inhaler Q 4H  


Maintain aspiration precautions


Anrtibiotics   





(2) Aspiration pneumonia


Current Visit: Yes   Status: Acute   Comment: 


Continue IV Vanco and Piperacillin Sod/Tazobactam as per ID


Afebrile, NSR on the monitor


Blood and sputum C/S- neg


   





(3) Recurrent seizures


Current Visit: Yes   Status: Acute   Priority: High   Comment: 


Continue fosphenytoin


Seizure precatuion  


Physical and occupational therapies   





(4) Pleural effusion


Current Visit: Yes   Status: Acute

## 2018-04-17 LAB
ARTERIAL BLOOD GAS HEMOGLOBIN: 8.5 G/DL (ref 11.7–17.4)
ARTERIAL BLOOD GAS O2 SAT: 100.6 % (ref 95–98)
ARTERIAL BLOOD GAS PCO2: 70 MM/HG (ref 35–45)
ARTERIAL BLOOD GAS TCO2: 39.9 MMOL/L (ref 22–28)
ARTERIAL PATENCY WRIST A: YES
BUN SERPL-MCNC: 24 MG/DL (ref 7–17)
CALCIUM SERPL-MCNC: 8.3 MG/DL (ref 8.4–10.2)
ERYTHROCYTE [DISTWIDTH] IN BLOOD BY AUTOMATED COUNT: 15 % (ref 11.5–14.5)
GFR NON-AFRICAN AMERICAN: 30
HCO3 BLDA-SCNC: 33.1 MMOL/L (ref 21–28)
HGB BLD-MCNC: 8.7 G/DL (ref 12–16)
INHALED O2 CONCENTRATION: 35 %
MCH RBC QN AUTO: 29.6 PG (ref 27–31)
MCHC RBC AUTO-ENTMCNC: 30.4 G/DL (ref 33–37)
MCV RBC AUTO: 97.6 FL (ref 81–99)
O2 CAP BLDA-SCNC: 11.8 ML/DL (ref 16–24)
O2 CT BLDA-SCNC: 11.9 ML/DL (ref 15–23)
PH BLDA: 7.34 [PH] (ref 7.35–7.45)
PLATELET # BLD: 157 K/UL (ref 130–400)
PO2 BLDA: 118 MM/HG (ref 80–100)
RBC # BLD AUTO: 2.93 MIL/UL (ref 3.8–5.2)
WBC # BLD AUTO: 4.5 K/UL (ref 4.8–10.8)

## 2018-04-17 RX ADMIN — SODIUM CHLORIDE SCH MLS/HR: 900 INJECTION, SOLUTION INTRAVENOUS at 09:05

## 2018-04-17 RX ADMIN — ACETYLCYSTEINE SCH ML: 100 SOLUTION ORAL; RESPIRATORY (INHALATION) at 07:45

## 2018-04-17 RX ADMIN — IPRATROPIUM BROMIDE AND ALBUTEROL SULFATE SCH ML: .5; 3 SOLUTION RESPIRATORY (INHALATION) at 15:44

## 2018-04-17 RX ADMIN — GLIPIZIDE SCH MG: 2.5 TABLET, EXTENDED RELEASE ORAL at 09:06

## 2018-04-17 RX ADMIN — IPRATROPIUM BROMIDE AND ALBUTEROL SULFATE SCH ML: .5; 3 SOLUTION RESPIRATORY (INHALATION) at 19:40

## 2018-04-17 RX ADMIN — IPRATROPIUM BROMIDE AND ALBUTEROL SULFATE SCH ML: .5; 3 SOLUTION RESPIRATORY (INHALATION) at 07:45

## 2018-04-17 RX ADMIN — SODIUM CHLORIDE SCH MLS/HR: 900 INJECTION, SOLUTION INTRAVENOUS at 22:39

## 2018-04-17 RX ADMIN — IPRATROPIUM BROMIDE AND ALBUTEROL SULFATE SCH ML: .5; 3 SOLUTION RESPIRATORY (INHALATION) at 11:02

## 2018-04-17 RX ADMIN — Medication SCH TAB: at 09:09

## 2018-04-17 RX ADMIN — ACETYLCYSTEINE SCH ML: 100 SOLUTION ORAL; RESPIRATORY (INHALATION) at 19:40

## 2018-04-17 NOTE — US
PROCEDURE:  



HISTORY:

redness of the left upper extremity



COMPARISON:

None available. 



TECHNIQUE:

Grayscale and duplex Doppler evaluation of the left upper extremity 

was performed.



Report prepared by   vascular technologist.



FINDINGS:



LEFT UPPER EXTREMITY:  

* SCA : Peak Systolic Velocity - 119.9: Doppler Waveform: Biphasic: 

Plaque description -  



* Axillary: Peak Systolic Velocity - 80.7: Doppler Waveform: 

Biphasic: Plaque description -  



* Brachial 



o Peak Systolic Velocity - 129.2: Doppler Waveform: Biphasic: Plaque 

description -  



* Radial 



o Peak Systolic Velocity - 109.8: Doppler Waveform: Monophasic open: 

Plaque description -  



* Ulnar 

o Peak Systolic Velocity - 69.3: Doppler Waveform: Monophasic: Plaque 

description -  





OTHER FINDINGS:  None.



IMPRESSION:

 There is no evidence of hemodynamically significant arterial 

insufficiency of the left upper extremity.

## 2018-04-17 NOTE — CP.PCM.PN
Subjective





- Date & Time of Evaluation


Date of Evaluation: 04/17/18


Time of Evaluation: 12:30





- Subjective


Subjective: 





ID Note-





Pt. seen and examined today in ICU.


pt. much more awake and alert and sitting up in bed and smiling.


she is being fed her lunch.


breathing comfortably





Objective





- Vital Signs/Intake and Output


Vital Signs (last 24 hours): 


 











Temp Pulse Resp BP Pulse Ox


 


 98.4 F   58 L  24   129/61   98 


 


 04/17/18 12:00  04/17/18 12:00  04/17/18 12:00  04/17/18 10:00  04/17/18 12:00








Intake and Output: 


 











 04/17/18 04/17/18





 06:59 18:59


 


Intake Total 530 400


 


Output Total 300 


 


Balance 230 400














- Medications


Medications: 


 Current Medications





Acetaminophen (Tylenol 325mg Tab)  975 mg PO Q6 PRN


   PRN Reason: Fever- T- 101 or above


Acetylcysteine (Mucomyst 10% 4ml)  2 ml IH RBID [facility]


   Last Admin: 04/17/18 07:45 Dose:  2 ml


Albuterol/Ipratropium (Duoneb 3 Mg/0.5 Mg (3 Ml) Ud)  3 ml INH RQID [facility]


   Last Admin: 04/17/18 11:02 Dose:  3 ml


Amlodipine Besylate (Norvasc)  2.5 mg PO DAILY [facility]


   Last Admin: 04/17/18 09:08 Dose:  2.5 mg


Furosemide (Lasix)  20 mg PO DAILY [facility]


   Last Admin: 04/17/18 09:07 Dose:  20 mg


Glipizide (Glucotrol Xl)  2.5 mg PO DAILY [facility]


   Last Admin: 04/17/18 09:06 Dose:  2.5 mg


Piperacillin Sod/Tazobactam (Sod 2.25 gm/ Sodium Chloride)  100 mls @ 100 mls/

hr IVPB Q6 [facility]


   PRN Reason: Protocol


   Last Admin: 04/17/18 09:11 Dose:  100 mls/hr


Acetazolamide 250 mg/ Sodium (Chloride)  50 mls @ 100 mls/hr IV Q12 [facility]


   Last Admin: 04/17/18 09:05 Dose:  100 mls/hr


Vancomycin HCl 750 mg/ Sodium (Chloride)  250 mls @ 166.667 mls/hr IVPB DAILY 

[facility]


   PRN Reason: Protocol


   Last Admin: 04/17/18 09:10 Dose:  166.667 mls/hr


Montelukast Sodium (Singulair)  10 mg PO HS [facility]


Multivitamins/Minerals (Therapeutic-M Tab)  1 tab PO DAILY [facility]


   Last Admin: 04/17/18 09:09 Dose:  1 tab


Pantoprazole Sodium (Protonix Inj)  40 mg IVP DAILY [facility]


   Last Admin: 04/17/18 09:08 Dose:  40 mg


Phenytoin (Dilantin)  100 mg PO Q8 [facility]


   Last Admin: 04/17/18 09:06 Dose:  100 mg











- Labs


Labs: 


 





 





- Additional Findings


Additional findings: 








- Constitutional


Appears: No Acute Distress








- Neck Exam


Neck exam: Positive for: Full Rom


Additional comments: 





posterior cervical spine surgical site clean, no erythema





- Respiratory Exam


Additional comments: 











slight decreased breath sounds at bases but better aeration compared to before 

, no wheezing





- Cardiovascular Exam


Cardiovascular Exam: RRR, +S1, +S2


Additional comments: 





3/6 ejection murmur heard at LSB





- GI/Abdominal Exam


GI & Abdominal Exam: Normal Bowel Sounds, Soft


Additional comments: 





NT, ND





- Extremities Exam


Extremities exam: Positive for: normal inspection





- Neurological Exam


Additional comments: 





much more awake  and alert today





 Laboratory Results - last 72 hr











  04/14/18 04/15/18 04/15/18





  21:44 05:40 06:00


 


WBC   


 


RBC   


 


Hgb   


 


Hct   


 


MCV   


 


MCH   


 


MCHC   


 


RDW   


 


Plt Count   


 


MPV   


 


Neut % (Auto)   


 


Lymph % (Auto)   


 


Mono % (Auto)   


 


Eos % (Auto)   


 


Baso % (Auto)   


 


Neut # (Auto)   


 


Lymph # (Auto)   


 


Mono # (Auto)   


 


Eos # (Auto)   


 


Baso # (Auto)   


 


pCO2   


 


pO2   


 


HCO3   


 


ABG pH   


 


ABG Total CO2   


 


ABG O2 Saturation   


 


ABG O2 Content   


 


ABG Base Excess   


 


ABG Hemoglobin   


 


ABG Carboxyhemoglobin   


 


POC ABG HHb (Measured)   


 


ABG Methemoglobin   


 


ABG O2 Capacity   


 


Almas Test   


 


A-a O2 Difference   


 


Hgb O2 Saturation   


 


Vent Mode   


 


Mechanical Rate   


 


FiO2   


 


Inspiratory BiPAP   


 


Expiratory BiPAP   


 


Crit Value Called To   


 


Crit Value Called By   


 


Crit Value Read Back   


 


Blood Gas Notified Time   


 


Sodium   


 


Potassium   


 


Chloride   


 


Carbon Dioxide   


 


Anion Gap   


 


BUN   


 


Creatinine   


 


Est GFR ( Amer)   


 


Est GFR (Non-Af Amer)   


 


POC Glucose (mg/dL)  136 H  92 


 


Random Glucose   


 


Calcium   


 


Vancomycin Trough    12.9 H


 


Phenytoin   














  04/15/18 04/15/18 04/15/18





  06:00 06:00 06:06


 


WBC  4.4 L  


 


RBC  2.73 L  


 


Hgb  8.4 L  


 


Hct  25.8 L  


 


MCV  94.5  D  


 


MCH  30.7  


 


MCHC  32.4 L  


 


RDW  13.9  


 


Plt Count  152  


 


MPV  9.3  


 


Neut % (Auto)  68.9  


 


Lymph % (Auto)  16.3 L  


 


Mono % (Auto)  11.1 H  


 


Eos % (Auto)  3.1  


 


Baso % (Auto)  0.6  


 


Neut # (Auto)  3.0  


 


Lymph # (Auto)  0.7 L  


 


Mono # (Auto)  0.5  


 


Eos # (Auto)  0.1  


 


Baso # (Auto)  0.0  


 


pCO2    80 H*


 


pO2    111 H


 


HCO3    42.1 H*


 


ABG pH    7.40


 


ABG Total CO2    52.1 H


 


ABG O2 Saturation    100.7 H


 


ABG O2 Content    11.3 L


 


ABG Base Excess    22.0 H


 


ABG Hemoglobin    8.0 L


 


ABG Carboxyhemoglobin    2.2 H


 


POC ABG HHb (Measured)    -0.7 L


 


ABG Methemoglobin    0.3


 


ABG O2 Capacity    11.2 L


 


Almas Test    Yes


 


A-a O2 Difference    74.0


 


Hgb O2 Saturation    98.1 H


 


Vent Mode   


 


Mechanical Rate    18


 


FiO2    40.0


 


Inspiratory BiPAP    16


 


Expiratory BiPAP    8


 


Crit Value Called To    Latanya nguyen


 


Crit Value Called By    292


 


Crit Value Read Back    Y


 


Blood Gas Notified Time    613


 


Sodium   143 


 


Potassium   4.4 


 


Chloride   89 L 


 


Carbon Dioxide   42 H* 


 


Anion Gap   16 


 


BUN   27 H 


 


Creatinine   1.3 H 


 


Est GFR ( Amer)   47 


 


Est GFR (Non-Af Amer)   39 


 


POC Glucose (mg/dL)   


 


Random Glucose   102 


 


Calcium   8.4 


 


Vancomycin Trough   


 


Phenytoin   














  04/15/18 04/15/18 04/15/18





  11:52 17:19 21:58


 


WBC   


 


RBC   


 


Hgb   


 


Hct   


 


MCV   


 


MCH   


 


MCHC   


 


RDW   


 


Plt Count   


 


MPV   


 


Neut % (Auto)   


 


Lymph % (Auto)   


 


Mono % (Auto)   


 


Eos % (Auto)   


 


Baso % (Auto)   


 


Neut # (Auto)   


 


Lymph # (Auto)   


 


Mono # (Auto)   


 


Eos # (Auto)   


 


Baso # (Auto)   


 


pCO2   


 


pO2   


 


HCO3   


 


ABG pH   


 


ABG Total CO2   


 


ABG O2 Saturation   


 


ABG O2 Content   


 


ABG Base Excess   


 


ABG Hemoglobin   


 


ABG Carboxyhemoglobin   


 


POC ABG HHb (Measured)   


 


ABG Methemoglobin   


 


ABG O2 Capacity   


 


Almas Test   


 


A-a O2 Difference   


 


Hgb O2 Saturation   


 


Vent Mode   


 


Mechanical Rate   


 


FiO2   


 


Inspiratory BiPAP   


 


Expiratory BiPAP   


 


Crit Value Called To   


 


Crit Value Called By   


 


Crit Value Read Back   


 


Blood Gas Notified Time   


 


Sodium   


 


Potassium   


 


Chloride   


 


Carbon Dioxide   


 


Anion Gap   


 


BUN   


 


Creatinine   


 


Est GFR ( Amer)   


 


Est GFR (Non-Af Amer)   


 


POC Glucose (mg/dL)  186 H  97  102


 


Random Glucose   


 


Calcium   


 


Vancomycin Trough   


 


Phenytoin   














  04/16/18 04/16/18 04/16/18





  04:20 04:20 04:20


 


WBC   5.0 


 


RBC   2.82 L 


 


Hgb   8.6 L 


 


Hct   27.1 L 


 


MCV   96.2 


 


MCH   30.5 


 


MCHC   31.7 L 


 


RDW   14.1 


 


Plt Count   163 


 


MPV   9.1 


 


Neut % (Auto)   64.2 


 


Lymph % (Auto)   18.4 L 


 


Mono % (Auto)   13.3 H 


 


Eos % (Auto)   3.7 


 


Baso % (Auto)   0.4 


 


Neut # (Auto)   3.2 


 


Lymph # (Auto)   0.9 L 


 


Mono # (Auto)   0.7 


 


Eos # (Auto)   0.2 


 


Baso # (Auto)   0.0 


 


pCO2   


 


pO2   


 


HCO3   


 


ABG pH   


 


ABG Total CO2   


 


ABG O2 Saturation   


 


ABG O2 Content   


 


ABG Base Excess   


 


ABG Hemoglobin   


 


ABG Carboxyhemoglobin   


 


POC ABG HHb (Measured)   


 


ABG Methemoglobin   


 


ABG O2 Capacity   


 


Almas Test   


 


A-a O2 Difference   


 


Hgb O2 Saturation   


 


Vent Mode   


 


Mechanical Rate   


 


FiO2   


 


Inspiratory BiPAP   


 


Expiratory BiPAP   


 


Crit Value Called To   


 


Crit Value Called By   


 


Crit Value Read Back   


 


Blood Gas Notified Time   


 


Sodium    144


 


Potassium    4.4


 


Chloride    98


 


Carbon Dioxide    35 H


 


Anion Gap    15


 


BUN    21 H


 


Creatinine    1.4 H


 


Est GFR ( Amer)    43


 


Est GFR (Non-Af Amer)    35


 


POC Glucose (mg/dL)   


 


Random Glucose    81


 


Calcium    8.3 L


 


Vancomycin Trough   


 


Phenytoin  18.2  














  04/16/18 04/16/18 04/16/18





  05:40 06:16 11:29


 


WBC   


 


RBC   


 


Hgb   


 


Hct   


 


MCV   


 


MCH   


 


MCHC   


 


RDW   


 


Plt Count   


 


MPV   


 


Neut % (Auto)   


 


Lymph % (Auto)   


 


Mono % (Auto)   


 


Eos % (Auto)   


 


Baso % (Auto)   


 


Neut # (Auto)   


 


Lymph # (Auto)   


 


Mono # (Auto)   


 


Eos # (Auto)   


 


Baso # (Auto)   


 


pCO2  68 H  


 


pO2  61 L  


 


HCO3  36.1 H  


 


ABG pH  7.39  


 


ABG Total CO2  43.3 H  


 


ABG O2 Saturation  97.5  


 


ABG O2 Content  11.4 L  


 


ABG Base Excess  14.3 H  


 


ABG Hemoglobin  8.5 L  


 


ABG Carboxyhemoglobin  2.1 H  


 


POC ABG HHb (Measured)  2.4  


 


ABG Methemoglobin  0.6  


 


ABG O2 Capacity  11.7 L  


 


Almas Test  Yes  


 


A-a O2 Difference  104.0  


 


Hgb O2 Saturation  95.0  


 


Vent Mode  Bipap  


 


Mechanical Rate  18  


 


FiO2  35.0  


 


Inspiratory BiPAP  16  


 


Expiratory BiPAP  8  


 


Crit Value Called To   


 


Crit Value Called By   


 


Crit Value Read Back   


 


Blood Gas Notified Time   


 


Sodium   


 


Potassium   


 


Chloride   


 


Carbon Dioxide   


 


Anion Gap   


 


BUN   


 


Creatinine   


 


Est GFR ( Amer)   


 


Est GFR (Non-Af Amer)   


 


POC Glucose (mg/dL)   121 H  171 H


 


Random Glucose   


 


Calcium   


 


Vancomycin Trough   


 


Phenytoin   














  04/16/18 04/16/18 04/17/18





  15:58 22:09 04:35


 


WBC   


 


RBC   


 


Hgb   


 


Hct   


 


MCV   


 


MCH   


 


MCHC   


 


RDW   


 


Plt Count   


 


MPV   


 


Neut % (Auto)   


 


Lymph % (Auto)   


 


Mono % (Auto)   


 


Eos % (Auto)   


 


Baso % (Auto)   


 


Neut # (Auto)   


 


Lymph # (Auto)   


 


Mono # (Auto)   


 


Eos # (Auto)   


 


Baso # (Auto)   


 


pCO2    70 H


 


pO2    118 H


 


HCO3    33.1 H


 


ABG pH    7.34 L


 


ABG Total CO2    39.9 H


 


ABG O2 Saturation    100.6 H


 


ABG O2 Content    11.9 L


 


ABG Base Excess    10.4 H


 


ABG Hemoglobin    8.5 L


 


ABG Carboxyhemoglobin    2.1 H


 


POC ABG HHb (Measured)    -0.6 L


 


ABG Methemoglobin    1.2


 


ABG O2 Capacity    11.8 L


 


Almas Test    Yes


 


A-a O2 Difference    44.0


 


Hgb O2 Saturation    97.3


 


Vent Mode    Bipap


 


Mechanical Rate    18


 


FiO2    35.0


 


Inspiratory BiPAP    16


 


Expiratory BiPAP    8


 


Crit Value Called To   


 


Crit Value Called By   


 


Crit Value Read Back   


 


Blood Gas Notified Time   


 


Sodium   


 


Potassium   


 


Chloride   


 


Carbon Dioxide   


 


Anion Gap   


 


BUN   


 


Creatinine   


 


Est GFR ( Amer)   


 


Est GFR (Non-Af Amer)   


 


POC Glucose (mg/dL)  187 H  179 H 


 


Random Glucose   


 


Calcium   


 


Vancomycin Trough   


 


Phenytoin   














  04/17/18 04/17/18 04/17/18





  04:40 04:40 04:59


 


WBC  4.5 L  


 


RBC  2.93 L  


 


Hgb  8.7 L  


 


Hct  28.6 L  


 


MCV  97.6  


 


MCH  29.6  


 


MCHC  30.4 L  


 


RDW  15.0 H  


 


Plt Count  157  


 


MPV   


 


Neut % (Auto)   


 


Lymph % (Auto)   


 


Mono % (Auto)   


 


Eos % (Auto)   


 


Baso % (Auto)   


 


Neut # (Auto)   


 


Lymph # (Auto)   


 


Mono # (Auto)   


 


Eos # (Auto)   


 


Baso # (Auto)   


 


pCO2   


 


pO2   


 


HCO3   


 


ABG pH   


 


ABG Total CO2   


 


ABG O2 Saturation   


 


ABG O2 Content   


 


ABG Base Excess   


 


ABG Hemoglobin   


 


ABG Carboxyhemoglobin   


 


POC ABG HHb (Measured)   


 


ABG Methemoglobin   


 


ABG O2 Capacity   


 


Almas Test   


 


A-a O2 Difference   


 


Hgb O2 Saturation   


 


Vent Mode   


 


Mechanical Rate   


 


FiO2   


 


Inspiratory BiPAP   


 


Expiratory BiPAP   


 


Crit Value Called To   


 


Crit Value Called By   


 


Crit Value Read Back   


 


Blood Gas Notified Time   


 


Sodium   144 


 


Potassium   4.6 


 


Chloride   98 


 


Carbon Dioxide   33 H 


 


Anion Gap   18 


 


BUN   24 H 


 


Creatinine   1.6 H 


 


Est GFR ( Amer)   37 


 


Est GFR (Non-Af Amer)   30 


 


POC Glucose (mg/dL)    121 H


 


Random Glucose   146 H 


 


Calcium   8.3 L 


 


Vancomycin Trough   


 


Phenytoin   














  04/17/18 04/17/18





  11:33 16:37


 


WBC  


 


RBC  


 


Hgb  


 


Hct  


 


MCV  


 


MCH  


 


MCHC  


 


RDW  


 


Plt Count  


 


MPV  


 


Neut % (Auto)  


 


Lymph % (Auto)  


 


Mono % (Auto)  


 


Eos % (Auto)  


 


Baso % (Auto)  


 


Neut # (Auto)  


 


Lymph # (Auto)  


 


Mono # (Auto)  


 


Eos # (Auto)  


 


Baso # (Auto)  


 


pCO2  


 


pO2  


 


HCO3  


 


ABG pH  


 


ABG Total CO2  


 


ABG O2 Saturation  


 


ABG O2 Content  


 


ABG Base Excess  


 


ABG Hemoglobin  


 


ABG Carboxyhemoglobin  


 


POC ABG HHb (Measured)  


 


ABG Methemoglobin  


 


ABG O2 Capacity  


 


Almas Test  


 


A-a O2 Difference  


 


Hgb O2 Saturation  


 


Vent Mode  


 


Mechanical Rate  


 


FiO2  


 


Inspiratory BiPAP  


 


Expiratory BiPAP  


 


Crit Value Called To  


 


Crit Value Called By  


 


Crit Value Read Back  


 


Blood Gas Notified Time  


 


Sodium  


 


Potassium  


 


Chloride  


 


Carbon Dioxide  


 


Anion Gap  


 


BUN  


 


Creatinine  


 


Est GFR ( Amer)  


 


Est GFR (Non-Af Amer)  


 


POC Glucose (mg/dL)  190 H  221 H


 


Random Glucose  


 


Calcium  


 


Vancomycin Trough  


 


Phenytoin  








 Microbiology





04/11/18 22:00   Blood-Venous   Blood Culture - Final


                            NO GROWTH AFTER 5 DAYS


04/12/18 10:15   Nose   MRSA Culture (Admit) - Final


                            MRSA NOT DETECTED


04/12/18 15:10   Sputum   Gram Stain - Final


04/12/18 15:10   Sputum   Sputum Culture - Final


                            NORMAL ORAL CAITLYN





 





 











Assessment and Plan


(1) Recurrent seizures


Status: Acute   





(2) Pleural effusion


Status: Acute   





(3) Cough


Status: Acute   





- Assessment and Plan (Free Text)


Assessment: 





A/P-


88 year old female with DM II, HTN, recent cervical fracture s/p spinal fusion 

in 11/2017 and seizures admitted with recurrent seizure and cough ? aspiration 

pneumonitis.





clinically much  improved


afebrile


wbc normal value


sputum cx- neg


blood cx- neg


sputum cytology- negative as per path report





plan-


on  IV zosyn and vanco for nosocomial and asp pneumonitis. day #6


advise to d/c vancomycin at this time.


monitor aspiration precautions.


seizure management as per neurology.














ICU time 45 minutes.

## 2018-04-17 NOTE — CP.PCM.PN
Subjective





- Subjective


Subjective: 








Acute Resp Failure (on BiPaP and Nasal O2)


Aspiration Pna / Sepsis


Sz


Cervical Fx, s/p fixation (on last admission)


DMII


HTN


Dysphagia





S/ Feeling better. 





O VSS Afebrile





Head: Neg adeno pos ila


Heart: ns1s2, neg me


Lungs: Crackles on both bases. Neg wheezing. 


Abdo: s, nt pos bs


Ext No c,c,e


Neuro, Alerto to person, and place, not to time. 





Labs: See Below





Plan: 








Cont Tele monitoring. 


Cont BiPaP for nocturnal use, and supplmental o2 for o2 sat of 89, 90, 91 Max. 

Avoid Hyperoxia. 


Cont IV abx as per ID. Monitor WBC#, Temp curve, and micro studies. 


Cont Dilantin, neuro f/u, monitor levels. 


S & S assessment. Puree diet tolerated. 


Bg moniotring with SSI. 


Repeat Portable X-ray in am. 


PUD and DVT Px. 





Dr. Sandoval [phone]








Objective





- Vital Signs/Intake and Output


Vital Signs (last 24 hours): 


 











Temp Pulse Resp BP Pulse Ox


 


 98.9 F   57 L  18   122/59 L  98 


 


 04/17/18 20:53  04/17/18 20:53  04/17/18 20:53  04/17/18 20:53  04/17/18 20:53








Intake and Output: 


 











 04/17/18 04/18/18





 18:59 06:59


 


Intake Total 900 130


 


Balance 900 130














- Medications


Medications: 


 Current Medications





Acetaminophen (Tylenol 325mg Tab)  975 mg PO Q6 PRN


   PRN Reason: Fever- T- 101 or above


Acetylcysteine (Mucomyst 10% 4ml)  2 ml IH RBID [facility]


   Last Admin: 04/17/18 19:40 Dose:  2 ml


Albuterol/Ipratropium (Duoneb 3 Mg/0.5 Mg (3 Ml) Ud)  3 ml INH RQID [facility]


   Last Admin: 04/17/18 19:40 Dose:  3 ml


Amlodipine Besylate (Norvasc)  2.5 mg PO DAILY [facility]


   Last Admin: 04/17/18 09:08 Dose:  2.5 mg


Furosemide (Lasix)  20 mg PO DAILY [facility]


   Last Admin: 04/17/18 09:07 Dose:  20 mg


Glipizide (Glucotrol Xl)  2.5 mg PO DAILY [facility]


   Last Admin: 04/17/18 09:06 Dose:  2.5 mg


Piperacillin Sod/Tazobactam (Sod 2.25 gm/ Sodium Chloride)  100 mls @ 100 mls/

hr IVPB Q6 [facility]


   PRN Reason: Protocol


   Last Admin: 04/17/18 16:28 Dose:  100 mls/hr


Acetazolamide 250 mg/ Sodium (Chloride)  50 mls @ 100 mls/hr IV Q12 [facility]


   Last Admin: 04/17/18 22:39 Dose:  100 mls/hr


Montelukast Sodium (Singulair)  10 mg PO HS [facility]


   Last Admin: 04/17/18 22:40 Dose:  10 mg


Multivitamins/Minerals (Therapeutic-M Tab)  1 tab PO DAILY [facility]


   Last Admin: 04/17/18 09:09 Dose:  1 tab


Pantoprazole Sodium (Protonix Inj)  40 mg IVP DAILY [facility]


   Last Admin: 04/17/18 09:08 Dose:  40 mg


Phenytoin (Dilantin)  100 mg PO Q8 [facility]


   Last Admin: 04/17/18 16:27 Dose:  100 mg











- Labs


Labs: 


 





 04/17/18 04:40 





 04/17/18 04:40 





 











PT  10.1 Seconds (9.8-13.1)   04/11/18  19:55    


 


INR  0.9  (0.9-1.2)   04/11/18  19:55    


 


APTT  25.9 Seconds (25.6-37.1)   04/11/18  19:55

## 2018-04-17 NOTE — CP.CCUPN
CCU Subjective





- Physician Review


Subjective (Free Text): 





04/17/18 16:47


The patient was Seen/interviewed and examined by me at the bedside during ICU 

round, 


Medical records reviewed and Management issues were discussed and formulated 

with the house staff.


Events reviewed 


Clinically improving, No Vasopressors 


Tolerating Puree diet


Alert and oriented, daughter is at bed side.


Denies any chest pain, SOB, headache, lightheadedness, dizziness, nausea, or 

vomiting. 


Improved respiratory status, on BIPAP only at night and nasal cannuladuring the 

day, O2 sat 100%. 


Breathing unlabored


Afebrile, NSR on the monitor


Blood and sputum cx- neg


Pt evaluated by Physical and occupational therapies


OOB to chair.


Left arm swelling markedly improved, elevation and V Doppler was negative for 

acute DVT 





CCU Objective





- Vital Signs / Intake & Output


Vital Signs (Last 4 hours): 


Vital Signs











  Temp Pulse Resp BP Pulse Ox


 


 04/17/18 06:00   49 L  19  119/63  100


 


 04/17/18 04:09   53 L   


 


 04/17/18 04:00  98.5 F  49 L  20  127/60  100











Intake and Output (Last 8hrs): 


 Intake & Output











 04/16/18 04/17/18 04/17/18





 22:59 06:59 14:59


 


Intake Total 160 370 


 


Output Total 300  


 


Balance -140 370 


 


Intake:   


 


   150 


 


  Oral  220 


 


Output:   


 


  Urine 300  


 


    Urine, Voided 300  


 


Other:   


 


  # Bowel Movements 1  














- Physical Exam


Head: Positive for: Atraumatic, Normocephalic


Pupils: Positive for: PERRL


Extroacular Muscles: Positive for: EOMI


Conjunctiva: Positive for: Normal


Mouth: Positive for: Moist Mucous Membranes


Neck: Positive for: Normal Range of Motion, Trachea Midline.  Negative for: 

Meningeal Signs, MIDLINE TENDERNESS, Paraspinal Tenderness, JVD, Lymphadenopathy

, Bruit, Other


Respiratory/Chest: Positive for: Rales, Rhonchi.  Negative for: Respiratory 

Distress, Accessory Muscle Use


Cardiovascular: Positive for: Regular Rate and Rhythm, Normal S1, S2.  Negative 

for: Irregular Rhythm


Neurological: Positive for: GCS=15, CN II-XII Intact, Speech Normal, Motor Func 

Grossly Intact, Normal Sensory Function





- Medications


Active Medications: 


Active Medications











Generic Name Dose Route Start Last Admin





  Trade Name Freq  PRN Reason Stop Dose Admin


 


Acetaminophen  975 mg  04/17/18 02:03  





  Tylenol 325mg Tab  PO   





  Q6 PRN   





  Fever- T- 101 or above   


 


Acetylcysteine  2 ml  04/17/18 08:00  





  Mucomyst 10% 4ml  IH   





  RBID [facility]   


 


Albuterol/Ipratropium  3 ml  04/17/18 08:00  





  Duoneb 3 Mg/0.5 Mg (3 Ml) Ud  INH   





  RQID [facility]   


 


Amlodipine Besylate  2.5 mg  04/17/18 09:00  





  Norvasc  PO   





  DAILY [facility]   


 


Furosemide  20 mg  04/17/18 09:00  





  Lasix  PO   





  DAILY [facility]   


 


Glipizide  2.5 mg  04/17/18 09:00  





  Glucotrol Xl  PO   





  DAILY [facility]   


 


Piperacillin Sod/Tazobactam  100 mls @ 100 mls/hr  04/17/18 04:00  04/17/18 04:

00





  Sod 2.25 gm/ Sodium Chloride  IVPB   100 mls/hr





  Q6 [facility]   Administration





  Protocol   


 


Acetazolamide 250 mg/ Sodium  50 mls @ 100 mls/hr  04/17/18 09:00  





  Chloride  IV   





  Q12 [facility]   


 


Vancomycin HCl 750 mg/ Sodium  250 mls @ 166.667 mls/hr  04/17/18 09:00  





  Chloride  IVPB   





  DAILY [facility]   





  Protocol   


 


Montelukast Sodium  10 mg  04/17/18 22:00  





  Singulair  PO   





  HS [facility]   


 


Multivitamins/Minerals  1 tab  04/17/18 09:00  





  Therapeutic-M Tab  PO   





  DAILY [facility]   


 


Pantoprazole Sodium  40 mg  04/17/18 09:00  





  Protonix Inj  IVP   





  DAILY [facility]   


 


Phenytoin  100 mg  04/17/18 09:00  





  Dilantin  PO   





  Q8 [facility]   














- Patient Studies


Lab Studies: 


 Microbiology Studies











 04/11/18 22:00 Blood Culture - Final





 Blood-Venous    NO GROWTH AFTER 5 DAYS








 Lab Studies











  04/17/18 04/17/18 04/17/18 Range/Units





  04:59 04:40 04:40 


 


WBC    4.5 L  (4.8-10.8)  K/uL


 


RBC    2.93 L  (3.80-5.20)  Mil/uL


 


Hgb    8.7 L  (12.0-16.0)  g/dL


 


Hct    28.6 L  (34.0-47.0)  %


 


MCV    97.6  (81.0-99.0)  fl


 


MCH    29.6  (27.0-31.0)  pg


 


MCHC    30.4 L  (33.0-37.0)  g/dL


 


RDW    15.0 H  (11.5-14.5)  %


 


Plt Count    157  (130-400)  K/uL


 


pCO2     (35-45)  mm/Hg


 


pO2     ()  mm/Hg


 


HCO3     (21-28)  mmol/L


 


ABG pH     (7.35-7.45)  


 


ABG Total CO2     (22-28)  mmol/L


 


ABG O2 Saturation     (95-98)  %


 


ABG O2 Content     (15-23)  ML/dL


 


ABG Base Excess     (-2.0-3.0)  mmol/L


 


ABG Hemoglobin     (11.7-17.4)  g/dL


 


ABG Carboxyhemoglobin     (0.5-1.5)  %


 


POC ABG HHb (Measured)     (0.0-5.0)  %


 


ABG Methemoglobin     (0.0-3.0)  %


 


ABG O2 Capacity     (16-24)  mL/dL


 


Almas Test     


 


A-a O2 Difference     mm/Hg


 


Hgb O2 Saturation     (95.0-98.0)  %


 


Vent Mode     


 


Mechanical Rate     


 


FiO2     %


 


Inspiratory BiPAP     


 


Expiratory BiPAP     


 


Sodium   144   (132-148)  mmol/l


 


Potassium   4.6   (3.6-5.0)  MMOL/L


 


Chloride   98   ()  mmol/L


 


Carbon Dioxide   33 H   (22-30)  mmol/L


 


Anion Gap   18   (10-20)  


 


BUN   24 H   (7-17)  mg/dl


 


Creatinine   1.6 H   (0.7-1.2)  mg/dl


 


Est GFR (African Amer)   37   


 


Est GFR (Non-Af Amer)   30   


 


POC Glucose (mg/dL)  121 H    ()  mg/dL


 


Random Glucose   146 H   ()  mg/dL


 


Calcium   8.3 L   (8.4-10.2)  mg/dL














  04/17/18 04/16/18 04/16/18 Range/Units





  04:35 22:09 15:58 


 


WBC     (4.8-10.8)  K/uL


 


RBC     (3.80-5.20)  Mil/uL


 


Hgb     (12.0-16.0)  g/dL


 


Hct     (34.0-47.0)  %


 


MCV     (81.0-99.0)  fl


 


MCH     (27.0-31.0)  pg


 


MCHC     (33.0-37.0)  g/dL


 


RDW     (11.5-14.5)  %


 


Plt Count     (130-400)  K/uL


 


pCO2  70 H    (35-45)  mm/Hg


 


pO2  118 H    ()  mm/Hg


 


HCO3  33.1 H    (21-28)  mmol/L


 


ABG pH  7.34 L    (7.35-7.45)  


 


ABG Total CO2  39.9 H    (22-28)  mmol/L


 


ABG O2 Saturation  100.6 H    (95-98)  %


 


ABG O2 Content  11.9 L    (15-23)  ML/dL


 


ABG Base Excess  10.4 H    (-2.0-3.0)  mmol/L


 


ABG Hemoglobin  8.5 L    (11.7-17.4)  g/dL


 


ABG Carboxyhemoglobin  2.1 H    (0.5-1.5)  %


 


POC ABG HHb (Measured)  -0.6 L    (0.0-5.0)  %


 


ABG Methemoglobin  1.2    (0.0-3.0)  %


 


ABG O2 Capacity  11.8 L    (16-24)  mL/dL


 


Almas Test  Yes    


 


A-a O2 Difference  44.0    mm/Hg


 


Hgb O2 Saturation  97.3    (95.0-98.0)  %


 


Vent Mode  Bipap    


 


Mechanical Rate  18    


 


FiO2  35.0    %


 


Inspiratory BiPAP  16    


 


Expiratory BiPAP  8    


 


Sodium     (132-148)  mmol/l


 


Potassium     (3.6-5.0)  MMOL/L


 


Chloride     ()  mmol/L


 


Carbon Dioxide     (22-30)  mmol/L


 


Anion Gap     (10-20)  


 


BUN     (7-17)  mg/dl


 


Creatinine     (0.7-1.2)  mg/dl


 


Est GFR (African Amer)     


 


Est GFR (Non-Af Amer)     


 


POC Glucose (mg/dL)   179 H  187 H  ()  mg/dL


 


Random Glucose     ()  mg/dL


 


Calcium     (8.4-10.2)  mg/dL














  04/16/18 Range/Units





  11:29 


 


WBC   (4.8-10.8)  K/uL


 


RBC   (3.80-5.20)  Mil/uL


 


Hgb   (12.0-16.0)  g/dL


 


Hct   (34.0-47.0)  %


 


MCV   (81.0-99.0)  fl


 


MCH   (27.0-31.0)  pg


 


MCHC   (33.0-37.0)  g/dL


 


RDW   (11.5-14.5)  %


 


Plt Count   (130-400)  K/uL


 


pCO2   (35-45)  mm/Hg


 


pO2   ()  mm/Hg


 


HCO3   (21-28)  mmol/L


 


ABG pH   (7.35-7.45)  


 


ABG Total CO2   (22-28)  mmol/L


 


ABG O2 Saturation   (95-98)  %


 


ABG O2 Content   (15-23)  ML/dL


 


ABG Base Excess   (-2.0-3.0)  mmol/L


 


ABG Hemoglobin   (11.7-17.4)  g/dL


 


ABG Carboxyhemoglobin   (0.5-1.5)  %


 


POC ABG HHb (Measured)   (0.0-5.0)  %


 


ABG Methemoglobin   (0.0-3.0)  %


 


ABG O2 Capacity   (16-24)  mL/dL


 


Almas Test   


 


A-a O2 Difference   mm/Hg


 


Hgb O2 Saturation   (95.0-98.0)  %


 


Vent Mode   


 


Mechanical Rate   


 


FiO2   %


 


Inspiratory BiPAP   


 


Expiratory BiPAP   


 


Sodium   (132-148)  mmol/l


 


Potassium   (3.6-5.0)  MMOL/L


 


Chloride   ()  mmol/L


 


Carbon Dioxide   (22-30)  mmol/L


 


Anion Gap   (10-20)  


 


BUN   (7-17)  mg/dl


 


Creatinine   (0.7-1.2)  mg/dl


 


Est GFR (African Amer)   


 


Est GFR (Non-Af Amer)   


 


POC Glucose (mg/dL)  171 H  ()  mg/dL


 


Random Glucose   ()  mg/dL


 


Calcium   (8.4-10.2)  mg/dL








 Laboratory Results - last 24 hr











  04/16/18 04/16/18 04/16/18





  11:29 15:58 22:09


 


WBC   


 


RBC   


 


Hgb   


 


Hct   


 


MCV   


 


MCH   


 


MCHC   


 


RDW   


 


Plt Count   


 


pCO2   


 


pO2   


 


HCO3   


 


ABG pH   


 


ABG Total CO2   


 


ABG O2 Saturation   


 


ABG O2 Content   


 


ABG Base Excess   


 


ABG Hemoglobin   


 


ABG Carboxyhemoglobin   


 


POC ABG HHb (Measured)   


 


ABG Methemoglobin   


 


ABG O2 Capacity   


 


Almas Test   


 


A-a O2 Difference   


 


Hgb O2 Saturation   


 


Vent Mode   


 


Mechanical Rate   


 


FiO2   


 


Inspiratory BiPAP   


 


Expiratory BiPAP   


 


Sodium   


 


Potassium   


 


Chloride   


 


Carbon Dioxide   


 


Anion Gap   


 


BUN   


 


Creatinine   


 


Est GFR ( Amer)   


 


Est GFR (Non-Af Amer)   


 


POC Glucose (mg/dL)  171 H  187 H  179 H


 


Random Glucose   


 


Calcium   














  04/17/18 04/17/18 04/17/18





  04:35 04:40 04:40


 


WBC   4.5 L 


 


RBC   2.93 L 


 


Hgb   8.7 L 


 


Hct   28.6 L 


 


MCV   97.6 


 


MCH   29.6 


 


MCHC   30.4 L 


 


RDW   15.0 H 


 


Plt Count   157 


 


pCO2  70 H  


 


pO2  118 H  


 


HCO3  33.1 H  


 


ABG pH  7.34 L  


 


ABG Total CO2  39.9 H  


 


ABG O2 Saturation  100.6 H  


 


ABG O2 Content  11.9 L  


 


ABG Base Excess  10.4 H  


 


ABG Hemoglobin  8.5 L  


 


ABG Carboxyhemoglobin  2.1 H  


 


POC ABG HHb (Measured)  -0.6 L  


 


ABG Methemoglobin  1.2  


 


ABG O2 Capacity  11.8 L  


 


Almas Test  Yes  


 


A-a O2 Difference  44.0  


 


Hgb O2 Saturation  97.3  


 


Vent Mode  Bipap  


 


Mechanical Rate  18  


 


FiO2  35.0  


 


Inspiratory BiPAP  16  


 


Expiratory BiPAP  8  


 


Sodium    144


 


Potassium    4.6


 


Chloride    98


 


Carbon Dioxide    33 H


 


Anion Gap    18


 


BUN    24 H


 


Creatinine    1.6 H


 


Est GFR ( Amer)    37


 


Est GFR (Non-Af Amer)    30


 


POC Glucose (mg/dL)   


 


Random Glucose    146 H


 


Calcium    8.3 L














  04/17/18





  04:59


 


WBC 


 


RBC 


 


Hgb 


 


Hct 


 


MCV 


 


MCH 


 


MCHC 


 


RDW 


 


Plt Count 


 


pCO2 


 


pO2 


 


HCO3 


 


ABG pH 


 


ABG Total CO2 


 


ABG O2 Saturation 


 


ABG O2 Content 


 


ABG Base Excess 


 


ABG Hemoglobin 


 


ABG Carboxyhemoglobin 


 


POC ABG HHb (Measured) 


 


ABG Methemoglobin 


 


ABG O2 Capacity 


 


Almas Test 


 


A-a O2 Difference 


 


Hgb O2 Saturation 


 


Vent Mode 


 


Mechanical Rate 


 


FiO2 


 


Inspiratory BiPAP 


 


Expiratory BiPAP 


 


Sodium 


 


Potassium 


 


Chloride 


 


Carbon Dioxide 


 


Anion Gap 


 


BUN 


 


Creatinine 


 


Est GFR ( Amer) 


 


Est GFR (Non-Af Amer) 


 


POC Glucose (mg/dL)  121 H


 


Random Glucose 


 


Calcium 











Fingerstick Blood Sugar Results: 121





Critical Care Progress Note





- Nutrition


Nutrition: 


 Nutrition











 Category Date Time Status


 


 Consistent Carbohydrate [DIET] Diets  04/15/18 Dinner Active














Assessment/Plan


(1) Acute respiratory failure with hypercapnia


Current Visit: No   Status: Acute   Comment: 


Failed Extubation yesterday


Continue Solu-Medrol 40 mg IVP Q8 


Gentle diuresis


C/W Albuterol/Ipratropium inhaler Q 4H  


Maintain aspiration precautions


Anrtibiotics   





(2) Aspiration pneumonia


Current Visit: Yes   Status: Acute   





(3) Recurrent seizures


Current Visit: Yes   Status: Acute   Priority: High   





(4) Pleural effusion


Current Visit: Yes   Status: Acute

## 2018-04-17 NOTE — CARD
--------------- APPROVED REPORT --------------





EKG Measurement

Heart [id number]

NH [id number]

[id number]

[id number]

[id number]



<Conclusion>

Sinus bradycardia

Minimal voltage criteria for LVH, may be normal variant

Septal infarct, age undetermined

Abnormal ECG

## 2018-04-18 LAB
BUN SERPL-MCNC: 23 MG/DL (ref 7–17)
CALCIUM SERPL-MCNC: 8.4 MG/DL (ref 8.4–10.2)
ERYTHROCYTE [DISTWIDTH] IN BLOOD BY AUTOMATED COUNT: 15 % (ref 11.5–14.5)
GFR NON-AFRICAN AMERICAN: 30
HGB BLD-MCNC: 8.6 G/DL (ref 12–16)
MCH RBC QN AUTO: 30.9 PG (ref 27–31)
MCHC RBC AUTO-ENTMCNC: 31.8 G/DL (ref 33–37)
MCV RBC AUTO: 97.2 FL (ref 81–99)
PLATELET # BLD: 163 K/UL (ref 130–400)
RBC # BLD AUTO: 2.78 MIL/UL (ref 3.8–5.2)
WBC # BLD AUTO: 6.3 K/UL (ref 4.8–10.8)

## 2018-04-18 RX ADMIN — Medication SCH TAB: at 09:21

## 2018-04-18 RX ADMIN — SODIUM CHLORIDE SCH MLS/HR: 900 INJECTION, SOLUTION INTRAVENOUS at 20:40

## 2018-04-18 RX ADMIN — IPRATROPIUM BROMIDE AND ALBUTEROL SULFATE SCH ML: .5; 3 SOLUTION RESPIRATORY (INHALATION) at 11:03

## 2018-04-18 RX ADMIN — IPRATROPIUM BROMIDE AND ALBUTEROL SULFATE SCH ML: .5; 3 SOLUTION RESPIRATORY (INHALATION) at 16:33

## 2018-04-18 RX ADMIN — IPRATROPIUM BROMIDE AND ALBUTEROL SULFATE SCH ML: .5; 3 SOLUTION RESPIRATORY (INHALATION) at 20:52

## 2018-04-18 RX ADMIN — ACETYLCYSTEINE SCH ML: 100 SOLUTION ORAL; RESPIRATORY (INHALATION) at 07:12

## 2018-04-18 RX ADMIN — GLIPIZIDE SCH MG: 2.5 TABLET, EXTENDED RELEASE ORAL at 09:18

## 2018-04-18 RX ADMIN — SODIUM CHLORIDE SCH MLS/HR: 900 INJECTION, SOLUTION INTRAVENOUS at 09:15

## 2018-04-18 RX ADMIN — ACETYLCYSTEINE SCH ML: 100 SOLUTION ORAL; RESPIRATORY (INHALATION) at 20:52

## 2018-04-18 RX ADMIN — IPRATROPIUM BROMIDE AND ALBUTEROL SULFATE SCH ML: .5; 3 SOLUTION RESPIRATORY (INHALATION) at 07:13

## 2018-04-18 NOTE — CP.PCM.PN
Subjective





- Subjective


Subjective: 








Acute Resp Failure (on BiPaP and Nasal O2)


Aspiration Pna / Sepsis


Sz


Cervical Fx, s/p fixation (on last admission)


DMII


HTN


Dysphagia





S/ Feeling better. 





O VSS Afebrile





Head: Neg adeno pos ila


Heart: ns1s2, neg me


Lungs: Crackles on both bases. Neg wheezing. 


Abdo: s, nt pos bs


Ext No c,c,e


Neuro, Alerto to person, and place, not to time. 





Labs: See Below





Plan: 








Cont Tele monitoring. 


Cont BiPaP for nocturnal use, and supplmental o2 for o2 sat of 89, 90, 91 Max. 

Avoid Hyperoxia. 


Cont IV abx as per ID. Monitor WBC#, Temp curve, and micro studies. 


Cont Dilantin, neuro f/u, monitor levels. 


Regular diet being tolerated. 


PT/OT


Bg moniotring with SSI. 


Repeat Portable X-ray in am. 


PUD and DVT Px. 


Consider transferring to TCU





Dr. Sandoval [phone]








Objective





- Vital Signs/Intake and Output


Vital Signs (last 24 hours): 


 











Temp Pulse Resp BP Pulse Ox


 


 98.3 F   62   20   139/73   97 


 


 04/18/18 19:31  04/18/18 21:39  04/18/18 19:31  04/18/18 19:31  04/18/18 19:31











- Medications


Medications: 


 Current Medications





Acetaminophen (Tylenol 325mg Tab)  975 mg PO Q6 PRN


   PRN Reason: Fever- T- 101 or above


Acetylcysteine (Mucomyst 10% 4ml)  2 ml IH RBID [facility]


   Last Admin: 04/18/18 20:52 Dose:  2 ml


Albuterol/Ipratropium (Duoneb 3 Mg/0.5 Mg (3 Ml) Ud)  3 ml INH RQID [facility]


   Last Admin: 04/18/18 20:52 Dose:  3 ml


Amlodipine Besylate (Norvasc)  2.5 mg PO DAILY [facility]


   Last Admin: 04/18/18 09:20 Dose:  2.5 mg


Furosemide (Lasix)  20 mg PO DAILY [facility]


   Last Admin: 04/18/18 09:18 Dose:  20 mg


Glipizide (Glucotrol Xl)  2.5 mg PO DAILY [facility]


   Last Admin: 04/18/18 09:18 Dose:  2.5 mg


Piperacillin Sod/Tazobactam (Sod 2.25 gm/ Sodium Chloride)  100 mls @ 100 mls/

hr IVPB Q6 [facility]


   PRN Reason: Protocol


   Last Admin: 04/18/18 22:05 Dose:  100 mls/hr


Acetazolamide 250 mg/ Sodium (Chloride)  50 mls @ 100 mls/hr IV Q12 [facility]


   Last Admin: 04/18/18 20:40 Dose:  100 mls/hr


Montelukast Sodium (Singulair)  10 mg PO HS [facility]


   Last Admin: 04/18/18 21:14 Dose:  10 mg


Multivitamins/Minerals (Therapeutic-M Tab)  1 tab PO DAILY [facility]


   Last Admin: 04/18/18 09:21 Dose:  1 tab


Pantoprazole Sodium (Protonix Inj)  40 mg IVP DAILY [facility]


   Last Admin: 04/18/18 09:20 Dose:  40 mg


Phenytoin (Dilantin)  100 mg PO Q8 [facility]


   Last Admin: 04/18/18 17:35 Dose:  100 mg











- Labs


Labs: 


 





 04/18/18 09:02 





 04/18/18 09:02 





 











PT  10.1 Seconds (9.8-13.1)   04/11/18  19:55    


 


INR  0.9  (0.9-1.2)   04/11/18  19:55    


 


APTT  25.9 Seconds (25.6-37.1)   04/11/18  19:55

## 2018-04-18 NOTE — RAD
HISTORY:

Hypercarbia  



COMPARISON:

04/14/2018. 



FINDINGS:



LUNGS:

There is airspace disease in both lower lobes.



PLEURA:

Bilateral pleural effusions, no pneumothorax apparent.



CARDIOVASCULAR:

Normal.



OSSEOUS STRUCTURES:

No significant abnormalities.



VISUALIZED UPPER ABDOMEN:

Normal.



OTHER FINDINGS:

None.



IMPRESSION:

Bilateral lower lobe airspace disease may represent atelectasis or 

pneumonia.  Small pleural effusions. Follow-up is advised.

## 2018-04-18 NOTE — CP.PCM.PN
Subjective





- Date & Time of Evaluation


Date of Evaluation: 04/18/18


Time of Evaluation: 09:22





- Subjective


Subjective: 





Ms. Herndon was seen and examined at the bedside. She is alert, oriented in all 

spheres. She denies any headache, dizziness, lightheadedness, nausea, or 

vomiting. She is able to move all extremities. Per daughter, her PO intake is 

back to normal. Educated the importance of hydration and seizure to bothe 

patient and daughter, verbalizes understanding.The redness of the left upper 

extremity is decreased in comparison from previous examination. She is able to 

move the affected arm and being elevated with a pillow.Ultrasound of bilateral 

upper extremities did not show any DVT.There was no untoward events overnight.





Objective





- Vital Signs/Intake and Output


Vital Signs (last 24 hours): 


 











Temp Pulse Resp BP Pulse Ox


 


 98.7 F   59 L  20   153/63 H  99 


 


 04/18/18 07:46  04/18/18 07:46  04/18/18 07:46  04/18/18 09:18  04/18/18 07:46








Intake and Output: 


 











 04/18/18 04/18/18





 06:59 18:59


 


Intake Total 130 


 


Balance 130 














- Medications


Medications: 


 Current Medications





Acetaminophen (Tylenol 325mg Tab)  975 mg PO Q6 PRN


   PRN Reason: Fever- T- 101 or above


Acetylcysteine (Mucomyst 10% 4ml)  2 ml IH RBID [facility]


   Last Admin: 04/18/18 07:12 Dose:  2 ml


Albuterol/Ipratropium (Duoneb 3 Mg/0.5 Mg (3 Ml) Ud)  3 ml INH RQID [facility]


   Last Admin: 04/18/18 07:13 Dose:  3 ml


Amlodipine Besylate (Norvasc)  2.5 mg PO DAILY [facility]


   Last Admin: 04/18/18 09:20 Dose:  2.5 mg


Furosemide (Lasix)  20 mg PO DAILY [facility]


   Last Admin: 04/18/18 09:18 Dose:  20 mg


Glipizide (Glucotrol Xl)  2.5 mg PO DAILY [facility]


   Last Admin: 04/18/18 09:18 Dose:  2.5 mg


Piperacillin Sod/Tazobactam (Sod 2.25 gm/ Sodium Chloride)  100 mls @ 100 mls/

hr IVPB Q6 [facility]


   PRN Reason: Protocol


   Last Admin: 04/18/18 09:16 Dose:  100 mls/hr


Acetazolamide 250 mg/ Sodium (Chloride)  50 mls @ 100 mls/hr IV Q12 [facility]


   Last Admin: 04/18/18 09:15 Dose:  100 mls/hr


Montelukast Sodium (Singulair)  10 mg PO HS [facility]


   Last Admin: 04/17/18 22:40 Dose:  10 mg


Multivitamins/Minerals (Therapeutic-M Tab)  1 tab PO DAILY [facility]


   Last Admin: 04/18/18 09:21 Dose:  1 tab


Pantoprazole Sodium (Protonix Inj)  40 mg IVP DAILY [facility]


   Last Admin: 04/18/18 09:20 Dose:  40 mg


Phenytoin (Dilantin)  100 mg PO Q8 [facility]


   Last Admin: 04/18/18 09:17 Dose:  100 mg











- Labs


Labs: 


 





 04/17/18 04:40 





 04/17/18 04:40 





 











PT  10.1 Seconds (9.8-13.1)   04/11/18  19:55    


 


INR  0.9  (0.9-1.2)   04/11/18  19:55    


 


APTT  25.9 Seconds (25.6-37.1)   04/11/18  19:55    














- Constitutional


Appears: No Acute Distress





- Head Exam


Head Exam: NORMAL INSPECTION





- Neurological Exam


Neurological Exam: Alert, Awake, Oriented x3


Neuro motor strength exam: Left Upper Extremity: 5, Right Upper Extremity: 5, 

Left Lower Extremity: 3, Right Lower Extremity: 3


Additional comments: 





Neurological unchanged from previous examination.





Assessment and Plan


(1) Recurrent seizures


Assessment & Plan: 


Case discussed with Dr. Fields, continue all current medical including AED, 

physical and occupational therapies. Recommend to follow up with Dr. Huang (

patient private neurologist) as an outpatient upon discharge. Recommend to 

repeat dilantin level prior to discharge.


Status: Acute

## 2018-04-19 VITALS
HEART RATE: 66 BPM | TEMPERATURE: 97.8 F | OXYGEN SATURATION: 100 % | SYSTOLIC BLOOD PRESSURE: 161 MMHG | DIASTOLIC BLOOD PRESSURE: 82 MMHG | RESPIRATION RATE: 20 BRPM

## 2018-04-19 LAB
BUN SERPL-MCNC: 26 MG/DL (ref 7–17)
CALCIUM SERPL-MCNC: 8.6 MG/DL (ref 8.4–10.2)
ERYTHROCYTE [DISTWIDTH] IN BLOOD BY AUTOMATED COUNT: 15.2 % (ref 11.5–14.5)
GFR NON-AFRICAN AMERICAN: 33
HGB BLD-MCNC: 8.5 G/DL (ref 12–16)
MCH RBC QN AUTO: 30.3 PG (ref 27–31)
MCHC RBC AUTO-ENTMCNC: 30.7 G/DL (ref 33–37)
MCV RBC AUTO: 98.8 FL (ref 81–99)
PLATELET # BLD: 162 K/UL (ref 130–400)
RBC # BLD AUTO: 2.79 MIL/UL (ref 3.8–5.2)
WBC # BLD AUTO: 6.6 K/UL (ref 4.8–10.8)

## 2018-04-19 RX ADMIN — IPRATROPIUM BROMIDE AND ALBUTEROL SULFATE SCH ML: .5; 3 SOLUTION RESPIRATORY (INHALATION) at 07:28

## 2018-04-19 RX ADMIN — SODIUM CHLORIDE SCH MLS/HR: 900 INJECTION, SOLUTION INTRAVENOUS at 08:20

## 2018-04-19 RX ADMIN — IPRATROPIUM BROMIDE AND ALBUTEROL SULFATE SCH ML: .5; 3 SOLUTION RESPIRATORY (INHALATION) at 16:36

## 2018-04-19 RX ADMIN — IPRATROPIUM BROMIDE AND ALBUTEROL SULFATE SCH ML: .5; 3 SOLUTION RESPIRATORY (INHALATION) at 11:09

## 2018-04-19 RX ADMIN — ACETYLCYSTEINE SCH ML: 100 SOLUTION ORAL; RESPIRATORY (INHALATION) at 07:28

## 2018-04-19 RX ADMIN — Medication SCH TAB: at 08:25

## 2018-04-19 RX ADMIN — GLIPIZIDE SCH MG: 2.5 TABLET, EXTENDED RELEASE ORAL at 08:24

## 2018-04-19 NOTE — CP.PCM.PN
Subjective





- Date & Time of Evaluation


Date of Evaluation: 04/19/18


Time of Evaluation: 13:12





- Subjective


Subjective: 





ID Note-





pt. seen and examined today in tele unit.


patient sitting up in chair and looks very comfortable and in good spirits and 

denies any complaints.








Objective





- Vital Signs/Intake and Output


Vital Signs (last 24 hours): 


 











Temp Pulse Resp BP Pulse Ox


 


 98.2 F   78   18   135/62   97 


 


 04/19/18 11:54  04/19/18 12:04  04/19/18 11:54  04/19/18 11:54  04/19/18 11:54











- Medications


Medications: 


 Current Medications





Acetaminophen (Tylenol 325mg Tab)  975 mg PO Q6 PRN


   PRN Reason: Fever- T- 101 or above


Acetylcysteine (Mucomyst 10% 4ml)  2 ml IH RBID [facility]


   Last Admin: 04/19/18 07:28 Dose:  2 ml


Albuterol/Ipratropium (Duoneb 3 Mg/0.5 Mg (3 Ml) Ud)  3 ml INH RQID [facility]


   Last Admin: 04/19/18 11:09 Dose:  3 ml


Amlodipine Besylate (Norvasc)  2.5 mg PO DAILY [facility]


   Last Admin: 04/19/18 08:25 Dose:  2.5 mg


Furosemide (Lasix)  20 mg PO DAILY [facility]


   Last Admin: 04/19/18 08:24 Dose:  20 mg


Glipizide (Glucotrol Xl)  2.5 mg PO DAILY [facility]


   Last Admin: 04/19/18 08:24 Dose:  2.5 mg


Piperacillin Sod/Tazobactam (Sod 2.25 gm/ Sodium Chloride)  100 mls @ 100 mls/

hr IVPB Q6 [facility]


   PRN Reason: Protocol


   Last Admin: 04/19/18 11:06 Dose:  100 mls/hr


Acetazolamide 250 mg/ Sodium (Chloride)  50 mls @ 100 mls/hr IV Q12 [facility]


   Last Admin: 04/19/18 08:20 Dose:  100 mls/hr


Montelukast Sodium (Singulair)  10 mg PO HS [facility]


   Last Admin: 04/18/18 21:14 Dose:  10 mg


Multivitamins/Minerals (Therapeutic-M Tab)  1 tab PO DAILY [facility]


   Last Admin: 04/19/18 08:25 Dose:  1 tab


Pantoprazole Sodium (Protonix Inj)  40 mg IVP DAILY [facility]


   Last Admin: 04/18/18 09:20 Dose:  40 mg


Phenytoin (Dilantin)  100 mg PO Q8 [facility]


   Last Admin: 04/19/18 08:23 Dose:  100 mg











- Labs


Labs: 


 





 





- Additional Findings


Additional findings: 








- Constitutional


Appears: No Acute Distress








- Neck Exam


Neck exam: Positive for: Full Rom


Additional comments: 





posterior cervical spine surgical site clean, no erythema





- Respiratory Exam


Additional comments: 





slight decreased breath sounds at bases but much better aeration compared to 

before , no wheezing





- Cardiovascular Exam


Cardiovascular Exam: RRR, +S1, +S2


Additional comments: 





3/6 ejection murmur heard at LSB





- GI/Abdominal Exam


GI & Abdominal Exam: Normal Bowel Sounds, Soft


Additional comments: 





NT, ND





- Extremities Exam


Extremities exam: Positive for: normal inspection





- Neurological Exam


Additional comments: 





much more awake  and alert today





 Laboratory Results - last 72 hr











  04/16/18 04/16/18 04/17/18





  15:58 22:09 04:35


 


WBC   


 


RBC   


 


Hgb   


 


Hct   


 


MCV   


 


MCH   


 


MCHC   


 


RDW   


 


Plt Count   


 


pCO2    70 H


 


pO2    118 H


 


HCO3    33.1 H


 


ABG pH    7.34 L


 


ABG Total CO2    39.9 H


 


ABG O2 Saturation    100.6 H


 


ABG O2 Content    11.9 L


 


ABG Base Excess    10.4 H


 


ABG Hemoglobin    8.5 L


 


ABG Carboxyhemoglobin    2.1 H


 


POC ABG HHb (Measured)    -0.6 L


 


ABG Methemoglobin    1.2


 


ABG O2 Capacity    11.8 L


 


Almas Test    Yes


 


A-a O2 Difference    44.0


 


Hgb O2 Saturation    97.3


 


Vent Mode    Bipap


 


Mechanical Rate    18


 


FiO2    35.0


 


Inspiratory BiPAP    16


 


Expiratory BiPAP    8


 


Sodium   


 


Potassium   


 


Chloride   


 


Carbon Dioxide   


 


Anion Gap   


 


BUN   


 


Creatinine   


 


Est GFR ( Amer)   


 


Est GFR (Non-Af Amer)   


 


POC Glucose (mg/dL)  187 H  179 H 


 


Random Glucose   


 


Calcium   














  04/17/18 04/17/18 04/17/18





  04:40 04:40 04:59


 


WBC  4.5 L  


 


RBC  2.93 L  


 


Hgb  8.7 L  


 


Hct  28.6 L  


 


MCV  97.6  


 


MCH  29.6  


 


MCHC  30.4 L  


 


RDW  15.0 H  


 


Plt Count  157  


 


pCO2   


 


pO2   


 


HCO3   


 


ABG pH   


 


ABG Total CO2   


 


ABG O2 Saturation   


 


ABG O2 Content   


 


ABG Base Excess   


 


ABG Hemoglobin   


 


ABG Carboxyhemoglobin   


 


POC ABG HHb (Measured)   


 


ABG Methemoglobin   


 


ABG O2 Capacity   


 


Almas Test   


 


A-a O2 Difference   


 


Hgb O2 Saturation   


 


Vent Mode   


 


Mechanical Rate   


 


FiO2   


 


Inspiratory BiPAP   


 


Expiratory BiPAP   


 


Sodium   144 


 


Potassium   4.6 


 


Chloride   98 


 


Carbon Dioxide   33 H 


 


Anion Gap   18 


 


BUN   24 H 


 


Creatinine   1.6 H 


 


Est GFR ( Amer)   37 


 


Est GFR (Non-Af Amer)   30 


 


POC Glucose (mg/dL)    121 H


 


Random Glucose   146 H 


 


Calcium   8.3 L 














  04/17/18 04/17/18 04/17/18





  11:33 16:37 21:13


 


WBC   


 


RBC   


 


Hgb   


 


Hct   


 


MCV   


 


MCH   


 


MCHC   


 


RDW   


 


Plt Count   


 


pCO2   


 


pO2   


 


HCO3   


 


ABG pH   


 


ABG Total CO2   


 


ABG O2 Saturation   


 


ABG O2 Content   


 


ABG Base Excess   


 


ABG Hemoglobin   


 


ABG Carboxyhemoglobin   


 


POC ABG HHb (Measured)   


 


ABG Methemoglobin   


 


ABG O2 Capacity   


 


Almas Test   


 


A-a O2 Difference   


 


Hgb O2 Saturation   


 


Vent Mode   


 


Mechanical Rate   


 


FiO2   


 


Inspiratory BiPAP   


 


Expiratory BiPAP   


 


Sodium   


 


Potassium   


 


Chloride   


 


Carbon Dioxide   


 


Anion Gap   


 


BUN   


 


Creatinine   


 


Est GFR ( Amer)   


 


Est GFR (Non-Af Amer)   


 


POC Glucose (mg/dL)  190 H  221 H  117 H


 


Random Glucose   


 


Calcium   














  04/18/18 04/18/18 04/18/18





  05:19 09:02 09:02


 


WBC   6.3 


 


RBC   2.78 L 


 


Hgb   8.6 L 


 


Hct   27.0 L 


 


MCV   97.2 


 


MCH   30.9 


 


MCHC   31.8 L 


 


RDW   15.0 H 


 


Plt Count   163 


 


pCO2   


 


pO2   


 


HCO3   


 


ABG pH   


 


ABG Total CO2   


 


ABG O2 Saturation   


 


ABG O2 Content   


 


ABG Base Excess   


 


ABG Hemoglobin   


 


ABG Carboxyhemoglobin   


 


POC ABG HHb (Measured)   


 


ABG Methemoglobin   


 


ABG O2 Capacity   


 


Almas Test   


 


A-a O2 Difference   


 


Hgb O2 Saturation   


 


Vent Mode   


 


Mechanical Rate   


 


FiO2   


 


Inspiratory BiPAP   


 


Expiratory BiPAP   


 


Sodium    143


 


Potassium    4.7


 


Chloride    103


 


Carbon Dioxide    31 H


 


Anion Gap    14


 


BUN    23 H


 


Creatinine    1.6 H


 


Est GFR ( Amer)    37


 


Est GFR (Non-Af Amer)    30


 


POC Glucose (mg/dL)  80  


 


Random Glucose    86


 


Calcium    8.4














  04/18/18 04/18/18 04/19/18





  10:51 21:44 05:09


 


WBC   


 


RBC   


 


Hgb   


 


Hct   


 


MCV   


 


MCH   


 


MCHC   


 


RDW   


 


Plt Count   


 


pCO2   


 


pO2   


 


HCO3   


 


ABG pH   


 


ABG Total CO2   


 


ABG O2 Saturation   


 


ABG O2 Content   


 


ABG Base Excess   


 


ABG Hemoglobin   


 


ABG Carboxyhemoglobin   


 


POC ABG HHb (Measured)   


 


ABG Methemoglobin   


 


ABG O2 Capacity   


 


Almas Test   


 


A-a O2 Difference   


 


Hgb O2 Saturation   


 


Vent Mode   


 


Mechanical Rate   


 


FiO2   


 


Inspiratory BiPAP   


 


Expiratory BiPAP   


 


Sodium   


 


Potassium   


 


Chloride   


 


Carbon Dioxide   


 


Anion Gap   


 


BUN   


 


Creatinine   


 


Est GFR ( Amer)   


 


Est GFR (Non-Af Amer)   


 


POC Glucose (mg/dL)  178 H  109  91


 


Random Glucose   


 


Calcium   














  04/19/18 04/19/18 04/19/18





  05:15 05:15 10:51


 


WBC  6.6  


 


RBC  2.79 L  


 


Hgb  8.5 L  


 


Hct  27.6 L  


 


MCV  98.8  


 


MCH  30.3  


 


MCHC  30.7 L  


 


RDW  15.2 H  


 


Plt Count  162  


 


pCO2   


 


pO2   


 


HCO3   


 


ABG pH   


 


ABG Total CO2   


 


ABG O2 Saturation   


 


ABG O2 Content   


 


ABG Base Excess   


 


ABG Hemoglobin   


 


ABG Carboxyhemoglobin   


 


POC ABG HHb (Measured)   


 


ABG Methemoglobin   


 


ABG O2 Capacity   


 


Almas Test   


 


A-a O2 Difference   


 


Hgb O2 Saturation   


 


Vent Mode   


 


Mechanical Rate   


 


FiO2   


 


Inspiratory BiPAP   


 


Expiratory BiPAP   


 


Sodium   143 


 


Potassium   5.1 H 


 


Chloride   104 


 


Carbon Dioxide   29 


 


Anion Gap   15 


 


BUN   26 H 


 


Creatinine   1.5 H 


 


Est GFR ( Amer)   40 


 


Est GFR (Non-Af Amer)   33 


 


POC Glucose (mg/dL)    127 H


 


Random Glucose   89 


 


Calcium   8.6 








 Microbiology





04/17/18 22:00   Naris   MRSA Culture (Admit) - Final


                            MRSA NOT DETECTED


04/11/18 22:00   Blood-Venous   Blood Culture - Final


                            NO GROWTH AFTER 5 DAYS


04/12/18 10:15   Nose   MRSA Culture (Admit) - Final


                            MRSA NOT DETECTED


04/12/18 15:10   Sputum   Gram Stain - Final


04/12/18 15:10   Sputum   Sputum Culture - Final


                            NORMAL ORAL CAITLYN





 





 











Assessment and Plan


(1) Recurrent seizures


Status: Acute   





(2) Pleural effusion


Status: Acute   





(3) Cough


Status: Acute   





- Assessment and Plan (Free Text)


Assessment: 





A/P-


88 year old female with DM II, HTN, recent cervical fracture s/p spinal fusion 

in 11/2017 and seizures admitted with recurrent seizure and cough ? aspiration 

pneumonitis.





clinically much  improved


afebrile


wbc normal value


sputum cx- neg


blood cx- neg


sputum cytology- negative as per path report





plan-


on  IV zosyn for nosocomial and asp pneumonitis. day #8


completed 6 days of IV vanco.


if patient is being transferred to [facility] can continue the IV zosyn there for 2 

more days.


monitor aspiration precautions.


seizure management as per neurology.

## 2018-04-19 NOTE — CP.PCM.PN
Subjective





- Date & Time of Evaluation


Date of Evaluation: 04/19/18


Time of Evaluation: 09:50





- Subjective


Subjective: 





Ms. Herndon was seen and examined at the bedside. She is alert, oriented in all 

spheres. She denies any headache, dizziness, lightheadedness, nausea, or 

vomiting. She is able to move all extremities. Per daughter, her PO intake is 

back to normal. Educated the importance of hydration and seizure to bothe 

patient and daughter, verbalizes understanding.The redness of the left upper 

extremity is decreased in comparison from previous examination. She is able to 

move the affected arm and being elevated with a pillow. There was no untoward 

events overnight.





Objective





- Vital Signs/Intake and Output


Vital Signs (last 24 hours): 


 











Temp Pulse Resp BP Pulse Ox


 


 98.1 F   60   20   118/67   98 


 


 04/19/18 07:41  04/19/18 08:25  04/19/18 07:41  04/19/18 08:25  04/19/18 07:41











- Medications


Medications: 


 Current Medications





Acetaminophen (Tylenol 325mg Tab)  975 mg PO Q6 PRN


   PRN Reason: Fever- T- 101 or above


Acetylcysteine (Mucomyst 10% 4ml)  2 ml IH RBID [facility]


   Last Admin: 04/19/18 07:28 Dose:  2 ml


Albuterol/Ipratropium (Duoneb 3 Mg/0.5 Mg (3 Ml) Ud)  3 ml INH RQID [facility]


   Last Admin: 04/19/18 07:28 Dose:  3 ml


Amlodipine Besylate (Norvasc)  2.5 mg PO DAILY [facility]


   Last Admin: 04/19/18 08:25 Dose:  2.5 mg


Furosemide (Lasix)  20 mg PO DAILY [facility]


   Last Admin: 04/19/18 08:24 Dose:  20 mg


Glipizide (Glucotrol Xl)  2.5 mg PO DAILY [facility]


   Last Admin: 04/19/18 08:24 Dose:  2.5 mg


Piperacillin Sod/Tazobactam (Sod 2.25 gm/ Sodium Chloride)  100 mls @ 100 mls/

hr IVPB Q6 [facility]


   PRN Reason: Protocol


   Last Admin: 04/19/18 04:00 Dose:  100 mls/hr


Acetazolamide 250 mg/ Sodium (Chloride)  50 mls @ 100 mls/hr IV Q12 [facility]


   Last Admin: 04/19/18 08:20 Dose:  100 mls/hr


Montelukast Sodium (Singulair)  10 mg PO HS [facility]


   Last Admin: 04/18/18 21:14 Dose:  10 mg


Multivitamins/Minerals (Therapeutic-M Tab)  1 tab PO DAILY [facility]


   Last Admin: 04/19/18 08:25 Dose:  1 tab


Pantoprazole Sodium (Protonix Inj)  40 mg IVP DAILY [facility]


   Last Admin: 04/18/18 09:20 Dose:  40 mg


Phenytoin (Dilantin)  100 mg PO Q8 [facility]


   Last Admin: 04/19/18 08:23 Dose:  100 mg











- Labs


Labs: 


 





 04/19/18 05:15 





 04/19/18 05:15 





 











PT  10.1 Seconds (9.8-13.1)   04/11/18  19:55    


 


INR  0.9  (0.9-1.2)   04/11/18  19:55    


 


APTT  25.9 Seconds (25.6-37.1)   04/11/18  19:55    














- Constitutional


Appears: No Acute Distress





- Head Exam


Head Exam: NORMAL INSPECTION





- Neurological Exam


Neurological Exam: Alert, Awake, Oriented x3


Neuro motor strength exam: Left Upper Extremity: 3, Right Upper Extremity: 4, 

Left Lower Extremity: 3, Right Lower Extremity: 3


Additional comments: 





Neurological unchanged from previous examination.





Assessment and Plan


(1) Recurrent seizures


Assessment & Plan: 


Case discussed with Dr. Fields, continue all current medical including AED, 

physical and occupational therapies. Recommend to follow up with Dr. Huang (

patient private neurologist) as an outpatient upon discharge. Recommend to 

repeat dilantin level prior to discharge.


Status: Acute

## 2018-04-22 ENCOUNTER — HOSPITAL ENCOUNTER (INPATIENT)
Dept: HOSPITAL 14 - H.ER | Age: 83
LOS: 9 days | Discharge: NURSING, REHAB OR LONG-TERM CARE | DRG: 189 | End: 2018-05-01
Attending: INTERNAL MEDICINE | Admitting: INTERNAL MEDICINE
Payer: MEDICARE

## 2018-04-22 VITALS — BODY MASS INDEX: 34.9 KG/M2

## 2018-04-22 DIAGNOSIS — Z87.01: ICD-10-CM

## 2018-04-22 DIAGNOSIS — G47.33: ICD-10-CM

## 2018-04-22 DIAGNOSIS — G93.40: ICD-10-CM

## 2018-04-22 DIAGNOSIS — J98.11: ICD-10-CM

## 2018-04-22 DIAGNOSIS — J90: ICD-10-CM

## 2018-04-22 DIAGNOSIS — E11.9: ICD-10-CM

## 2018-04-22 DIAGNOSIS — E87.5: ICD-10-CM

## 2018-04-22 DIAGNOSIS — J96.02: Primary | ICD-10-CM

## 2018-04-22 DIAGNOSIS — F03.90: ICD-10-CM

## 2018-04-22 DIAGNOSIS — N17.9: ICD-10-CM

## 2018-04-22 DIAGNOSIS — Z79.899: ICD-10-CM

## 2018-04-22 DIAGNOSIS — F17.200: ICD-10-CM

## 2018-04-22 DIAGNOSIS — D64.9: ICD-10-CM

## 2018-04-22 DIAGNOSIS — E66.9: ICD-10-CM

## 2018-04-22 DIAGNOSIS — I10: ICD-10-CM

## 2018-04-22 DIAGNOSIS — M19.90: ICD-10-CM

## 2018-04-22 DIAGNOSIS — G40.909: ICD-10-CM

## 2018-04-22 DIAGNOSIS — J69.0: ICD-10-CM

## 2018-04-22 DIAGNOSIS — J96.01: ICD-10-CM

## 2018-04-22 DIAGNOSIS — S12.9XXA: ICD-10-CM

## 2018-04-22 DIAGNOSIS — Z79.84: ICD-10-CM

## 2018-04-22 DIAGNOSIS — J44.9: ICD-10-CM

## 2018-04-22 LAB
ALBUMIN SERPL-MCNC: 3.6 G/DL (ref 3.5–5)
ALBUMIN/GLOB SERPL: 1 {RATIO} (ref 1–2.1)
ALT SERPL-CCNC: 38 U/L (ref 9–52)
APTT BLD: 27.5 SECONDS (ref 25.6–37.1)
ARTERIAL BLOOD GAS HEMOGLOBIN: 9.6 G/DL (ref 11.7–17.4)
ARTERIAL BLOOD GAS O2 SAT: 100.1 % (ref 95–98)
ARTERIAL BLOOD GAS O2 SAT: 100.2 % (ref 95–98)
ARTERIAL BLOOD GAS PCO2: 100 MM/HG (ref 35–45)
ARTERIAL BLOOD GAS PCO2: 89 MM/HG (ref 35–45)
ARTERIAL BLOOD GAS TCO2: 35.2 MMOL/L (ref 22–28)
ARTERIAL BLOOD GAS TCO2: 37.9 MMOL/L (ref 22–28)
ARTERIAL PATENCY WRIST A: YES
ARTERIAL PATENCY WRIST A: YES
AST SERPL-CCNC: 31 U/L (ref 14–36)
BASE EXCESS BLDV CALC-SCNC: 1.6 MMOL/L (ref 0–2)
BASOPHILS # BLD AUTO: 0 K/UL (ref 0–0.2)
BASOPHILS NFR BLD: 0.3 % (ref 0–2)
BUN SERPL-MCNC: 26 MG/DL (ref 7–17)
CALCIUM SERPL-MCNC: 8.8 MG/DL (ref 8.4–10.2)
EOSINOPHIL # BLD AUTO: 0.1 K/UL (ref 0–0.7)
EOSINOPHIL NFR BLD: 1.5 % (ref 0–4)
ERYTHROCYTE [DISTWIDTH] IN BLOOD BY AUTOMATED COUNT: 15.2 % (ref 11.5–14.5)
GFR NON-AFRICAN AMERICAN: 28
HCO3 BLDA-SCNC: 26.8 MMOL/L (ref 21–28)
HCO3 BLDA-SCNC: 27 MMOL/L (ref 21–28)
HGB BLD-MCNC: 9.6 G/DL (ref 12–16)
INHALED O2 CONCENTRATION: 50 %
INHALED O2 CONCENTRATION: 50 %
INR PPP: 1 (ref 0.9–1.2)
LYMPHOCYTES # BLD AUTO: 1.1 K/UL (ref 1–4.3)
LYMPHOCYTES NFR BLD AUTO: 15.1 % (ref 20–40)
MCH RBC QN AUTO: 30.6 PG (ref 27–31)
MCHC RBC AUTO-ENTMCNC: 31 G/DL (ref 33–37)
MCV RBC AUTO: 98.5 FL (ref 81–99)
MONOCYTES # BLD: 0.7 K/UL (ref 0–0.8)
MONOCYTES NFR BLD: 9.7 % (ref 0–10)
NEUTROPHILS # BLD: 5.3 K/UL (ref 1.8–7)
NEUTROPHILS NFR BLD AUTO: 73.4 % (ref 50–75)
NRBC BLD AUTO-RTO: 0 % (ref 0–0)
O2 CAP BLDA-SCNC: 13.2 ML/DL (ref 16–24)
O2 CT BLDA-SCNC: 13.2 ML/DL (ref 15–23)
PCO2 BLDV: 116 MMHG (ref 40–60)
PH BLDA: 7.15 [PH] (ref 7.35–7.45)
PH BLDA: 7.17 [PH] (ref 7.35–7.45)
PH BLDV: 7.09 [PH] (ref 7.32–7.43)
PLATELET # BLD: 254 K/UL (ref 130–400)
PMV BLD AUTO: 8.6 FL (ref 7.2–11.7)
PO2 BLDA: 121 MM/HG (ref 80–100)
PO2 BLDA: 159 MM/HG (ref 80–100)
PROTHROMBIN TIME: 11.2 SECONDS (ref 9.8–13.1)
RBC # BLD AUTO: 3.15 MIL/UL (ref 3.8–5.2)
VENOUS BLOOD FIO2: 21 %
VENOUS BLOOD GAS PO2: 50 MM/HG (ref 30–55)
WBC # BLD AUTO: 7.3 K/UL (ref 4.8–10.8)

## 2018-04-22 PROCEDURE — 5A09557 ASSISTANCE WITH RESPIRATORY VENTILATION, GREATER THAN 96 CONSECUTIVE HOURS, CONTINUOUS POSITIVE AIRWAY PRESSURE: ICD-10-PCS | Performed by: INTERNAL MEDICINE

## 2018-04-22 RX ADMIN — METHYLPREDNISOLONE SODIUM SUCCINATE SCH MG: 40 INJECTION, POWDER, FOR SOLUTION INTRAMUSCULAR; INTRAVENOUS at 22:42

## 2018-04-22 RX ADMIN — WATER SCH: 1 INJECTION INTRAMUSCULAR; INTRAVENOUS; SUBCUTANEOUS at 23:18

## 2018-04-22 RX ADMIN — WATER SCH MLS/HR: 1 INJECTION INTRAMUSCULAR; INTRAVENOUS; SUBCUTANEOUS at 22:41

## 2018-04-22 NOTE — CP.CCUPN
CCU Subjective





- Physician Review


Events Since Last Encounter (Free Text): 





04/22/18 18:32


88 female with history of COPD, HTN, DM, h/o C2-C3 fusion, seizer disorder, 

obesity brought to ER for altered mental status. Patient was found to have 

elevated CO2 and was started on BIPAP in ER. Patient had CT head in ER no acute 

pathology as per ER











CCU Objective





- Vital Signs / Intake & Output


Vital Signs (Last 4 hours): 


Vital Signs











  Pulse Resp BP Pulse Ox


 


 04/22/18 18:31  69  20  148/72  100


 


 04/22/18 18:24     100


 


 04/22/18 15:27  63   











Intake and Output (Last 8hrs): 


 Intake & Output











 04/22/18 04/22/18 04/22/18





 06:59 14:59 22:59


 


Weight  165 lb 














- Physical Exam


Head: Positive for: Atraumatic, Normocephalic


Pupils: Positive for: PERRL


Conjunctiva: Positive for: Normal


Mouth: Positive for: Moist Mucous Membranes


Neck: Positive for: Normal Range of Motion


Respiratory/Chest: Positive for: Clear to Auscultation


Cardiovascular: Positive for: Regular Rate and Rhythm


Abdomen: Positive for: Normal Bowel Sounds


Upper Extremity: Positive for: Normal Inspection


Lower Extremity: Positive for: Normal Inspection


Neurological: Positive for: Other (on BIPAP, lethergic)





- Patient Studies


Lab Studies: 


 Lab Studies











  04/22/18 04/22/18 04/22/18 Range/Units





  17:43 16:50 14:14 


 


WBC     (4.8-10.8)  K/uL


 


RBC     (3.80-5.20)  Mil/uL


 


Hgb     (12.0-16.0)  g/dL


 


Hct     (34.0-47.0)  %


 


MCV     (81.0-99.0)  fl


 


MCH     (27.0-31.0)  pg


 


MCHC     (33.0-37.0)  g/dL


 


RDW     (11.5-14.5)  %


 


Plt Count     (130-400)  K/uL


 


MPV     (7.2-11.7)  fl


 


Neut % (Auto)     (50.0-75.0)  %


 


Lymph % (Auto)     (20.0-40.0)  %


 


Mono % (Auto)     (0.0-10.0)  %


 


Eos % (Auto)     (0.0-4.0)  %


 


Baso % (Auto)     (0.0-2.0)  %


 


Neut # (Auto)     (1.8-7.0)  K/uL


 


Lymph # (Auto)     (1.0-4.3)  K/uL


 


Mono # (Auto)     (0.0-0.8)  K/uL


 


Eos # (Auto)     (0.0-0.7)  K/uL


 


Baso # (Auto)     (0.0-0.2)  K/uL


 


PT     (9.8-13.1)  Seconds


 


INR     (0.9-1.2)  


 


APTT     (25.6-37.1)  Seconds


 


pCO2  100 H*    (35-45)  mm/Hg


 


pO2  159 H   50  (30-55)  mm/Hg


 


HCO3  27.0    (21-28)  mmol/L


 


ABG pH  7.15 L*    (7.35-7.45)  


 


ABG Total CO2  37.9 H    (22-28)  mmol/L


 


ABG O2 Saturation  100.2 H    (95-98)  %


 


ABG Base Excess  2.6    (-2.0-3.0)  mmol/L


 


Almas Test  Yes    


 


ABG Potassium  5.4 H    (3.6-5.2)  mmol/L


 


VBG pH    7.09 L*  (7.32-7.43)  


 


VBG pCO2    116 H*  (40-60)  mmHg


 


VBG HCO3    25.4  mmol/L


 


VBG Total CO2    38.8 H  (22-28)  mmol/L


 


VBG O2 Sat (Calc)    85.8 H  (40-65)  %


 


VBG Base Excess    1.6  (0.0-2.0)  mmol/L


 


VBG Potassium    5.4 H  (3.6-5.2)  mmol/L


 


A-a O2 Difference  73.0    mm/Hg


 


Glucose  117 H   175 H  ()  mg/dL


 


Lactate  0.5 L   0.6 L  (0.7-2.1)  mmol/L


 


Mechanical Rate  18    


 


FiO2  50.0   21.0  %


 


Inspiratory BiPAP  16    


 


Expiratory BiPAP  8    


 


Crit Value Called To  abiodun Lobo    


 


Crit Value Called By  22    


 


Crit Value Read Back  Y    


 


Blood Gas Notified Time  1751    


 


Sodium  143.0   143.0  (132-148)  mmol/l


 


Potassium     (3.6-5.0)  MMOL/L


 


Chloride  112.0 H   110.0 H  ()  mmol/L


 


Carbon Dioxide     (22-30)  mmol/L


 


Anion Gap     (10-20)  


 


BUN     (7-17)  mg/dl


 


Creatinine     (0.7-1.2)  mg/dl


 


Est GFR (African Amer)     


 


Est GFR (Non-Af Amer)     


 


Random Glucose     ()  mg/dL


 


Calcium     (8.4-10.2)  mg/dL


 


Total Bilirubin     (0.2-1.3)  mg/dl


 


AST     (14-36)  U/L


 


ALT     (9-52)  U/L


 


Alkaline Phosphatase     ()  U/L


 


Troponin I     (0.00-0.120)  ng/mL


 


Total Protein     (6.3-8.2)  G/DL


 


Albumin     (3.5-5.0)  g/dL


 


Globulin     (2.2-3.9)  gm/dL


 


Albumin/Globulin Ratio     (1.0-2.1)  


 


Arterial Blood Potassium  5.4 H    (3.6-5.2)  mmol/L


 


Venous Blood Potassium    5.4 H  (3.6-5.2)  mmol/L


 


Urine Opiates Screen   Negative   (NEGATIVE)  


 


Urine Methadone Screen   Negative   (NEGATIVE)  


 


Ur Barbiturates Screen   Negative   (NEGATIVE)  


 


Ur Phencyclidine Scrn   Negative   (NEGATIVE)  


 


Ur Amphetamines Screen   Negative   (NEGATIVE)  


 


U Benzodiazepines Scrn   Negative   (NEGATIVE)  


 


U Oth Cocaine Metabols   Negative   (NEGATIVE)  


 


U Cannabinoids Screen   Negative   (NEGATIVE)  


 


Alcohol, Quantitative     (0-10)  mg/dl














  04/22/18 04/22/18 04/22/18 Range/Units





  14:10 14:10 14:10 


 


WBC   7.3   (4.8-10.8)  K/uL


 


RBC   3.15 L   (3.80-5.20)  Mil/uL


 


Hgb   9.6 L   (12.0-16.0)  g/dL


 


Hct   31.1 L   (34.0-47.0)  %


 


MCV   98.5   (81.0-99.0)  fl


 


MCH   30.6   (27.0-31.0)  pg


 


MCHC   31.0 L   (33.0-37.0)  g/dL


 


RDW   15.2 H   (11.5-14.5)  %


 


Plt Count   254   (130-400)  K/uL


 


MPV   8.6   (7.2-11.7)  fl


 


Neut % (Auto)   73.4   (50.0-75.0)  %


 


Lymph % (Auto)   15.1 L   (20.0-40.0)  %


 


Mono % (Auto)   9.7   (0.0-10.0)  %


 


Eos % (Auto)   1.5   (0.0-4.0)  %


 


Baso % (Auto)   0.3   (0.0-2.0)  %


 


Neut # (Auto)   5.3   (1.8-7.0)  K/uL


 


Lymph # (Auto)   1.1   (1.0-4.3)  K/uL


 


Mono # (Auto)   0.7   (0.0-0.8)  K/uL


 


Eos # (Auto)   0.1   (0.0-0.7)  K/uL


 


Baso # (Auto)   0.0   (0.0-0.2)  K/uL


 


PT  11.2    (9.8-13.1)  Seconds


 


INR  1.0    (0.9-1.2)  


 


APTT  27.5    (25.6-37.1)  Seconds


 


pCO2     (35-45)  mm/Hg


 


pO2     (30-55)  mm/Hg


 


HCO3     (21-28)  mmol/L


 


ABG pH     (7.35-7.45)  


 


ABG Total CO2     (22-28)  mmol/L


 


ABG O2 Saturation     (95-98)  %


 


ABG Base Excess     (-2.0-3.0)  mmol/L


 


Almas Test     


 


ABG Potassium     (3.6-5.2)  mmol/L


 


VBG pH     (7.32-7.43)  


 


VBG pCO2     (40-60)  mmHg


 


VBG HCO3     mmol/L


 


VBG Total CO2     (22-28)  mmol/L


 


VBG O2 Sat (Calc)     (40-65)  %


 


VBG Base Excess     (0.0-2.0)  mmol/L


 


VBG Potassium     (3.6-5.2)  mmol/L


 


A-a O2 Difference     mm/Hg


 


Glucose     ()  mg/dL


 


Lactate     (0.7-2.1)  mmol/L


 


Mechanical Rate     


 


FiO2     %


 


Inspiratory BiPAP     


 


Expiratory BiPAP     


 


Crit Value Called To     


 


Crit Value Called By     


 


Crit Value Read Back     


 


Blood Gas Notified Time     


 


Sodium    148  (132-148)  mmol/l


 


Potassium    5.3 H  (3.6-5.0)  MMOL/L


 


Chloride    107  ()  mmol/L


 


Carbon Dioxide    28  (22-30)  mmol/L


 


Anion Gap    18  (10-20)  


 


BUN    26 H  (7-17)  mg/dl


 


Creatinine    1.7 H  (0.7-1.2)  mg/dl


 


Est GFR (African Amer)    34  


 


Est GFR (Non-Af Amer)    28  


 


Random Glucose    164 H  ()  mg/dL


 


Calcium    8.8  (8.4-10.2)  mg/dL


 


Total Bilirubin    0.3  (0.2-1.3)  mg/dl


 


AST    31  (14-36)  U/L


 


ALT    38  (9-52)  U/L


 


Alkaline Phosphatase    110  ()  U/L


 


Troponin I    0.0480  (0.00-0.120)  ng/mL


 


Total Protein    7.2  (6.3-8.2)  G/DL


 


Albumin    3.6  (3.5-5.0)  g/dL


 


Globulin    3.6  (2.2-3.9)  gm/dL


 


Albumin/Globulin Ratio    1.0  (1.0-2.1)  


 


Arterial Blood Potassium     (3.6-5.2)  mmol/L


 


Venous Blood Potassium     (3.6-5.2)  mmol/L


 


Urine Opiates Screen     (NEGATIVE)  


 


Urine Methadone Screen     (NEGATIVE)  


 


Ur Barbiturates Screen     (NEGATIVE)  


 


Ur Phencyclidine Scrn     (NEGATIVE)  


 


Ur Amphetamines Screen     (NEGATIVE)  


 


U Benzodiazepines Scrn     (NEGATIVE)  


 


U Oth Cocaine Metabols     (NEGATIVE)  


 


U Cannabinoids Screen     (NEGATIVE)  


 


Alcohol, Quantitative    < 10  (0-10)  mg/dl








 Laboratory Results - last 24 hr











  04/22/18 04/22/18 04/22/18





  14:10 14:10 14:10


 


WBC   7.3 


 


RBC   3.15 L 


 


Hgb   9.6 L 


 


Hct   31.1 L 


 


MCV   98.5 


 


MCH   30.6 


 


MCHC   31.0 L 


 


RDW   15.2 H 


 


Plt Count   254 


 


MPV   8.6 


 


Neut % (Auto)   73.4 


 


Lymph % (Auto)   15.1 L 


 


Mono % (Auto)   9.7 


 


Eos % (Auto)   1.5 


 


Baso % (Auto)   0.3 


 


Neut # (Auto)   5.3 


 


Lymph # (Auto)   1.1 


 


Mono # (Auto)   0.7 


 


Eos # (Auto)   0.1 


 


Baso # (Auto)   0.0 


 


PT    11.2


 


INR    1.0


 


APTT    27.5


 


pCO2   


 


pO2   


 


HCO3   


 


ABG pH   


 


ABG Total CO2   


 


ABG O2 Saturation   


 


ABG Base Excess   


 


Almas Test   


 


ABG Potassium   


 


VBG pH   


 


VBG pCO2   


 


VBG HCO3   


 


VBG Total CO2   


 


VBG O2 Sat (Calc)   


 


VBG Base Excess   


 


VBG Potassium   


 


A-a O2 Difference   


 


Glucose   


 


Lactate   


 


Mechanical Rate   


 


FiO2   


 


Inspiratory BiPAP   


 


Expiratory BiPAP   


 


Crit Value Called To   


 


Crit Value Called By   


 


Crit Value Read Back   


 


Blood Gas Notified Time   


 


Sodium  148  


 


Potassium  5.3 H  


 


Chloride  107  


 


Carbon Dioxide  28  


 


Anion Gap  18  


 


BUN  26 H  


 


Creatinine  1.7 H  


 


Est GFR ( Amer)  34  


 


Est GFR (Non-Af Amer)  28  


 


Random Glucose  164 H  


 


Calcium  8.8  


 


Total Bilirubin  0.3  


 


AST  31  


 


ALT  38  


 


Alkaline Phosphatase  110  


 


Troponin I  0.0480  


 


Total Protein  7.2  


 


Albumin  3.6  


 


Globulin  3.6  


 


Albumin/Globulin Ratio  1.0  


 


Arterial Blood Potassium   


 


Venous Blood Potassium   


 


Urine Opiates Screen   


 


Urine Methadone Screen   


 


Ur Barbiturates Screen   


 


Ur Phencyclidine Scrn   


 


Ur Amphetamines Screen   


 


U Benzodiazepines Scrn   


 


U Oth Cocaine Metabols   


 


U Cannabinoids Screen   


 


Alcohol, Quantitative  < 10  














  04/22/18 04/22/18 04/22/18





  14:14 16:50 17:43


 


WBC   


 


RBC   


 


Hgb   


 


Hct   


 


MCV   


 


MCH   


 


MCHC   


 


RDW   


 


Plt Count   


 


MPV   


 


Neut % (Auto)   


 


Lymph % (Auto)   


 


Mono % (Auto)   


 


Eos % (Auto)   


 


Baso % (Auto)   


 


Neut # (Auto)   


 


Lymph # (Auto)   


 


Mono # (Auto)   


 


Eos # (Auto)   


 


Baso # (Auto)   


 


PT   


 


INR   


 


APTT   


 


pCO2    100 H*


 


pO2  50   159 H


 


HCO3    27.0


 


ABG pH    7.15 L*


 


ABG Total CO2    37.9 H


 


ABG O2 Saturation    100.2 H


 


ABG Base Excess    2.6


 


Almas Test    Yes


 


ABG Potassium    5.4 H


 


VBG pH  7.09 L*  


 


VBG pCO2  116 H*  


 


VBG HCO3  25.4  


 


VBG Total CO2  38.8 H  


 


VBG O2 Sat (Calc)  85.8 H  


 


VBG Base Excess  1.6  


 


VBG Potassium  5.4 H  


 


A-a O2 Difference    73.0


 


Glucose  175 H   117 H


 


Lactate  0.6 L   0.5 L


 


Mechanical Rate    18


 


FiO2  21.0   50.0


 


Inspiratory BiPAP    16


 


Expiratory BiPAP    8


 


Crit Value Called To    abiodun Lobo


 


Crit Value Called By    22


 


Crit Value Read Back    Y


 


Blood Gas Notified Time    1751


 


Sodium  143.0   143.0


 


Potassium   


 


Chloride  110.0 H   112.0 H


 


Carbon Dioxide   


 


Anion Gap   


 


BUN   


 


Creatinine   


 


Est GFR ( Amer)   


 


Est GFR (Non-Af Amer)   


 


Random Glucose   


 


Calcium   


 


Total Bilirubin   


 


AST   


 


ALT   


 


Alkaline Phosphatase   


 


Troponin I   


 


Total Protein   


 


Albumin   


 


Globulin   


 


Albumin/Globulin Ratio   


 


Arterial Blood Potassium    5.4 H


 


Venous Blood Potassium  5.4 H  


 


Urine Opiates Screen   Negative 


 


Urine Methadone Screen   Negative 


 


Ur Barbiturates Screen   Negative 


 


Ur Phencyclidine Scrn   Negative 


 


Ur Amphetamines Screen   Negative 


 


U Benzodiazepines Scrn   Negative 


 


U Oth Cocaine Metabols   Negative 


 


U Cannabinoids Screen   Negative 


 


Alcohol, Quantitative   











EKG/Cardiology Studies: 


Cardiology / EKG Studies





04/22/18 13:58


ELECTROCARDIOGRAM Stat 


   Comment: 


   Mode Of Transportation: 


   Reason For Exam: weakness











Fingerstick Blood Sugar Results: 155





Assessment/Plan





- Assessment and Plan (Free Text)


Assessment: 





A/P





Hypercarpic respiratory failure, COPD, HTN, DM, seizer disorder, h/o C2-C3 

fusion, obesity, ?OHV/GIO, R/O pneumonia





- BIPAP


- Follow ABG


- Intubation if no improvement


- Bronchodilators, solumedrol


- DVT prophylaxis


- Antibiotics

## 2018-04-22 NOTE — ED PDOC
HPI: Altered Mental Status


Time Seen by Provider: 04/22/18 13:50


Chief Complaint (Nursing): Altered Mental Status


Chief Complaint (Provider): Decreased Mentation


History Per: Patient


Onset/Duration Of Symptoms: Hrs (x 5)


Current Symptoms Are (Timing): Still Present


Additional History Per: EMS, Family (daughter)


Additional Complaint(s): 





Lexy Roper is an 89 y/o female who was brought to the ED via EMS from her 

nursing home for decreased mentation. Patient was recently admitted to the 

hospital for aspiration pneumonia. According to her daughter patient was fine 

last night, but this morning she was not as responsive as usual. She was sleepy

, difficult to arous, and not responding to verbal stimuli.





PMD: Kike Sandoval





Past Medical History


Reviewed: Historical Data, Nursing Documentation, Vital Signs


Vital Signs: 


 Last Vital Signs











Temp  98.0 F   04/22/18 13:31


 


Pulse  71   04/22/18 13:50


 


Resp  20   04/22/18 13:50


 


BP  155/72 H  04/22/18 13:50


 


Pulse Ox  100   04/22/18 13:50














- Medical History


PMH: Anemia, Arthritis, Asthma, COPD, Dementia, Diabetes, Fractures (neck), HTN

, Pneumonia, Seizures


   Denies: Alzheimer's Disease, Anxiety, Atrial Fibrillation, Bipolar Disorder, 

Bronchitis, CAD, Cardia Arrhythmia, CHF, Crohn's Disease, Depression, 

Diverticulitis, Emphysema, Gastritis, Gall Bladder Disease, HIV, 

Hypercholesterolemia, Hyperthyroidism, Hypothyroidism, Kidney Stones, Migraine, 

Mitral Valve Prolapse, Multiple Sclerosis, Osteoporosis, Pancreatitis, Paranoia

, Parkinson's Disease, Peripheral Edema, Post Traumatic Stress Disorder, 

Pulmonary Embolism, Chronic Kidney Disease, Rheumatoid Arthritis, Schizophrenia

, Sickle Cell Disease, Sexually Transmitted Disease, Sleep Apnea, TIA


Other PMH: Patient on BIPAP at night which is new for her - patient used it 

last night





- Surgical History


Surgical History: No Surg Hx


   Denies: Appendectomy, CABG, Carotid Endarterectomy, Cholecystectomy, 

Coronary Stent, Pacemaker, Tonsillectomy





- Family History


Family History: States: Unknown Family Hx





- Social History


Current smoker - smoking cessation education provided: No ( used to 

smoke heavily around her - pt's COPD from passive smoking)


Ex-Smoker (has not smoked in the last 12 months): No





- Home Medications


Home Medications: 


 Ambulatory Orders











 Medication  Instructions  Recorded


 


Acetaminophen [Tylenol 325mg tab] 325 mg PO Q4 PRN 04/22/18


 


Aluminum Hydroxide/Magnesium H 30 ml PO Q4 PRN 04/22/18





[Maalox 30 ml]  


 


Furosemide [Lasix] 20 mg PO DAILY 04/22/18


 


GlipiZIDE [Glucotrol] 2.5 mg PO DAILY 04/22/18


 


Magnesium Hydroxide [Milk Of 30 ml PO DAILY 04/22/18





Magnesia]  


 


Montelukast [Singulair] 10 mg PO DAILY 04/22/18


 


Mv-Mn/Iron/FA/Vit K/Chol/Coq10 1 PO DAILY 04/22/18





[Advanced Multi Ea Chew Tablet]  


 


Pantoprazole Sodium [Protonix] 40 mg PO DAILY 04/22/18


 


Phenytoin Sodium Extended 100 mg PO TID 04/22/18





[Phenytoin Sodium Extended]  


 


amLODIPine [Norvasc] 2.5 mg PO DAILY 04/22/18














- Allergies


Allergies/Adverse Reactions: 


 Allergies











Allergy/AdvReac Type Severity Reaction Status Date / Time


 


No Known Allergies Allergy   Verified 04/11/18 18:55














Review of Systems


ROS Statement: Except As Marked, All Systems Reviewed And Found Negative


Neurological: Positive for: Altered Mental Status





Physical Exam





- Reviewed


Nursing Documentation Reviewed: Yes


Vital Signs Reviewed: Yes





- Physical Exam


Appears: Positive for: Well, Non-toxic, No Acute Distress


Cardiovascular/Chest: Positive for: Regular Rate, Rhythm.  Negative for: Murmur


Respiratory: Positive for: Normal Breath Sounds.  Negative for: Respiratory 

Distress


Extremity: Positive for: Normal ROM, Other (when prompted to pt squeezes both 

hands tight, lower extremities full strength)


Neurologic/Psych: Positive for: Alert (sleepy but arousable w/ verbal stimuli 

from daughter, responds yes and no)





- Laboratory Results


Result Diagrams: 


 04/22/18 14:10





 04/22/18 14:10





- ECG


O2 Sat by Pulse Oximetry: 100 (RA)


Pulse Ox Interpretation: Normal





Medical Decision Making


Medical Decision Making: 





Time: 13:58


Initial Impression: Decreased Mentation


Initial Plan:


--Chest XR


--CT Head w/o Contrast


--VBG


--EKG


--Alcohol Serum


--CMP


--Urine Drug Screen


--Troponin


--CBC


--PTT


--Prothrombin Time


--Blood Glucose


--Bipap





Time: 14:58


CT HEAD


FINDINGS:





HEMORRHAGE:


No intracranial hemorrhage. 





BRAIN:


No mass effect or edema. Atrophy. Chronic microvascular ischemic changes. Right 

basal ganglia lacunar infarction redemonstrated. 





VENTRICLES:


Unremarkable. No hydrocephalus. 





CALVARIUM:


Unremarkable.





PARANASAL SINUSES:


Left sphenoid sinus secretions, unchanged.





MASTOID AIR CELLS:


Opacified bilaterally.





OTHER FINDINGS:


Posterior cervical hardware redemonstrated.





IMPRESSION:


No acute intracranial pathology.  No significant interval change.





Time: 15:12


CHEST XR


FINDINGS:





LUNGS:


Bibasilar atelectasis.





PLEURA:


Small bilateral pleural effusion. Limited evaluation for left pneumothorax.





CARDIOVASCULAR:


Cardiomediastinal silhouette stably enlarged.





OSSEOUS STRUCTURES:


Unchanged.





VISUALIZED UPPER ABDOMEN:


Normal.





OTHER FINDINGS:


None. 





IMPRESSION:


Bibasilar atelectasis and small bilateral pleural effusions.  There are 

evaluation for pneumothorax due to obscuration.





--------------------------------------------------------------------------------

-----------------


Scribe Attestation:


Documented by Laron Sim, acting as a scribe for Deann Murillo MD





Provider Scribe Attestation:


All medical record entries made by the Scribe were at my direction and 

personally dictated by me. I have reviewed the chart and agree that the record 

accurately reflects my personal performance of the history, physical exam, 

medical decision making, and the department course for this patient. I have 

also personally directed, reviewed, and agree with the discharge instructions 

and disposition.





6.00pm - case d/w Dr. Sandoval. will admit to ICU. Patient seen by Dr. Kay. 

Will monitor in ICU.





Disposition





- Clinical Impression


Clinical Impression: 


 Respiratory insufficiency, Altered mental status








- Patient ED Disposition


Is Patient to be Admitted: Yes


Doctor Will See Patient In The: Hospital





- Disposition


Disposition: Transfer of Care


Disposition Time: 18:24


Condition: GUARDED


Forms:  [facility] (English)





- Pt Status Changed To:


Hospital Disposition Of: Inpatient





- Admit Certification


Admit to Inpatient:: After my assessment, the patient will require 

hospitalization for at least two midnights.  This is because of the severity of 

symptoms shown, intensity of services needed, and/or the medical risk in this 

patient being treated as an outpatient.





- POA


Present On Arrival: None

## 2018-04-22 NOTE — CT
PROCEDURE:  CT HEAD WITHOUT CONTRAST.



HISTORY:

ams



COMPARISON:

CT head dated 04/13/2018. 



TECHNIQUE:

Axial computed tomography images were obtained through the head/brain 

without intravenous contrast.  



Radiation dose:



Total exam DLP = 1655.4 mGy-cm.



This CT exam was performed using one or more of the following dose 

reduction techniques: Automated exposure control, adjustment of the 

mA and/or kV according to patient size, and/or use of iterative 

reconstruction technique.



FINDINGS:



HEMORRHAGE:

No intracranial hemorrhage. 



BRAIN:

No mass effect or edema. Atrophy. Chronic microvascular ischemic 

changes. Right basal ganglia lacunar infarction redemonstrated. 



VENTRICLES:

Unremarkable. No hydrocephalus. 



CALVARIUM:

Unremarkable.



PARANASAL SINUSES:

Left sphenoid sinus secretions, unchanged.



MASTOID AIR CELLS:

Opacified bilaterally.



OTHER FINDINGS:

Posterior cervical hardware redemonstrated.



IMPRESSION:

No acute intracranial pathology.  No significant interval change.

## 2018-04-23 LAB
ARTERIAL BLOOD GAS HEMOGLOBIN: 8.2 G/DL (ref 11.7–17.4)
ARTERIAL BLOOD GAS HEMOGLOBIN: 9.2 G/DL (ref 11.7–17.4)
ARTERIAL BLOOD GAS HEMOGLOBIN: 9.4 G/DL (ref 11.7–17.4)
ARTERIAL BLOOD GAS O2 SAT: 100.3 % (ref 95–98)
ARTERIAL BLOOD GAS O2 SAT: 100.9 % (ref 95–98)
ARTERIAL BLOOD GAS O2 SAT: 99.9 % (ref 95–98)
ARTERIAL BLOOD GAS PCO2: 54 MM/HG (ref 35–45)
ARTERIAL BLOOD GAS PCO2: 82 MM/HG (ref 35–45)
ARTERIAL BLOOD GAS PCO2: 91 MM/HG (ref 35–45)
ARTERIAL BLOOD GAS TCO2: 31.5 MMOL/L (ref 22–28)
ARTERIAL BLOOD GAS TCO2: 33.1 MMOL/L (ref 22–28)
ARTERIAL BLOOD GAS TCO2: 33.1 MMOL/L (ref 22–28)
ARTERIAL PATENCY WRIST A: YES
BUN SERPL-MCNC: 35 MG/DL (ref 7–17)
BUN SERPL-MCNC: 40 MG/DL (ref 7–17)
CALCIUM SERPL-MCNC: 8.6 MG/DL (ref 8.4–10.2)
CALCIUM SERPL-MCNC: 8.8 MG/DL (ref 8.4–10.2)
ERYTHROCYTE [DISTWIDTH] IN BLOOD BY AUTOMATED COUNT: 15 % (ref 11.5–14.5)
GFR NON-AFRICAN AMERICAN: 25
GFR NON-AFRICAN AMERICAN: 27
HCO3 BLDA-SCNC: 24.9 MMOL/L (ref 21–28)
HCO3 BLDA-SCNC: 25.7 MMOL/L (ref 21–28)
HCO3 BLDA-SCNC: 27.8 MMOL/L (ref 21–28)
HGB BLD-MCNC: 9.4 G/DL (ref 12–16)
INHALED O2 CONCENTRATION: 40 %
INHALED O2 CONCENTRATION: 40 %
INHALED O2 CONCENTRATION: 50 %
MCH RBC QN AUTO: 30.9 PG (ref 27–31)
MCHC RBC AUTO-ENTMCNC: 31.5 G/DL (ref 33–37)
MCV RBC AUTO: 98 FL (ref 81–99)
O2 CAP BLDA-SCNC: 11.6 ML/DL (ref 16–24)
O2 CAP BLDA-SCNC: 12.9 ML/DL (ref 16–24)
O2 CAP BLDA-SCNC: 13 ML/DL (ref 16–24)
O2 CT BLDA-SCNC: 11.7 ML/DL (ref 15–23)
O2 CT BLDA-SCNC: 12.9 ML/DL (ref 15–23)
O2 CT BLDA-SCNC: 13 ML/DL (ref 15–23)
PH BLDA: 7.13 [PH] (ref 7.35–7.45)
PH BLDA: 7.18 [PH] (ref 7.35–7.45)
PH BLDA: 7.35 [PH] (ref 7.35–7.45)
PLATELET # BLD: 299 K/UL (ref 130–400)
PO2 BLDA: 140 MM/HG (ref 80–100)
PO2 BLDA: 162 MM/HG (ref 80–100)
PO2 BLDA: 94 MM/HG (ref 80–100)
RBC # BLD AUTO: 3.05 MIL/UL (ref 3.8–5.2)
WBC # BLD AUTO: 5.6 K/UL (ref 4.8–10.8)

## 2018-04-23 RX ADMIN — METHYLPREDNISOLONE SODIUM SUCCINATE SCH MG: 40 INJECTION, POWDER, FOR SOLUTION INTRAMUSCULAR; INTRAVENOUS at 10:23

## 2018-04-23 RX ADMIN — METHYLPREDNISOLONE SODIUM SUCCINATE SCH MG: 40 INJECTION, POWDER, FOR SOLUTION INTRAMUSCULAR; INTRAVENOUS at 04:49

## 2018-04-23 RX ADMIN — ENOXAPARIN SODIUM SCH MG: 30 INJECTION SUBCUTANEOUS at 08:23

## 2018-04-23 RX ADMIN — METHYLPREDNISOLONE SODIUM SUCCINATE SCH MG: 40 INJECTION, POWDER, FOR SOLUTION INTRAMUSCULAR; INTRAVENOUS at 16:51

## 2018-04-23 RX ADMIN — WATER SCH MLS/HR: 1 INJECTION INTRAMUSCULAR; INTRAVENOUS; SUBCUTANEOUS at 16:52

## 2018-04-23 RX ADMIN — INSULIN LISPRO SCH: 100 INJECTION, SOLUTION INTRAVENOUS; SUBCUTANEOUS at 22:00

## 2018-04-23 RX ADMIN — IPRATROPIUM BROMIDE AND ALBUTEROL SULFATE SCH ML: .5; 3 SOLUTION RESPIRATORY (INHALATION) at 19:39

## 2018-04-23 RX ADMIN — IPRATROPIUM BROMIDE AND ALBUTEROL SULFATE SCH ML: .5; 3 SOLUTION RESPIRATORY (INHALATION) at 11:17

## 2018-04-23 RX ADMIN — IPRATROPIUM BROMIDE AND ALBUTEROL SULFATE SCH ML: .5; 3 SOLUTION RESPIRATORY (INHALATION) at 08:05

## 2018-04-23 RX ADMIN — WATER SCH MLS/HR: 1 INJECTION INTRAMUSCULAR; INTRAVENOUS; SUBCUTANEOUS at 21:26

## 2018-04-23 RX ADMIN — METHYLPREDNISOLONE SODIUM SUCCINATE SCH MG: 40 INJECTION, POWDER, FOR SOLUTION INTRAMUSCULAR; INTRAVENOUS at 21:26

## 2018-04-23 RX ADMIN — PHENYTOIN SODIUM SCH MG: 50 INJECTION INTRAMUSCULAR; INTRAVENOUS at 08:22

## 2018-04-23 RX ADMIN — IPRATROPIUM BROMIDE AND ALBUTEROL SULFATE SCH ML: .5; 3 SOLUTION RESPIRATORY (INHALATION) at 15:48

## 2018-04-23 RX ADMIN — WATER SCH MLS/HR: 1 INJECTION INTRAMUSCULAR; INTRAVENOUS; SUBCUTANEOUS at 04:49

## 2018-04-23 RX ADMIN — PHENYTOIN SODIUM SCH MG: 50 INJECTION INTRAMUSCULAR; INTRAVENOUS at 16:53

## 2018-04-23 RX ADMIN — WATER SCH MLS/HR: 1 INJECTION INTRAMUSCULAR; INTRAVENOUS; SUBCUTANEOUS at 10:24

## 2018-04-23 RX ADMIN — PHENYTOIN SODIUM SCH MG: 50 INJECTION INTRAMUSCULAR; INTRAVENOUS at 00:13

## 2018-04-23 NOTE — CP.PCM.HP
History of Present Illness





- History of Present Illness


History of Present Illness: 








Patient seen and examined, full H & P to be dictated. 





Cont care as per ICU. 





Repeat ABG in AM. 





Present on Admission





- Present on Admission


Any Indicators Present on Admission: No





Past Patient History





- Infectious Disease


Hx of Infectious Diseases: None





- Tetanus Immunizations


Tetanus Immunization: Unknown





- Past Medical History & Family History


Past Medical History?: Yes





- Past Social History


Smoking Status: Current Some Days Smoker





- CARDIAC


Hx Cardiac Disorders: Yes


Hx Atrial Fibrillation: No


Hx Cardia Arrhythmia: No


Hx Congestive Heart Failure: No


Hx Hypercholesterolemia: No


Hx Hypertension: Yes


Hx Mitral Valve Prolapse: No


Hx Pacemaker: No


Hx Peripheral Edema: No





- PULMONARY


Hx Respiratory Disorders: Yes


Hx Asthma: Yes


Hx Bronchitis: No


Hx Chronic Obstructive Pulmonary Disease (COPD): Yes


Hx Emphysema: No


Hx Pneumonia: Yes


Hx Pulmonary Embolism: No


Hx Sleep Apnea: No





- NEUROLOGICAL


Hx Neurological Disorder: Yes


Hx Alzheimer's Disease: No


Hx Dementia: Yes


Hx Migraine: No


Hx Multiple Sclerosis: No


Hx Parkinson's Disease: No


Hx Seizures: Yes


Hx Transient Ischemic Attacks (TIA): No





- HEENT


Hx HEENT Problems: No





- RENAL


Hx Chronic Kidney Disease: No





- ENDOCRINE/METABOLIC


Hx Endocrine Disorders: Yes


Hx Diabetes Mellitus Type 2: Yes


Hx Hyperthyroidism: No


Hx Hypothyroidism: No





- HEMATOLOGICAL/ONCOLOGICAL


Hx Blood Disorders: Yes


Hx Anemia: Yes


Hx Human Immunodeficiency Virus (HIV): No


Hx Sickle Cell Disease: No





- INTEGUMENTARY


Hx Dermatological Problems: No





- MUSCULOSKELETAL/RHEUMATOLOGICAL


Hx Musculoskeletal Disorders: Yes


Hx Arthritis: Yes


Hx Falls: No


Hx Fractures: Yes (neck)


Hx Osteoporosis: No


Hx Rheumatoid Arthritis: No





- GASTROINTESTINAL


Hx Gastrointestinal Disorders: No


Hx Crohn's Disease: No


Hx Diverticulitis: No


Hx Gall Bladder Disease: No


Hx Gastritis: No


Hx Pancreatitis: No





- GENITOURINARY/GYNECOLOGICAL


Hx Genitourinary Disorders: Yes


Hx Sexually Transmitted Disorders: No


Other/Comment: tubal ligation





- PSYCHIATRIC


Hx Psychophysiologic Disorder: No


Hx Anxiety: No


Hx Bipolar Disorder: No


Hx Depression: No


Hx Paranoia: No


Hx Post Traumatic Stress Disorder: No


Hx Schizophrenia: No





- SURGICAL HISTORY


Hx Surgeries: Yes


Hx Appendectomy: No


Hx Carotid Endarterectomy: No


Hx Cholecystectomy: No


Hx Coronary Artery Bypass Graft: No


Hx Coronary Stent: No


Hx Tonsillectomy: No


Other/Comment: c2 c3 fusion.  tubal ligation





- ANESTHESIA


Hx Anesthesia: Yes


Hx Anesthesia Reactions: No





Meds


Allergies/Adverse Reactions: 


 Allergies











Allergy/AdvReac Type Severity Reaction Status Date / Time


 


No Known Allergies Allergy   Verified 04/11/18 18:55














Results





- Vital Signs


Recent Vital Signs: 





 Last Vital Signs











Temp  98.3 F   04/23/18 16:00


 


Pulse  55 L  04/23/18 19:39


 


Resp  12   04/23/18 18:00


 


BP  123/65   04/23/18 18:00


 


Pulse Ox  100   04/23/18 18:00














- Labs


Result Diagrams: 


 04/24/18 04:30





 04/24/18 04:30


Labs: 





 Laboratory Results - last 24 hr











  04/23/18 04/23/18 04/23/18





  04:25 04:25 05:45


 


WBC  5.6  


 


RBC  3.05 L  


 


Hgb  9.4 L  


 


Hct  29.9 L  


 


MCV  98.0  


 


MCH  30.9  


 


MCHC  31.5 L  


 


RDW  15.0 H  


 


Plt Count  299  


 


pCO2    91 H*


 


pO2    162 H


 


HCO3    24.9


 


ABG pH    7.13 L*


 


ABG Total CO2    33.1 H


 


ABG O2 Saturation    100.3 H


 


ABG O2 Content    12.9 L


 


ABG Base Excess    -0.1


 


ABG Hemoglobin    9.2 L


 


ABG Carboxyhemoglobin    2.1 H


 


POC ABG HHb (Measured)    -0.3 L


 


ABG Methemoglobin    1.1


 


ABG O2 Capacity    12.9 L


 


Almas Test    Yes


 


A-a O2 Difference    81.0


 


Hgb O2 Saturation    97.2


 


Vent Mode   


 


Mechanical Rate    22


 


FiO2    50.0


 


Inspiratory BiPAP    16


 


Expiratory BiPAP    8


 


Crit Value Called To    Roya thorne rn


 


Crit Value Called By    302


 


Crit Value Read Back    Y


 


Blood Gas Notified Time    550


 


Sodium   148 


 


Potassium   5.8 H 


 


Chloride   108 H 


 


Carbon Dioxide   28 


 


Anion Gap   18 


 


BUN   35 H 


 


Creatinine   1.8 H 


 


Est GFR ( Amer)   32 


 


Est GFR (Non-Af Amer)   27 


 


POC Glucose (mg/dL)   


 


Random Glucose   145 H 


 


Calcium   8.8 


 


Phenytoin   














  04/23/18 04/23/18 04/23/18





  06:23 07:33 08:36


 


WBC   


 


RBC   


 


Hgb   


 


Hct   


 


MCV   


 


MCH   


 


MCHC   


 


RDW   


 


Plt Count   


 


pCO2   82 H* 


 


pO2   94 


 


HCO3   25.7 


 


ABG pH   7.18 L* 


 


ABG Total CO2   33.1 H 


 


ABG O2 Saturation   99.9 H 


 


ABG O2 Content   13.0 L 


 


ABG Base Excess   1.0 


 


ABG Hemoglobin   9.4 L 


 


ABG Carboxyhemoglobin   2.2 H 


 


POC ABG HHb (Measured)   0.1 


 


ABG Methemoglobin   0.8 


 


ABG O2 Capacity   13.0 L 


 


Almas Test   Yes 


 


A-a O2 Difference   89.0 


 


Hgb O2 Saturation   96.9 


 


Vent Mode   Bipap 


 


Mechanical Rate   22 


 


FiO2   40.0 


 


Inspiratory BiPAP   16 


 


Expiratory BiPAP   8 


 


Crit Value Called To   Latanya abernathy 


 


Crit Value Called By   [facility] 


 


Crit Value Read Back   Y 


 


Blood Gas Notified Time   743 


 


Sodium   


 


Potassium   


 


Chloride   


 


Carbon Dioxide   


 


Anion Gap   


 


BUN   


 


Creatinine   


 


Est GFR ( Amer)   


 


Est GFR (Non-Af Amer)   


 


POC Glucose (mg/dL)  138 H   127 H


 


Random Glucose   


 


Calcium   


 


Phenytoin   














  04/23/18 04/23/18 04/23/18





  09:20 16:44 17:10


 


WBC   


 


RBC   


 


Hgb   


 


Hct   


 


MCV   


 


MCH   


 


MCHC   


 


RDW   


 


Plt Count   


 


pCO2   54 H 


 


pO2   140 H 


 


HCO3   27.8 


 


ABG pH   7.35 


 


ABG Total CO2   31.5 H 


 


ABG O2 Saturation   100.9 H 


 


ABG O2 Content   11.7 L 


 


ABG Base Excess   3.6 H 


 


ABG Hemoglobin   8.2 L 


 


ABG Carboxyhemoglobin   2.0 H 


 


POC ABG HHb (Measured)   -0.9 L 


 


ABG Methemoglobin   0.2 


 


ABG O2 Capacity   11.6 L 


 


Almas Test   Yes 


 


A-a O2 Difference   78.0 


 


Hgb O2 Saturation   98.7 H 


 


Vent Mode   


 


Mechanical Rate   


 


FiO2   40.0 


 


Inspiratory BiPAP   


 


Expiratory BiPAP   


 


Crit Value Called To   


 


Crit Value Called By   


 


Crit Value Read Back   


 


Blood Gas Notified Time   


 


Sodium    153 H


 


Potassium    4.6


 


Chloride    110 H


 


Carbon Dioxide    30


 


Anion Gap    18


 


BUN    40 H


 


Creatinine    1.9 H


 


Est GFR ( Amer)    30


 


Est GFR (Non-Af Amer)    25


 


POC Glucose (mg/dL)   


 


Random Glucose    137 H


 


Calcium    8.6


 


Phenytoin  5.3 L  














  04/23/18 04/23/18





  18:27 21:25


 


WBC  


 


RBC  


 


Hgb  


 


Hct  


 


MCV  


 


MCH  


 


MCHC  


 


RDW  


 


Plt Count  


 


pCO2  


 


pO2  


 


HCO3  


 


ABG pH  


 


ABG Total CO2  


 


ABG O2 Saturation  


 


ABG O2 Content  


 


ABG Base Excess  


 


ABG Hemoglobin  


 


ABG Carboxyhemoglobin  


 


POC ABG HHb (Measured)  


 


ABG Methemoglobin  


 


ABG O2 Capacity  


 


Almas Test  


 


A-a O2 Difference  


 


Hgb O2 Saturation  


 


Vent Mode  


 


Mechanical Rate  


 


FiO2  


 


Inspiratory BiPAP  


 


Expiratory BiPAP  


 


Crit Value Called To  


 


Crit Value Called By  


 


Crit Value Read Back  


 


Blood Gas Notified Time  


 


Sodium  


 


Potassium  


 


Chloride  


 


Carbon Dioxide  


 


Anion Gap  


 


BUN  


 


Creatinine  


 


Est GFR ( Amer)  


 


Est GFR (Non-Af Amer)  


 


POC Glucose (mg/dL)  141 H  165 H


 


Random Glucose  


 


Calcium  


 


Phenytoin

## 2018-04-23 NOTE — CP.CCUPN
CCU Subjective





- Physician Review


Subjective (Free Text): 





04/23/18 17:02


The patient was Seen/interviewed and examined by me at the bedside, 


Medical records reviewed and Management issues were discussed and formulated 

with the house staff.


88 Years old Female with PMHx of HTN, Diabetes, Pneumonia, Seizures, Anemia, 

Arthritis, Asthma/COPD,  Fractures (neck) and Dementia


Patient was recently hospitalized for Resp failure, Orally intubated, and 

mechanically ventilated. 


Who brought to ER yesterday for altered mental status


Patient was found to have elevated CO2 and was started on BIPAP in ER. 


Patient had CT head in ER no acute pathology


Pt initially had Twitching movements are no longer seen 


Patient more awake, response, follows basic commands.


Comfortable, Resp labored.


Afebrile, NSR on the monitor


Last 24H I&O (+250)

















CCU Objective





- Physical Exam


Head: Positive for: Atraumatic, Normocephalic


Pupils: Positive for: PERRL


Conjunctiva: Positive for: Normal


Mouth: Positive for: Moist Mucous Membranes


Neck: Positive for: Normal Range of Motion


Respiratory/Chest: Positive for: Clear to Auscultation


Cardiovascular: Positive for: Regular Rate and Rhythm


Abdomen: Positive for: Normal Bowel Sounds


Upper Extremity: Positive for: Normal Inspection


Lower Extremity: Positive for: Normal Inspection


Neurological: Positive for: Other (on BIPAP, lethergic)





- Medications


Active Medications: 


Active Medications











Generic Name Dose Route Start Last Admin





  Trade Name Freq  PRN Reason Stop Dose Admin


 


Albuterol/Ipratropium  3 ml  04/22/18 20:00  04/23/18 15:48





  Duoneb 3 Mg/0.5 Mg (3 Ml) Ud  INH   3 ml





  RQID MARIELY   Administration


 


Enoxaparin Sodium  30 mg  04/23/18 09:00  04/23/18 08:23





  Lovenox  SC   30 mg





  DAILY MARIELY   Administration





  Protocol   


 


Piperacillin Sod/Tazobactam  100 mls @ 100 mls/hr  04/22/18 22:00  04/23/18 10:

24





  Sod 3.375 gm/ Sodium Chloride  IVPB   100 mls/hr





  Q6 MARIELY   Administration





  Protocol   


 


Azithromycin 500 mg/ Sodium  250 mls @ 250 mls/hr  04/23/18 09:00  04/23/18 10:

21





  Chloride  IVPB   250 mls/hr





  DAILY MARIELY   Administration





  Protocol   


 


Methylprednisolone  40 mg  04/22/18 22:00  04/23/18 10:23





  Solu-Medrol  IVP   40 mg





  Q6 MARIELY   Administration


 


Phenytoin  100 mg  04/23/18 01:00  04/23/18 08:22





  Dilantin  IVP   100 mg





  Q8 MARIELY   Administration














- Patient Studies


Lab Studies: 


 Lab Studies











  04/23/18 04/23/18 04/23/18 Range/Units





  16:44 09:20 08:36 


 


WBC     (4.8-10.8)  K/uL


 


RBC     (3.80-5.20)  Mil/uL


 


Hgb     (12.0-16.0)  g/dL


 


Hct     (34.0-47.0)  %


 


MCV     (81.0-99.0)  fl


 


MCH     (27.0-31.0)  pg


 


MCHC     (33.0-37.0)  g/dL


 


RDW     (11.5-14.5)  %


 


Plt Count     (130-400)  K/uL


 


pCO2  54 H    (35-45)  mm/Hg


 


pO2  140 H    ()  mm/Hg


 


HCO3  27.8    (21-28)  mmol/L


 


ABG pH  7.35    (7.35-7.45)  


 


ABG Total CO2  31.5 H    (22-28)  mmol/L


 


ABG O2 Saturation  100.9 H    (95-98)  %


 


ABG O2 Content  11.7 L    (15-23)  ML/dL


 


ABG Base Excess  3.6 H    (-2.0-3.0)  mmol/L


 


ABG Hemoglobin  8.2 L    (11.7-17.4)  g/dL


 


ABG Carboxyhemoglobin  2.0 H    (0.5-1.5)  %


 


POC ABG HHb (Measured)  -0.9 L    (0.0-5.0)  %


 


ABG Methemoglobin  0.2    (0.0-3.0)  %


 


ABG O2 Capacity  11.6 L    (16-24)  mL/dL


 


Almas Test  Yes    


 


ABG Potassium     (3.6-5.2)  mmol/L


 


A-a O2 Difference  78.0    mm/Hg


 


Hgb O2 Saturation  98.7 H    (95.0-98.0)  %


 


Sodium     (132-148)  mmol/L


 


Chloride     ()  mmol/L


 


Glucose     ()  mg/dL


 


Lactate     (0.7-2.1)  mmol/L


 


Vent Mode     


 


Mechanical Rate     


 


FiO2  40.0    %


 


Inspiratory BiPAP     


 


Expiratory BiPAP     


 


Crit Value Called To     


 


Crit Value Called By     


 


Crit Value Read Back     


 


Blood Gas Notified Time     


 


Potassium     (3.6-5.0)  MMOL/L


 


Carbon Dioxide     (22-30)  mmol/L


 


Anion Gap     (10-20)  


 


BUN     (7-17)  mg/dl


 


Creatinine     (0.7-1.2)  mg/dl


 


Est GFR (African Amer)     


 


Est GFR (Non-Af Amer)     


 


POC Glucose (mg/dL)    127 H  ()  mg/dL


 


Random Glucose     ()  mg/dL


 


Calcium     (8.4-10.2)  mg/dL


 


Arterial Blood Potassium     (3.6-5.2)  mmol/L


 


Urine Opiates Screen     (NEGATIVE)  


 


Urine Methadone Screen     (NEGATIVE)  


 


Ur Barbiturates Screen     (NEGATIVE)  


 


Phenytoin   5.3 L   (10-20)  ug/ML


 


Ur Phencyclidine Scrn     (NEGATIVE)  


 


Ur Amphetamines Screen     (NEGATIVE)  


 


U Benzodiazepines Scrn     (NEGATIVE)  


 


U Oth Cocaine Metabols     (NEGATIVE)  


 


U Cannabinoids Screen     (NEGATIVE)  














  04/23/18 04/23/18 04/23/18 Range/Units





  07:33 06:23 05:45 


 


WBC     (4.8-10.8)  K/uL


 


RBC     (3.80-5.20)  Mil/uL


 


Hgb     (12.0-16.0)  g/dL


 


Hct     (34.0-47.0)  %


 


MCV     (81.0-99.0)  fl


 


MCH     (27.0-31.0)  pg


 


MCHC     (33.0-37.0)  g/dL


 


RDW     (11.5-14.5)  %


 


Plt Count     (130-400)  K/uL


 


pCO2  82 H*   91 H*  (35-45)  mm/Hg


 


pO2  94   162 H  ()  mm/Hg


 


HCO3  25.7   24.9  (21-28)  mmol/L


 


ABG pH  7.18 L*   7.13 L*  (7.35-7.45)  


 


ABG Total CO2  33.1 H   33.1 H  (22-28)  mmol/L


 


ABG O2 Saturation  99.9 H   100.3 H  (95-98)  %


 


ABG O2 Content  13.0 L   12.9 L  (15-23)  ML/dL


 


ABG Base Excess  1.0   -0.1  (-2.0-3.0)  mmol/L


 


ABG Hemoglobin  9.4 L   9.2 L  (11.7-17.4)  g/dL


 


ABG Carboxyhemoglobin  2.2 H   2.1 H  (0.5-1.5)  %


 


POC ABG HHb (Measured)  0.1   -0.3 L  (0.0-5.0)  %


 


ABG Methemoglobin  0.8   1.1  (0.0-3.0)  %


 


ABG O2 Capacity  13.0 L   12.9 L  (16-24)  mL/dL


 


Almas Test  Yes   Yes  


 


ABG Potassium     (3.6-5.2)  mmol/L


 


A-a O2 Difference  89.0   81.0  mm/Hg


 


Hgb O2 Saturation  96.9   97.2  (95.0-98.0)  %


 


Sodium     (132-148)  mmol/L


 


Chloride     ()  mmol/L


 


Glucose     ()  mg/dL


 


Lactate     (0.7-2.1)  mmol/L


 


Vent Mode  Bipap    


 


Mechanical Rate  22   22  


 


FiO2  40.0   50.0  %


 


Inspiratory BiPAP  16   16  


 


Expiratory BiPAP  8   8  


 


Crit Value Called To  Latanya thorne rn  


 


Crit Value Called By  S bay   302  


 


Crit Value Read Back  Y   Y  


 


Blood Gas Notified Time  743   550  


 


Potassium     (3.6-5.0)  MMOL/L


 


Carbon Dioxide     (22-30)  mmol/L


 


Anion Gap     (10-20)  


 


BUN     (7-17)  mg/dl


 


Creatinine     (0.7-1.2)  mg/dl


 


Est GFR (African Amer)     


 


Est GFR (Non-Af Amer)     


 


POC Glucose (mg/dL)   138 H   ()  mg/dL


 


Random Glucose     ()  mg/dL


 


Calcium     (8.4-10.2)  mg/dL


 


Arterial Blood Potassium     (3.6-5.2)  mmol/L


 


Urine Opiates Screen     (NEGATIVE)  


 


Urine Methadone Screen     (NEGATIVE)  


 


Ur Barbiturates Screen     (NEGATIVE)  


 


Phenytoin     (10-20)  ug/ML


 


Ur Phencyclidine Scrn     (NEGATIVE)  


 


Ur Amphetamines Screen     (NEGATIVE)  


 


U Benzodiazepines Scrn     (NEGATIVE)  


 


U Oth Cocaine Metabols     (NEGATIVE)  


 


U Cannabinoids Screen     (NEGATIVE)  














  04/23/18 04/23/18 04/22/18 Range/Units





  04:25 04:25 22:07 


 


WBC   5.6   (4.8-10.8)  K/uL


 


RBC   3.05 L   (3.80-5.20)  Mil/uL


 


Hgb   9.4 L   (12.0-16.0)  g/dL


 


Hct   29.9 L   (34.0-47.0)  %


 


MCV   98.0   (81.0-99.0)  fl


 


MCH   30.9   (27.0-31.0)  pg


 


MCHC   31.5 L   (33.0-37.0)  g/dL


 


RDW   15.0 H   (11.5-14.5)  %


 


Plt Count   299   (130-400)  K/uL


 


pCO2     (35-45)  mm/Hg


 


pO2     ()  mm/Hg


 


HCO3     (21-28)  mmol/L


 


ABG pH     (7.35-7.45)  


 


ABG Total CO2     (22-28)  mmol/L


 


ABG O2 Saturation     (95-98)  %


 


ABG O2 Content     (15-23)  ML/dL


 


ABG Base Excess     (-2.0-3.0)  mmol/L


 


ABG Hemoglobin     (11.7-17.4)  g/dL


 


ABG Carboxyhemoglobin     (0.5-1.5)  %


 


POC ABG HHb (Measured)     (0.0-5.0)  %


 


ABG Methemoglobin     (0.0-3.0)  %


 


ABG O2 Capacity     (16-24)  mL/dL


 


Almas Test     


 


ABG Potassium     (3.6-5.2)  mmol/L


 


A-a O2 Difference     mm/Hg


 


Hgb O2 Saturation     (95.0-98.0)  %


 


Sodium  148    (132-148)  mmol/L


 


Chloride  108 H    ()  mmol/L


 


Glucose     ()  mg/dL


 


Lactate     (0.7-2.1)  mmol/L


 


Vent Mode     


 


Mechanical Rate     


 


FiO2     %


 


Inspiratory BiPAP     


 


Expiratory BiPAP     


 


Crit Value Called To     


 


Crit Value Called By     


 


Crit Value Read Back     


 


Blood Gas Notified Time     


 


Potassium  5.8 H    (3.6-5.0)  MMOL/L


 


Carbon Dioxide  28    (22-30)  mmol/L


 


Anion Gap  18    (10-20)  


 


BUN  35 H    (7-17)  mg/dl


 


Creatinine  1.8 H    (0.7-1.2)  mg/dl


 


Est GFR (African Amer)  32    


 


Est GFR (Non-Af Amer)  27    


 


POC Glucose (mg/dL)    119 H  ()  mg/dL


 


Random Glucose  145 H    ()  mg/dL


 


Calcium  8.8    (8.4-10.2)  mg/dL


 


Arterial Blood Potassium     (3.6-5.2)  mmol/L


 


Urine Opiates Screen     (NEGATIVE)  


 


Urine Methadone Screen     (NEGATIVE)  


 


Ur Barbiturates Screen     (NEGATIVE)  


 


Phenytoin     (10-20)  ug/ML


 


Ur Phencyclidine Scrn     (NEGATIVE)  


 


Ur Amphetamines Screen     (NEGATIVE)  


 


U Benzodiazepines Scrn     (NEGATIVE)  


 


U Oth Cocaine Metabols     (NEGATIVE)  


 


U Cannabinoids Screen     (NEGATIVE)  














  04/22/18 04/22/18 04/22/18 Range/Units





  20:12 17:43 16:50 


 


WBC     (4.8-10.8)  K/uL


 


RBC     (3.80-5.20)  Mil/uL


 


Hgb     (12.0-16.0)  g/dL


 


Hct     (34.0-47.0)  %


 


MCV     (81.0-99.0)  fl


 


MCH     (27.0-31.0)  pg


 


MCHC     (33.0-37.0)  g/dL


 


RDW     (11.5-14.5)  %


 


Plt Count     (130-400)  K/uL


 


pCO2  89 H*  100 H*   (35-45)  mm/Hg


 


pO2  121 H  159 H   ()  mm/Hg


 


HCO3  26.8  27.0   (21-28)  mmol/L


 


ABG pH  7.17 L*  7.15 L*   (7.35-7.45)  


 


ABG Total CO2  35.2 H  37.9 H   (22-28)  mmol/L


 


ABG O2 Saturation  100.1 H  100.2 H   (95-98)  %


 


ABG O2 Content  13.2 L    (15-23)  ML/dL


 


ABG Base Excess  2.4  2.6   (-2.0-3.0)  mmol/L


 


ABG Hemoglobin  9.6 L    (11.7-17.4)  g/dL


 


ABG Carboxyhemoglobin  1.4    (0.5-1.5)  %


 


POC ABG HHb (Measured)  -0.1 L    (0.0-5.0)  %


 


ABG Methemoglobin  2.5    (0.0-3.0)  %


 


ABG O2 Capacity  13.2 L    (16-24)  mL/dL


 


Almas Test  Yes  Yes   


 


ABG Potassium   5.4 H   (3.6-5.2)  mmol/L


 


A-a O2 Difference  124.0  73.0   mm/Hg


 


Hgb O2 Saturation  96.2    (95.0-98.0)  %


 


Sodium   143.0   (132-148)  mmol/L


 


Chloride   112.0 H   ()  mmol/L


 


Glucose   117 H   ()  mg/dL


 


Lactate   0.5 L   (0.7-2.1)  mmol/L


 


Vent Mode     


 


Mechanical Rate  22  18   


 


FiO2  50.0  50.0   %


 


Inspiratory BiPAP  16  16   


 


Expiratory BiPAP  8  8   


 


Crit Value Called To  lenore Erwin sau yan   


 


Crit Value Called By  22 22   


 


Crit Value Read Back  Y  Y   


 


Blood Gas Notified Time  2028 1751   


 


Potassium     (3.6-5.0)  MMOL/L


 


Carbon Dioxide     (22-30)  mmol/L


 


Anion Gap     (10-20)  


 


BUN     (7-17)  mg/dl


 


Creatinine     (0.7-1.2)  mg/dl


 


Est GFR (African Amer)     


 


Est GFR (Non-Af Amer)     


 


POC Glucose (mg/dL)     ()  mg/dL


 


Random Glucose     ()  mg/dL


 


Calcium     (8.4-10.2)  mg/dL


 


Arterial Blood Potassium   5.4 H   (3.6-5.2)  mmol/L


 


Urine Opiates Screen    Negative  (NEGATIVE)  


 


Urine Methadone Screen    Negative  (NEGATIVE)  


 


Ur Barbiturates Screen    Negative  (NEGATIVE)  


 


Phenytoin     (10-20)  ug/ML


 


Ur Phencyclidine Scrn    Negative  (NEGATIVE)  


 


Ur Amphetamines Screen    Negative  (NEGATIVE)  


 


U Benzodiazepines Scrn    Negative  (NEGATIVE)  


 


U Oth Cocaine Metabols    Negative  (NEGATIVE)  


 


U Cannabinoids Screen    Negative  (NEGATIVE)  














  04/22/18 Range/Units





  13:49 


 


WBC   (4.8-10.8)  K/uL


 


RBC   (3.80-5.20)  Mil/uL


 


Hgb   (12.0-16.0)  g/dL


 


Hct   (34.0-47.0)  %


 


MCV   (81.0-99.0)  fl


 


MCH   (27.0-31.0)  pg


 


MCHC   (33.0-37.0)  g/dL


 


RDW   (11.5-14.5)  %


 


Plt Count   (130-400)  K/uL


 


pCO2   (35-45)  mm/Hg


 


pO2   ()  mm/Hg


 


HCO3   (21-28)  mmol/L


 


ABG pH   (7.35-7.45)  


 


ABG Total CO2   (22-28)  mmol/L


 


ABG O2 Saturation   (95-98)  %


 


ABG O2 Content   (15-23)  ML/dL


 


ABG Base Excess   (-2.0-3.0)  mmol/L


 


ABG Hemoglobin   (11.7-17.4)  g/dL


 


ABG Carboxyhemoglobin   (0.5-1.5)  %


 


POC ABG HHb (Measured)   (0.0-5.0)  %


 


ABG Methemoglobin   (0.0-3.0)  %


 


ABG O2 Capacity   (16-24)  mL/dL


 


Almas Test   


 


ABG Potassium   (3.6-5.2)  mmol/L


 


A-a O2 Difference   mm/Hg


 


Hgb O2 Saturation   (95.0-98.0)  %


 


Sodium   (132-148)  mmol/L


 


Chloride   ()  mmol/L


 


Glucose   ()  mg/dL


 


Lactate   (0.7-2.1)  mmol/L


 


Vent Mode   


 


Mechanical Rate   


 


FiO2   %


 


Inspiratory BiPAP   


 


Expiratory BiPAP   


 


Crit Value Called To   


 


Crit Value Called By   


 


Crit Value Read Back   


 


Blood Gas Notified Time   


 


Potassium   (3.6-5.0)  MMOL/L


 


Carbon Dioxide   (22-30)  mmol/L


 


Anion Gap   (10-20)  


 


BUN   (7-17)  mg/dl


 


Creatinine   (0.7-1.2)  mg/dl


 


Est GFR (African Amer)   


 


Est GFR (Non-Af Amer)   


 


POC Glucose (mg/dL)  155 H  ()  mg/dL


 


Random Glucose   ()  mg/dL


 


Calcium   (8.4-10.2)  mg/dL


 


Arterial Blood Potassium   (3.6-5.2)  mmol/L


 


Urine Opiates Screen   (NEGATIVE)  


 


Urine Methadone Screen   (NEGATIVE)  


 


Ur Barbiturates Screen   (NEGATIVE)  


 


Phenytoin   (10-20)  ug/ML


 


Ur Phencyclidine Scrn   (NEGATIVE)  


 


Ur Amphetamines Screen   (NEGATIVE)  


 


U Benzodiazepines Scrn   (NEGATIVE)  


 


U Oth Cocaine Metabols   (NEGATIVE)  


 


U Cannabinoids Screen   (NEGATIVE)  








 Laboratory Results - last 24 hr











  04/22/18 04/22/18 04/22/18





  13:49 16:50 17:43


 


WBC   


 


RBC   


 


Hgb   


 


Hct   


 


MCV   


 


MCH   


 


MCHC   


 


RDW   


 


Plt Count   


 


pCO2    100 H*


 


pO2    159 H


 


HCO3    27.0


 


ABG pH    7.15 L*


 


ABG Total CO2    37.9 H


 


ABG O2 Saturation    100.2 H


 


ABG O2 Content   


 


ABG Base Excess    2.6


 


ABG Hemoglobin   


 


ABG Carboxyhemoglobin   


 


POC ABG HHb (Measured)   


 


ABG Methemoglobin   


 


ABG O2 Capacity   


 


Almas Test    Yes


 


ABG Potassium    5.4 H


 


A-a O2 Difference    73.0


 


Hgb O2 Saturation   


 


Sodium    143.0


 


Chloride    112.0 H


 


Glucose    117 H


 


Lactate    0.5 L


 


Vent Mode   


 


Mechanical Rate    18


 


FiO2    50.0


 


Inspiratory BiPAP    16


 


Expiratory BiPAP    8


 


Crit Value Called To    abiodun Lobo


 


Crit Value Called By    22


 


Crit Value Read Back    Y


 


Blood Gas Notified Time    1751


 


Potassium   


 


Carbon Dioxide   


 


Anion Gap   


 


BUN   


 


Creatinine   


 


Est GFR ( Amer)   


 


Est GFR (Non-Af Amer)   


 


POC Glucose (mg/dL)  155 H  


 


Random Glucose   


 


Calcium   


 


Arterial Blood Potassium    5.4 H


 


Urine Opiates Screen   Negative 


 


Urine Methadone Screen   Negative 


 


Ur Barbiturates Screen   Negative 


 


Phenytoin   


 


Ur Phencyclidine Scrn   Negative 


 


Ur Amphetamines Screen   Negative 


 


U Benzodiazepines Scrn   Negative 


 


U Oth Cocaine Metabols   Negative 


 


U Cannabinoids Screen   Negative 














  04/22/18 04/22/18 04/23/18





  20:12 22:07 04:25


 


WBC    5.6


 


RBC    3.05 L


 


Hgb    9.4 L


 


Hct    29.9 L


 


MCV    98.0


 


MCH    30.9


 


MCHC    31.5 L


 


RDW    15.0 H


 


Plt Count    299


 


pCO2  89 H*  


 


pO2  121 H  


 


HCO3  26.8  


 


ABG pH  7.17 L*  


 


ABG Total CO2  35.2 H  


 


ABG O2 Saturation  100.1 H  


 


ABG O2 Content  13.2 L  


 


ABG Base Excess  2.4  


 


ABG Hemoglobin  9.6 L  


 


ABG Carboxyhemoglobin  1.4  


 


POC ABG HHb (Measured)  -0.1 L  


 


ABG Methemoglobin  2.5  


 


ABG O2 Capacity  13.2 L  


 


Almas Test  Yes  


 


ABG Potassium   


 


A-a O2 Difference  124.0  


 


Hgb O2 Saturation  96.2  


 


Sodium   


 


Chloride   


 


Glucose   


 


Lactate   


 


Vent Mode   


 


Mechanical Rate  22  


 


FiO2  50.0  


 


Inspiratory BiPAP  16  


 


Expiratory BiPAP  8  


 


Crit Value Called To  lenore Erwin  


 


Crit Value Called By  22  


 


Crit Value Read Back  Y  


 


Blood Gas Notified Time  2028  


 


Potassium   


 


Carbon Dioxide   


 


Anion Gap   


 


BUN   


 


Creatinine   


 


Est GFR ( Amer)   


 


Est GFR (Non-Af Amer)   


 


POC Glucose (mg/dL)   119 H 


 


Random Glucose   


 


Calcium   


 


Arterial Blood Potassium   


 


Urine Opiates Screen   


 


Urine Methadone Screen   


 


Ur Barbiturates Screen   


 


Phenytoin   


 


Ur Phencyclidine Scrn   


 


Ur Amphetamines Screen   


 


U Benzodiazepines Scrn   


 


U Oth Cocaine Metabols   


 


U Cannabinoids Screen   














  04/23/18 04/23/18 04/23/18





  04:25 05:45 06:23


 


WBC   


 


RBC   


 


Hgb   


 


Hct   


 


MCV   


 


MCH   


 


MCHC   


 


RDW   


 


Plt Count   


 


pCO2   91 H* 


 


pO2   162 H 


 


HCO3   24.9 


 


ABG pH   7.13 L* 


 


ABG Total CO2   33.1 H 


 


ABG O2 Saturation   100.3 H 


 


ABG O2 Content   12.9 L 


 


ABG Base Excess   -0.1 


 


ABG Hemoglobin   9.2 L 


 


ABG Carboxyhemoglobin   2.1 H 


 


POC ABG HHb (Measured)   -0.3 L 


 


ABG Methemoglobin   1.1 


 


ABG O2 Capacity   12.9 L 


 


Almas Test   Yes 


 


ABG Potassium   


 


A-a O2 Difference   81.0 


 


Hgb O2 Saturation   97.2 


 


Sodium  148  


 


Chloride  108 H  


 


Glucose   


 


Lactate   


 


Vent Mode   


 


Mechanical Rate   22 


 


FiO2   50.0 


 


Inspiratory BiPAP   16 


 


Expiratory BiPAP   8 


 


Crit Value Called To   Roya thorne rn 


 


Crit Value Called By   302 


 


Crit Value Read Back   Y 


 


Blood Gas Notified Time   550 


 


Potassium  5.8 H  


 


Carbon Dioxide  28  


 


Anion Gap  18  


 


BUN  35 H  


 


Creatinine  1.8 H  


 


Est GFR ( Amer)  32  


 


Est GFR (Non-Af Amer)  27  


 


POC Glucose (mg/dL)    138 H


 


Random Glucose  145 H  


 


Calcium  8.8  


 


Arterial Blood Potassium   


 


Urine Opiates Screen   


 


Urine Methadone Screen   


 


Ur Barbiturates Screen   


 


Phenytoin   


 


Ur Phencyclidine Scrn   


 


Ur Amphetamines Screen   


 


U Benzodiazepines Scrn   


 


U Oth Cocaine Metabols   


 


U Cannabinoids Screen   














  04/23/18 04/23/18 04/23/18





  07:33 08:36 09:20


 


WBC   


 


RBC   


 


Hgb   


 


Hct   


 


MCV   


 


MCH   


 


MCHC   


 


RDW   


 


Plt Count   


 


pCO2  82 H*  


 


pO2  94  


 


HCO3  25.7  


 


ABG pH  7.18 L*  


 


ABG Total CO2  33.1 H  


 


ABG O2 Saturation  99.9 H  


 


ABG O2 Content  13.0 L  


 


ABG Base Excess  1.0  


 


ABG Hemoglobin  9.4 L  


 


ABG Carboxyhemoglobin  2.2 H  


 


POC ABG HHb (Measured)  0.1  


 


ABG Methemoglobin  0.8  


 


ABG O2 Capacity  13.0 L  


 


Almas Test  Yes  


 


ABG Potassium   


 


A-a O2 Difference  89.0  


 


Hgb O2 Saturation  96.9  


 


Sodium   


 


Chloride   


 


Glucose   


 


Lactate   


 


Vent Mode  Bipap  


 


Mechanical Rate  22  


 


FiO2  40.0  


 


Inspiratory BiPAP  16  


 


Expiratory BiPAP  8  


 


Crit Value Called To  Latanya abernathy  


 


Crit Value Called By  S bay  


 


Crit Value Read Back  Y  


 


Blood Gas Notified Time  743  


 


Potassium   


 


Carbon Dioxide   


 


Anion Gap   


 


BUN   


 


Creatinine   


 


Est GFR ( Amer)   


 


Est GFR (Non-Af Amer)   


 


POC Glucose (mg/dL)   127 H 


 


Random Glucose   


 


Calcium   


 


Arterial Blood Potassium   


 


Urine Opiates Screen   


 


Urine Methadone Screen   


 


Ur Barbiturates Screen   


 


Phenytoin    5.3 L


 


Ur Phencyclidine Scrn   


 


Ur Amphetamines Screen   


 


U Benzodiazepines Scrn   


 


U Oth Cocaine Metabols   


 


U Cannabinoids Screen   














  04/23/18





  16:44


 


WBC 


 


RBC 


 


Hgb 


 


Hct 


 


MCV 


 


MCH 


 


MCHC 


 


RDW 


 


Plt Count 


 


pCO2  54 H


 


pO2  140 H


 


HCO3  27.8


 


ABG pH  7.35


 


ABG Total CO2  31.5 H


 


ABG O2 Saturation  100.9 H


 


ABG O2 Content  11.7 L


 


ABG Base Excess  3.6 H


 


ABG Hemoglobin  8.2 L


 


ABG Carboxyhemoglobin  2.0 H


 


POC ABG HHb (Measured)  -0.9 L


 


ABG Methemoglobin  0.2


 


ABG O2 Capacity  11.6 L


 


[place] Test  Yes


 


ABG Potassium 


 


A-a O2 Difference  78.0


 


Hgb O2 Saturation  98.7 H


 


Sodium 


 


Chloride 


 


Glucose 


 


Lactate 


 


Vent Mode 


 


Mechanical Rate 


 


FiO2  40.0


 


Inspiratory BiPAP 


 


Expiratory BiPAP 


 


Crit Value Called To 


 


Crit Value Called By 


 


Crit Value Read Back 


 


Blood Gas Notified Time 


 


Potassium 


 


Carbon Dioxide 


 


Anion Gap 


 


BUN 


 


Creatinine 


 


Est GFR ( Amer) 


 


Est GFR (Non-Af Amer) 


 


POC Glucose (mg/dL) 


 


Random Glucose 


 


Calcium 


 


Arterial Blood Potassium 


 


Urine Opiates Screen 


 


Urine Methadone Screen 


 


Ur Barbiturates Screen 


 


Phenytoin 


 


Ur Phencyclidine Scrn 


 


Ur Amphetamines Screen 


 


U Benzodiazepines Scrn 


 


U Oth Cocaine Metabols 


 


U Cannabinoids Screen 











EKG/Cardiology Studies: 


Cardiology / EKG Studies





04/23/18


EKG [ELECTROCARDIOGRAM] Stat 


   Comment: 


   Mode Of Transportation: PORTABLE


   Reason For Exam: Bradycardia











Fingerstick Blood Sugar Results: 155





Review of Systems





- Review of Systems


Systems not reviewed;Unavailable: Altered Mental Status





- Constitutional


Constitutional: absent: Fever, Chills





- Cardiovascular


Cardiovascular: absent: Chest Pain, Chest Pain at Rest, Chest Pain with Activity

, Diaphoresis





- Respiratory


Respiratory: absent: Cough, Dyspnea, Hemoptysis, Dyspnea on Exertion, Wheezing, 

Snoring





- Gastrointestinal


Gastrointestinal: absent: Abdominal Pain, Coffee Ground Emesis, Heartburn, 

Melena, Nausea, Vomiting





Critical Care Progress Note





- Extremities/Vascular


Does the Patient have a Central Venous Catheter?: No


Does the Patient need a Central Venous Catheter?: No


Does the Patient have a Mace Catheter?: No


Does the Patient need a Mace Catheter?: No





Assessment/Plan


(1) Acute respiratory failure with hypercapnia


Current Visit: Yes   Status: Acute   Priority: High   Comment: 


BIPAP, sitting adjusted.


Serial ABG


Continue Solu-Medrol 40 mg IVP Q6 


Gentle diuresis


C/W Albuterol/Ipratropium inhaler Q 4H  


Maintain aspiration precautions


Anrtibiotics with IV Zosyn   





(2) Altered mental status


Current Visit: Yes   Status: Acute   Priority: High   





(3) Recurrent seizures


Current Visit: Yes   Status: Acute   Priority: High   Comment: 


Continue Phenytoin, foloe up level, if low, discuss with neuro for dose change


PRN Ativan   





(4) Aspiration pneumonia


Current Visit: No   Status: Acute   





(5) Pleural effusion


Current Visit: No   Status: Acute

## 2018-04-23 NOTE — CARD
--------------- APPROVED REPORT --------------





EKG Measurement

Heart [id number]

VT [id number]

[id number]

[id number]

[id number]



<Conclusion>

Sinus rhythm with premature atrial complexes

Otherwise normal ECG

## 2018-04-23 NOTE — CARD
--------------- APPROVED REPORT --------------





EKG Measurement

Heart [id number]

SD [id number]

[id number]

[id number]

[id number]



<Conclusion>

Normal sinus rhythm

Septal infarct, age undetermined

Abnormal ECG

## 2018-04-24 LAB
ARTERIAL BLOOD GAS HEMOGLOBIN: 8.2 G/DL (ref 11.7–17.4)
ARTERIAL BLOOD GAS O2 SAT: 100.9 % (ref 95–98)
ARTERIAL BLOOD GAS PCO2: 58 MM/HG (ref 35–45)
ARTERIAL BLOOD GAS TCO2: 31.7 MMOL/L (ref 22–28)
ARTERIAL PATENCY WRIST A: YES
BUN SERPL-MCNC: 42 MG/DL (ref 7–17)
CALCIUM SERPL-MCNC: 8.3 MG/DL (ref 8.4–10.2)
ERYTHROCYTE [DISTWIDTH] IN BLOOD BY AUTOMATED COUNT: 14.5 % (ref 11.5–14.5)
GFR NON-AFRICAN AMERICAN: 25
HCO3 BLDA-SCNC: 27.4 MMOL/L (ref 21–28)
HGB BLD-MCNC: 8 G/DL (ref 12–16)
INHALED O2 CONCENTRATION: 32 %
MCH RBC QN AUTO: 29.8 PG (ref 27–31)
MCHC RBC AUTO-ENTMCNC: 31.3 G/DL (ref 33–37)
MCV RBC AUTO: 95.3 FL (ref 81–99)
O2 CAP BLDA-SCNC: 11.5 ML/DL (ref 16–24)
O2 CT BLDA-SCNC: 11.6 ML/DL (ref 15–23)
PH BLDA: 7.32 [PH] (ref 7.35–7.45)
PLATELET # BLD: 263 K/UL (ref 130–400)
PO2 BLDA: 131 MM/HG (ref 80–100)
RBC # BLD AUTO: 2.68 MIL/UL (ref 3.8–5.2)
WBC # BLD AUTO: 4.4 K/UL (ref 4.8–10.8)

## 2018-04-24 RX ADMIN — WATER SCH MLS/HR: 1 INJECTION INTRAMUSCULAR; INTRAVENOUS; SUBCUTANEOUS at 21:06

## 2018-04-24 RX ADMIN — IPRATROPIUM BROMIDE AND ALBUTEROL SULFATE SCH ML: .5; 3 SOLUTION RESPIRATORY (INHALATION) at 07:12

## 2018-04-24 RX ADMIN — IPRATROPIUM BROMIDE AND ALBUTEROL SULFATE SCH ML: .5; 3 SOLUTION RESPIRATORY (INHALATION) at 19:07

## 2018-04-24 RX ADMIN — PHENYTOIN SCH MG: 125 SUSPENSION ORAL at 10:18

## 2018-04-24 RX ADMIN — INSULIN LISPRO SCH: 100 INJECTION, SOLUTION INTRAVENOUS; SUBCUTANEOUS at 21:13

## 2018-04-24 RX ADMIN — IPRATROPIUM BROMIDE AND ALBUTEROL SULFATE SCH ML: .5; 3 SOLUTION RESPIRATORY (INHALATION) at 11:27

## 2018-04-24 RX ADMIN — WATER SCH MLS/HR: 1 INJECTION INTRAMUSCULAR; INTRAVENOUS; SUBCUTANEOUS at 09:17

## 2018-04-24 RX ADMIN — INSULIN LISPRO SCH U: 100 INJECTION, SOLUTION INTRAVENOUS; SUBCUTANEOUS at 16:37

## 2018-04-24 RX ADMIN — IPRATROPIUM BROMIDE AND ALBUTEROL SULFATE SCH ML: .5; 3 SOLUTION RESPIRATORY (INHALATION) at 15:41

## 2018-04-24 RX ADMIN — PHENYTOIN SODIUM SCH MG: 50 INJECTION INTRAMUSCULAR; INTRAVENOUS at 00:37

## 2018-04-24 RX ADMIN — WATER SCH MLS/HR: 1 INJECTION INTRAMUSCULAR; INTRAVENOUS; SUBCUTANEOUS at 04:01

## 2018-04-24 RX ADMIN — WATER SCH MLS/HR: 1 INJECTION INTRAMUSCULAR; INTRAVENOUS; SUBCUTANEOUS at 15:49

## 2018-04-24 RX ADMIN — INSULIN LISPRO SCH U: 100 INJECTION, SOLUTION INTRAVENOUS; SUBCUTANEOUS at 12:00

## 2018-04-24 RX ADMIN — INSULIN LISPRO SCH: 100 INJECTION, SOLUTION INTRAVENOUS; SUBCUTANEOUS at 06:56

## 2018-04-24 RX ADMIN — METHYLPREDNISOLONE SODIUM SUCCINATE SCH MG: 40 INJECTION, POWDER, FOR SOLUTION INTRAMUSCULAR; INTRAVENOUS at 09:16

## 2018-04-24 RX ADMIN — ENOXAPARIN SODIUM SCH MG: 30 INJECTION SUBCUTANEOUS at 08:35

## 2018-04-24 RX ADMIN — METHYLPREDNISOLONE SODIUM SUCCINATE SCH MG: 40 INJECTION, POWDER, FOR SOLUTION INTRAMUSCULAR; INTRAVENOUS at 04:01

## 2018-04-24 RX ADMIN — PHENYTOIN SCH MG: 125 SUSPENSION ORAL at 13:05

## 2018-04-24 RX ADMIN — METHYLPREDNISOLONE SODIUM SUCCINATE SCH MG: 40 INJECTION, POWDER, FOR SOLUTION INTRAMUSCULAR; INTRAVENOUS at 21:05

## 2018-04-24 RX ADMIN — PHENYTOIN SCH MG: 125 SUSPENSION ORAL at 16:37

## 2018-04-24 NOTE — CP.CCUPN
CCU Subjective





- Physician Review


Subjective (Free Text): 








04/24/18 15:21


The patient was Seen/interviewed and examined by me at the bedside, 


Medical records reviewed and Management issues were discussed and formulated 

with the house staff.


88 Years old Female with PMHx of HTN, Diabetes, Pneumonia, Seizures, Anemia, 

Arthritis, Asthma/COPD,  Fractures (neck) and Dementia


Patient was recently hospitalized for Resp failure, Orally intubated, and 

mechanically ventilated. 


Who brought to ER 4/22 for altered mental status


Patient was found to have elevated CO2 and was started on BIPAP in ER. 


Patient had CT head in ER no acute pathology


Pt initially had Twitching movements are no longer seen 


Admitted to the ICU with hypercabnic respiratory failure, requiring BIPAP


Patient more awake today, response, follows basic commands.


Comfortable, Resp labored, on 4L nasal cannula


Afebrile, NSR on the monitor


Last 24H I&O Pt incontinent 


Evaluated by wound care for  blanchable redness on sacral region








CCU Objective





- Vital Signs / Intake & Output


Vital Signs (Last 4 hours): 


Vital Signs











  Temp Pulse Resp BP Pulse Ox


 


 04/24/18 14:00   66  14  165/69 H  99


 


 04/24/18 13:00   65  21  161/79 H  100


 


 04/24/18 12:00  99.2 F  60  20  166/72 H  98











Intake and Output (Last 8hrs): 


 Intake & Output











 04/24/18 04/24/18 04/24/18





 06:59 14:59 22:59


 


Weight  173 lb 














- Physical Exam


Head: Positive for: Atraumatic, Normocephalic


Pupils: Positive for: PERRL


Conjunctiva: Positive for: Normal


Mouth: Positive for: Moist Mucous Membranes


Neck: Positive for: Normal Range of Motion


Respiratory/Chest: Positive for: Clear to Auscultation


Cardiovascular: Positive for: Regular Rate and Rhythm


Abdomen: Positive for: Normal Bowel Sounds


Upper Extremity: Positive for: Normal Inspection


Lower Extremity: Positive for: Normal Inspection


Neurological: Positive for: Other (on BIPAP, lethergic)





- Medications


Active Medications: 


Active Medications











Generic Name Dose Route Start Last Admin





  Trade Name Freq  PRN Reason Stop Dose Admin


 


Albuterol/Ipratropium  3 ml  04/22/18 20:00  04/24/18 11:27





  Duoneb 3 Mg/0.5 Mg (3 Ml) Ud  INH   3 ml





  RQID [facility]   Administration


 


Enoxaparin Sodium  30 mg  04/23/18 09:00  04/24/18 08:35





  Lovenox  SC   30 mg





  DAILY [facility]   Administration





  Protocol   


 


Piperacillin Sod/Tazobactam  100 mls @ 100 mls/hr  04/22/18 22:00  04/24/18 09:

17





  Sod 3.375 gm/ Sodium Chloride  IVPB   100 mls/hr





  Q6 [facility]   Administration





  Protocol   


 


Azithromycin 500 mg/ Sodium  250 mls @ 250 mls/hr  04/23/18 09:00  04/24/18 08:

38





  Chloride  IVPB   250 mls/hr





  DAILY [facility]   Administration





  Protocol   


 


Sodium Chloride  1,000 mls @ 75 mls/hr  04/23/18 20:45  04/23/18 21:26





  Sodium Chloride 0.45%  IV  04/24/18 20:38  75 mls/hr





  [id number] [facility]   Administration


 


Insulin Human Lispro  0 units  04/23/18 22:00  04/24/18 12:00





  Humalog  SC   2 u





  [facility]   Administration





  Protocol   


 


Methylprednisolone  40 mg  04/22/18 22:00  04/24/18 09:16





  Solu-Medrol  IVP   40 mg





  Q6 [facility]   Administration


 


Phenytoin  100 mg  04/24/18 09:00  04/24/18 13:05





  Dilantin  PO   100 mg





  TID MARI[facility]   Administration














- Patient Studies


Lab Studies: 


 Lab Studies











  04/24/18 04/24/18 04/24/18 Range/Units





  11:57 06:54 04:30 


 


WBC     (4.8-10.8)  K/uL


 


RBC     (3.80-5.20)  Mil/uL


 


Hgb     (12.0-16.0)  g/dL


 


Hct     (34.0-47.0)  %


 


MCV     (81.0-99.0)  fl


 


MCH     (27.0-31.0)  pg


 


MCHC     (33.0-37.0)  g/dL


 


RDW     (11.5-14.5)  %


 


Plt Count     (130-400)  K/uL


 


pCO2     (35-45)  mm/Hg


 


pO2     ()  mm/Hg


 


HCO3     (21-28)  mmol/L


 


ABG pH     (7.35-7.45)  


 


ABG Total CO2     (22-28)  mmol/L


 


ABG O2 Saturation     (95-98)  %


 


ABG O2 Content     (15-23)  ML/dL


 


ABG Base Excess     (-2.0-3.0)  mmol/L


 


ABG Hemoglobin     (11.7-17.4)  g/dL


 


ABG Carboxyhemoglobin     (0.5-1.5)  %


 


POC ABG HHb (Measured)     (0.0-5.0)  %


 


ABG Methemoglobin     (0.0-3.0)  %


 


ABG O2 Capacity     (16-24)  mL/dL


 


[place] Test     


 


A-a O2 Difference     mm/Hg


 


Hgb O2 Saturation     (95.0-98.0)  %


 


FiO2     %


 


Sodium    148  (132-148)  mmol/l


 


Potassium    4.2  (3.6-5.0)  MMOL/L


 


Chloride    108 H  ()  mmol/L


 


Carbon Dioxide    29  (22-30)  mmol/L


 


Anion Gap    15  (10-20)  


 


BUN    42 H  (7-17)  mg/dl


 


Creatinine    1.9 H  (0.7-1.2)  mg/dl


 


Est GFR (African Amer)    30  


 


Est GFR (Non-Af Amer)    25  


 


POC Glucose (mg/dL)  247 H  118 H   ()  mg/dL


 


Random Glucose    147 H  ()  mg/dL


 


Calcium    8.3 L  (8.4-10.2)  mg/dL














  04/24/18 04/23/18 04/23/18 Range/Units





  04:30 21:25 18:27 


 


WBC  4.4 L    (4.8-10.8)  K/uL


 


RBC  2.68 L    (3.80-5.20)  Mil/uL


 


Hgb  8.0 L    (12.0-16.0)  g/dL


 


Hct  25.5 L    (34.0-47.0)  %


 


MCV  95.3  D    (81.0-99.0)  fl


 


MCH  29.8    (27.0-31.0)  pg


 


MCHC  31.3 L    (33.0-37.0)  g/dL


 


RDW  14.5    (11.5-14.5)  %


 


Plt Count  263    (130-400)  K/uL


 


pCO2     (35-45)  mm/Hg


 


pO2     ()  mm/Hg


 


HCO3     (21-28)  mmol/L


 


ABG pH     (7.35-7.45)  


 


ABG Total CO2     (22-28)  mmol/L


 


ABG O2 Saturation     (95-98)  %


 


ABG O2 Content     (15-23)  ML/dL


 


ABG Base Excess     (-2.0-3.0)  mmol/L


 


ABG Hemoglobin     (11.7-17.4)  g/dL


 


ABG Carboxyhemoglobin     (0.5-1.5)  %


 


POC ABG HHb (Measured)     (0.0-5.0)  %


 


ABG Methemoglobin     (0.0-3.0)  %


 


ABG O2 Capacity     (16-24)  mL/dL


 


[place] Test     


 


A-a O2 Difference     mm/Hg


 


Hgb O2 Saturation     (95.0-98.0)  %


 


FiO2     %


 


Sodium     (132-148)  mmol/l


 


Potassium     (3.6-5.0)  MMOL/L


 


Chloride     ()  mmol/L


 


Carbon Dioxide     (22-30)  mmol/L


 


Anion Gap     (10-20)  


 


BUN     (7-17)  mg/dl


 


Creatinine     (0.7-1.2)  mg/dl


 


Est GFR (African Amer)     


 


Est GFR (Non-Af Amer)     


 


POC Glucose (mg/dL)   165 H  141 H  ()  mg/dL


 


Random Glucose     ()  mg/dL


 


Calcium     (8.4-10.2)  mg/dL














  04/23/18 04/23/18 Range/Units





  17:10 16:44 


 


WBC    (4.8-10.8)  K/uL


 


RBC    (3.80-5.20)  Mil/uL


 


Hgb    (12.0-16.0)  g/dL


 


Hct    (34.0-47.0)  %


 


MCV    (81.0-99.0)  fl


 


MCH    (27.0-31.0)  pg


 


MCHC    (33.0-37.0)  g/dL


 


RDW    (11.5-14.5)  %


 


Plt Count    (130-400)  K/uL


 


pCO2   54 H  (35-45)  mm/Hg


 


pO2   140 H  ()  mm/Hg


 


HCO3   27.8  (21-28)  mmol/L


 


ABG pH   7.35  (7.35-7.45)  


 


ABG Total CO2   31.5 H  (22-28)  mmol/L


 


ABG O2 Saturation   100.9 H  (95-98)  %


 


ABG O2 Content   11.7 L  (15-23)  ML/dL


 


ABG Base Excess   3.6 H  (-2.0-3.0)  mmol/L


 


ABG Hemoglobin   8.2 L  (11.7-17.4)  g/dL


 


ABG Carboxyhemoglobin   2.0 H  (0.5-1.5)  %


 


POC ABG HHb (Measured)   -0.9 L  (0.0-5.0)  %


 


ABG Methemoglobin   0.2  (0.0-3.0)  %


 


ABG O2 Capacity   11.6 L  (16-24)  mL/dL


 


[place] Test   Yes  


 


A-a O2 Difference   78.0  mm/Hg


 


Hgb O2 Saturation   98.7 H  (95.0-98.0)  %


 


FiO2   40.0  %


 


Sodium  153 H   (132-148)  mmol/l


 


Potassium  4.6   (3.6-5.0)  MMOL/L


 


Chloride  110 H   ()  mmol/L


 


Carbon Dioxide  30   (22-30)  mmol/L


 


Anion Gap  18   (10-20)  


 


BUN  40 H   (7-17)  mg/dl


 


Creatinine  1.9 H   (0.7-1.2)  mg/dl


 


Est GFR (African Amer)  30   


 


Est GFR (Non-Af Amer)  25   


 


POC Glucose (mg/dL)    ()  mg/dL


 


Random Glucose  137 H   ()  mg/dL


 


Calcium  8.6   (8.4-10.2)  mg/dL








 Laboratory Results - last 24 hr











  04/23/18 04/23/18 04/23/18





  16:44 17:10 18:27


 


WBC   


 


RBC   


 


Hgb   


 


Hct   


 


MCV   


 


MCH   


 


MCHC   


 


RDW   


 


Plt Count   


 


pCO2  54 H  


 


pO2  140 H  


 


HCO3  27.8  


 


ABG pH  7.35  


 


ABG Total CO2  31.5 H  


 


ABG O2 Saturation  100.9 H  


 


ABG O2 Content  11.7 L  


 


ABG Base Excess  3.6 H  


 


ABG Hemoglobin  8.2 L  


 


ABG Carboxyhemoglobin  2.0 H  


 


POC ABG HHb (Measured)  -0.9 L  


 


ABG Methemoglobin  0.2  


 


ABG O2 Capacity  11.6 L  


 


Almas Test  Yes  


 


A-a O2 Difference  78.0  


 


Hgb O2 Saturation  98.7 H  


 


FiO2  40.0  


 


Sodium   153 H 


 


Potassium   4.6 


 


Chloride   110 H 


 


Carbon Dioxide   30 


 


Anion Gap   18 


 


BUN   40 H 


 


Creatinine   1.9 H 


 


Est GFR ( Amer)   30 


 


Est GFR (Non-Af Amer)   25 


 


POC Glucose (mg/dL)    141 H


 


Random Glucose   137 H 


 


Calcium   8.6 














  04/23/18 04/24/18 04/24/18





  21:25 04:30 04:30


 


WBC   4.4 L 


 


RBC   2.68 L 


 


Hgb   8.0 L 


 


Hct   25.5 L 


 


MCV   95.3  D 


 


MCH   29.8 


 


MCHC   31.3 L 


 


RDW   14.5 


 


Plt Count   263 


 


pCO2   


 


pO2   


 


HCO3   


 


ABG pH   


 


ABG Total CO2   


 


ABG O2 Saturation   


 


ABG O2 Content   


 


ABG Base Excess   


 


ABG Hemoglobin   


 


ABG Carboxyhemoglobin   


 


POC ABG HHb (Measured)   


 


ABG Methemoglobin   


 


ABG O2 Capacity   


 


Almas Test   


 


A-a O2 Difference   


 


Hgb O2 Saturation   


 


FiO2   


 


Sodium    148


 


Potassium    4.2


 


Chloride    108 H


 


Carbon Dioxide    29


 


Anion Gap    15


 


BUN    42 H


 


Creatinine    1.9 H


 


Est GFR ( Amer)    30


 


Est GFR (Non-Af Amer)    25


 


POC Glucose (mg/dL)  165 H  


 


Random Glucose    147 H


 


Calcium    8.3 L














  04/24/18 04/24/18





  06:54 11:57


 


WBC  


 


RBC  


 


Hgb  


 


Hct  


 


MCV  


 


MCH  


 


MCHC  


 


RDW  


 


Plt Count  


 


pCO2  


 


pO2  


 


HCO3  


 


ABG pH  


 


ABG Total CO2  


 


ABG O2 Saturation  


 


ABG O2 Content  


 


ABG Base Excess  


 


ABG Hemoglobin  


 


ABG Carboxyhemoglobin  


 


POC ABG HHb (Measured)  


 


ABG Methemoglobin  


 


ABG O2 Capacity  


 


Almas Test  


 


A-a O2 Difference  


 


Hgb O2 Saturation  


 


FiO2  


 


Sodium  


 


Potassium  


 


Chloride  


 


Carbon Dioxide  


 


Anion Gap  


 


BUN  


 


Creatinine  


 


Est GFR ( Amer)  


 


Est GFR (Non-Af Amer)  


 


POC Glucose (mg/dL)  118 H  247 H


 


Random Glucose  


 


Calcium  











Fingerstick Blood Sugar Results: 247





Critical Care Progress Note





- Nutrition


Nutrition: 


 Nutrition











 Category Date Time Status


 


 Dysphagia/Modified Consistency Diet [DIET] Diets  04/23/18 Dinner Active


 


 Dysphagia/Modified Consistency Diet [DIET] Diets  04/24/18 Dinner Active














Assessment/Plan


(1) Acute respiratory failure with hypercapnia


Current Visit: Yes   Status: Acute   Priority: High   Comment: 


Wean off BIPAP


Serial ABG, frequent assessement of mental status


Continue Solu-Medrol 40 mg IVP, decrease dose to  Q12H 


Gentle Hydrations 


C/W Albuterol/Ipratropium inhaler Q 4H  


Maintain aspiration precautions


Anrtibiotics with IV Zosyn   





(2) Altered mental status


Current Visit: Yes   Status: Acute   Priority: High   Comment: 


Sec to hypercrbia, resolving 


Admited to ICU for frequent neuro checks   





(3) Recurrent seizures


Current Visit: Yes   Status: Acute   Priority: High   Comment: 


Continue Phenytoin, received extrs 200 mg yesterday


folllow up level in AM, if low, discuss with neuro for dose change


PRN Ativan   





(4) Aspiration pneumonia


Current Visit: No   Status: Acute   





(5) Pleural effusion


Current Visit: No   Status: Acute

## 2018-04-24 NOTE — CP.PCM.PN
Subjective





- Subjective


Subjective: 











Acute Resp Fail Type II


COPD/B. Asthma


HTN





s/ Feeling better





O/ VSS





Head: neg adeno 


Heart: RRR ns1s2 neg m


Lungs Clear to A & P


Abdo S, NT pos BS


Ext No C,C,E


Neuro, GNF A & O *3





Labs see below.





Cont present ICU care. Monitor Serial ABG's. Cont IV Abx and monitor WBC#, Temp

, and panculutres.


Start PT/OT 


Daily labs. 


PUD and DVT Px. 


BiPaP prn. 








Objective





- Vital Signs/Intake and Output


Vital Signs (last 24 hours): 


 











Temp Pulse Resp BP Pulse Ox


 


 98.4 F   72   18   134/64   99 


 


 04/24/18 16:00  04/24/18 18:00  04/24/18 18:00  04/24/18 18:00  04/24/18 18:00








Intake and Output: 


 











 04/24/18 04/25/18





 18:59 06:59


 


Intake Total 1080 


 


Balance 1080 














- Medications


Medications: 


 Current Medications





Albuterol/Ipratropium (Duoneb 3 Mg/0.5 Mg (3 Ml) Ud)  3 ml INH RQID [facility]


   Last Admin: 04/24/18 19:07 Dose:  3 ml


Enoxaparin Sodium (Lovenox)  30 mg SC DAILY [facility]


   PRN Reason: Protocol


   Last Admin: 04/24/18 08:35 Dose:  30 mg


Piperacillin Sod/Tazobactam (Sod 3.375 gm/ Sodium Chloride)  100 mls @ 100 mls/

hr IVPB Q6 [facility]


   PRN Reason: Protocol


   Last Admin: 04/24/18 21:06 Dose:  100 mls/hr


Azithromycin 500 mg/ Sodium (Chloride)  250 mls @ 250 mls/hr IVPB DAILY [facility]


   PRN Reason: Protocol


   Last Admin: 04/24/18 08:38 Dose:  250 mls/hr


Insulin Human Lispro (Humalog)  0 units SC ACHS [facility]


   PRN Reason: Protocol


   Last Admin: 04/24/18 21:13 Dose:  Not Given


Methylprednisolone (Solu-Medrol)  40 mg IVP Q12 [facility]


   Last Admin: 04/24/18 21:05 Dose:  40 mg


Phenytoin (Dilantin)  100 mg PO TID [facility]


   Last Admin: 04/24/18 16:37 Dose:  100 mg











- Labs


Labs: 


 





 04/24/18 04:30 





 04/24/18 04:30 





 











PT  11.2 Seconds (9.8-13.1)   04/22/18  14:10    


 


INR  1.0  (0.9-1.2)   04/22/18  14:10    


 


APTT  27.5 Seconds (25.6-37.1)   04/22/18  14:10

## 2018-04-25 LAB
BUN SERPL-MCNC: 40 MG/DL (ref 7–17)
CALCIUM SERPL-MCNC: 8 MG/DL (ref 8.4–10.2)
ERYTHROCYTE [DISTWIDTH] IN BLOOD BY AUTOMATED COUNT: 15.1 % (ref 11.5–14.5)
GFR NON-AFRICAN AMERICAN: 30
HGB BLD-MCNC: 8.8 G/DL (ref 12–16)
MCH RBC QN AUTO: 29.9 PG (ref 27–31)
MCHC RBC AUTO-ENTMCNC: 30.9 G/DL (ref 33–37)
MCV RBC AUTO: 96.6 FL (ref 81–99)
PLATELET # BLD: 249 K/UL (ref 130–400)
RBC # BLD AUTO: 2.93 MIL/UL (ref 3.8–5.2)
WBC # BLD AUTO: 4.7 K/UL (ref 4.8–10.8)

## 2018-04-25 RX ADMIN — IPRATROPIUM BROMIDE AND ALBUTEROL SULFATE SCH ML: .5; 3 SOLUTION RESPIRATORY (INHALATION) at 15:45

## 2018-04-25 RX ADMIN — INSULIN LISPRO SCH: 100 INJECTION, SOLUTION INTRAVENOUS; SUBCUTANEOUS at 21:04

## 2018-04-25 RX ADMIN — METHYLPREDNISOLONE SODIUM SUCCINATE SCH MG: 40 INJECTION, POWDER, FOR SOLUTION INTRAMUSCULAR; INTRAVENOUS at 08:17

## 2018-04-25 RX ADMIN — INSULIN LISPRO SCH: 100 INJECTION, SOLUTION INTRAVENOUS; SUBCUTANEOUS at 06:37

## 2018-04-25 RX ADMIN — PHENYTOIN SCH MG: 125 SUSPENSION ORAL at 13:06

## 2018-04-25 RX ADMIN — WATER SCH MLS/HR: 1 INJECTION INTRAMUSCULAR; INTRAVENOUS; SUBCUTANEOUS at 15:24

## 2018-04-25 RX ADMIN — IPRATROPIUM BROMIDE AND ALBUTEROL SULFATE SCH ML: .5; 3 SOLUTION RESPIRATORY (INHALATION) at 11:02

## 2018-04-25 RX ADMIN — WATER SCH MLS/HR: 1 INJECTION INTRAMUSCULAR; INTRAVENOUS; SUBCUTANEOUS at 21:04

## 2018-04-25 RX ADMIN — PHENYTOIN SCH: 125 SUSPENSION ORAL at 16:14

## 2018-04-25 RX ADMIN — INSULIN LISPRO SCH: 100 INJECTION, SOLUTION INTRAVENOUS; SUBCUTANEOUS at 17:06

## 2018-04-25 RX ADMIN — METHYLPREDNISOLONE SODIUM SUCCINATE SCH MG: 40 INJECTION, POWDER, FOR SOLUTION INTRAMUSCULAR; INTRAVENOUS at 21:04

## 2018-04-25 RX ADMIN — IPRATROPIUM BROMIDE AND ALBUTEROL SULFATE SCH ML: .5; 3 SOLUTION RESPIRATORY (INHALATION) at 19:00

## 2018-04-25 RX ADMIN — WATER SCH MLS/HR: 1 INJECTION INTRAMUSCULAR; INTRAVENOUS; SUBCUTANEOUS at 03:53

## 2018-04-25 RX ADMIN — INSULIN LISPRO SCH U: 100 INJECTION, SOLUTION INTRAVENOUS; SUBCUTANEOUS at 11:10

## 2018-04-25 RX ADMIN — ENOXAPARIN SODIUM SCH MG: 30 INJECTION SUBCUTANEOUS at 08:16

## 2018-04-25 RX ADMIN — PHENYTOIN SCH MG: 125 SUSPENSION ORAL at 08:15

## 2018-04-25 RX ADMIN — WATER SCH MLS/HR: 1 INJECTION INTRAMUSCULAR; INTRAVENOUS; SUBCUTANEOUS at 09:53

## 2018-04-25 RX ADMIN — IPRATROPIUM BROMIDE AND ALBUTEROL SULFATE SCH ML: .5; 3 SOLUTION RESPIRATORY (INHALATION) at 07:10

## 2018-04-25 NOTE — CP.PCM.PN
Subjective





- Subjective


Subjective: 





Acute Resp Fail Type II


COPD/B. Asthma


HTN


Sz D/O


Acute Renal Insuff.








s/ Feeling better





O/ VSS





Head: neg adeno 


Heart: RRR ns1s2 neg m


Lungs Clear to A & P


Abdo S, NT pos BS


Ext No C,C,E


Neuro, GNF A & O *3





Labs see below.





Cont present ICU care. Monitor Serial ABG's. Cont IV Abx and monitor WBC#, Temp

, and panculutres.


Start PT/OT 


Daily labs. 


PUD and DVT Px. 


BiPaP prn and nocturnal use. 


Use compressed air for nebulized treatments. 


Neuromuscular disease causing CO2 retenition? Should not be intermittent, so I 

suspect getting high concentrations of O2 at NH during treatments. 


R/O Cerebral lesion? Again, should not be intermittent, but may be a 

contributing factor.


Neuro consult appreciated. Diaphragmatic EMG? available? 





Dr. Sandoval (Cell) [phone]








Objective





- Vital Signs/Intake and Output


Vital Signs (last 24 hours): 


 











Temp Pulse Resp BP Pulse Ox


 


 98.7 F   57 L  23   177/82 H  100 


 


 04/25/18 20:00  04/25/18 20:00  04/25/18 20:00  04/25/18 20:00  04/25/18 20:00








Intake and Output: 


 











 04/25/18 04/26/18





 18:59 06:59


 


Intake Total 1080 0


 


Output Total 402 


 


Balance 678 0














- Medications


Medications: 


 Current Medications





Albuterol/Ipratropium (Duoneb 3 Mg/0.5 Mg (3 Ml) Ud)  3 ml INH RQID [facility]


   Last Admin: 04/25/18 19:00 Dose:  3 ml


Enoxaparin Sodium (Lovenox)  30 mg SC DAILY [facility]


   PRN Reason: Protocol


   Last Admin: 04/25/18 08:16 Dose:  30 mg


Piperacillin Sod/Tazobactam (Sod 3.375 gm/ Sodium Chloride)  100 mls @ 100 mls/

hr IVPB Q6 [facility]


   PRN Reason: Protocol


   Last Admin: 04/25/18 21:04 Dose:  100 mls/hr


Azithromycin 500 mg/ Sodium (Chloride)  250 mls @ 250 mls/hr IVPB DAILY [facility]


   PRN Reason: Protocol


   Last Admin: 04/25/18 08:57 Dose:  250 mls/hr


Insulin Human Lispro (Humalog)  0 units SC ACHS [facility]


   PRN Reason: Protocol


   Last Admin: 04/25/18 21:04 Dose:  Not Given


Methylprednisolone (Solu-Medrol)  40 mg IVP Q12 [facility]


   Last Admin: 04/25/18 21:04 Dose:  40 mg


Phenytoin (Dilantin)  100 mg PO TID [facility]


   Last Admin: 04/25/18 16:14 Dose:  Not Given











- Labs


Labs: 


 





 04/25/18 04:30 





 04/25/18 04:30 





 











PT  11.2 Seconds (9.8-13.1)   04/22/18  14:10    


 


INR  1.0  (0.9-1.2)   04/22/18  14:10    


 


APTT  27.5 Seconds (25.6-37.1)   04/22/18  14:10

## 2018-04-25 NOTE — CP.PCM.CON
History of Present Illness





- History of Present Illness


History of Present Illness: 





Mrs. Herndon is an 88-year-old woman with a past medical history of epilepsy, 

cared for by Dr. Huang, who had a witnessed generlized tonic seizure at home 

and was brought in to the ED during her previous admission.  I was consulted in 

the past for a subtherapeutic dilantin level.  The patient had an episode of 

confusion, encephalopathy, and hypoxia and was admitted to ICU.  Dilantin level 

was subtherapeutic. Neurology was consulted to assist with management of AED. 

CT head did not show any acute findings. 





Review of Systems





- Review of Systems


All systems: reviewed and no additional remarkable complaints except





Past Patient History





- Infectious Disease


Hx of Infectious Diseases: None





- Tetanus Immunizations


Tetanus Immunization: Unknown





- Past Medical History & Family History


Past Medical History?: Yes





- Past Social History


Smoking Status: Current Some Days Smoker





- CARDIAC


Hx Cardiac Disorders: Yes


Hx Atrial Fibrillation: No


Hx Cardia Arrhythmia: No


Hx Congestive Heart Failure: No


Hx Hypercholesterolemia: No


Hx Hypertension: Yes


Hx Mitral Valve Prolapse: No


Hx Pacemaker: No


Hx Peripheral Edema: No





- PULMONARY


Hx Respiratory Disorders: Yes


Hx Asthma: Yes


Hx Bronchitis: No


Hx Chronic Obstructive Pulmonary Disease (COPD): Yes


Hx Emphysema: No


Hx Pneumonia: Yes


Hx Pulmonary Embolism: No


Hx Sleep Apnea: No





- NEUROLOGICAL


Hx Neurological Disorder: Yes


Hx Alzheimer's Disease: No


Hx Dementia: Yes


Hx Migraine: No


Hx Multiple Sclerosis: No


Hx Parkinson's Disease: No


Hx Seizures: Yes


Hx Transient Ischemic Attacks (TIA): No





- HEENT


Hx HEENT Problems: No





- RENAL


Hx Chronic Kidney Disease: No





- ENDOCRINE/METABOLIC


Hx Endocrine Disorders: Yes


Hx Diabetes Mellitus Type 2: Yes


Hx Hyperthyroidism: No


Hx Hypothyroidism: No





- HEMATOLOGICAL/ONCOLOGICAL


Hx Blood Disorders: Yes


Hx Anemia: Yes


Hx Human Immunodeficiency Virus (HIV): No


Hx Sickle Cell Disease: No





- INTEGUMENTARY


Hx Dermatological Problems: No





- MUSCULOSKELETAL/RHEUMATOLOGICAL


Hx Musculoskeletal Disorders: Yes


Hx Arthritis: Yes


Hx Falls: No


Hx Fractures: Yes (neck)


Hx Osteoporosis: No


Hx Rheumatoid Arthritis: No





- GASTROINTESTINAL


Hx Gastrointestinal Disorders: No


Hx Crohn's Disease: No


Hx Diverticulitis: No


Hx Gall Bladder Disease: No


Hx Gastritis: No


Hx Pancreatitis: No





- GENITOURINARY/GYNECOLOGICAL


Hx Genitourinary Disorders: Yes


Hx Sexually Transmitted Disorders: No


Other/Comment: tubal ligation





- PSYCHIATRIC


Hx Psychophysiologic Disorder: No


Hx Anxiety: No


Hx Bipolar Disorder: No


Hx Depression: No


Hx Paranoia: No


Hx Post Traumatic Stress Disorder: No


Hx Schizophrenia: No





- SURGICAL HISTORY


Hx Surgeries: Yes


Hx Appendectomy: No


Hx Carotid Endarterectomy: No


Hx Cholecystectomy: No


Hx Coronary Artery Bypass Graft: No


Hx Coronary Stent: No


Hx Tonsillectomy: No


Other/Comment: c2 c3 fusion.  tubal ligation





- ANESTHESIA


Hx Anesthesia: Yes


Hx Anesthesia Reactions: No





Meds


Allergies/Adverse Reactions: 


 Allergies











Allergy/AdvReac Type Severity Reaction Status Date / Time


 


No Known Allergies Allergy   Verified 04/11/18 18:55














- Medications


Medications: 


 Current Medications





Albuterol/Ipratropium (Duoneb 3 Mg/0.5 Mg (3 Ml) Ud)  3 ml INH RQID [facility]


   Last Admin: 04/25/18 11:02 Dose:  3 ml


Enoxaparin Sodium (Lovenox)  30 mg SC DAILY [facility]


   PRN Reason: Protocol


   Last Admin: 04/25/18 08:16 Dose:  30 mg


Piperacillin Sod/Tazobactam (Sod 3.375 gm/ Sodium Chloride)  100 mls @ 100 mls/

hr IVPB Q6 [facility]


   PRN Reason: Protocol


   Last Admin: 04/25/18 09:53 Dose:  100 mls/hr


Azithromycin 500 mg/ Sodium (Chloride)  250 mls @ 250 mls/hr IVPB DAILY [facility]


   PRN Reason: Protocol


   Last Admin: 04/25/18 08:57 Dose:  250 mls/hr


Insulin Human Lispro (Humalog)  0 units SC ACHS [facility]


   PRN Reason: Protocol


   Last Admin: 04/25/18 11:10 Dose:  2 u


Methylprednisolone (Solu-Medrol)  40 mg IVP Q12 [facility]


   Last Admin: 04/25/18 08:17 Dose:  40 mg


Phenytoin (Dilantin)  100 mg PO TID [facility]


   Last Admin: 04/25/18 13:06 Dose:  100 mg











Physical Exam





- Neurological Exam


Neurological exam: Abnormal Gait, Alert, CN II-XII Intact, Oriented x3


Additional comments: 





Generalized weakness, but no focal neurological deficits noted. 





Results





- Vital Signs


Recent Vital Signs: 


 Last Vital Signs











Temp  98.6 F   04/25/18 11:58


 


Pulse  62   04/25/18 14:00


 


Resp  21   04/25/18 14:00


 


BP  159/77 H  04/25/18 14:00


 


Pulse Ox  100   04/25/18 14:00














- Labs


Result Diagrams: 


 04/25/18 04:30





 04/25/18 04:30


Labs: 


 Laboratory Results - last 24 hr











  04/24/18 04/24/18 04/24/18





  16:35 21:09 22:44


 


WBC   


 


RBC   


 


Hgb   


 


Hct   


 


MCV   


 


MCH   


 


MCHC   


 


RDW   


 


Plt Count   


 


pCO2    58 H


 


pO2    131 H


 


HCO3    27.4


 


ABG pH    7.32 L


 


ABG Total CO2    31.7 H


 


ABG O2 Saturation    100.9 H


 


ABG O2 Content    11.6 L


 


ABG Base Excess    3.1 H


 


ABG Hemoglobin    8.2 L


 


ABG Carboxyhemoglobin    2.1 H


 


POC ABG HHb (Measured)    -0.9 L


 


ABG Methemoglobin    0.9


 


ABG O2 Capacity    11.5 L


 


Almas Test    Yes


 


A-a O2 Difference    25.0


 


Hgb O2 Saturation    97.9


 


Vent Mode    3lnc


 


FiO2    32.0


 


Sodium   


 


Potassium   


 


Chloride   


 


Carbon Dioxide   


 


Anion Gap   


 


BUN   


 


Creatinine   


 


Est GFR ( Amer)   


 


Est GFR (Non-Af Amer)   


 


POC Glucose (mg/dL)  188 H  149 H 


 


Random Glucose   


 


Calcium   


 


Phenytoin   














  04/25/18 04/25/18 04/25/18





  04:30 04:30 04:30


 


WBC   4.7 L 


 


RBC   2.93 L 


 


Hgb   8.8 L 


 


Hct   28.3 L 


 


MCV   96.6 


 


MCH   29.9 


 


MCHC   30.9 L 


 


RDW   15.1 H 


 


Plt Count   249 


 


pCO2   


 


pO2   


 


HCO3   


 


ABG pH   


 


ABG Total CO2   


 


ABG O2 Saturation   


 


ABG O2 Content   


 


ABG Base Excess   


 


ABG Hemoglobin   


 


ABG Carboxyhemoglobin   


 


POC ABG HHb (Measured)   


 


ABG Methemoglobin   


 


ABG O2 Capacity   


 


Almas Test   


 


A-a O2 Difference   


 


Hgb O2 Saturation   


 


Vent Mode   


 


FiO2   


 


Sodium    145


 


Potassium    4.9


 


Chloride    108 H


 


Carbon Dioxide    25


 


Anion Gap    17


 


BUN    40 H


 


Creatinine    1.6 H


 


Est GFR ( Amer)    37


 


Est GFR (Non-Af Amer)    30


 


POC Glucose (mg/dL)   


 


Random Glucose    152 H


 


Calcium    8.0 L


 


Phenytoin  6.3 L  














  04/25/18 04/25/18





  06:23 11:06


 


WBC  


 


RBC  


 


Hgb  


 


Hct  


 


MCV  


 


MCH  


 


MCHC  


 


RDW  


 


Plt Count  


 


pCO2  


 


pO2  


 


HCO3  


 


ABG pH  


 


ABG Total CO2  


 


ABG O2 Saturation  


 


ABG O2 Content  


 


ABG Base Excess  


 


ABG Hemoglobin  


 


ABG Carboxyhemoglobin  


 


POC ABG HHb (Measured)  


 


ABG Methemoglobin  


 


ABG O2 Capacity  


 


Almas Test  


 


A-a O2 Difference  


 


Hgb O2 Saturation  


 


Vent Mode  


 


FiO2  


 


Sodium  


 


Potassium  


 


Chloride  


 


Carbon Dioxide  


 


Anion Gap  


 


BUN  


 


Creatinine  


 


Est GFR ( Amer)  


 


Est GFR (Non-Af Amer)  


 


POC Glucose (mg/dL)  130 H  244 H


 


Random Glucose  


 


Calcium  


 


Phenytoin  














Assessment & Plan


(1) Recurrent seizures


Assessment and Plan: 


The patient was subtherapeutic on dilantin.  I recommend loading with 400 mg IV 

and continuing with 100 mg Q8.  Check dilantin levels after loading.  PT/OT 

eval and treatment is recommended for deconditioning.  Follow up with Dr. Huang 

for outpatient neurology. 


Status: Acute   Priority: High

## 2018-04-25 NOTE — CP.CCUPN
CCU Subjective





- Physician Review


Subjective (Free Text): 


Off BiPAP, on 3 LPM nasal cannula, smiling and pleasant, in no distress, no 

hand tremors noted, has left facial tic movements (old), otherwise at baseline 

neuromental status. 





Other Vitals and I/Os reviewed.  





ROS:  No other pertinent negs or positives on 10+  system review-sedated.





PMSFH:    All other Nursing and physician documentation reviewed to date; no 

new pertinent info noted relevant to current medical problems.





EXAM-


HEENT: no icterus, no gaze preference, pupils equal and reactive, no icterus


NECK: No JVD, supple, carotids equal upstroke bilat/no bruits


CHEST: few crackles with decreased BS bases bilaterally, no wheezes audible


HEART:  regular distant, S1S2, no rubs.


ABD:  soft, no distention, no tympany, no palp tenderness, BS hypoactive. 


EXT:   no edema bilat. No peripheral/ digital cyanosis, no calf tenderness or 

palpable cords, distal pulses intact and symmetrical. SCDs on bilaterally. 


NEURO:  no gross focal motor deficits.


SKIN:   no rashes,  warm and dry.





LABS:  





WBC= 4.7


HGB= 8.8


PLTs= 249K





Na= 145


K= 4.9


CL= 108


HCO3= 25


BUN/Cr= 40/1.6


BS= 152








IMPRESSION / MAJOR PROBLEMS NOW:


1.  Acute Hypercapneic Resp Failure


2.  Seizure Disorder


3.  h/o Odontoid / C2 fracture with spinal fusion surgery approx. 6 months ago.


4.  h/o recurrent falls at home








PLAN:


1.   Given all other meds and interventions being equal and reviewed; patient 

is ready to leave the ICU, however, there is a concern for recurrent 

hypercarbia. Daughter has mentioned in the past that she notices patient is 

more sedate after Phenytoin administration. Will discuss with PMD / Neuro if 

there is another alternative AED that is as effective as and less sedating than 

Phenytoin. Otherwise occult seizure activity or NCSE causing hypercarbia, since 

Phenytoin levels were sub-therapeutic on admission. 





CCU Objective





- Vital Signs / Intake & Output


Vital Signs (Last 4 hours): 


Vital Signs











  Temp Pulse Resp BP Pulse Ox


 


 04/25/18 10:00   65  21  150/72  100


 


 04/25/18 09:00   63  18  166/72 H  99


 


 04/25/18 08:00  98.2 F  58 L  18  176/72 H  98











Intake and Output (Last 8hrs): 


 Intake & Output











 04/24/18 04/25/18 04/25/18





 22:59 06:59 14:59


 


Intake Total 1180  50


 


Balance 1180  50


 


Weight   178 lb


 


Intake:   


 


    50


 


  Intake, Piggyback 200  


 


  Oral 480  


 


Other:   


 


  # Bowel Movements 1

## 2018-04-26 LAB
BUN SERPL-MCNC: 31 MG/DL (ref 7–17)
CALCIUM SERPL-MCNC: 8.2 MG/DL (ref 8.4–10.2)
ERYTHROCYTE [DISTWIDTH] IN BLOOD BY AUTOMATED COUNT: 14.1 % (ref 11.5–14.5)
GFR NON-AFRICAN AMERICAN: 39
HGB BLD-MCNC: 8.4 G/DL (ref 12–16)
MCH RBC QN AUTO: 30.7 PG (ref 27–31)
MCHC RBC AUTO-ENTMCNC: 32.6 G/DL (ref 33–37)
MCV RBC AUTO: 94.2 FL (ref 81–99)
PLATELET # BLD: 276 K/UL (ref 130–400)
RBC # BLD AUTO: 2.74 MIL/UL (ref 3.8–5.2)
WBC # BLD AUTO: 5.5 K/UL (ref 4.8–10.8)

## 2018-04-26 RX ADMIN — IPRATROPIUM BROMIDE AND ALBUTEROL SULFATE SCH ML: .5; 3 SOLUTION RESPIRATORY (INHALATION) at 15:12

## 2018-04-26 RX ADMIN — INSULIN LISPRO SCH: 100 INJECTION, SOLUTION INTRAVENOUS; SUBCUTANEOUS at 07:30

## 2018-04-26 RX ADMIN — INSULIN LISPRO SCH: 100 INJECTION, SOLUTION INTRAVENOUS; SUBCUTANEOUS at 22:00

## 2018-04-26 RX ADMIN — INSULIN LISPRO SCH U: 100 INJECTION, SOLUTION INTRAVENOUS; SUBCUTANEOUS at 13:09

## 2018-04-26 RX ADMIN — PHENYTOIN SCH MG: 125 SUSPENSION ORAL at 09:38

## 2018-04-26 RX ADMIN — IPRATROPIUM BROMIDE AND ALBUTEROL SULFATE SCH ML: .5; 3 SOLUTION RESPIRATORY (INHALATION) at 07:10

## 2018-04-26 RX ADMIN — IPRATROPIUM BROMIDE AND ALBUTEROL SULFATE SCH ML: .5; 3 SOLUTION RESPIRATORY (INHALATION) at 19:22

## 2018-04-26 RX ADMIN — METHYLPREDNISOLONE SODIUM SUCCINATE SCH MG: 40 INJECTION, POWDER, FOR SOLUTION INTRAMUSCULAR; INTRAVENOUS at 09:40

## 2018-04-26 RX ADMIN — WATER SCH MLS/HR: 1 INJECTION INTRAMUSCULAR; INTRAVENOUS; SUBCUTANEOUS at 04:30

## 2018-04-26 RX ADMIN — METHYLPREDNISOLONE SODIUM SUCCINATE SCH MG: 40 INJECTION, POWDER, FOR SOLUTION INTRAMUSCULAR; INTRAVENOUS at 21:06

## 2018-04-26 RX ADMIN — INSULIN LISPRO SCH U: 100 INJECTION, SOLUTION INTRAVENOUS; SUBCUTANEOUS at 17:55

## 2018-04-26 RX ADMIN — IPRATROPIUM BROMIDE AND ALBUTEROL SULFATE SCH ML: .5; 3 SOLUTION RESPIRATORY (INHALATION) at 11:40

## 2018-04-26 RX ADMIN — WATER SCH MLS/HR: 1 INJECTION INTRAMUSCULAR; INTRAVENOUS; SUBCUTANEOUS at 17:56

## 2018-04-26 RX ADMIN — WATER SCH MLS/HR: 1 INJECTION INTRAMUSCULAR; INTRAVENOUS; SUBCUTANEOUS at 09:43

## 2018-04-26 RX ADMIN — ENOXAPARIN SODIUM SCH MG: 30 INJECTION SUBCUTANEOUS at 09:39

## 2018-04-26 RX ADMIN — PHENYTOIN SCH MG: 125 SUSPENSION ORAL at 13:08

## 2018-04-26 RX ADMIN — PHENYTOIN SCH MG: 125 SUSPENSION ORAL at 17:54

## 2018-04-26 RX ADMIN — WATER SCH MLS/HR: 1 INJECTION INTRAMUSCULAR; INTRAVENOUS; SUBCUTANEOUS at 21:09

## 2018-04-26 NOTE — CP.PCM.PN
Subjective





- Date & Time of Evaluation


Date of Evaluation: 04/26/18


Time of Evaluation: 10:10





- Subjective


Subjective: 





Ms. Porter was seen and examined at the bedside in ICU. She is more awake, alert

, participates in a pleasant conversation, jokes with the staff. According to 

the daughter, she did not have any tremors or involuntary movements overnight. 

She denies any headache, dizziness, lightheadedness, able to move upper 

extremities spontaneously in comparison to her lower extremities. Her dilantin 

level this am is 7. There was no untoward events overnight.





Objective





- Vital Signs/Intake and Output


Vital Signs (last 24 hours): 


 











Temp Pulse Resp BP Pulse Ox


 


 97.8 F   57 L  21   172/89 H  100 


 


 04/26/18 08:00  04/26/18 08:00  04/26/18 08:00  04/26/18 08:00  04/26/18 08:00








Intake and Output: 


 











 04/26/18 04/26/18





 06:59 18:59


 


Intake Total 0 


 


Balance 0 














- Medications


Medications: 


 Current Medications





Albuterol/Ipratropium (Duoneb 3 Mg/0.5 Mg (3 Ml) Ud)  3 ml INH RQID [facility]


   Last Admin: 04/26/18 07:10 Dose:  3 ml


Piperacillin Sod/Tazobactam (Sod 3.375 gm/ Sodium Chloride)  100 mls @ 100 mls/

hr IVPB Q6 [facility]


   PRN Reason: Protocol


   Last Admin: 04/26/18 09:43 Dose:  100 mls/hr


Azithromycin 500 mg/ Sodium (Chloride)  250 mls @ 250 mls/hr IVPB DAILY [facility]


   PRN Reason: Protocol


   Last Admin: 04/26/18 09:42 Dose:  250 mls/hr


Insulin Human Lispro (Humalog)  0 units SC ACHS [facility]


   PRN Reason: Protocol


   Last Admin: 04/26/18 07:30 Dose:  Not Given


Methylprednisolone (Solu-Medrol)  40 mg IVP Q12 [facility]


   Last Admin: 04/26/18 09:40 Dose:  40 mg


Phenytoin (Dilantin)  100 mg PO TID [facility]


   Last Admin: 04/26/18 09:38 Dose:  100 mg











- Labs


Labs: 


 





 04/26/18 09:47 





 04/26/18 06:00 





 











PT  11.2 Seconds (9.8-13.1)   04/22/18  14:10    


 


INR  1.0  (0.9-1.2)   04/22/18  14:10    


 


APTT  27.5 Seconds (25.6-37.1)   04/22/18  14:10    














- Constitutional


Appears: No Acute Distress





- Head Exam


Head Exam: NORMAL INSPECTION





- Neurological Exam


Neurological Exam: Alert, Awake, Oriented x3


Neuro motor strength exam: Left Upper Extremity: 4, Right Upper Extremity: 4, 

Left Lower Extremity: 2/1, Right Lower Extremity: 2/1


Additional comments: 





Neurological improved from previous examination.





Assessment and Plan


(1) Recurrent seizures


Assessment & Plan: 


Case discussed with Dr. Means, continue all current medical and physical 

therapies. Recommend to increase dilantin from 100 mg PO TID to 150 mg PO TID. 

check dilantin level in am.


Status: Acute

## 2018-04-26 NOTE — CP.CCUPN
CCU Subjective





- Physician Review


Subjective (Free Text): 





Has remained off BiPAP with intact and stable resp status on nasal cannula 

oxygen support. No overt seizure or tremor activity seen. Mentation is at her 

best. 





Other Vitals and I/Os reviewed.  





ROS:  No other pertinent negs or positives on 10+  system review-sedated.





PMSFH:    All other Nursing and physician documentation reviewed to date; no 

new pertinent info noted relevant to current medical problems.





EXAM-


HEENT: no icterus, no gaze preference, pupils equal and reactive, no icterus


NECK: No JVD, supple, carotids equal upstroke bilat/no bruits


CHEST: few crackles with decreased BS bases bilaterally, no wheezes audible


HEART:  regular distant, S1S2, no rubs.


ABD:  soft, no distention, no tympany, no palp tenderness, BS hypoactive. 


EXT:   no edema bilat. No peripheral/ digital cyanosis, no calf tenderness or 

palpable cords, distal pulses intact and symmetrical. SCDs on bilaterally. 


NEURO:  no gross focal motor deficits.


SKIN:   no rashes,  warm and dry.





LABS:  





WBC= 5.5


HGB= 8.4


PLTs= 276K





Na= 143


K= 5.4


CL= 108


HCO3= 27


BUN/Cr= 31/1.3


BS= 128








IMPRESSION / MAJOR PROBLEMS NOW:


1.  Acute Hypercapneic Resp Failure


2.  Seizure Disorder


3.  h/o Odontoid / C2 fracture with spinal fusion surgery approx. 6 months ago.


4.  h/o recurrent falls at home








PLAN:


1.   Given all other meds and interventions being equal and reviewed; patient 

is ready to leave the ICU, however, there is a concern for recurrent 

hypercarbia. Daughter has mentioned in the past that she notices patient is 

more sedate after Phenytoin administration. Discussed with PMD and Neuro:  

Albuterol treatments to be given via compressed air and avoid oxygen supported 

neb therapy.  Dilantin dosing to continue and increased today after 

supplemental bolus dosing yesterday. 





CCU Objective





- Vital Signs / Intake & Output


Vital Signs (Last 4 hours): 


Vital Signs











  Temp Pulse Resp BP Pulse Ox


 


 04/26/18 12:00  99.3 F  63  21  168/75 H  100











Intake and Output (Last 8hrs): 


 Intake & Output











 04/25/18 04/26/18 04/26/18





 22:59 06:59 14:59


 


Intake Total 1030  


 


Output Total 402  


 


Balance 628  


 


Intake:   


 


  IV 0  


 


  Intake, Piggyback 550  


 


  Oral 480  


 


Output:   


 


  Urine 400  


 


    Urine, Voided 400  


 


  Urine/Stool Mix 2

## 2018-04-26 NOTE — CP.PCM.PN
Subjective





- Subjective


Subjective: 








Acute Resp Fail Type II


COPD/B. Asthma


HTN


Sz D/O


Acute Renal Insuff.








s/ Feeling better. eating without problems. On nasal o2. 





O/ VSS





Head: neg adeno 


Heart: RRR ns1s2 neg m


Lungs Clear to A & P


Abdo S, NT pos BS


Ext No C,C,E


Neuro, GNF A & O *3





Labs see below.





Cont present ICU care. Monitor Serial ABG's. Cont IV Abx and monitor WBC#, Temp

, and panculutres.


Start PT/OT 


Daily labs. 


PUD and DVT Px. 


BiPaP prn and nocturnal use. 


Use compressed air for nebulized treatments. 


Neuromuscular disease causing CO2 retenition? Should not be intermittent, so I 

suspect getting high concentrations of O2 at NH during treatments. 


R/O Cerebral lesion? Again, should not be intermittent, but may be a 

contributing factor.


Neuro consult appreciated. Diaphragmatic EMG? available? 


May transfer to  for continued care. 





Dr. Sandoval (Cell) [phone]








Objective





- Vital Signs/Intake and Output


Vital Signs (last 24 hours): 


 











Temp Pulse Resp BP Pulse Ox


 


 99.5 F   59 L  22   122/67   100 


 


 04/26/18 20:00  04/26/18 22:37  04/26/18 22:00  04/26/18 22:00  04/26/18 22:00








Intake and Output: 


 











 04/26/18 04/27/18





 18:59 06:59


 


Intake Total 730 0


 


Output Total 300 


 


Balance 430 0














- Medications


Medications: 


 Current Medications





Albuterol/Ipratropium (Duoneb 3 Mg/0.5 Mg (3 Ml) Ud)  3 ml INH RQID [facility]


   Last Admin: 04/26/18 19:22 Dose:  3 ml


Piperacillin Sod/Tazobactam (Sod 3.375 gm/ Sodium Chloride)  100 mls @ 100 mls/

hr IVPB Q6 [facility]


   PRN Reason: Protocol


   Last Admin: 04/26/18 21:09 Dose:  100 mls/hr


Azithromycin 500 mg/ Sodium (Chloride)  250 mls @ 250 mls/hr IVPB DAILY [facility]


   PRN Reason: Protocol


   Last Admin: 04/26/18 09:42 Dose:  250 mls/hr


Insulin Human Lispro (Humalog)  0 units SC ACHS [facility]


   PRN Reason: Protocol


   Last Admin: 04/26/18 17:55 Dose:  1 u


Methylprednisolone (Solu-Medrol)  40 mg IVP Q12 [facility]


   Last Admin: 04/26/18 21:06 Dose:  40 mg


Phenytoin (Dilantin)  150 mg PO TID [facility]


   Last Admin: 04/26/18 17:54 Dose:  150 mg











- Labs


Labs: 


 





 04/26/18 09:47 





 04/26/18 06:00 





 











PT  11.2 Seconds (9.8-13.1)   04/22/18  14:10    


 


INR  1.0  (0.9-1.2)   04/22/18  14:10    


 


APTT  27.5 Seconds (25.6-37.1)   04/22/18  14:10

## 2018-04-27 LAB
ERYTHROCYTE [DISTWIDTH] IN BLOOD BY AUTOMATED COUNT: 14.8 % (ref 11.5–14.5)
HGB BLD-MCNC: 8.4 G/DL (ref 12–16)
MCH RBC QN AUTO: 30.4 PG (ref 27–31)
MCHC RBC AUTO-ENTMCNC: 31.9 G/DL (ref 33–37)
MCV RBC AUTO: 95.1 FL (ref 81–99)
PLATELET # BLD: 276 K/UL (ref 130–400)
RBC # BLD AUTO: 2.78 MIL/UL (ref 3.8–5.2)
WBC # BLD AUTO: 5.6 K/UL (ref 4.8–10.8)

## 2018-04-27 RX ADMIN — INSULIN LISPRO SCH: 100 INJECTION, SOLUTION INTRAVENOUS; SUBCUTANEOUS at 21:49

## 2018-04-27 RX ADMIN — METHYLPREDNISOLONE SODIUM SUCCINATE SCH MG: 40 INJECTION, POWDER, FOR SOLUTION INTRAMUSCULAR; INTRAVENOUS at 21:27

## 2018-04-27 RX ADMIN — IPRATROPIUM BROMIDE AND ALBUTEROL SULFATE SCH ML: .5; 3 SOLUTION RESPIRATORY (INHALATION) at 11:28

## 2018-04-27 RX ADMIN — PHENYTOIN SCH MG: 125 SUSPENSION ORAL at 08:19

## 2018-04-27 RX ADMIN — WATER SCH MLS/HR: 1 INJECTION INTRAMUSCULAR; INTRAVENOUS; SUBCUTANEOUS at 09:32

## 2018-04-27 RX ADMIN — WATER SCH MLS/HR: 1 INJECTION INTRAMUSCULAR; INTRAVENOUS; SUBCUTANEOUS at 21:28

## 2018-04-27 RX ADMIN — INSULIN LISPRO SCH U: 100 INJECTION, SOLUTION INTRAVENOUS; SUBCUTANEOUS at 11:19

## 2018-04-27 RX ADMIN — WATER SCH MLS/HR: 1 INJECTION INTRAMUSCULAR; INTRAVENOUS; SUBCUTANEOUS at 15:46

## 2018-04-27 RX ADMIN — INSULIN LISPRO SCH U: 100 INJECTION, SOLUTION INTRAVENOUS; SUBCUTANEOUS at 17:01

## 2018-04-27 RX ADMIN — PHENYTOIN SCH MG: 125 SUSPENSION ORAL at 13:56

## 2018-04-27 RX ADMIN — IPRATROPIUM BROMIDE AND ALBUTEROL SULFATE SCH ML: .5; 3 SOLUTION RESPIRATORY (INHALATION) at 19:11

## 2018-04-27 RX ADMIN — IPRATROPIUM BROMIDE AND ALBUTEROL SULFATE SCH ML: .5; 3 SOLUTION RESPIRATORY (INHALATION) at 15:20

## 2018-04-27 RX ADMIN — IPRATROPIUM BROMIDE AND ALBUTEROL SULFATE SCH ML: .5; 3 SOLUTION RESPIRATORY (INHALATION) at 07:17

## 2018-04-27 RX ADMIN — METHYLPREDNISOLONE SODIUM SUCCINATE SCH MG: 40 INJECTION, POWDER, FOR SOLUTION INTRAMUSCULAR; INTRAVENOUS at 08:20

## 2018-04-27 RX ADMIN — PHENYTOIN SCH MG: 125 SUSPENSION ORAL at 17:01

## 2018-04-27 NOTE — CP.PCM.PN
Subjective





- Date & Time of Evaluation


Date of Evaluation: 04/27/18


Time of Evaluation: 08:06





- Subjective


Subjective: 





Ms. Porter was seen and examined at the bedside in ICU. She is more awake, alert

, participates in a pleasant conversation and claims of feeling much much 

better. She did have mild right upper extremity tremors or involuntary 

movements overnight. She denies any headache, dizziness, lightheadedness, able 

to move upper extremities spontaneously in comparison to her lower extremities. 

Her dilantin level this am is 7.1. There was no untoward events overnight.








Objective





- Vital Signs/Intake and Output


Vital Signs (last 24 hours): 


 











Temp Pulse Resp BP Pulse Ox


 


 98.4 F   56 L  12   179/74 H  98 


 


 04/27/18 04:00  04/27/18 06:00  04/27/18 06:00  04/27/18 06:00  04/27/18 06:00








Intake and Output: 


 











 04/27/18 04/27/18





 06:59 18:59


 


Intake Total 0 


 


Balance 0 














- Medications


Medications: 


 Current Medications





Albuterol/Ipratropium (Duoneb 3 Mg/0.5 Mg (3 Ml) Ud)  3 ml INH RQID [facility]


   Last Admin: 04/27/18 07:17 Dose:  3 ml


Piperacillin Sod/Tazobactam (Sod 3.375 gm/ Sodium Chloride)  100 mls @ 100 mls/

hr IVPB Q6 [facility]


   PRN Reason: Protocol


   Last Admin: 04/26/18 21:09 Dose:  100 mls/hr


Azithromycin 500 mg/ Sodium (Chloride)  250 mls @ 250 mls/hr IVPB DAILY [facility]


   PRN Reason: Protocol


   Last Admin: 04/26/18 09:42 Dose:  250 mls/hr


Insulin Human Lispro (Humalog)  0 units SC ACHS [facility]


   PRN Reason: Protocol


   Last Admin: 04/26/18 22:00 Dose:  Not Given


Methylprednisolone (Solu-Medrol)  40 mg IVP Q12 [facility]


   Last Admin: 04/26/18 21:06 Dose:  40 mg


Phenytoin (Dilantin)  150 mg PO TID [facility]


   Last Admin: 04/26/18 17:54 Dose:  150 mg











- Labs


Labs: 


 





 04/26/18 09:47 





 04/26/18 06:00 





 











PT  11.2 Seconds (9.8-13.1)   04/22/18  14:10    


 


INR  1.0  (0.9-1.2)   04/22/18  14:10    


 


APTT  27.5 Seconds (25.6-37.1)   04/22/18  14:10    














- Constitutional


Appears: No Acute Distress





- Head Exam


Head Exam: NORMAL INSPECTION





- Neurological Exam


Neurological Exam: Alert, Awake, Oriented x3


Neuro motor strength exam: Left Upper Extremity: 4, Right Upper Extremity: 4, 

Left Lower Extremity: 2/1, Right Lower Extremity: 2/1


Additional comments: 





Neurological unchanged from previous examination.





Assessment and Plan


(1) Recurrent seizures


Assessment & Plan: 


Case discussed with Dr. Means, continue all current medical, physical, and 

occupational therapies. Recommend to monitor dilantin level patricia. am.


Status: Acute

## 2018-04-27 NOTE — CP.PCM.PN
Subjective





- Subjective


Subjective: 








Acute Resp Fail Type II


COPD/B. Asthma


HTN


Sz D/O


Acute Renal Insuff.








s/ Feeling better. eating without problems. On nasal o2. 





O/ VSS





Head: neg adeno 


Heart: RRR ns1s2 neg m


Lungs Clear to A & P


Abdo S, NT pos BS


Ext No C,C,E


Neuro, GNF A & O *3





Labs see below.





Cont present Telemetry care. Monitor Serial ABG's. Cont IV Abx and monitor WBC#

, Temp, and panculutres.


Start PT/OT 


Daily labs. 


PUD and DVT Px. 


BiPaP prn and nocturnal use. 


Use compressed air for nebulized treatments. 


Will speak with SW on Monday for possible placement in hospital rehab TCU vx 6N 





Dr. Sandoval (Cell) [phone]





Objective





- Vital Signs/Intake and Output


Vital Signs (last 24 hours): 


 











Temp Pulse Resp BP Pulse Ox


 


 97.9 F   62   20   141/69   96 


 


 04/27/18 20:00  04/27/18 20:00  04/27/18 20:00  04/27/18 20:00  04/27/18 20:00








Intake and Output: 


 











 04/27/18 04/28/18





 18:59 06:59


 


Intake Total 560 


 


Output Total 1000 


 


Balance -440 














- Medications


Medications: 


 Current Medications





Albuterol/Ipratropium (Duoneb 3 Mg/0.5 Mg (3 Ml) Ud)  3 ml INH RQID [facility]


   Last Admin: 04/27/18 19:11 Dose:  3 ml


Azithromycin 500 mg/ Sodium (Chloride)  250 mls @ 250 mls/hr IVPB DAILY [facility]


   PRN Reason: Protocol


   Last Admin: 04/27/18 08:21 Dose:  250 mls/hr


Insulin Human Lispro (Humalog)  0 units SC ACHS [facility]


   PRN Reason: Protocol


   Last Admin: 04/27/18 21:49 Dose:  Not Given


Methylprednisolone (Solu-Medrol)  40 mg IVP Q12 [facility]


   Last Admin: 04/27/18 21:27 Dose:  40 mg


Phenytoin (Dilantin)  150 mg PO TID [facility]


   Last Admin: 04/27/18 17:01 Dose:  150 mg











- Labs


Labs: 


 





 04/27/18 06:00 





 04/26/18 06:00 





 











PT  11.2 Seconds (9.8-13.1)   04/22/18  14:10    


 


INR  1.0  (0.9-1.2)   04/22/18  14:10    


 


APTT  27.5 Seconds (25.6-37.1)   04/22/18  14:10

## 2018-04-28 RX ADMIN — PHENYTOIN SCH MG: 125 SUSPENSION ORAL at 08:42

## 2018-04-28 RX ADMIN — METHYLPREDNISOLONE SODIUM SUCCINATE SCH MG: 40 INJECTION, POWDER, FOR SOLUTION INTRAMUSCULAR; INTRAVENOUS at 21:29

## 2018-04-28 RX ADMIN — WATER SCH MLS/HR: 1 INJECTION INTRAMUSCULAR; INTRAVENOUS; SUBCUTANEOUS at 00:28

## 2018-04-28 RX ADMIN — METHYLPREDNISOLONE SODIUM SUCCINATE SCH MG: 40 INJECTION, POWDER, FOR SOLUTION INTRAMUSCULAR; INTRAVENOUS at 00:28

## 2018-04-28 RX ADMIN — INSULIN LISPRO SCH: 100 INJECTION, SOLUTION INTRAVENOUS; SUBCUTANEOUS at 22:45

## 2018-04-28 RX ADMIN — METHYLPREDNISOLONE SODIUM SUCCINATE SCH: 40 INJECTION, POWDER, FOR SOLUTION INTRAMUSCULAR; INTRAVENOUS at 00:14

## 2018-04-28 RX ADMIN — IPRATROPIUM BROMIDE AND ALBUTEROL SULFATE SCH ML: .5; 3 SOLUTION RESPIRATORY (INHALATION) at 19:21

## 2018-04-28 RX ADMIN — WATER SCH MLS/HR: 1 INJECTION INTRAMUSCULAR; INTRAVENOUS; SUBCUTANEOUS at 00:29

## 2018-04-28 RX ADMIN — PHENYTOIN SCH MG: 125 SUSPENSION ORAL at 12:37

## 2018-04-28 RX ADMIN — METHYLPREDNISOLONE SODIUM SUCCINATE SCH MG: 40 INJECTION, POWDER, FOR SOLUTION INTRAMUSCULAR; INTRAVENOUS at 08:43

## 2018-04-28 RX ADMIN — IPRATROPIUM BROMIDE AND ALBUTEROL SULFATE SCH ML: .5; 3 SOLUTION RESPIRATORY (INHALATION) at 15:09

## 2018-04-28 RX ADMIN — IPRATROPIUM BROMIDE AND ALBUTEROL SULFATE SCH ML: .5; 3 SOLUTION RESPIRATORY (INHALATION) at 07:29

## 2018-04-28 RX ADMIN — IPRATROPIUM BROMIDE AND ALBUTEROL SULFATE SCH ML: .5; 3 SOLUTION RESPIRATORY (INHALATION) at 11:01

## 2018-04-28 RX ADMIN — INSULIN LISPRO SCH U: 100 INJECTION, SOLUTION INTRAVENOUS; SUBCUTANEOUS at 08:44

## 2018-04-28 RX ADMIN — INSULIN LISPRO SCH U: 100 INJECTION, SOLUTION INTRAVENOUS; SUBCUTANEOUS at 16:40

## 2018-04-28 RX ADMIN — WATER SCH: 1 INJECTION INTRAMUSCULAR; INTRAVENOUS; SUBCUTANEOUS at 00:15

## 2018-04-28 RX ADMIN — PHENYTOIN SCH MG: 125 SUSPENSION ORAL at 16:39

## 2018-04-28 RX ADMIN — INSULIN LISPRO SCH U: 100 INJECTION, SOLUTION INTRAVENOUS; SUBCUTANEOUS at 12:37

## 2018-04-28 NOTE — CP.PCM.PN
Subjective





- Subjective


Subjective: 








Acute Resp Fail Type II


COPD/B. Asthma


HTN


Sz D/O


Acute Renal Insuff.








s/ Feeling better. eating without problems. On nasal o2. 





O/ VSS





Head: neg adeno 


Heart: RRR ns1s2 neg m


Lungs Clear to A & P


Abdo S, NT pos BS


Ext No C,C,E


Neuro, GNF A & O *3





Labs see below.





Cont present Telemetry care. Monitor Serial ABG's. Cont IV Abx and monitor WBC#

, Temp, and panculutres.


Start PT/OT 


Daily labs. 


PUD and DVT Px. 


BiPaP prn and nocturnal use. 


Use compressed air for nebulized treatments. 


Will speak with SW on Monday for possible placement in hospital rehab TCU vx [facility] 


No IV access. Change IV Abx and steroids to PO. ID consulted. 





Dr. Sandoval (Cell) [phone]





Objective





- Vital Signs/Intake and Output


Vital Signs (last 24 hours): 


 











Temp Pulse Resp BP Pulse Ox


 


 98.3 F   61   17   149/73   98 


 


 04/28/18 16:02  04/28/18 16:02  04/28/18 16:02  04/28/18 16:02  04/28/18 16:02








Intake and Output: 


 











 04/28/18 04/29/18





 18:59 06:59


 


Intake Total 900 


 


Balance 900 














- Medications


Medications: 


 Current Medications





Acetaminophen (Tylenol 325mg Tab)  650 mg PO Q6 [facility]


   PRN Reason: Pain, Mild (1-3)


   Last Admin: 04/28/18 02:14 Dose:  650 mg


Albuterol/Ipratropium (Duoneb 3 Mg/0.5 Mg (3 Ml) Ud)  3 ml INH RQID [facility]


   Last Admin: 04/28/18 19:21 Dose:  3 ml


Insulin Human Lispro (Humalog)  0 units SC ACHS [facility]


   PRN Reason: Protocol


   Last Admin: 04/28/18 16:40 Dose:  1 u


Phenytoin (Dilantin)  150 mg PO TID [facility]


   Last Admin: 04/28/18 16:39 Dose:  150 mg


Prednisone (Prednisone Tab)  20 mg PO DAILY [facility]











- Labs


Labs: 


 





 04/27/18 06:00 





 04/26/18 06:00 





 











PT  11.2 Seconds (9.8-13.1)   04/22/18  14:10    


 


INR  1.0  (0.9-1.2)   04/22/18  14:10    


 


APTT  27.5 Seconds (25.6-37.1)   04/22/18  14:10

## 2018-04-29 LAB
ALBUMIN SERPL-MCNC: 3.2 G/DL (ref 3.5–5)
ALBUMIN/GLOB SERPL: 1 {RATIO} (ref 1–2.1)
ALT SERPL-CCNC: 38 U/L (ref 9–52)
AST SERPL-CCNC: 18 U/L (ref 14–36)
BUN SERPL-MCNC: 24 MG/DL (ref 7–17)
CALCIUM SERPL-MCNC: 8.5 MG/DL (ref 8.4–10.2)
ERYTHROCYTE [DISTWIDTH] IN BLOOD BY AUTOMATED COUNT: 14.9 % (ref 11.5–14.5)
GFR NON-AFRICAN AMERICAN: 47
HGB BLD-MCNC: 8.5 G/DL (ref 12–16)
MCH RBC QN AUTO: 30.1 PG (ref 27–31)
MCHC RBC AUTO-ENTMCNC: 31.7 G/DL (ref 33–37)
MCV RBC AUTO: 94.9 FL (ref 81–99)
PLATELET # BLD: 247 K/UL (ref 130–400)
RBC # BLD AUTO: 2.81 MIL/UL (ref 3.8–5.2)
WBC # BLD AUTO: 8.4 K/UL (ref 4.8–10.8)

## 2018-04-29 RX ADMIN — IPRATROPIUM BROMIDE AND ALBUTEROL SULFATE SCH ML: .5; 3 SOLUTION RESPIRATORY (INHALATION) at 07:29

## 2018-04-29 RX ADMIN — INSULIN LISPRO SCH: 100 INJECTION, SOLUTION INTRAVENOUS; SUBCUTANEOUS at 21:37

## 2018-04-29 RX ADMIN — IPRATROPIUM BROMIDE AND ALBUTEROL SULFATE SCH ML: .5; 3 SOLUTION RESPIRATORY (INHALATION) at 11:42

## 2018-04-29 RX ADMIN — PHENYTOIN SCH MG: 125 SUSPENSION ORAL at 13:00

## 2018-04-29 RX ADMIN — INSULIN LISPRO SCH U: 100 INJECTION, SOLUTION INTRAVENOUS; SUBCUTANEOUS at 17:36

## 2018-04-29 RX ADMIN — INSULIN LISPRO SCH: 100 INJECTION, SOLUTION INTRAVENOUS; SUBCUTANEOUS at 06:30

## 2018-04-29 RX ADMIN — PHENYTOIN SCH MG: 125 SUSPENSION ORAL at 17:34

## 2018-04-29 RX ADMIN — IPRATROPIUM BROMIDE AND ALBUTEROL SULFATE SCH ML: .5; 3 SOLUTION RESPIRATORY (INHALATION) at 16:01

## 2018-04-29 RX ADMIN — IPRATROPIUM BROMIDE AND ALBUTEROL SULFATE SCH ML: .5; 3 SOLUTION RESPIRATORY (INHALATION) at 19:06

## 2018-04-29 RX ADMIN — PHENYTOIN SCH MG: 125 SUSPENSION ORAL at 09:21

## 2018-04-29 RX ADMIN — AMOXICILLIN AND CLAVULANATE POTASSIUM SCH TAB: 875; 125 TABLET, FILM COATED ORAL at 21:35

## 2018-04-29 RX ADMIN — INSULIN LISPRO SCH U: 100 INJECTION, SOLUTION INTRAVENOUS; SUBCUTANEOUS at 17:37

## 2018-04-29 RX ADMIN — INSULIN LISPRO SCH U: 100 INJECTION, SOLUTION INTRAVENOUS; SUBCUTANEOUS at 13:01

## 2018-04-29 NOTE — CP.PCM.PN
Subjective





- Subjective


Subjective: 








Acute Resp Fail Type II


COPD/B. Asthma


HTN


Sz D/O


Acute Renal Insuff.


Hyperkalemia








s/ Feeling better. eating without problems. On nasal o2, BiPap at night. 





O/ VSS





Head: neg adeno 


Heart: RRR ns1s2 neg m


Lungs Clear to A & P


Abdo S, NT pos BS


Ext No C,C,E


Neuro, GNF A & O *3





Labs see below.





Cont present Telemetry care. Monitor Serial ABG's. Cont IV Abx and monitor WBC#

, Temp, and panculutres.


Start PT/OT 


Daily labs. 


PUD and DVT Px. 


BiPaP prn and nocturnal use. 


Use compressed air for nebulized treatments. 


Will speak with SW on Monday for possible placement in hospital rehab TCU vx [facility] 


No IV access. Change IV Abx and steroids to PO. ID consulted. 


Give Kayexalate and repeat K+. Renal consultation. 





Dr. Sandoval (Cell) [phone]





Objective





- Vital Signs/Intake and Output


Vital Signs (last 24 hours): 


 











Temp Pulse Resp BP Pulse Ox


 


 99.0 F   70   17   157/70 H  97 


 


 04/29/18 19:57  04/29/18 19:57  04/29/18 19:57  04/29/18 19:57  04/29/18 19:57











- Medications


Medications: 


 Current Medications





Acetaminophen (Tylenol 325mg Tab)  650 mg PO Q6 [facility]


   PRN Reason: Pain, Mild (1-3)


   Last Admin: 04/29/18 20:36 Dose:  650 mg


Albuterol/Ipratropium (Duoneb 3 Mg/0.5 Mg (3 Ml) Ud)  3 ml INH RQID [facility]


   Last Admin: 04/29/18 19:06 Dose:  3 ml


Amoxicillin/Clavulanate Potassium (Augmentin 875 Mg-125 Mg Tab)  1 tab PO Q12 

[facility]


   PRN Reason: Protocol


   Last Admin: 04/29/18 21:35 Dose:  1 tab


Insulin Human Lispro (Humalog)  0 units SC ACHS [facility]


   PRN Reason: Protocol


   Last Admin: 04/29/18 21:37 Dose:  Not Given


Phenytoin (Dilantin)  150 mg PO TID [facility]


   Last Admin: 04/29/18 17:34 Dose:  150 mg


Prednisone (Prednisone Tab)  20 mg PO DAILY [facility]


   Last Admin: 04/29/18 09:22 Dose:  20 mg











- Labs


Labs: 


 





 04/29/18 04:40 





 04/29/18 04:40 





 











PT  11.2 Seconds (9.8-13.1)   04/22/18  14:10    


 


INR  1.0  (0.9-1.2)   04/22/18  14:10    


 


APTT  27.5 Seconds (25.6-37.1)   04/22/18  14:10

## 2018-04-29 NOTE — CP.PCM.CON
History of Present Illness





- History of Present Illness


History of Present Illness: 





88 female with history of COPD, HTN, DM, h/o C2-C3 fusion, seizure  disorder, 

obesity brought to ER for altered mental status. Patient was found to have 

elevated CO2 and was started on BIPAP in ER but eventually required intubation 

and mechanical ventilation  was later extubated and sent to the floors  Thus 

far cultures all negative and IV access was lost  ID consult requesrted for 

this 





Review of Systems





- Review of Systems


All systems: reviewed and no additional remarkable complaints except





- Constitutional


Constitutional: As Per HPI





- EENT


Eyes: absent: As Per HPI, Blind Spots, Blurred Vision, Change in Vision, 

Decreased Night Vision, Diplopia, Discharge, Dry Eye, Exophthalmos, Floaters, 

Irritation, Itchy Eyes, Loss of Peripheral Vision, Pain, Photophobia, Requires 

Corrective Lenses, Sees Flashes, Spots in Vision, Tunnel Vision, Other Visual 

Disturbances, Loss of Vision, Other


Ears: absent: As Per HPI, Decreased Hearing, Ear Discharge, Ear Pain, Tinnitus, 

Abnormal Hearing, Disequilibrium, Dizziness, Other


Nose/Mouth/Throat: absent: As Per HPI, Epistaxis, Nasal Congestion, Nasal 

Discharge, Nasal Obstruction, Nasal Trauma, Nose Pain, Post Nasal Drip, Sinus 

Pain, Sinus Pressure, Bleeding Gums, Change in Voice, Dental Pain, Dry Mouth, 

Dysphagia, Halitosis, Hoarsness, Lip Swelling, Mouth Lesions, Mouth Pain, 

Odynophagia, Sore Throat, Throat Swelling, Tongue Swelling, Facial Pain, Neck 

Pain, Neck Mass, Other





- Breasts


Breasts: absent: As Per HPI, Change in Shape, Mass, Pain, Nipple Discharge, 

Nipple Inversion, Skin Changes, Swelling, Other





- Cardiovascular


Cardiovascular: As Per HPI





- Respiratory


Respiratory: As Per HPI





- Gastrointestinal


Gastrointestinal: absent: As Per HPI, Abdominal Pain, Belching, Bloating, 

Change in Bowel Habits, Change in Stool Character, Coffee Ground Emesis, 

Constipation, Cramping, Diarrhea, Dyspepsia, Dysphagia, Early Satiety, 

Excessive Flatus, Fecal Incontinence, Heartburn, Hematemesis, Hematochezia, 

Loose Stools, Melena, Nausea, Odynophagia, Temesmus, Vomiting, Other





- Genitourinary


Genitourinary: absent: As Per HPI, Change in Urinary Stream, Difficulty 

Urinating, Dysuria, Flank Pain, Hematuria, Pyuria, Nocturia, Urinary 

Incontinence, Urinary Frequency, Urinary Hesitance, Urinary Urgency, Voiding 

Freq/Small Amts, Freq UTI, Hx Renal/Bladder Calculi, Hx /Renal Surgery, 

Bladder Distension, Other





- Reproductive: Female


Reproductive:Female: absent: As Per HPI, Amenorrhea, Amenorrhea/Birth Control, 

Currently Menstual, Cycle <21 Days, Cycle >35 Days, Cycle Variable, Menses 1-7 

Days, Menses >/= 8 Days, Menses Variable, Cycle > 4 Weeks Between, No Menses 

for 6 Months, Heavy Menses, Light Menses, Normal Menses, Spotting Between Cycles

, S/P Hysterectomy, Menopausal, Post Menopausal, Premenarche, Abnormal Vaginal 

Bleeding, Dysmenorrhea, Dyspareunia, Genital Lesions, Genital Pruritis, Pelvic 

Pain, Prolapse Symptoms, Sexual Dysfunction, Vaginal Discharge, Vaginal Dryness

, Vaginal Odor, Vaginal Pruritis, Other





- Menstruation


Menstruation: absent: As Per HPI, Amenorrhea, Amenorrhea/Birth Control, 

Currently Menstual, Cycle <21 Days, Cycle >35 Days, Cycle Variable, Menses 1-7 

Days, Menses >/= 8 Days, Menses Variable, Cycle > 4 Weeks Between, No Menses 

for 6 Months, Heavy Menses, Light Menses, Normal Menses, Spotting Between Cycles

, S/P Hysterectomy, Menopausal, Post Menopausal, Premenarche, Abnormal Vaginal 

Bleeding, Dysmenorrhea, Other





- Musculoskeletal


Musculoskeletal: absent: As Per HPI, Abnormal Gait, Arthralgias, Atrophy, Back 

Pain, Deformity, Joint Swelling, Limited Range of Motion, Loss of Height, 

Muscle Cramps, Muscle Weakness, Myalgias, Neck Pain, Numbness, Radiating Pain 

into Limb, Stiffness, Tingling, Other





- Integumentary


Integumentary: absent: As Per HPI, Acne, Alopecia, Bleeding Lesions, Change in 

Hair, Change in Nails, Change in Pigmentation, Changing Lesions, Dry Skin, 

Erythema, Furuncle, Hirsutism, Lesions, New Lesions, Non-Healing Lesions, 

Photosensitivity, Pruritus, Rash, Skin Pain, Skin Ulcer, Sores, Striae, Swelling

, Unusual Bruising, Wounds, Jaundice, Other





- Neurological


Neurological: As Per HPI





- Psychiatric


Psychiatric: absent: As Per HPI, Abnormal Sleep Pattern, Anhedonia, Anxiety, 

Auditory Hallucinations, Behavioral Changes, Change in Appetite, Change in 

Libido, Confusion, Depression, Difficulty Concentrating, Hallucinations, 

Homicidal Ideation, Hopelessness, Irritability, Memory Loss, Mood Swings, Panic 

Attacks, Paranoia, Suicidal Ideation, Visual Hallucinations, Tactile 

Hallucinations, Other





- Endocrine


Endocrine: absent: As Per HPI, Change in Body Appearance, Change in Libido, 

Cold Intolorance, Deepening of Voice, Excessive Sweating, Fatigue, Flushing, 

Heat Intolorance, Increase in Ring/Shoe/Hat Size, Palpitations, Polydipsia, 

Polyphagia, Polyuria, Other





- Hematologic/Lymphatic


Hematologic: absent: As Per HPI, Easy Bleeding, Easy Bruising, Lymphadenopathy, 

Other





Past Patient History





- Infectious Disease


Hx of Infectious Diseases: None





- Tetanus Immunizations


Tetanus Immunization: Unknown





- Past Medical History & Family History


Past Medical History?: Yes





- Past Social History


Smoking Status: Current Some Days Smoker





- CARDIAC


Hx Cardiac Disorders: Yes


Hx Congestive Heart Failure: No


Hx Hypercholesterolemia: No


Hx Hypertension: Yes





- PULMONARY


Hx Chronic Obstructive Pulmonary Disease (COPD): Yes





- NEUROLOGICAL


Hx Neurological Disorder: Yes


Hx Alzheimer's Disease: No


Hx Dementia: Yes


Hx Migraine: No


Hx Multiple Sclerosis: No


Hx Parkinson's Disease: No


Hx Seizures: Yes


Hx Transient Ischemic Attacks (TIA): No





- HEENT


Hx HEENT Problems: No





- RENAL


Hx Chronic Kidney Disease: No





- ENDOCRINE/METABOLIC


Hx Diabetes Mellitus Type 2: Yes


Hx Hypothyroidism: No





- HEMATOLOGICAL/ONCOLOGICAL


Hx Blood Disorders: Yes


Hx Anemia: Yes


Hx Human Immunodeficiency Virus (HIV): No


Hx Sickle Cell Disease: No





- INTEGUMENTARY


Hx Dermatological Problems: No





- MUSCULOSKELETAL/RHEUMATOLOGICAL


Hx Arthritis: Yes


Hx Rheumatoid Arthritis: No





- GASTROINTESTINAL


Hx Gastrointestinal Disorders: No


Hx Crohn's Disease: No


Hx Diverticulitis: No


Hx Gall Bladder Disease: No


Hx Gastritis: No


Hx Pancreatitis: No





- GENITOURINARY/GYNECOLOGICAL


Hx Genitourinary Disorders: Yes


Hx Sexually Transmitted Disorders: No


Other/Comment: tubal ligation





- PSYCHIATRIC


Hx Psychophysiologic Disorder: No


Hx Anxiety: No


Hx Bipolar Disorder: No


Hx Depression: No


Hx Paranoia: No


Hx Post Traumatic Stress Disorder: No


Hx Schizophrenia: No





- SURGICAL HISTORY


Hx Surgeries: Yes


Hx Appendectomy: No


Hx Carotid Endarterectomy: No


Hx Cholecystectomy: No


Hx Coronary Artery Bypass Graft: No


Hx Coronary Stent: No


Hx Tonsillectomy: No


Other/Comment: c2 c3 fusion.  tubal ligation





- ANESTHESIA


Hx Anesthesia: Yes


Hx Anesthesia Reactions: No





Meds


Allergies/Adverse Reactions: 


 Allergies











Allergy/AdvReac Type Severity Reaction Status Date / Time


 


No Known Allergies Allergy   Verified 04/11/18 18:55














- Medications


Medications: 


 Current Medications





Acetaminophen (Tylenol 325mg Tab)  650 mg PO Q6 PRN


   PRN Reason: Pain, Mild (1-3)


   Last Admin: 04/28/18 02:14 Dose:  650 mg


Albuterol/Ipratropium (Duoneb 3 Mg/0.5 Mg (3 Ml) Ud)  3 ml INH RQID [facility]


   Last Admin: 04/29/18 11:42 Dose:  3 ml


Insulin Human Lispro (Humalog)  0 units SC ACHS [facility]


   PRN Reason: Protocol


   Last Admin: 04/29/18 13:01 Dose:  1 u


Levofloxacin (Levaquin)  500 mg PO DAILY [facility]


   PRN Reason: Protocol


   Last Admin: 04/29/18 09:22 Dose:  500 mg


Phenytoin (Dilantin)  150 mg PO TID [facility]


   Last Admin: 04/29/18 13:00 Dose:  150 mg


Prednisone (Prednisone Tab)  20 mg PO DAILY [facility]


   Last Admin: 04/29/18 09:22 Dose:  20 mg











Physical Exam





- Constitutional


Appears: Non-toxic, No Acute Distress, Confused, Chronically Ill





- Head Exam


Head Exam: ATRAUMATIC, NORMAL INSPECTION, NORMOCEPHALIC





- Eye Exam


Eye Exam: EOMI, PERRL.  absent: Scleral icterus





- ENT Exam


ENT Exam: Mucous Membranes Dry, Normal External Ear Exam, Normal Oropharynx





- Neck Exam


Neck exam: Negative for: Lymphadenopathy





- Respiratory Exam


Respiratory Exam: Decreased Breath Sounds, Rhonchi





- Cardiovascular Exam


Cardiovascular Exam: REGULAR RHYTHM, +S1, +S2





- GI/Abdominal Exam


GI & Abdominal Exam: Diminished Bowel Sounds, Distended, Soft.  absent: Guarding

, Rebound, Rigid, Tenderness





- Rectal Exam


Rectal Exam: Deferred





-  Exam


 Exam: NORMAL INSPECTION





- Extremities Exam


Extremities exam: Positive for: pedal edema, pedal pulses present.  Negative for

: calf tenderness, tenderness





- Back Exam


Back exam: absent: CVA tenderness (L), CVA tenderness (R), paraspinal tenderness





- Neurological Exam


Neurological exam: Alert, CN II-XII Intact, Oriented x3, Reflexes Normal





- Psychiatric Exam


Psychiatric exam: Normal Mood





- Skin


Skin Exam: Dry





Results





- Vital Signs


Recent Vital Signs: 


 Last Vital Signs











Temp  98.7 F   04/29/18 08:52


 


Pulse  57 L  04/29/18 08:52


 


Resp  20   04/29/18 08:52


 


BP  171/65 H  04/29/18 08:52


 


Pulse Ox  96   04/29/18 08:52














- Labs


Result Diagrams: 


 04/29/18 04:40





 04/29/18 04:40


Labs: 


 Laboratory Results - last 24 hr











  04/28/18 04/28/18 04/28/18





  10:10 16:26 22:31


 


WBC   


 


RBC   


 


Hgb   


 


Hct   


 


MCV   


 


MCH   


 


MCHC   


 


RDW   


 


Plt Count   


 


Sodium   


 


Potassium   


 


Chloride   


 


Carbon Dioxide   


 


Anion Gap   


 


BUN   


 


Creatinine   


 


Est GFR ( Amer)   


 


Est GFR (Non-Af Amer)   


 


POC Glucose (mg/dL)  186 H  192 H  133 H


 


Random Glucose   


 


Calcium   


 


Total Bilirubin   


 


AST   


 


ALT   


 


Alkaline Phosphatase   


 


Total Protein   


 


Albumin   


 


Globulin   


 


Albumin/Globulin Ratio   














  04/29/18 04/29/18 04/29/18





  04:40 04:40 06:14


 


WBC  8.4  


 


RBC  2.81 L  


 


Hgb  8.5 L  


 


Hct  26.7 L  


 


MCV  94.9  


 


MCH  30.1  


 


MCHC  31.7 L  


 


RDW  14.9 H  


 


Plt Count  247  


 


Sodium   144 


 


Potassium   5.8 H 


 


Chloride   106 


 


Carbon Dioxide   27 


 


Anion Gap   17 


 


BUN   24 H 


 


Creatinine   1.1 


 


Est GFR ( Amer)   57 


 


Est GFR (Non-Af Amer)   47 


 


POC Glucose (mg/dL)    116 H


 


Random Glucose   140 H 


 


Calcium   8.5 


 


Total Bilirubin   0.5 


 


AST   18 


 


ALT   38 


 


Alkaline Phosphatase   81 


 


Total Protein   6.3 


 


Albumin   3.2 L 


 


Globulin   3.1 


 


Albumin/Globulin Ratio   1.0 














  04/29/18





  11:14


 


WBC 


 


RBC 


 


Hgb 


 


Hct 


 


MCV 


 


MCH 


 


MCHC 


 


RDW 


 


Plt Count 


 


Sodium 


 


Potassium 


 


Chloride 


 


Carbon Dioxide 


 


Anion Gap 


 


BUN 


 


Creatinine 


 


Est GFR ( Amer) 


 


Est GFR (Non-Af Amer) 


 


POC Glucose (mg/dL)  167 H


 


Random Glucose 


 


Calcium 


 


Total Bilirubin 


 


AST 


 


ALT 


 


Alkaline Phosphatase 


 


Total Protein 


 


Albumin 


 


Globulin 


 


Albumin/Globulin Ratio 














Assessment & Plan


(1) Acute respiratory failure with hypercapnia


Status: Acute   Priority: High   





(2) Altered mental status


Status: Acute   Priority: High   





(3) Recurrent seizures


Status: Acute   Priority: High   





(4) Respiratory insufficiency


Status: Acute   





(5) Anemia


Status: Acute   





(6) Aspiration pneumonia


Status: Acute   





(7) Cervical vertebral fracture


Status: Acute   





(8) Chest pain


Status: Acute   





- Assessment and Plan (Free Text)


Assessment: 





cont po levaquin for now- concern for lowering seizure threshold exists


may d/c all antibiotics after 7 days total

## 2018-04-29 NOTE — CP.PCM.PN
Subjective





- Date & Time of Evaluation


Date of Evaluation: 04/29/18


Time of Evaluation: 09:10





- Subjective


Subjective: 





Ms. Porter was seen and examined at the bedside. She is more awake, alert, 

participates in a pleasant conversation and claims of feeling much much better. 

She did have mild right upper extremity tremors or involuntary movements 

overnight. She denies any headache, dizziness, lightheadedness, able to move 

upper extremities spontaneously in comparison to her lower extremities. Her 

dilantin level this am is 13.9. There was no untoward events overnight.





Objective





- Vital Signs/Intake and Output


Vital Signs (last 24 hours): 


 











Temp Pulse Resp BP Pulse Ox


 


 98.7 F   57 L  20   171/65 H  96 


 


 04/29/18 08:52  04/29/18 08:52  04/29/18 08:52  04/29/18 08:52  04/29/18 08:52











- Medications


Medications: 


 Current Medications





Acetaminophen (Tylenol 325mg Tab)  650 mg PO Q6 PRN


   PRN Reason: Pain, Mild (1-3)


   Last Admin: 04/28/18 02:14 Dose:  650 mg


Albuterol/Ipratropium (Duoneb 3 Mg/0.5 Mg (3 Ml) Ud)  3 ml INH RQID [facility]


   Last Admin: 04/29/18 07:29 Dose:  3 ml


Insulin Human Lispro (Humalog)  0 units SC ACHS [facility]


   PRN Reason: Protocol


   Last Admin: 04/29/18 06:30 Dose:  Not Given


Levofloxacin (Levaquin)  500 mg PO DAILY [facility]


   PRN Reason: Protocol


Phenytoin (Dilantin)  150 mg PO TID [facility]


   Last Admin: 04/28/18 16:39 Dose:  150 mg


Prednisone (Prednisone Tab)  20 mg PO DAILY [facility]











- Labs


Labs: 


 





 04/29/18 04:40 





 04/29/18 04:40 





 











PT  11.2 Seconds (9.8-13.1)   04/22/18  14:10    


 


INR  1.0  (0.9-1.2)   04/22/18  14:10    


 


APTT  27.5 Seconds (25.6-37.1)   04/22/18  14:10    














- Constitutional


Appears: No Acute Distress





- Head Exam


Head Exam: NORMAL INSPECTION





- Neurological Exam


Neurological Exam: Alert, Awake, Oriented x3


Neuro motor strength exam: Left Upper Extremity: 4, Right Upper Extremity: 4, 

Left Lower Extremity: 2/1, Right Lower Extremity: 2/1


Additional comments: 





Neurological unchanged from previous examination.





Assessment and Plan


(1) Recurrent seizures


Assessment & Plan: 


Case discussed with Dr. Means, continue all current medical, physical, and 

occupational therapies. Recommend to repeat dilantin level prior to discharge.


Status: Acute

## 2018-04-29 NOTE — RAD
PROCEDURE:  CHEST RADIOGRAPH, 1 VIEW



HISTORY:

r/o infiltrate



COMPARISON:

04/22/2018



FINDINGS:



LUNGS:

There is mild pulmonary venous congestion.



PLEURA:

No pneumothorax. Bilateral pleural effusions, larger on the right. 



CARDIOVASCULAR:

Normal.



OSSEOUS STRUCTURES:

No significant abnormalities.



VISUALIZED UPPER ABDOMEN:

Normal.



OTHER FINDINGS:

None. 



IMPRESSION:

Findings are most compatible with mild venous congestion and pleural 

effusions, larger on the right.

## 2018-04-29 NOTE — CP.PCM.CON
History of Present Illness





- History of Present Illness


History of Present Illness: 





renal consult note





cc: hyperkalemia





88 female with history of COPD, HTN, DM, h/o C2-C3 fusion, seizure  disorder, 

obesity admitted initially with ams, acute resp failure sec to hypercarbia, now 

extubated and transferred to floor. 


have been consulted for hyperkalemia. 


unable to provide much hx 


no urinary complaints 








pmh: dm. htn copd seizure disorder


social: per records, non smoker, no alcohol 


complete ros negative 





PE:vitals reviewed


heent normal 


no jvd


skin normal 


op moist


s1s2 present


no resp distress


abd soft 


no edema 


flat affect 








PLAN


hyperkalemia/htn/dm/seizure disorder


low K diet


check urine lytes 


renal function normal 


abx per ID 


check cortisol levels





Past Patient History





- Infectious Disease


Hx of Infectious Diseases: None





- Tetanus Immunizations


Tetanus Immunization: Unknown





- Past Medical History & Family History


Past Medical History?: Yes





- Past Social History


Smoking Status: Current Some Days Smoker





- CARDIAC


Hx Cardiac Disorders: Yes


Hx Congestive Heart Failure: No


Hx Hypercholesterolemia: No


Hx Hypertension: Yes





- PULMONARY


Hx Chronic Obstructive Pulmonary Disease (COPD): Yes





- NEUROLOGICAL


Hx Neurological Disorder: Yes


Hx Alzheimer's Disease: No


Hx Dementia: Yes


Hx Migraine: No


Hx Multiple Sclerosis: No


Hx Parkinson's Disease: No


Hx Seizures: Yes


Hx Transient Ischemic Attacks (TIA): No





- HEENT


Hx HEENT Problems: No





- RENAL


Hx Chronic Kidney Disease: No





- ENDOCRINE/METABOLIC


Hx Diabetes Mellitus Type 2: Yes


Hx Hypothyroidism: No





- HEMATOLOGICAL/ONCOLOGICAL


Hx Blood Disorders: Yes


Hx Anemia: Yes


Hx Human Immunodeficiency Virus (HIV): No


Hx Sickle Cell Disease: No





- INTEGUMENTARY


Hx Dermatological Problems: No





- MUSCULOSKELETAL/RHEUMATOLOGICAL


Hx Arthritis: Yes


Hx Rheumatoid Arthritis: No





- GASTROINTESTINAL


Hx Gastrointestinal Disorders: No


Hx Crohn's Disease: No


Hx Diverticulitis: No


Hx Gall Bladder Disease: No


Hx Gastritis: No


Hx Pancreatitis: No





- GENITOURINARY/GYNECOLOGICAL


Hx Genitourinary Disorders: Yes


Hx Sexually Transmitted Disorders: No


Other/Comment: tubal ligation





- PSYCHIATRIC


Hx Psychophysiologic Disorder: No


Hx Anxiety: No


Hx Bipolar Disorder: No


Hx Depression: No


Hx Paranoia: No


Hx Post Traumatic Stress Disorder: No


Hx Schizophrenia: No





- SURGICAL HISTORY


Hx Surgeries: Yes


Hx Appendectomy: No


Hx Carotid Endarterectomy: No


Hx Cholecystectomy: No


Hx Coronary Artery Bypass Graft: No


Hx Coronary Stent: No


Hx Tonsillectomy: No


Other/Comment: c2 c3 fusion.  tubal ligation





- ANESTHESIA


Hx Anesthesia: Yes


Hx Anesthesia Reactions: No





Meds


Allergies/Adverse Reactions: 


 Allergies











Allergy/AdvReac Type Severity Reaction Status Date / Time


 


No Known Allergies Allergy   Verified 04/11/18 18:55














- Medications


Medications: 


 Current Medications





Acetaminophen (Tylenol 325mg Tab)  650 mg PO Q6 PRN


   PRN Reason: Pain, Mild (1-3)


   Last Admin: 04/28/18 02:14 Dose:  650 mg


Albuterol/Ipratropium (Duoneb 3 Mg/0.5 Mg (3 Ml) Ud)  3 ml INH RQID [facility]


   Last Admin: 04/29/18 16:01 Dose:  3 ml


Amoxicillin/Clavulanate Potassium (Augmentin 875 Mg-125 Mg Tab)  1 tab PO Q12 

[facility]


   PRN Reason: Protocol


Insulin Human Lispro (Humalog)  0 units SC ACHS [facility]


   PRN Reason: Protocol


   Last Admin: 04/29/18 13:01 Dose:  1 u


Phenytoin (Dilantin)  150 mg PO TID [facility]


   Last Admin: 04/29/18 13:00 Dose:  150 mg


Prednisone (Prednisone Tab)  20 mg PO DAILY [facility]


   Last Admin: 04/29/18 09:22 Dose:  20 mg











Results





- Vital Signs


Recent Vital Signs: 


 Last Vital Signs











Temp  98.7 F   04/29/18 15:54


 


Pulse  60   04/29/18 15:54


 


Resp  16   04/29/18 15:54


 


BP  161/66 H  04/29/18 15:54


 


Pulse Ox  99   04/29/18 15:54














- Labs


Result Diagrams: 


 04/29/18 04:40





 04/29/18 04:40


Labs: 


 Laboratory Results - last 24 hr











  04/28/18 04/29/18 04/29/18





  22:31 04:40 04:40


 


WBC   8.4 


 


RBC   2.81 L 


 


Hgb   8.5 L 


 


Hct   26.7 L 


 


MCV   94.9 


 


MCH   30.1 


 


MCHC   31.7 L 


 


RDW   14.9 H 


 


Plt Count   247 


 


Sodium    144


 


Potassium    5.8 H


 


Chloride    106


 


Carbon Dioxide    27


 


Anion Gap    17


 


BUN    24 H


 


Creatinine    1.1


 


Est GFR ( Amer)    57


 


Est GFR (Non-Af Amer)    47


 


POC Glucose (mg/dL)  133 H  


 


Random Glucose    140 H


 


Calcium    8.5


 


Total Bilirubin    0.5


 


AST    18


 


ALT    38


 


Alkaline Phosphatase    81


 


Total Protein    6.3


 


Albumin    3.2 L


 


Globulin    3.1


 


Albumin/Globulin Ratio    1.0














  04/29/18 04/29/18





  06:14 11:14


 


WBC  


 


RBC  


 


Hgb  


 


Hct  


 


MCV  


 


MCH  


 


MCHC  


 


RDW  


 


Plt Count  


 


Sodium  


 


Potassium  


 


Chloride  


 


Carbon Dioxide  


 


Anion Gap  


 


BUN  


 


Creatinine  


 


Est GFR ( Amer)  


 


Est GFR (Non-Af Amer)  


 


POC Glucose (mg/dL)  116 H  167 H


 


Random Glucose  


 


Calcium  


 


Total Bilirubin  


 


AST  


 


ALT  


 


Alkaline Phosphatase  


 


Total Protein  


 


Albumin  


 


Globulin  


 


Albumin/Globulin Ratio

## 2018-04-30 LAB
ALBUMIN SERPL-MCNC: 3.3 G/DL (ref 3.5–5)
ALBUMIN/GLOB SERPL: 1 {RATIO} (ref 1–2.1)
ALT SERPL-CCNC: 40 U/L (ref 9–52)
AST SERPL-CCNC: 19 U/L (ref 14–36)
BACTERIA #/AREA URNS HPF: (no result) /[HPF]
BILIRUB UR-MCNC: NEGATIVE MG/DL
BUN SERPL-MCNC: 22 MG/DL (ref 7–17)
CALCIUM SERPL-MCNC: 9 MG/DL (ref 8.4–10.2)
COLOR UR: YELLOW
ERYTHROCYTE [DISTWIDTH] IN BLOOD BY AUTOMATED COUNT: 15 % (ref 11.5–14.5)
GFR NON-AFRICAN AMERICAN: 47
GLUCOSE UR STRIP-MCNC: (no result) MG/DL
HGB BLD-MCNC: 9 G/DL (ref 12–16)
LEUKOCYTE ESTERASE UR-ACNC: (no result) LEU/UL
MCH RBC QN AUTO: 30.9 PG (ref 27–31)
MCHC RBC AUTO-ENTMCNC: 32.3 G/DL (ref 33–37)
MCV RBC AUTO: 95.5 FL (ref 81–99)
PH UR STRIP: 7 [PH] (ref 5–8)
PLATELET # BLD: 261 K/UL (ref 130–400)
PROT UR STRIP-MCNC: NEGATIVE MG/DL
RBC # BLD AUTO: 2.9 MIL/UL (ref 3.8–5.2)
RBC # UR STRIP: (no result) /UL
SP GR UR STRIP: 1.01 (ref 1–1.03)
SQUAMOUS EPITHIAL: 1 /HPF (ref 0–5)
URINE CLARITY: CLEAR
UROBILINOGEN UR-MCNC: (no result) MG/DL (ref 0.2–1)
WBC # BLD AUTO: 6.7 K/UL (ref 4.8–10.8)

## 2018-04-30 RX ADMIN — INSULIN LISPRO SCH U: 100 INJECTION, SOLUTION INTRAVENOUS; SUBCUTANEOUS at 16:28

## 2018-04-30 RX ADMIN — IPRATROPIUM BROMIDE AND ALBUTEROL SULFATE SCH ML: .5; 3 SOLUTION RESPIRATORY (INHALATION) at 19:07

## 2018-04-30 RX ADMIN — INSULIN LISPRO SCH: 100 INJECTION, SOLUTION INTRAVENOUS; SUBCUTANEOUS at 08:36

## 2018-04-30 RX ADMIN — AMOXICILLIN AND CLAVULANATE POTASSIUM SCH TAB: 875; 125 TABLET, FILM COATED ORAL at 21:10

## 2018-04-30 RX ADMIN — PHENYTOIN SCH MG: 125 SUSPENSION ORAL at 12:33

## 2018-04-30 RX ADMIN — PHENYTOIN SCH MG: 125 SUSPENSION ORAL at 08:35

## 2018-04-30 RX ADMIN — INSULIN LISPRO SCH: 100 INJECTION, SOLUTION INTRAVENOUS; SUBCUTANEOUS at 22:23

## 2018-04-30 RX ADMIN — PHENYTOIN SCH MG: 125 SUSPENSION ORAL at 16:28

## 2018-04-30 RX ADMIN — IPRATROPIUM BROMIDE AND ALBUTEROL SULFATE SCH ML: .5; 3 SOLUTION RESPIRATORY (INHALATION) at 11:04

## 2018-04-30 RX ADMIN — IPRATROPIUM BROMIDE AND ALBUTEROL SULFATE SCH ML: .5; 3 SOLUTION RESPIRATORY (INHALATION) at 15:14

## 2018-04-30 RX ADMIN — IPRATROPIUM BROMIDE AND ALBUTEROL SULFATE SCH ML: .5; 3 SOLUTION RESPIRATORY (INHALATION) at 07:58

## 2018-04-30 RX ADMIN — INSULIN LISPRO SCH U: 100 INJECTION, SOLUTION INTRAVENOUS; SUBCUTANEOUS at 12:33

## 2018-04-30 RX ADMIN — AMOXICILLIN AND CLAVULANATE POTASSIUM SCH TAB: 875; 125 TABLET, FILM COATED ORAL at 08:35

## 2018-04-30 NOTE — CP.PCM.PN
Subjective





- Date & Time of Evaluation


Date of Evaluation: 04/30/18


Time of Evaluation: 07:00





- Subjective


Subjective: 





switched to PO augmentin as blood  cultures neg and less risk for seizures


so far tolerating well





Objective





- Vital Signs/Intake and Output


Vital Signs (last 24 hours): 


 











Temp Pulse Resp BP Pulse Ox


 


 98.3 F   57 L  18   169/76 H  99 


 


 04/30/18 07:56  04/30/18 07:56  04/30/18 07:56  04/30/18 07:56  04/30/18 07:56











- Medications


Medications: 


 Current Medications





Acetaminophen (Tylenol 325mg Tab)  650 mg PO Q6 PRN


   PRN Reason: Pain, Mild (1-3)


   Last Admin: 04/29/18 20:36 Dose:  650 mg


Albuterol/Ipratropium (Duoneb 3 Mg/0.5 Mg (3 Ml) Ud)  3 ml INH RQID [facility]


   Last Admin: 04/30/18 07:58 Dose:  3 ml


Amoxicillin/Clavulanate Potassium (Augmentin 875 Mg-125 Mg Tab)  1 tab PO Q12 

[facility]


   PRN Reason: Protocol


   Last Admin: 04/30/18 08:35 Dose:  1 tab


Insulin Human Lispro (Humalog)  0 units SC ACHS [facility]


   PRN Reason: Protocol


   Last Admin: 04/30/18 08:36 Dose:  Not Given


Phenytoin (Dilantin)  150 mg PO TID [facility]


   Last Admin: 04/30/18 08:35 Dose:  150 mg


Prednisone (Prednisone Tab)  20 mg PO DAILY [facility]


   Last Admin: 04/30/18 08:36 Dose:  20 mg











- Labs


Labs: 


 





 04/30/18 04:20 





 04/30/18 04:20 





 











PT  11.2 Seconds (9.8-13.1)   04/22/18  14:10    


 


INR  1.0  (0.9-1.2)   04/22/18  14:10    


 


APTT  27.5 Seconds (25.6-37.1)   04/22/18  14:10    














- Constitutional


Appears: Non-toxic, Chronically Ill





- Head Exam


Head Exam: NORMOCEPHALIC





- Eye Exam


Eye Exam: PERRL





- ENT Exam


ENT Exam: Mucous Membranes Dry





- Neck Exam


Neck Exam: absent: Lymphadenopathy





- Respiratory Exam


Respiratory Exam: Decreased Breath Sounds





- Cardiovascular Exam


Cardiovascular Exam: REGULAR RHYTHM





- GI/Abdominal Exam


GI & Abdominal Exam: Distended





Assessment and Plan


(1) Acute respiratory failure with hypercapnia


Status: Acute   





(2) Altered mental status


Status: Acute   





(3) Recurrent seizures


Status: Acute   





(4) Respiratory insufficiency


Status: Acute   





(5) Anemia


Status: Acute   





(6) Aspiration pneumonia


Status: Acute   





(7) Cervical vertebral fracture


Status: Acute   





(8) Chest pain


Status: Acute

## 2018-04-30 NOTE — CP.PCM.PN
Subjective





- Date & Time of Evaluation


Date of Evaluation: 04/30/18


Time of Evaluation: 09:44





- Subjective


Subjective: 





Ms. Porter was seen and examined at the bedside. She is more awake, alert, 

participates in a pleasant conversation and claims of feeling much much better. 

She did have mild right upper extremity tremors or involuntary movements 

overnight. She denies any headache, dizziness, lightheadedness, able to move 

upper extremities spontaneously in comparison to her lower extremities. There 

was no untoward events overnight.





Objective





- Vital Signs/Intake and Output


Vital Signs (last 24 hours): 


 











Temp Pulse Resp BP Pulse Ox


 


 98.3 F   57 L  18   169/76 H  99 


 


 04/30/18 07:56  04/30/18 07:56  04/30/18 07:56  04/30/18 07:56  04/30/18 07:56











- Medications


Medications: 


 Current Medications





Acetaminophen (Tylenol 325mg Tab)  650 mg PO Q6 PRN


   PRN Reason: Pain, Mild (1-3)


   Last Admin: 04/29/18 20:36 Dose:  650 mg


Albuterol/Ipratropium (Duoneb 3 Mg/0.5 Mg (3 Ml) Ud)  3 ml INH RQID [facility]


   Last Admin: 04/30/18 07:58 Dose:  3 ml


Amoxicillin/Clavulanate Potassium (Augmentin 875 Mg-125 Mg Tab)  1 tab PO Q12 

[facility]


   PRN Reason: Protocol


   Last Admin: 04/30/18 08:35 Dose:  1 tab


Insulin Human Lispro (Humalog)  0 units SC ACHS [facility]


   PRN Reason: Protocol


   Last Admin: 04/30/18 08:36 Dose:  Not Given


Phenytoin (Dilantin)  150 mg PO TID [facility]


   Last Admin: 04/30/18 08:35 Dose:  150 mg


Prednisone (Prednisone Tab)  20 mg PO DAILY [facility]


   Last Admin: 04/30/18 08:36 Dose:  20 mg











- Labs


Labs: 


 





 04/30/18 04:20 





 04/30/18 04:20 





 











PT  11.2 Seconds (9.8-13.1)   04/22/18  14:10    


 


INR  1.0  (0.9-1.2)   04/22/18  14:10    


 


APTT  27.5 Seconds (25.6-37.1)   04/22/18  14:10    














- Constitutional


Appears: No Acute Distress





- Head Exam


Head Exam: NORMAL INSPECTION





- Neurological Exam


Neurological Exam: Alert, Awake, Oriented x3


Neuro motor strength exam: Left Upper Extremity: 4, Right Upper Extremity: 4, 

Left Lower Extremity: 2/1, Right Lower Extremity: 2/1


Additional comments: 





Neurological examination unchanged from previous examination.





Assessment and Plan


(1) Recurrent seizures


Assessment & Plan: 


Case discussed with Dr. Means, continue all current medical, physical, and 

occupational therapies. Recommend to repeat dilantin level prior to discharge.


Status: Acute

## 2018-04-30 NOTE — CP.PCM.PN
Subjective





- Date & Time of Evaluation


Date of Evaluation: 04/30/18


Time of Evaluation: 09:59





- Subjective


Subjective: 





Patient and bed awake and conscious.


Patient feeling good.


No nausea no vomiting


Family at the bedside





Objective





- Vital Signs/Intake and Output


Vital Signs (last 24 hours): 


 











Temp Pulse Resp BP Pulse Ox


 


 98.3 F   57 L  18   169/76 H  99 


 


 04/30/18 07:56  04/30/18 07:56  04/30/18 07:56  04/30/18 07:56  04/30/18 07:56











- Medications


Medications: 


 Current Medications





Acetaminophen (Tylenol 325mg Tab)  650 mg PO Q6 PRN


   PRN Reason: Pain, Mild (1-3)


   Last Admin: 04/29/18 20:36 Dose:  650 mg


Albuterol/Ipratropium (Duoneb 3 Mg/0.5 Mg (3 Ml) Ud)  3 ml INH RQID [facility]


   Last Admin: 04/30/18 07:58 Dose:  3 ml


Amoxicillin/Clavulanate Potassium (Augmentin 875 Mg-125 Mg Tab)  1 tab PO Q12 

[facility]


   PRN Reason: Protocol


   Last Admin: 04/30/18 08:35 Dose:  1 tab


Insulin Human Lispro (Humalog)  0 units SC ACHS [facility]


   PRN Reason: Protocol


   Last Admin: 04/30/18 08:36 Dose:  Not Given


Phenytoin (Dilantin)  150 mg PO TID [facility]


   Last Admin: 04/30/18 08:35 Dose:  150 mg


Prednisone (Prednisone Tab)  20 mg PO DAILY [facility]


   Last Admin: 04/30/18 08:36 Dose:  20 mg











- Labs


Labs: 


 





 04/30/18 04:20 





 04/30/18 04:20 





 











PT  11.2 Seconds (9.8-13.1)   04/22/18  14:10    


 


INR  1.0  (0.9-1.2)   04/22/18  14:10    


 


APTT  27.5 Seconds (25.6-37.1)   04/22/18  14:10    














- Constitutional


Appears: No Acute Distress





- ENT Exam


ENT Exam: Mucous Membranes Moist





- Neck Exam


Neck Exam: absent: Lymphadenopathy





- Respiratory Exam


Respiratory Exam: NORMAL BREATHING PATTERN.  absent: Chest Wall Tenderness, 

Rales





- Cardiovascular Exam


Cardiovascular Exam: REGULAR RHYTHM.  absent: Gallop, JVD, Rubs





- GI/Abdominal Exam


GI & Abdominal Exam: Soft, Normal Bowel Sounds





- Extremities Exam


Extremities Exam: absent: Calf Tenderness





- Back Exam


Back Exam: absent: CVA tenderness (L), CVA tenderness (R)





- Neurological Exam


Neurological Exam: Alert





- Psychiatric Exam


Psychiatric exam: Normal Affect





- Skin


Skin Exam: absent: Cyanosis





Assessment and Plan


(1) JASON (acute kidney injury)


Assessment & Plan: 


Patient appeared to be recovering from acute kidney injury serum creatinine 

came back to normal.


Status post respiratory failure .


Hyperkalemia corrected


As per primary team for the rest of the medical problem


Status: Acute   





(2) Acute respiratory failure with hypercapnia


Status: Acute

## 2018-05-01 ENCOUNTER — HOSPITAL ENCOUNTER (INPATIENT)
Dept: HOSPITAL 14 - H.TCU | Age: 83
LOS: 17 days | Discharge: HOME | DRG: 194 | End: 2018-05-18
Attending: INTERNAL MEDICINE | Admitting: INTERNAL MEDICINE
Payer: MEDICARE

## 2018-05-01 VITALS — BODY MASS INDEX: 33.1 KG/M2

## 2018-05-01 VITALS
OXYGEN SATURATION: 95 % | HEART RATE: 66 BPM | SYSTOLIC BLOOD PRESSURE: 165 MMHG | DIASTOLIC BLOOD PRESSURE: 72 MMHG | TEMPERATURE: 98.7 F

## 2018-05-01 VITALS — RESPIRATION RATE: 18 BRPM

## 2018-05-01 DIAGNOSIS — Z87.891: ICD-10-CM

## 2018-05-01 DIAGNOSIS — J44.0: ICD-10-CM

## 2018-05-01 DIAGNOSIS — J18.9: Primary | ICD-10-CM

## 2018-05-01 DIAGNOSIS — I10: ICD-10-CM

## 2018-05-01 DIAGNOSIS — D64.9: ICD-10-CM

## 2018-05-01 DIAGNOSIS — F03.90: ICD-10-CM

## 2018-05-01 DIAGNOSIS — G40.909: ICD-10-CM

## 2018-05-01 DIAGNOSIS — E11.9: ICD-10-CM

## 2018-05-01 PROCEDURE — 5A0945Z ASSISTANCE WITH RESPIRATORY VENTILATION, 24-96 CONSECUTIVE HOURS: ICD-10-PCS | Performed by: INTERNAL MEDICINE

## 2018-05-01 PROCEDURE — F06ZDZZ SWALLOWING DYSFUNCTION TREATMENT: ICD-10-PCS | Performed by: INTERNAL MEDICINE

## 2018-05-01 PROCEDURE — F07L6ZZ THERAPEUTIC EXERCISE TREATMENT OF MUSCULOSKELETAL SYSTEM - LOWER BACK / LOWER EXTREMITY: ICD-10-PCS | Performed by: INTERNAL MEDICINE

## 2018-05-01 PROCEDURE — F08Z3FZ FEEDING/EATING TREATMENT USING ASSISTIVE, ADAPTIVE, SUPPORTIVE OR PROTECTIVE EQUIPMENT: ICD-10-PCS | Performed by: INTERNAL MEDICINE

## 2018-05-01 PROCEDURE — F07Z9ZZ GAIT TRAINING/FUNCTIONAL AMBULATION TREATMENT: ICD-10-PCS | Performed by: INTERNAL MEDICINE

## 2018-05-01 PROCEDURE — F07Z9FZ GAIT TRAINING/FUNCTIONAL AMBULATION TREATMENT USING ASSISTIVE, ADAPTIVE, SUPPORTIVE OR PROTECTIVE EQUIPMENT: ICD-10-PCS | Performed by: INTERNAL MEDICINE

## 2018-05-01 PROCEDURE — F08Z1FZ DRESSING TECHNIQUES TREATMENT USING ASSISTIVE, ADAPTIVE, SUPPORTIVE OR PROTECTIVE EQUIPMENT: ICD-10-PCS | Performed by: INTERNAL MEDICINE

## 2018-05-01 PROCEDURE — F08Z2FZ GROOMING/PERSONAL HYGIENE TREATMENT USING ASSISTIVE, ADAPTIVE, SUPPORTIVE OR PROTECTIVE EQUIPMENT: ICD-10-PCS | Performed by: INTERNAL MEDICINE

## 2018-05-01 RX ADMIN — INSULIN LISPRO SCH: 100 INJECTION, SOLUTION INTRAVENOUS; SUBCUTANEOUS at 09:27

## 2018-05-01 RX ADMIN — AMOXICILLIN AND CLAVULANATE POTASSIUM SCH TAB: 875; 125 TABLET, FILM COATED ORAL at 21:13

## 2018-05-01 RX ADMIN — IPRATROPIUM BROMIDE AND ALBUTEROL SULFATE SCH ML: .5; 3 SOLUTION RESPIRATORY (INHALATION) at 07:21

## 2018-05-01 RX ADMIN — IPRATROPIUM BROMIDE AND ALBUTEROL SULFATE SCH ML: .5; 3 SOLUTION RESPIRATORY (INHALATION) at 19:08

## 2018-05-01 RX ADMIN — AMOXICILLIN AND CLAVULANATE POTASSIUM SCH TAB: 875; 125 TABLET, FILM COATED ORAL at 09:26

## 2018-05-01 RX ADMIN — IPRATROPIUM BROMIDE AND ALBUTEROL SULFATE SCH ML: .5; 3 SOLUTION RESPIRATORY (INHALATION) at 11:13

## 2018-05-01 RX ADMIN — INSULIN LISPRO SCH: 100 INJECTION, SOLUTION INTRAVENOUS; SUBCUTANEOUS at 13:17

## 2018-05-01 RX ADMIN — PHENYTOIN SCH MG: 125 SUSPENSION ORAL at 16:56

## 2018-05-01 RX ADMIN — PHENYTOIN SCH MG: 125 SUSPENSION ORAL at 09:27

## 2018-05-01 RX ADMIN — PHENYTOIN SCH MG: 125 SUSPENSION ORAL at 13:16

## 2018-05-01 NOTE — CP.PCM.PN
Subjective





- Date & Time of Evaluation


Date of Evaluation: 05/01/18


Time of Evaluation: 10:47





- Subjective


Subjective: 





Patient feeling good no chest pain no shortness of breath


Vital signs stable


No nausea or vomiting





Objective





- Vital Signs/Intake and Output


Vital Signs (last 24 hours): 


 











Temp Pulse Resp BP Pulse Ox


 


 98.3 F   64   18   169/78 H  99 


 


 05/01/18 07:39  05/01/18 07:39  05/01/18 07:39  05/01/18 07:39  05/01/18 07:39











- Medications


Medications: 


 Current Medications





Acetaminophen (Tylenol 325mg Tab)  650 mg PO Q6 PRN


   PRN Reason: Pain, Mild (1-3)


   Last Admin: 05/01/18 09:31 Dose:  650 mg


Albuterol/Ipratropium (Duoneb 3 Mg/0.5 Mg (3 Ml) Ud)  3 ml INH RQID [facility]


   Last Admin: 05/01/18 07:21 Dose:  3 ml


Amoxicillin/Clavulanate Potassium (Augmentin 875 Mg-125 Mg Tab)  1 tab PO Q12 

[facility]


   PRN Reason: Protocol


   Last Admin: 05/01/18 09:26 Dose:  1 tab


Insulin Human Lispro (Humalog)  0 units SC ACHS [facility]


   PRN Reason: Protocol


   Last Admin: 05/01/18 09:27 Dose:  Not Given


Phenytoin (Dilantin)  150 mg PO TID [facility]


   Last Admin: 05/01/18 09:27 Dose:  150 mg


Prednisone (Prednisone Tab)  20 mg PO DAILY [facility]


   Last Admin: 05/01/18 09:28 Dose:  20 mg











- Labs


Labs: 


 





 04/30/18 04:20 





 04/30/18 04:20 





 











PT  11.2 Seconds (9.8-13.1)   04/22/18  14:10    


 


INR  1.0  (0.9-1.2)   04/22/18  14:10    


 


APTT  27.5 Seconds (25.6-37.1)   04/22/18  14:10    














- Constitutional


Appears: No Acute Distress





- ENT Exam


ENT Exam: Mucous Membranes Moist





- Respiratory Exam


Respiratory Exam: NORMAL BREATHING PATTERN.  absent: Chest Wall Tenderness





- Cardiovascular Exam


Cardiovascular Exam: REGULAR RHYTHM.  absent: JVD, Rubs





- GI/Abdominal Exam


GI & Abdominal Exam: Soft, Normal Bowel Sounds





- Extremities Exam


Extremities Exam: absent: Calf Tenderness





- Back Exam


Back Exam: absent: CVA tenderness (L), CVA tenderness (R)





- Neurological Exam


Neurological Exam: Alert





- Psychiatric Exam


Psychiatric exam: Normal Affect





- Skin


Skin Exam: absent: Cyanosis





Assessment and Plan


(1) JASON (acute kidney injury)


Assessment & Plan: 


Recovering from acute kidney injury serum creatinine is stable.


88 Years old Female with PMHx of HTN, Diabetes, Pneumonia, Seizures, Anemia, 

Arthritis, Asthma/COPD,  Fractures (neck) and Dementia


Status: Acute   





(2) Acute respiratory failure with hypercapnia


Status: Acute

## 2018-05-02 RX ADMIN — IPRATROPIUM BROMIDE AND ALBUTEROL SULFATE SCH ML: .5; 3 SOLUTION RESPIRATORY (INHALATION) at 19:19

## 2018-05-02 RX ADMIN — IPRATROPIUM BROMIDE AND ALBUTEROL SULFATE SCH ML: .5; 3 SOLUTION RESPIRATORY (INHALATION) at 07:05

## 2018-05-02 RX ADMIN — PHENYTOIN SCH MG: 125 SUSPENSION ORAL at 17:38

## 2018-05-02 RX ADMIN — PANTOPRAZOLE SODIUM SCH MG: 40 TABLET, DELAYED RELEASE ORAL at 08:43

## 2018-05-02 RX ADMIN — PHENYTOIN SCH MG: 125 SUSPENSION ORAL at 13:03

## 2018-05-02 RX ADMIN — IPRATROPIUM BROMIDE AND ALBUTEROL SULFATE SCH ML: .5; 3 SOLUTION RESPIRATORY (INHALATION) at 15:39

## 2018-05-02 RX ADMIN — AMOXICILLIN AND CLAVULANATE POTASSIUM SCH TAB: 875; 125 TABLET, FILM COATED ORAL at 21:11

## 2018-05-02 RX ADMIN — PHENYTOIN SCH MG: 125 SUSPENSION ORAL at 08:42

## 2018-05-02 RX ADMIN — AMOXICILLIN AND CLAVULANATE POTASSIUM SCH TAB: 875; 125 TABLET, FILM COATED ORAL at 08:42

## 2018-05-02 RX ADMIN — IPRATROPIUM BROMIDE AND ALBUTEROL SULFATE SCH ML: .5; 3 SOLUTION RESPIRATORY (INHALATION) at 11:02

## 2018-05-03 RX ADMIN — AMOXICILLIN AND CLAVULANATE POTASSIUM SCH TAB: 875; 125 TABLET, FILM COATED ORAL at 08:54

## 2018-05-03 RX ADMIN — PHENYTOIN SCH MG: 125 SUSPENSION ORAL at 13:21

## 2018-05-03 RX ADMIN — AMOXICILLIN AND CLAVULANATE POTASSIUM SCH TAB: 875; 125 TABLET, FILM COATED ORAL at 20:50

## 2018-05-03 RX ADMIN — ALBUTEROL SULFATE SCH PUFF: 90 AEROSOL, METERED RESPIRATORY (INHALATION) at 17:09

## 2018-05-03 RX ADMIN — PHENYTOIN SCH MG: 125 SUSPENSION ORAL at 08:54

## 2018-05-03 RX ADMIN — PHENYTOIN SCH MG: 125 SUSPENSION ORAL at 17:10

## 2018-05-03 RX ADMIN — ALBUTEROL SULFATE SCH PUFF: 90 AEROSOL, METERED RESPIRATORY (INHALATION) at 21:00

## 2018-05-03 RX ADMIN — PANTOPRAZOLE SODIUM SCH MG: 40 TABLET, DELAYED RELEASE ORAL at 08:54

## 2018-05-03 RX ADMIN — IPRATROPIUM BROMIDE AND ALBUTEROL SULFATE SCH ML: .5; 3 SOLUTION RESPIRATORY (INHALATION) at 07:22

## 2018-05-03 RX ADMIN — IPRATROPIUM BROMIDE AND ALBUTEROL SULFATE SCH ML: .5; 3 SOLUTION RESPIRATORY (INHALATION) at 11:07

## 2018-05-04 RX ADMIN — ALBUTEROL SULFATE SCH PUFF: 90 AEROSOL, METERED RESPIRATORY (INHALATION) at 17:05

## 2018-05-04 RX ADMIN — ALBUTEROL SULFATE SCH PUFF: 90 AEROSOL, METERED RESPIRATORY (INHALATION) at 03:34

## 2018-05-04 RX ADMIN — AMOXICILLIN AND CLAVULANATE POTASSIUM SCH TAB: 875; 125 TABLET, FILM COATED ORAL at 08:33

## 2018-05-04 RX ADMIN — PANTOPRAZOLE SODIUM SCH MG: 40 TABLET, DELAYED RELEASE ORAL at 08:33

## 2018-05-04 RX ADMIN — ALBUTEROL SULFATE SCH PUFF: 90 AEROSOL, METERED RESPIRATORY (INHALATION) at 09:08

## 2018-05-04 RX ADMIN — PHENYTOIN SCH MG: 125 SUSPENSION ORAL at 08:34

## 2018-05-04 RX ADMIN — PHENYTOIN SCH MG: 125 SUSPENSION ORAL at 12:33

## 2018-05-04 RX ADMIN — ALBUTEROL SULFATE SCH PUFF: 90 AEROSOL, METERED RESPIRATORY (INHALATION) at 21:08

## 2018-05-04 RX ADMIN — AMOXICILLIN AND CLAVULANATE POTASSIUM SCH TAB: 875; 125 TABLET, FILM COATED ORAL at 20:58

## 2018-05-04 RX ADMIN — PHENYTOIN SCH MG: 125 SUSPENSION ORAL at 17:10

## 2018-05-04 NOTE — CP.PCM.HP
History of Present Illness





- History of Present Illness


History of Present Illness: 











87 y/o felmale admitted for Hypercapneic Resp Fail; Pna/ anemia / sz d/o 

intubated extubated and now here for rehab.





Never smoker





FHx Non Cont





Meds: see Reconcil list. 





VSS Afebrile





Head neg adeno


Heart RRR ns1s2


Lungs clear to A & P


Abdo s, nt, pos bs


Ext no, c,c,e 


Neuro nf





PMHx as above. Plus HTN and asthma.





a/p





Cont rehab


cont anti sz medications


do not give nebulized treatments with ox. give albuterol via HFA





cont po abx till finish.


give o2 max 3 lpm


pud and dvt px








Past Patient History





- Infectious Disease


Hx of Infectious Diseases: None





- Tetanus Immunizations


Tetanus Immunization: Unknown





- Past Medical History & Family History


Past Medical History?: Yes





- Past Social History


Smoking Status: Former Smoker





- CARDIAC


Hx Cardiac Disorders: Yes


Hx Hypertension: Yes





- PULMONARY


Hx Chronic Obstructive Pulmonary Disease (COPD): Yes





- NEUROLOGICAL


Hx Neurological Disorder: Yes


Hx Alzheimer's Disease: No


Hx Dementia: Yes


Hx Migraine: No


Hx Multiple Sclerosis: No


Hx Parkinson's Disease: No


Hx Seizures: Yes


Hx Transient Ischemic Attacks (TIA): No





- HEENT


Hx HEENT Problems: No





- RENAL


Hx Chronic Kidney Disease: No





- ENDOCRINE/METABOLIC


Hx Diabetes Mellitus Type 2: Yes





- HEMATOLOGICAL/ONCOLOGICAL


Hx Blood Disorders: Yes


Hx Anemia: Yes


Hx Human Immunodeficiency Virus (HIV): No


Hx Sickle Cell Disease: No





- INTEGUMENTARY


Hx Dermatological Problems: No





- MUSCULOSKELETAL/RHEUMATOLOGICAL


Hx Arthritis: Yes





- GASTROINTESTINAL


Hx Gastrointestinal Disorders: No


Hx Crohn's Disease: No


Hx Diverticulitis: No


Hx Gall Bladder Disease: No


Hx Gastritis: No


Hx Pancreatitis: No





- GENITOURINARY/GYNECOLOGICAL


Hx Genitourinary Disorders: Yes


Hx Sexually Transmitted Disorders: No


Other/Comment: tubal ligation





- PSYCHIATRIC


Hx Psychophysiologic Disorder: No


Hx Anxiety: No


Hx Bipolar Disorder: No


Hx Depression: No


Hx Paranoia: No


Hx Post Traumatic Stress Disorder: No


Hx Schizophrenia: No


Hx Substance Use: No





- SURGICAL HISTORY


Hx Surgeries: Yes


Hx Appendectomy: No


Hx Carotid Endarterectomy: No


Hx Cholecystectomy: No


Hx Coronary Artery Bypass Graft: No


Hx Coronary Stent: No


Hx Tonsillectomy: No


Other/Comment: c2 c3 fusion.  tubal ligation





- ANESTHESIA


Hx Anesthesia: Yes


Hx Anesthesia Reactions: No





Meds


Allergies/Adverse Reactions: 


 Allergies











Allergy/AdvReac Type Severity Reaction Status Date / Time


 


No Known Allergies Allergy   Verified 05/01/18 15:32














Results





- Vital Signs


Recent Vital Signs: 





 Last Vital Signs











Temp  98.1 F   05/03/18 19:39


 


Pulse  61   05/04/18 08:33


 


Resp  20   05/03/18 19:39


 


BP  190/80 H  05/04/18 08:33


 


Pulse Ox  98   05/03/18 19:39














- Labs


Labs: 





 Laboratory Results - last 24 hr











  05/03/18 05/03/18 05/03/18





  05:13 10:48 15:54


 


POC Glucose (mg/dL)  99  165 H  127 H














  05/03/18





  20:48


 


POC Glucose (mg/dL)  173 H

## 2018-05-05 RX ADMIN — PHENYTOIN SCH MG: 125 SUSPENSION ORAL at 09:15

## 2018-05-05 RX ADMIN — PHENYTOIN SCH MG: 125 SUSPENSION ORAL at 13:41

## 2018-05-05 RX ADMIN — ALBUTEROL SULFATE SCH PUFF: 90 AEROSOL, METERED RESPIRATORY (INHALATION) at 09:18

## 2018-05-05 RX ADMIN — ALBUTEROL SULFATE SCH PUFF: 90 AEROSOL, METERED RESPIRATORY (INHALATION) at 17:08

## 2018-05-05 RX ADMIN — PHENYTOIN SCH MG: 125 SUSPENSION ORAL at 17:07

## 2018-05-05 RX ADMIN — ALBUTEROL SULFATE SCH PUFF: 90 AEROSOL, METERED RESPIRATORY (INHALATION) at 03:00

## 2018-05-05 RX ADMIN — AMOXICILLIN AND CLAVULANATE POTASSIUM SCH TAB: 875; 125 TABLET, FILM COATED ORAL at 21:19

## 2018-05-05 RX ADMIN — AMOXICILLIN AND CLAVULANATE POTASSIUM SCH TAB: 875; 125 TABLET, FILM COATED ORAL at 09:15

## 2018-05-05 RX ADMIN — ALBUTEROL SULFATE SCH PUFF: 90 AEROSOL, METERED RESPIRATORY (INHALATION) at 21:20

## 2018-05-05 RX ADMIN — PANTOPRAZOLE SODIUM SCH MG: 40 TABLET, DELAYED RELEASE ORAL at 09:16

## 2018-05-06 LAB
ARTERIAL BLOOD GAS HEMOGLOBIN: 10.4 G/DL (ref 11.7–17.4)
ARTERIAL BLOOD GAS O2 SAT: 99.1 % (ref 95–98)
ARTERIAL BLOOD GAS PCO2: 67 MM/HG (ref 35–45)
ARTERIAL BLOOD GAS TCO2: 39.1 MMOL/L (ref 22–28)
ARTERIAL PATENCY WRIST A: YES
HCO3 BLDA-SCNC: 32.3 MMOL/L (ref 21–28)
INHALED O2 CONCENTRATION: 21 %
O2 CAP BLDA-SCNC: 14.1 ML/DL (ref 16–24)
O2 CT BLDA-SCNC: 14 ML/DL (ref 15–23)
PH BLDA: 7.35 [PH] (ref 7.35–7.45)
PO2 BLDA: 87 MM/HG (ref 80–100)

## 2018-05-06 RX ADMIN — GUAIFENESIN PRN MG: 100 SOLUTION ORAL at 22:50

## 2018-05-06 RX ADMIN — ALBUTEROL SULFATE SCH PUFF: 90 AEROSOL, METERED RESPIRATORY (INHALATION) at 21:27

## 2018-05-06 RX ADMIN — ALBUTEROL SULFATE SCH PUFF: 90 AEROSOL, METERED RESPIRATORY (INHALATION) at 16:32

## 2018-05-06 RX ADMIN — PANTOPRAZOLE SODIUM SCH MG: 40 TABLET, DELAYED RELEASE ORAL at 09:14

## 2018-05-06 RX ADMIN — ALBUTEROL SULFATE SCH PUFF: 90 AEROSOL, METERED RESPIRATORY (INHALATION) at 05:01

## 2018-05-06 RX ADMIN — PHENYTOIN SCH MG: 125 SUSPENSION ORAL at 12:17

## 2018-05-06 RX ADMIN — PHENYTOIN SCH MG: 125 SUSPENSION ORAL at 16:33

## 2018-05-06 RX ADMIN — ALBUTEROL SULFATE SCH PUFF: 90 AEROSOL, METERED RESPIRATORY (INHALATION) at 09:13

## 2018-05-06 RX ADMIN — GUAIFENESIN PRN MG: 100 SOLUTION ORAL at 16:32

## 2018-05-06 RX ADMIN — PHENYTOIN SCH MG: 125 SUSPENSION ORAL at 09:30

## 2018-05-06 RX ADMIN — AMOXICILLIN AND CLAVULANATE POTASSIUM SCH TAB: 875; 125 TABLET, FILM COATED ORAL at 09:14

## 2018-05-06 RX ADMIN — AMOXICILLIN AND CLAVULANATE POTASSIUM SCH TAB: 875; 125 TABLET, FILM COATED ORAL at 21:25

## 2018-05-07 RX ADMIN — PANTOPRAZOLE SODIUM SCH MG: 40 TABLET, DELAYED RELEASE ORAL at 08:02

## 2018-05-07 RX ADMIN — ALBUTEROL SULFATE SCH PUFF: 90 AEROSOL, METERED RESPIRATORY (INHALATION) at 21:15

## 2018-05-07 RX ADMIN — ALBUTEROL SULFATE SCH PUFF: 90 AEROSOL, METERED RESPIRATORY (INHALATION) at 09:05

## 2018-05-07 RX ADMIN — ALBUTEROL SULFATE SCH PUFF: 90 AEROSOL, METERED RESPIRATORY (INHALATION) at 16:59

## 2018-05-07 RX ADMIN — GUAIFENESIN PRN MG: 100 SOLUTION ORAL at 09:35

## 2018-05-07 RX ADMIN — PHENYTOIN SCH MG: 125 SUSPENSION ORAL at 16:59

## 2018-05-07 RX ADMIN — PHENYTOIN SCH MG: 125 SUSPENSION ORAL at 08:01

## 2018-05-07 RX ADMIN — PHENYTOIN SCH MG: 125 SUSPENSION ORAL at 12:34

## 2018-05-07 RX ADMIN — GUAIFENESIN PRN MG: 100 SOLUTION ORAL at 21:15

## 2018-05-07 RX ADMIN — ALBUTEROL SULFATE SCH PUFF: 90 AEROSOL, METERED RESPIRATORY (INHALATION) at 03:40

## 2018-05-08 RX ADMIN — ALBUTEROL SULFATE SCH PUFF: 90 AEROSOL, METERED RESPIRATORY (INHALATION) at 04:40

## 2018-05-08 RX ADMIN — PHENYTOIN SCH MG: 125 SUSPENSION ORAL at 12:54

## 2018-05-08 RX ADMIN — ALBUTEROL SULFATE SCH PUFF: 90 AEROSOL, METERED RESPIRATORY (INHALATION) at 21:55

## 2018-05-08 RX ADMIN — PHENYTOIN SCH MG: 125 SUSPENSION ORAL at 08:18

## 2018-05-08 RX ADMIN — PHENYTOIN SCH MG: 125 SUSPENSION ORAL at 16:20

## 2018-05-08 RX ADMIN — ALBUTEROL SULFATE SCH PUFF: 90 AEROSOL, METERED RESPIRATORY (INHALATION) at 16:21

## 2018-05-08 RX ADMIN — PANTOPRAZOLE SODIUM SCH MG: 40 TABLET, DELAYED RELEASE ORAL at 08:18

## 2018-05-08 RX ADMIN — GUAIFENESIN PRN MG: 100 SOLUTION ORAL at 23:17

## 2018-05-08 RX ADMIN — ALBUTEROL SULFATE SCH PUFF: 90 AEROSOL, METERED RESPIRATORY (INHALATION) at 09:55

## 2018-05-09 LAB
BUN SERPL-MCNC: 27 MG/DL (ref 7–17)
CALCIUM SERPL-MCNC: 9.1 MG/DL (ref 8.4–10.2)
ERYTHROCYTE [DISTWIDTH] IN BLOOD BY AUTOMATED COUNT: 14.3 % (ref 11.5–14.5)
GFR NON-AFRICAN AMERICAN: 47
HGB BLD-MCNC: 10.2 G/DL (ref 12–16)
MCH RBC QN AUTO: 29.9 PG (ref 27–31)
MCHC RBC AUTO-ENTMCNC: 31.9 G/DL (ref 33–37)
MCV RBC AUTO: 93.8 FL (ref 81–99)
PLATELET # BLD: 233 K/UL (ref 130–400)
RBC # BLD AUTO: 3.42 MIL/UL (ref 3.8–5.2)
WBC # BLD AUTO: 5 K/UL (ref 4.8–10.8)

## 2018-05-09 RX ADMIN — PHENYTOIN SCH MG: 125 SUSPENSION ORAL at 17:20

## 2018-05-09 RX ADMIN — PHENYTOIN SCH MG: 125 SUSPENSION ORAL at 08:31

## 2018-05-09 RX ADMIN — ALBUTEROL SULFATE SCH PUFF: 90 AEROSOL, METERED RESPIRATORY (INHALATION) at 21:26

## 2018-05-09 RX ADMIN — ALBUTEROL SULFATE SCH PUFF: 90 AEROSOL, METERED RESPIRATORY (INHALATION) at 10:07

## 2018-05-09 RX ADMIN — MAGNESIUM HYDROXIDE PRN ML: 400 SUSPENSION ORAL at 18:48

## 2018-05-09 RX ADMIN — ALBUTEROL SULFATE SCH: 90 AEROSOL, METERED RESPIRATORY (INHALATION) at 05:18

## 2018-05-09 RX ADMIN — ALBUTEROL SULFATE SCH PUFF: 90 AEROSOL, METERED RESPIRATORY (INHALATION) at 17:22

## 2018-05-09 RX ADMIN — PANTOPRAZOLE SODIUM SCH MG: 40 TABLET, DELAYED RELEASE ORAL at 08:32

## 2018-05-09 RX ADMIN — PHENYTOIN SCH MG: 125 SUSPENSION ORAL at 13:17

## 2018-05-09 NOTE — CP.PCM.PN
Subjective





- Subjective


Subjective: 





87 y/o felmale admitted for Hypercapneic Resp Fail; Pna/ anemia / sz d/o 

intubated extubated and now here for rehab.





Never smoker





FHx Non Cont





Meds: see Reconcil list. 





VSS Afebrile





Head neg adeno


Heart RRR ns1s2


Lungs clear to A & P


Abdo s, nt, pos bs


Ext no, c,c,e 


Neuro nf





PMHx as above. Plus HTN and asthma.





a/p





Cont rehab


cont anti sz medications


do not give nebulized treatments with ox. give albuterol via HFA


Finished full abx regiem. 


give o2 max 2 lpm


pud and dvt px








Objective





- Vital Signs/Intake and Output


Vital Signs (last 24 hours): 


 











Temp Pulse Resp BP Pulse Ox


 


 97.9 F   60   20   141/69   98 


 


 05/09/18 19:40  05/09/18 19:40  05/09/18 19:40  05/09/18 19:40  05/09/18 19:40











- Medications


Medications: 


 Current Medications





Acetaminophen (Tylenol 325mg Tab)  650 mg PO Q4 PRN


   PRN Reason: Pain, Mild (1-3)


   Last Admin: 05/08/18 05:05 Dose:  650 mg


Albuterol (Ventolin Hfa 90 Mcg/Actuation (8 G))  2 puff INH Q6 [facility]


   Last Admin: 05/09/18 21:26 Dose:  2 puff


Amlodipine Besylate (Norvasc)  5 mg PO DAILY [facility]


   Last Admin: 05/09/18 08:32 Dose:  5 mg


Dextrose (Dextrose 50% Inj)  0 ml IV STAT PRN; Protocol


   PRN Reason: Hypoglycemia Protocol


Dextrose (Glutose 15)  0 gm PO ONCE PRN; Protocol


   PRN Reason: Hypoglycemia Protocol


Furosemide (Lasix)  20 mg PO DAILY [facility]


   Last Admin: 05/09/18 08:32 Dose:  20 mg


Glucagon (Glucagen Diagnostic Kit)  0 mg IM STAT PRN; Protocol


   PRN Reason: Hypoglycemia Protocol


Guaifenesin (Robitussin)  100 mg PO Q6 PRN


   PRN Reason: Cough


   Last Admin: 05/08/18 23:17 Dose:  100 mg


Heparin Sodium (Porcine) (Heparin)  5,000 units SC Q12 [facility]


   PRN Reason: Protocol


   Last Admin: 05/09/18 21:27 Dose:  5,000 units


Hydrochlorothiazide (Hydrodiuril)  25 mg PO DAILY [facility]


   Last Admin: 05/09/18 08:32 Dose:  25 mg


Insulin Human Regular (Humulin R)  0 units SC ACHS [facility]


   PRN Reason: Protocol


   Last Admin: 05/09/18 21:26 Dose:  Not Given


Magnesium Hydroxide (Milk Of Magnesia)  30 ml PO DAILY [facility]


   PRN Reason: Constipation


   Last Admin: 05/09/18 18:48 Dose:  30 ml


Montelukast Sodium (Singulair)  10 mg PO [facility]


   Last Admin: 05/09/18 21:23 Dose:  10 mg


Nystatin (Nystop Topical Powder)  1 applic TOP TID [facility]


   Last Admin: 05/09/18 17:20 Dose:  1 u


Pantoprazole Sodium (Protonix Ec Tab)  40 mg PO DAILY [facility]


   Last Admin: 05/09/18 08:32 Dose:  40 mg


Phenytoin (Dilantin)  150 mg PO TID [facility]


   Last Admin: 05/09/18 17:20 Dose:  150 mg


Prednisone (Prednisone Tab)  5 mg PO DAILY [facility]


   Last Admin: 05/09/18 08:33 Dose:  5 mg











- Labs


Labs: 


 





 05/09/18 07:05 





 05/09/18 07:05

## 2018-05-10 RX ADMIN — MAGNESIUM HYDROXIDE PRN ML: 400 SUSPENSION ORAL at 21:25

## 2018-05-10 RX ADMIN — PHENYTOIN SCH MG: 125 SUSPENSION ORAL at 16:52

## 2018-05-10 RX ADMIN — PHENYTOIN SCH MG: 125 SUSPENSION ORAL at 09:07

## 2018-05-10 RX ADMIN — PHENYTOIN SCH MG: 125 SUSPENSION ORAL at 12:43

## 2018-05-10 RX ADMIN — ALBUTEROL SULFATE SCH PUFF: 90 AEROSOL, METERED RESPIRATORY (INHALATION) at 09:09

## 2018-05-10 RX ADMIN — PANTOPRAZOLE SODIUM SCH MG: 40 TABLET, DELAYED RELEASE ORAL at 09:08

## 2018-05-10 RX ADMIN — ALBUTEROL SULFATE SCH PUFF: 90 AEROSOL, METERED RESPIRATORY (INHALATION) at 03:24

## 2018-05-10 RX ADMIN — ALBUTEROL SULFATE SCH PUFF: 90 AEROSOL, METERED RESPIRATORY (INHALATION) at 21:25

## 2018-05-10 RX ADMIN — GUAIFENESIN PRN MG: 100 SOLUTION ORAL at 21:26

## 2018-05-10 RX ADMIN — ALBUTEROL SULFATE SCH PUFF: 90 AEROSOL, METERED RESPIRATORY (INHALATION) at 16:51

## 2018-05-11 RX ADMIN — ALBUTEROL SULFATE SCH PUFF: 90 AEROSOL, METERED RESPIRATORY (INHALATION) at 11:50

## 2018-05-11 RX ADMIN — PHENYTOIN SCH MG: 125 SUSPENSION ORAL at 08:51

## 2018-05-11 RX ADMIN — PANTOPRAZOLE SODIUM SCH MG: 40 TABLET, DELAYED RELEASE ORAL at 08:50

## 2018-05-11 RX ADMIN — PHENYTOIN SCH MG: 125 SUSPENSION ORAL at 16:43

## 2018-05-11 RX ADMIN — ALBUTEROL SULFATE SCH PUFF: 90 AEROSOL, METERED RESPIRATORY (INHALATION) at 03:45

## 2018-05-11 RX ADMIN — ALBUTEROL SULFATE SCH PUFF: 90 AEROSOL, METERED RESPIRATORY (INHALATION) at 22:12

## 2018-05-11 RX ADMIN — ALBUTEROL SULFATE SCH PUFF: 90 AEROSOL, METERED RESPIRATORY (INHALATION) at 17:10

## 2018-05-11 RX ADMIN — PHENYTOIN SCH MG: 125 SUSPENSION ORAL at 12:50

## 2018-05-12 RX ADMIN — PANTOPRAZOLE SODIUM SCH MG: 40 TABLET, DELAYED RELEASE ORAL at 08:43

## 2018-05-12 RX ADMIN — ALBUTEROL SULFATE SCH PUFF: 90 AEROSOL, METERED RESPIRATORY (INHALATION) at 03:33

## 2018-05-12 RX ADMIN — PHENYTOIN SCH MG: 125 SUSPENSION ORAL at 17:10

## 2018-05-12 RX ADMIN — ALBUTEROL SULFATE SCH PUFF: 90 AEROSOL, METERED RESPIRATORY (INHALATION) at 09:10

## 2018-05-12 RX ADMIN — PHENYTOIN SCH MG: 125 SUSPENSION ORAL at 08:41

## 2018-05-12 RX ADMIN — GUAIFENESIN PRN MG: 100 SOLUTION ORAL at 21:40

## 2018-05-12 RX ADMIN — ALBUTEROL SULFATE SCH PUFF: 90 AEROSOL, METERED RESPIRATORY (INHALATION) at 17:12

## 2018-05-12 RX ADMIN — PHENYTOIN SCH MG: 125 SUSPENSION ORAL at 13:30

## 2018-05-12 RX ADMIN — ALBUTEROL SULFATE SCH PUFF: 90 AEROSOL, METERED RESPIRATORY (INHALATION) at 21:39

## 2018-05-13 RX ADMIN — ALBUTEROL SULFATE SCH PUFF: 90 AEROSOL, METERED RESPIRATORY (INHALATION) at 03:57

## 2018-05-13 RX ADMIN — ALBUTEROL SULFATE SCH PUFF: 90 AEROSOL, METERED RESPIRATORY (INHALATION) at 21:49

## 2018-05-13 RX ADMIN — PHENYTOIN SCH MG: 125 SUSPENSION ORAL at 16:46

## 2018-05-13 RX ADMIN — GUAIFENESIN PRN MG: 100 SOLUTION ORAL at 16:46

## 2018-05-13 RX ADMIN — PHENYTOIN SCH MG: 125 SUSPENSION ORAL at 08:42

## 2018-05-13 RX ADMIN — ALBUTEROL SULFATE SCH PUFF: 90 AEROSOL, METERED RESPIRATORY (INHALATION) at 10:14

## 2018-05-13 RX ADMIN — PANTOPRAZOLE SODIUM SCH MG: 40 TABLET, DELAYED RELEASE ORAL at 08:43

## 2018-05-13 RX ADMIN — ALBUTEROL SULFATE SCH PUFF: 90 AEROSOL, METERED RESPIRATORY (INHALATION) at 16:00

## 2018-05-13 RX ADMIN — PHENYTOIN SCH MG: 125 SUSPENSION ORAL at 12:14

## 2018-05-14 RX ADMIN — ALBUTEROL SULFATE SCH: 90 AEROSOL, METERED RESPIRATORY (INHALATION) at 04:30

## 2018-05-14 RX ADMIN — PANTOPRAZOLE SODIUM SCH MG: 40 TABLET, DELAYED RELEASE ORAL at 08:23

## 2018-05-14 RX ADMIN — ALBUTEROL SULFATE SCH PUFF: 90 AEROSOL, METERED RESPIRATORY (INHALATION) at 16:51

## 2018-05-14 RX ADMIN — ALBUTEROL SULFATE SCH PUFF: 90 AEROSOL, METERED RESPIRATORY (INHALATION) at 09:21

## 2018-05-14 RX ADMIN — ALBUTEROL SULFATE SCH PUFF: 90 AEROSOL, METERED RESPIRATORY (INHALATION) at 22:40

## 2018-05-14 RX ADMIN — PHENYTOIN SCH MG: 125 SUSPENSION ORAL at 16:51

## 2018-05-14 RX ADMIN — PHENYTOIN SCH MG: 125 SUSPENSION ORAL at 12:20

## 2018-05-14 RX ADMIN — PHENYTOIN SCH MG: 125 SUSPENSION ORAL at 08:22

## 2018-05-15 RX ADMIN — PANTOPRAZOLE SODIUM SCH MG: 40 TABLET, DELAYED RELEASE ORAL at 09:07

## 2018-05-15 RX ADMIN — PHENYTOIN SCH MG: 125 SUSPENSION ORAL at 09:05

## 2018-05-15 RX ADMIN — PHENYTOIN SCH MG: 125 SUSPENSION ORAL at 13:11

## 2018-05-15 RX ADMIN — ALBUTEROL SULFATE SCH PUFF: 90 AEROSOL, METERED RESPIRATORY (INHALATION) at 22:06

## 2018-05-15 RX ADMIN — MAGNESIUM HYDROXIDE PRN ML: 400 SUSPENSION ORAL at 22:08

## 2018-05-15 RX ADMIN — ALBUTEROL SULFATE SCH PUFF: 90 AEROSOL, METERED RESPIRATORY (INHALATION) at 16:28

## 2018-05-15 RX ADMIN — ALBUTEROL SULFATE SCH PUFF: 90 AEROSOL, METERED RESPIRATORY (INHALATION) at 09:55

## 2018-05-15 RX ADMIN — PHENYTOIN SCH MG: 125 SUSPENSION ORAL at 16:28

## 2018-05-15 RX ADMIN — ALBUTEROL SULFATE SCH PUFF: 90 AEROSOL, METERED RESPIRATORY (INHALATION) at 03:49

## 2018-05-15 NOTE — CP.PCM.PN
Subjective





- Subjective


Subjective: 














This note is for a Saturday visit. It was dictated but it never made it to the 

chart?? 





87 y/o felmale admitted for Hypercapneic Resp Fail; Pna/ anemia / sz d/o 

intubated extubated and now here for rehab.











VSS Afebrile





Head neg adeno


Heart RRR ns1s2


Lungs clear to A & P


Abdo s, nt, pos bs


Ext no, c,c,e 


Neuro nf











a/p





Cont rehab


cont anti sz medications


do not give nebulized treatments with ox. give albuterol via HFA


Finished full abx regiem. 


give o2 max 2 lpm


pud and dvt px





Objective





- Vital Signs/Intake and Output


Vital Signs (last 24 hours): 


 











Temp Pulse Resp BP Pulse Ox


 


 97.9 F   61   20   122/65   96 


 


 05/15/18 08:44  05/15/18 09:07  05/15/18 08:44  05/15/18 09:08  05/15/18 08:44











- Medications


Medications: 


 Current Medications





Acetaminophen (Tylenol 325mg Tab)  650 mg PO Q4 PRN


   PRN Reason: Pain, Mild (1-3)


   Last Admin: 05/15/18 05:24 Dose:  650 mg


Albuterol (Ventolin Hfa 90 Mcg/Actuation (8 G))  2 puff INH Q6 [facility]


   Last Admin: 05/15/18 09:55 Dose:  2 puff


Amlodipine Besylate (Norvasc)  5 mg PO DAILY [facility]


   Last Admin: 05/15/18 09:07 Dose:  5 mg


Dextrose (Dextrose 50% Inj)  0 ml IV STAT PRN; Protocol


   PRN Reason: Hypoglycemia Protocol


Dextrose (Glutose 15)  0 gm PO ONCE PRN; Protocol


   PRN Reason: Hypoglycemia Protocol


Furosemide (Lasix)  20 mg PO DAILY [facility]


   Last Admin: 05/15/18 09:08 Dose:  20 mg


Glucagon (Glucagen Diagnostic Kit)  0 mg IM STAT PRN; Protocol


   PRN Reason: Hypoglycemia Protocol


Guaifenesin (Robitussin)  100 mg PO Q6 PRN


   PRN Reason: Cough


   Last Admin: 05/13/18 16:46 Dose:  100 mg


Heparin Sodium (Porcine) (Heparin)  5,000 units SC Q12 [facility]


   PRN Reason: Protocol


   Last Admin: 05/15/18 09:09 Dose:  5,000 units


Hydrochlorothiazide (Hydrodiuril)  25 mg PO DAILY [facility]


   Last Admin: 05/15/18 09:09 Dose:  25 mg


Insulin Human Regular (Humulin R)  0 units SC ACHS [facility]


   PRN Reason: Protocol


   Last Admin: 05/15/18 11:59 Dose:  1 units


Magnesium Hydroxide (Milk Of Magnesia)  30 ml PO DAILY [facility]


   PRN Reason: Constipation


   Last Admin: 05/09/18 18:48 Dose:  30 ml


Montelukast Sodium (Singulair)  10 mg PO [facility]


   Last Admin: 05/14/18 22:39 Dose:  10 mg


Nystatin (Nystop Topical Powder)  1 applic TOP TID [facility]


   Last Admin: 05/15/18 09:11 Dose:  1 u


Pantoprazole Sodium (Protonix Ec Tab)  40 mg PO DAILY [facility]


   Last Admin: 05/15/18 09:07 Dose:  40 mg


Phenytoin (Dilantin)  150 mg PO TID [facility]


   Last Admin: 05/15/18 13:11 Dose:  150 mg











- Labs


Labs: 


 





 05/09/18 07:05 





 05/09/18 07:05

## 2018-05-16 VITALS — RESPIRATION RATE: 20 BRPM

## 2018-05-16 LAB
BASOPHILS # BLD AUTO: 0 K/UL (ref 0–0.2)
BASOPHILS NFR BLD: 0.8 % (ref 0–2)
BUN SERPL-MCNC: 42 MG/DL (ref 7–17)
CALCIUM SERPL-MCNC: 8.9 MG/DL (ref 8.4–10.2)
EOSINOPHIL # BLD AUTO: 0.2 K/UL (ref 0–0.7)
EOSINOPHIL NFR BLD: 3.3 % (ref 0–4)
ERYTHROCYTE [DISTWIDTH] IN BLOOD BY AUTOMATED COUNT: 14.2 % (ref 11.5–14.5)
GFR NON-AFRICAN AMERICAN: 42
HGB BLD-MCNC: 9.9 G/DL (ref 12–16)
LYMPHOCYTES # BLD AUTO: 1.9 K/UL (ref 1–4.3)
LYMPHOCYTES NFR BLD AUTO: 35.6 % (ref 20–40)
MCH RBC QN AUTO: 30 PG (ref 27–31)
MCHC RBC AUTO-ENTMCNC: 32 G/DL (ref 33–37)
MCV RBC AUTO: 93.6 FL (ref 81–99)
MONOCYTES # BLD: 0.5 K/UL (ref 0–0.8)
MONOCYTES NFR BLD: 9.9 % (ref 0–10)
NEUTROPHILS # BLD: 2.7 K/UL (ref 1.8–7)
NEUTROPHILS NFR BLD AUTO: 50.4 % (ref 50–75)
NRBC BLD AUTO-RTO: 0 % (ref 0–0)
PLATELET # BLD: 167 K/UL (ref 130–400)
PMV BLD AUTO: 9.7 FL (ref 7.2–11.7)
RBC # BLD AUTO: 3.32 MIL/UL (ref 3.8–5.2)
WBC # BLD AUTO: 5.3 K/UL (ref 4.8–10.8)

## 2018-05-16 RX ADMIN — ALBUTEROL SULFATE SCH PUFF: 90 AEROSOL, METERED RESPIRATORY (INHALATION) at 09:36

## 2018-05-16 RX ADMIN — ALBUTEROL SULFATE SCH PUFF: 90 AEROSOL, METERED RESPIRATORY (INHALATION) at 21:01

## 2018-05-16 RX ADMIN — ALBUTEROL SULFATE SCH PUFF: 90 AEROSOL, METERED RESPIRATORY (INHALATION) at 16:47

## 2018-05-16 RX ADMIN — ALBUTEROL SULFATE SCH: 90 AEROSOL, METERED RESPIRATORY (INHALATION) at 05:38

## 2018-05-16 RX ADMIN — PHENYTOIN SCH MG: 125 SUSPENSION ORAL at 08:28

## 2018-05-16 RX ADMIN — PHENYTOIN SCH MG: 125 SUSPENSION ORAL at 13:10

## 2018-05-16 RX ADMIN — PHENYTOIN SCH MG: 125 SUSPENSION ORAL at 16:47

## 2018-05-16 RX ADMIN — PANTOPRAZOLE SODIUM SCH MG: 40 TABLET, DELAYED RELEASE ORAL at 08:25

## 2018-05-17 RX ADMIN — ALBUTEROL SULFATE SCH PUFF: 90 AEROSOL, METERED RESPIRATORY (INHALATION) at 09:13

## 2018-05-17 RX ADMIN — PHENYTOIN SCH MG: 125 SUSPENSION ORAL at 08:19

## 2018-05-17 RX ADMIN — PANTOPRAZOLE SODIUM SCH MG: 40 TABLET, DELAYED RELEASE ORAL at 08:20

## 2018-05-17 RX ADMIN — ALBUTEROL SULFATE SCH PUFF: 90 AEROSOL, METERED RESPIRATORY (INHALATION) at 17:01

## 2018-05-17 RX ADMIN — PHENYTOIN SCH MG: 125 SUSPENSION ORAL at 12:00

## 2018-05-17 RX ADMIN — ALBUTEROL SULFATE SCH PUFF: 90 AEROSOL, METERED RESPIRATORY (INHALATION) at 04:10

## 2018-05-17 RX ADMIN — PHENYTOIN SCH MG: 125 SUSPENSION ORAL at 17:00

## 2018-05-17 RX ADMIN — ALBUTEROL SULFATE SCH PUFF: 90 AEROSOL, METERED RESPIRATORY (INHALATION) at 21:26

## 2018-05-17 NOTE — CARD
--------------- APPROVED REPORT --------------





EKG Measurement

Heart [id number]

VA [id number]

[id number]

[id number]

[id number]



<Conclusion>

Sinus bradycardia

Otherwise normal ECG

## 2018-05-18 VITALS
TEMPERATURE: 97.7 F | OXYGEN SATURATION: 98 % | HEART RATE: 60 BPM | SYSTOLIC BLOOD PRESSURE: 122 MMHG | DIASTOLIC BLOOD PRESSURE: 62 MMHG

## 2018-05-18 RX ADMIN — ALBUTEROL SULFATE SCH PUFF: 90 AEROSOL, METERED RESPIRATORY (INHALATION) at 03:54

## 2018-05-18 RX ADMIN — ALBUTEROL SULFATE SCH PUFF: 90 AEROSOL, METERED RESPIRATORY (INHALATION) at 09:31

## 2018-05-18 RX ADMIN — PHENYTOIN SCH MG: 125 SUSPENSION ORAL at 08:21

## 2018-05-18 RX ADMIN — PHENYTOIN SCH MG: 125 SUSPENSION ORAL at 12:17

## 2018-05-18 RX ADMIN — PANTOPRAZOLE SODIUM SCH MG: 40 TABLET, DELAYED RELEASE ORAL at 08:22

## 2018-06-05 NOTE — CP.PCM.DIS
Provider





- Provider


Date of Admission: 


05/01/18 15:38











Patient admitted with CO2 Narcosis 2/2 excessive O2 given during treatments. 





Treated in ICU with BiPaP and eventually encephalopthy improved as CO2 dropped 

to baseline. 





Steroids, Abx, and Bronchodilators were also given.





Upon d/c Patient was clear to a & P. No c,c,e. 





Labs were acceptable. 





Patient was sent to rehab for short term rehab and was d/c to rehab on May 24, 

2018





Patient was d/michael from [facility] to home, to follow up with me at my office in 2 

weeks. 





Final Dx:





Acute Resp Failure Type II.





COPD / B. Asthma





HTN





Recent Cervical Fx s/p fixation by Neurosurgery. 





OA. 





Please call me should you have any questions. [phone]





Attending physician: 


Kike Sandoval MD








Hospital Course





- Lab Results


Lab Results: 


 Most Recent Lab Values











WBC  5.3 K/uL (4.8-10.8)   05/16/18  06:15    


 


RBC  3.32 Mil/uL (3.80-5.20)  L  05/16/18  06:15    


 


Hgb  9.9 g/dL (12.0-16.0)  L  05/16/18  06:15    


 


Hct  31.1 % (34.0-47.0)  L  05/16/18  06:15    


 


MCV  93.6 fl (81.0-99.0)   05/16/18  06:15    


 


MCH  30.0 pg (27.0-31.0)   05/16/18  06:15    


 


MCHC  32.0 g/dL (33.0-37.0)  L  05/16/18  06:15    


 


RDW  14.2 % (11.5-14.5)   05/16/18  06:15    


 


Plt Count  167 K/uL (130-400)   05/16/18  06:15    


 


MPV  9.7 fl (7.2-11.7)   05/16/18  06:15    


 


Neut % (Auto)  50.4 % (50.0-75.0)   05/16/18  06:15    


 


Lymph % (Auto)  35.6 % (20.0-40.0)   05/16/18  06:15    


 


Mono % (Auto)  9.9 % (0.0-10.0)   05/16/18  06:15    


 


Eos % (Auto)  3.3 % (0.0-4.0)   05/16/18  06:15    


 


Baso % (Auto)  0.8 % (0.0-2.0)   05/16/18  06:15    


 


Neut # (Auto)  2.7 K/uL (1.8-7.0)   05/16/18  06:15    


 


Lymph # (Auto)  1.9 K/uL (1.0-4.3)   05/16/18  06:15    


 


Mono # (Auto)  0.5 K/uL (0.0-0.8)   05/16/18  06:15    


 


Eos # (Auto)  0.2 K/uL (0.0-0.7)   05/16/18  06:15    


 


Baso # (Auto)  0.0 K/uL (0.0-0.2)   05/16/18  06:15    


 


pCO2  67 mm/Hg (35-45)  H  05/06/18  14:40    


 


pO2  87 mm/Hg ()   05/06/18  14:40    


 


HCO3  32.3 mmol/L (21-28)  H  05/06/18  14:40    


 


ABG pH  7.35  (7.35-7.45)   05/06/18  14:40    


 


ABG Total CO2  39.1 mmol/L (22-28)  H  05/06/18  14:40    


 


ABG O2 Saturation  99.1 % (95-98)  H  05/06/18  14:40    


 


ABG O2 Content  14.0 ML/dL (15-23)  L  05/06/18  14:40    


 


ABG Base Excess  9.5 mmol/L (-2.0-3.0)  H  05/06/18  14:40    


 


ABG Hemoglobin  10.4 g/dL (11.7-17.4)  L  05/06/18  14:40    


 


ABG Carboxyhemoglobin  1.3 % (0.5-1.5)   05/06/18  14:40    


 


POC ABG HHb (Measured)  0.9 % (0.0-5.0)   05/06/18  14:40    


 


ABG Methemoglobin  2.8 % (0.0-3.0)   05/06/18  14:40    


 


ABG O2 Capacity  14.1 mL/dL (16-24)  L  05/06/18  14:40    


 


Almas Test  Yes   05/06/18  14:40    


 


A-a O2 Difference  -21.0 mm/Hg  05/06/18  14:40    


 


Hgb O2 Saturation  95.0 % (95.0-98.0)   05/06/18  14:40    


 


FiO2  21.0 %  05/06/18  14:40    


 


Sodium  138 mmol/l (132-148)   05/16/18  06:15    


 


Potassium  4.6 MMOL/L (3.6-5.0)   05/16/18  06:15    


 


Chloride  93 mmol/L ()  L  05/16/18  06:15    


 


Carbon Dioxide  33 mmol/L (22-30)  H  05/16/18  06:15    


 


Anion Gap  17  (10-20)   05/16/18  06:15    


 


BUN  42 mg/dl (7-17)  H  05/16/18  06:15    


 


Creatinine  1.2 mg/dl (0.7-1.2)   05/16/18  06:15    


 


Est GFR ( Amer)  51   05/16/18  06:15    


 


Est GFR (Non-Af Amer)  42   05/16/18  06:15    


 


POC Glucose (mg/dL)  201 mg/dL ()  H  05/18/18  10:43    


 


Random Glucose  112 mg/dL ()  H  05/16/18  06:15    


 


Calcium  8.9 mg/dL (8.4-10.2)   05/16/18  06:15    


 


Troponin I  0.0280 ng/mL (0.00-0.120)   05/17/18  21:31    














Discharge Plan





- Follow Up Plan


Condition: GOOD


Disposition: HOME/ ROUTINE


Instructions:  High Blood Pressure (DC), Respiratory Distress Syndrome, Adult (

DC), Preventing Falls


Additional Instructions: 


[facility] WILL FOLLOW PATIENT AT HOME [phone]


PLEASE FOLLOW UP WITH DR. ESTRADA IN 1 WEEK 





Referrals: 


Glen Liang MD [Staff Provider] - 


Uli Means MD [Medical Doctor] - 


Stanley Lawson MD [Staff Provider] - 


Kike Sandoval MD [Family Provider] -

## 2018-06-20 NOTE — ED PDOC
HPI:  Head Injury


Time Seen by Provider: 11/14/17 20:30


Chief Complaint (Nursing): Trauma


Chief Complaint (Provider): Head injury


History Per: Patient, Family (Daughter)


History/Exam Limitations: no limitations


Additional Complaint(s): 





Patient is an 89 y/o  female with a past medical history of hypertension

, diabetes, chronic obstructive pulmonary disease, and asthma presenting to the 

emergency department for a head injury. Reports that the incident occurred as 

she was getting up from a sitting position in a chair. In the midst of the 

action, she fell forward and hit her head, with a questionable and brief loss 

of consciousness. Notes significant and developing swelling to her forehead. 

Also reports sensing blood in the back of her throat. Denies associated nausea, 

vomiting, chest pain, shortness of breath, fever, cough, or other complaints. 





Of note, the fall was witnessed by her daughter who is also providing history. 





PCP: Dr. Kike Sandoval





Past Medical History


Reviewed: Historical Data, Nursing Documentation, Vital Signs


Vital Signs: 





 Last Vital Signs











Temp  97.2 F L  11/14/17 20:12


 


Pulse  88   11/14/17 20:12


 


Resp  16   11/14/17 20:12


 


BP  159/86 H  11/14/17 20:12


 


Pulse Ox      














- Medical History


PMH: Arthritis, Asthma, COPD, Diabetes, HTN


   Denies: Chronic Kidney Disease





- Surgical History


Surgical History: No Surg Hx





- Family History


Family History: States: Unknown Family Hx





- Social History


Current smoker - smoking cessation education provided: No


Ex-Smoker (has not smoked in the last 12 months): No


Alcohol: None


Drugs: Denies





- Home Medications


Home Medications: 


 Ambulatory Orders











 Medication  Instructions  Recorded


 


Albuterol 0.083% [Albuterol 0.083% 3 ml IH TID 01/28/17





Inhal Sol (2.5 mg/3 ml) UD]  


 


Albuterol Sulfate [Proair Hfa] 2 puff IH Q6 01/28/17


 


Alendronate [Fosamax] 70 mg PO SUN 01/28/17


 


Budesonide/Formoterol Fumarate 1 puff PO Q12 01/28/17





[Symbicort 160-4.5 Mcg Inhaler]  


 


Clotrimazole [Clotrimazole-7] 45 gm VG DAILY 01/28/17


 


Losartan [Cozaar] 50 mg PO DAILY 01/28/17


 


Montelukast [Singulair] 10 mg PO HS 01/28/17


 


Potassium Chloride [Klor-Con M10] 10 meq PO DAILY 01/28/17


 


Torsemide [Demadex] 20 mg PO DAILY 01/28/17


 


amLODIPine [Norvasc] 2.5 mg PO DAILY 01/28/17


 


Albuterol HFA [Ventolin HFA 90 2 puff IH [id number] 11/14/17





mcg/actuation (8 g)]  


 


Glipizide [Glipizide ER] 2.5 mg PO DAILY 11/14/17


 


Potassium Chloride [Klor-Con 10 meq PO DAILY 11/14/17





Sprinkle]  














- Allergies


Allergies/Adverse Reactions: 


 Allergies











Allergy/AdvReac Type Severity Reaction Status Date / Time


 


No Known Allergies Allergy   Verified 11/14/17 20:12














Review of Systems


ROS Statement: Except As Marked, All Systems Reviewed And Found Negative


Constitutional: Negative for: Fever


Eyes: Positive for: Other (resolved sensation of blood in the back of her throat

)


Cardiovascular: Negative for: Chest Pain


Respiratory: Negative for: Cough, Shortness of Breath


Gastrointestinal: Negative for: Nausea, Vomiting


Musculoskeletal: Positive for: Other (forehead swelling and pain associated 

with head injury)


Neurological: Positive for: Other (questionable brief loss of consciousness/

syncope)





Physical Exam





- Reviewed


Nursing Documentation Reviewed: Yes


Vital Signs Reviewed: Yes





- Physical Exam


Appears: Positive for: Non-toxic, No Acute Distress


Head Exam: Positive for: NORMOCEPHALIC (A large 8 cm right sided forehead 

hematoma with associated ecchymosis noted).  Negative for: ATRAUMATIC


Skin: Positive for: Normal Color, Warm, Dry


Eye Exam: Positive for: Normal appearance


Neck: Positive for: Normal


Cardiovascular/Chest: Positive for: Regular Rate, Rhythm.  Negative for: Murmur


Respiratory: Positive for: Normal Breath Sounds.  Negative for: Accessory 

Muscle Use, Respiratory Distress


Extremity: Positive for: Normal ROM.  Negative for: Pedal Edema


Neurologic/Psych: Positive for: Alert, Oriented (x3)





- Laboratory Results


Result Diagrams: 


 11/14/17 21:54





 11/14/17 21:54





- ECG


ECG: Positive for: Interpreted By Me, Viewed By Me


ECG Rhythm: Positive for: Normal QRS, Right Bundle Branch Block (incomplete)


Rate: 71





Medical Decision Making


Medical Decision Making: 





Time: 20:51





Initial impression:  Patient is an 88 year old  female with a forehead 

hematoma in the setting of a syncopal event.





Initial plan:


 Type and Screen


 EKG


 Acetaminophen 650 mg PO


 Observation for patient safety


 Urinalysis 


 Reevaluation





21:05


EKG viewed and interpreted by me.





21:55


Head CT reviewed. Findings noted as follows:





FINDINGS:  


Brain: No acute intracranial hemorrhage.  Age-appropriate periventricular white 

matter disease.  No edema.  


Ventricles: Age-appropriate ventriculomegaly.  


Bones:  No acute displaced fracture.  


Sinuses: Air-fluid level within the left sphenoid sinus. The remaining 

paranasal sinuses are otherwise unremarkable.   


Mastoid air cells:  Unremarkable as visualized.  No mastoid effusion.  


 


Large right frontal scalp hematoma  





IMPRESSION:       


Large right frontal scalp hematoma, without underlying fracture, acute 

intracranial hemorrhage, or suspicious mass effect.  


 


Inflammatory sinus disease.





22:15


Maxillofacial CT reviewed. Findings noted as follows:





FINDINGS:  


Bones/joints:  No acute fracture.  


Soft tissues: A large right frontal scalp hematoma is identified.  


Orbits: Preserved  


Sinuses:  Unremarkable.  No air-fluid levels.  


 


IMPRESSION:       


Large right frontal scalp hematoma without underlying fracture.  





22:30


In provider's opinion, patient's fall is not likely due to mechanical causes 

given the description. Labs were reviewed and were noted to be significant for 

signs for anemia, with a hemoglobin level of 7.5 noted. It is unclear if 

patient has a history of anemia. However, patient's hemoglobin levels from 

three years ago were in the 11s range. Patient also denies any obvious bloody 

or black stool. 





The case was discussed with Dr. Sandoval who requested that patient be placed 

under hospital's services until he returns tomorrow night. Case was also 

discussed with Dr. Simmons (Hospitalist).





Clinical impression: Syncope, anemia, forehead hematuria





--------------------------------------------------------------------------------

---------------------------------------


Scribe Attestation:


Documented by Leisa Cummings, acting as a scribe for Isael Layton MD.





Provider Scribe Attestation:


All medical record entries made by the Scribe were at my direction and 

personally dictated by me. I have reviewed the chart and agree that the record 

accurately reflects my personal performance of the history, physical exam, 

medical decision making, and the department course for this patient. I have 

also personally directed, reviewed, and agree with the discharge instructions 

and disposition.





Disposition





- Clinical Impression


Clinical Impression: 


 Syncope, Traumatic hematoma of forehead, Anemia








- Patient ED Disposition


Is Patient to be Admitted: Yes


Discussed With DrMontrell: Kike Sandoval (Dr Simmons)


Counseled Patient/Family Regarding: Studies Performed, Diagnosis





- Disposition


Disposition Time: 22:00


Condition: FAIR
I have reviewed and confirmed nurses' notes for patient's medications, allergies, medical history, and surgical history.

## 2018-06-30 ENCOUNTER — HOSPITAL ENCOUNTER (EMERGENCY)
Dept: HOSPITAL 14 - H.ER | Age: 83
LOS: 1 days | Discharge: HOME | End: 2018-07-01
Payer: MEDICARE

## 2018-06-30 VITALS — BODY MASS INDEX: 33.1 KG/M2

## 2018-06-30 DIAGNOSIS — F03.90: ICD-10-CM

## 2018-06-30 DIAGNOSIS — E11.9: ICD-10-CM

## 2018-06-30 DIAGNOSIS — N39.0: ICD-10-CM

## 2018-06-30 DIAGNOSIS — J44.9: ICD-10-CM

## 2018-06-30 DIAGNOSIS — I10: ICD-10-CM

## 2018-06-30 DIAGNOSIS — G40.909: Primary | ICD-10-CM

## 2018-06-30 DIAGNOSIS — R50.9: ICD-10-CM

## 2018-06-30 LAB
ALBUMIN SERPL-MCNC: 3.7 G/DL (ref 3.5–5)
ALBUMIN/GLOB SERPL: 1.2 {RATIO} (ref 1–2.1)
ALT SERPL-CCNC: 29 U/L (ref 9–52)
AST SERPL-CCNC: 50 U/L (ref 14–36)
BASOPHILS # BLD AUTO: 0 K/UL (ref 0–0.2)
BASOPHILS NFR BLD: 0.2 % (ref 0–2)
BUN SERPL-MCNC: 27 MG/DL (ref 7–17)
CALCIUM SERPL-MCNC: 8.4 MG/DL (ref 8.4–10.2)
EOSINOPHIL # BLD AUTO: 0.1 K/UL (ref 0–0.7)
EOSINOPHIL NFR BLD: 1.2 % (ref 0–4)
ERYTHROCYTE [DISTWIDTH] IN BLOOD BY AUTOMATED COUNT: 13.3 % (ref 11.5–14.5)
GFR NON-AFRICAN AMERICAN: 47
HGB BLD-MCNC: 9.9 G/DL (ref 12–16)
LYMPHOCYTES # BLD AUTO: 0.4 K/UL (ref 1–4.3)
LYMPHOCYTES NFR BLD AUTO: 3.7 % (ref 20–40)
MCH RBC QN AUTO: 28.9 PG (ref 27–31)
MCHC RBC AUTO-ENTMCNC: 32.2 G/DL (ref 33–37)
MCV RBC AUTO: 89.7 FL (ref 81–99)
MONOCYTES # BLD: 1 K/UL (ref 0–0.8)
MONOCYTES NFR BLD: 8.6 % (ref 0–10)
NEUTROPHILS # BLD: 10 K/UL (ref 1.8–7)
NEUTROPHILS NFR BLD AUTO: 86.3 % (ref 50–75)
NRBC BLD AUTO-RTO: 0 % (ref 0–0)
PLATELET # BLD: 244 K/UL (ref 130–400)
PMV BLD AUTO: 8.6 FL (ref 7.2–11.7)
RBC # BLD AUTO: 3.43 MIL/UL (ref 3.8–5.2)
WBC # BLD AUTO: 11.6 K/UL (ref 4.8–10.8)

## 2018-06-30 PROCEDURE — 81003 URINALYSIS AUTO W/O SCOPE: CPT

## 2018-06-30 PROCEDURE — 99285 EMERGENCY DEPT VISIT HI MDM: CPT

## 2018-06-30 PROCEDURE — 87040 BLOOD CULTURE FOR BACTERIA: CPT

## 2018-06-30 PROCEDURE — 86308 HETEROPHILE ANTIBODY SCREEN: CPT

## 2018-06-30 PROCEDURE — 80053 COMPREHEN METABOLIC PANEL: CPT

## 2018-06-30 PROCEDURE — 80185 ASSAY OF PHENYTOIN TOTAL: CPT

## 2018-06-30 PROCEDURE — 85730 THROMBOPLASTIN TIME PARTIAL: CPT

## 2018-06-30 PROCEDURE — 82948 REAGENT STRIP/BLOOD GLUCOSE: CPT

## 2018-06-30 PROCEDURE — 96360 HYDRATION IV INFUSION INIT: CPT

## 2018-06-30 PROCEDURE — 93005 ELECTROCARDIOGRAM TRACING: CPT

## 2018-06-30 PROCEDURE — 87804 INFLUENZA ASSAY W/OPTIC: CPT

## 2018-06-30 PROCEDURE — 87181 SC STD AGAR DILUTION PER AGT: CPT

## 2018-06-30 PROCEDURE — 85610 PROTHROMBIN TIME: CPT

## 2018-06-30 PROCEDURE — 71045 X-RAY EXAM CHEST 1 VIEW: CPT

## 2018-06-30 PROCEDURE — 87086 URINE CULTURE/COLONY COUNT: CPT

## 2018-06-30 PROCEDURE — 85025 COMPLETE CBC W/AUTO DIFF WBC: CPT

## 2018-06-30 PROCEDURE — 83605 ASSAY OF LACTIC ACID: CPT

## 2018-06-30 NOTE — ED PDOC
HPI: Seizure


Time Seen by Provider: 06/30/18 22:34


Chief Complaint (Nursing): Seizure


Chief Complaint (Provider): Seizure


History Per: Patient, Family (daughter)


History/Exam Limitations: no limitations


Recent Seizure Activity Began: Just Before Arrival (2130)


Number Of Seizures: One


Length Of Seizures (Duration): Minutes (x1)


Quality Of Seizure: Generalized


Precipitating Factor(s): None


Associated Symptoms: Bit Tongue


Additional Complaint(s): 


89 year old  female with medical history of epilepsy, cervical spine 

fractures and hypertension, presents to the emergency department with 

generalized seizure-like activity lasting 1 minute, witnessed by her daughter 

around 2130 tonight at home. Daughter reports that patient was given Tylenol at 

2100 for a fever but denies any nausea, vomiting or diarrhea. Upon arrival to ED

, patient is AxO and reporting chills. 





PMD: Kike Sandoval MD





Past Medical History


Reviewed: Historical Data, Nursing Documentation, Vital Signs


Vital Signs: 


 Last Vital Signs











Temp  98.1 F   07/01/18 03:24


 


Pulse  67   07/01/18 03:24


 


Resp  18   07/01/18 03:24


 


BP  116/56 L  07/01/18 03:24


 


Pulse Ox  94 L  07/01/18 03:54














- Medical History


PMH: Anemia, Arthritis, Asthma, COPD, Dementia, Diabetes, Fractures (neck), HTN

, Pneumonia, Seizures


   Denies: Alzheimer's Disease, Anxiety, Atrial Fibrillation, Bipolar Disorder, 

Bronchitis, CAD, Cardia Arrhythmia, CHF, Crohn's Disease, Depression, 

Diverticulitis, Emphysema, Gastritis, Gall Bladder Disease, HIV, 

Hypercholesterolemia, Hyperthyroidism, Hypothyroidism, Kidney Stones, Migraine, 

Mitral Valve Prolapse, Multiple Sclerosis, Osteoporosis, Pancreatitis, Paranoia

, Parkinson's Disease, Peripheral Edema, Post Traumatic Stress Disorder, 

Pulmonary Embolism, Chronic Kidney Disease, Rheumatoid Arthritis, Schizophrenia

, Sickle Cell Disease, Sexually Transmitted Disease, Sleep Apnea, TIA





- Surgical History


Surgical History: 


   Denies: Appendectomy, CABG, Carotid Endarterectomy, Cholecystectomy, 

Coronary Stent, Pacemaker, Tonsillectomy





- Family History


Family History: States: Unknown Family Hx





- Home Medications


Home Medications: 


 Ambulatory Orders











 Medication  Instructions  Recorded


 


Acetaminophen [Tylenol 325mg tab] 325 mg PO Q4 PRN 04/22/18


 


Albuterol HFA [Ventolin HFA 90 2 puff IN Q6 04/22/18





mcg/actuation (8 g)]  


 


Furosemide [Lasix] 20 mg PO DAILY 04/22/18


 


Magnesium Hydroxide [Milk Of 30 ml PO DAILY 04/22/18





Magnesia]  


 


Montelukast [Singulair] 10 mg PO DAILY 04/22/18


 


Pantoprazole Sodium [Protonix] 40 mg PO DAILY 04/22/18


 


amLODIPine [Norvasc] 2.5 mg PO DAILY 04/22/18


 


Phenytoin [Dilantin] 150 mg PO TID #30 udc 05/01/18


 


predniSONE [predniSONE Tab] 20 mg PO DAILY #10 tab 05/01/18


 


Ibuprofen [Motrin] 600 mg PO BID PRN 05/17/18


 


Nystatin [Nystatin] 1 appful TOP TID 05/17/18


 


guaiFENesin [Robitussin] 100 mg PO Q6 PRN 05/17/18


 


levoFLOXacin [Levaquin] 500 mg PO DAILY #10 tab 07/01/18














- Allergies


Allergies/Adverse Reactions: 


 Allergies











Allergy/AdvReac Type Severity Reaction Status Date / Time


 


No Known Allergies Allergy   Verified 06/30/18 22:26














Review of Systems


ROS Statement: Except As Marked, All Systems Reviewed And Found Negative


Constitutional: Positive for: Fever, Chills


Gastrointestinal: Negative for: Nausea, Vomiting, Diarrhea


Neurological: Positive for: Seizures





Physical Exam





- Reviewed


Nursing Documentation Reviewed: Yes


Vital Signs Reviewed: Yes





- Physical Exam


Appears: Positive for: Non-toxic, No Acute Distress


Head Exam: Positive for: ATRAUMATIC, NORMAL INSPECTION, NORMOCEPHALIC


Skin: Positive for: Normal Color, Warm (febrile)


Eye Exam: Positive for: Normal appearance


ENT: Positive for: Normal ENT Inspection


Neck: Positive for: Normal


Cardiovascular/Chest: Positive for: Chest Non Tender, Tachycardia


Respiratory: Positive for: Normal Breath Sounds.  Negative for: Respiratory 

Distress


Gastrointestinal/Abdominal: Positive for: Normal Exam, Soft.  Negative for: 

Tenderness


Extremity: Positive for: Normal ROM (upper/lower)


Neurologic/Psych: Positive for: Alert, Oriented.  Negative for: Motor/Sensory 

Deficits





- Laboratory Results


Result Diagrams: 


 06/30/18 23:15





 06/30/18 23:15





- ECG


O2 Sat by Pulse Oximetry: 94 (RA)


Pulse Ox Interpretation: Normal





Medical Decision Making


Medical Decision Making: 


Initial Impression: 88 y/o  female with seizure activity in setting of 

known epilepsy and febrile illness





Initial Plan:


* EKG


* CMP


* Phenytoin 


* Lact acid


* Urine dipstick


* CBC


* PTT


* PT


* CXR


* Glucose, POC


* NS 1,000ml IV per 1,000mls/hr


* Tylenol 975mg PO


* Blood culture


* Urine culture


* Influenza AB


* Mono


* UA


----------





Time: 0010


--Negative for mono and influenza.





Time: 0130


--Labs: no significant clinical abnormality.


--Dilantin level in therapeutic range.


--CXR: demonstrates no pneumonic process.


----------





Time: 0220


--Upon provider re-evaluation, patient is medically stable and requires no 

further treatment in the ED at this time. Patient will be discharged home with 

daughter. Counseling was provided and all questions were answered regarding 

diagnosis. There is agreement to discharge plan. Return if symptoms persist or 

worsen.





Clinical Impression: Seizure; Febrile illness, UTI


----------





Time: 0320


--UA results reviewed after patient was discharged. Revealed large amount of 

leukocytes despite negative urine dip.


--Patient's daughter notified of results and RX for Levaquin was faxed to [facility] ([facility])  as requested by daughter . 





--------------------------------------------------------------------------------

----------------------------------------


Scribe Attestation:


Documented by Sonal Gomez, acting as a scribe for Isael Layton MD.





Provider Scribe Attestation:


All medical record entries made by the Scribe were at my direction and 

personally dictated by me. I have reviewed the chart and agree that the record 

accurately reflects my personal performance of the history, physical exam, 

medical decision making, and the department course for this patient. I have 

also personally directed, reviewed, and agree with the discharge instructions 

and disposition.





Disposition





- Clinical Impression


Clinical Impression: 


 Witnessed seizure, Fever, UTI (urinary tract infection)








- Patient ED Disposition


Is Patient to be Admitted: No


Counseled Patient/Family Regarding: Studies Performed, Diagnosis





- Disposition


Disposition: Routine/Home


Disposition Time: 02:20


Condition: IMPROVED


Prescriptions: 


levoFLOXacin [Levaquin] 500 mg PO DAILY #10 tab


Instructions:  Urinary Tract Infections in Adults, Seizures, Fever, Adult (DC)


Forms:  [facility] (English)

## 2018-07-01 VITALS — SYSTOLIC BLOOD PRESSURE: 116 MMHG | DIASTOLIC BLOOD PRESSURE: 56 MMHG | HEART RATE: 67 BPM | TEMPERATURE: 98.1 F

## 2018-07-01 VITALS — OXYGEN SATURATION: 94 %

## 2018-07-01 VITALS — RESPIRATION RATE: 18 BRPM

## 2018-07-01 LAB
APTT BLD: 25.4 SECONDS (ref 25.6–37.1)
BACTERIA #/AREA URNS HPF: (no result) /[HPF]
BILIRUB UR-MCNC: NEGATIVE MG/DL
COLOR UR: YELLOW
EOSINOPHIL NFR BLD: 1 % (ref 0–7)
GLUCOSE UR STRIP-MCNC: (no result) MG/DL
HYPOCHROMIC: SLIGHT
INR PPP: 1.1 (ref 0.9–1.2)
LEUKOCYTE ESTERASE UR-ACNC: (no result) LEU/UL
LYMPHOCYTE: 6 % (ref 20–50)
METAMYELOCYTES NFR BLD: 1 % (ref 0–0)
MONOCYTE: 5 % (ref 0–10)
MYELOCYTES NFR BLD: 1 % (ref 0–0)
NEUTROPHILS NFR BLD AUTO: 83 % (ref 42–75)
NEUTS BAND NFR BLD: 2 % (ref 0–2)
PH UR STRIP: 5 [PH] (ref 5–8)
PLATELET # BLD EST: NORMAL 10*3/UL
PROT UR STRIP-MCNC: 100 MG/DL
PROTHROMBIN TIME: 11.7 SECONDS (ref 9.8–13.1)
RBC # UR STRIP: NEGATIVE /UL
SP GR UR STRIP: 1.01 (ref 1–1.03)
SQUAMOUS EPITHIAL: < 1 /HPF (ref 0–5)
TARGETS BLD QL SMEAR: SLIGHT
TOTAL CELLS COUNTED BLD: 100
URINE CLARITY: (no result)
UROBILINOGEN UR-MCNC: (no result) MG/DL (ref 0.2–1)
VARIANT LYMPHS NFR BLD MANUAL: 1 % (ref 0–0)

## 2018-07-01 NOTE — RAD
HISTORY:

chest pain  



COMPARISON:

No prior. 



FINDINGS:



LUNGS:

Mild fibrotic changes, no consolidation.



PLEURA:

No significant pleural effusion identified, no pneumothorax apparent.



CARDIOVASCULAR:

Normal.



OSSEOUS STRUCTURES:

No significant abnormalities.



VISUALIZED UPPER ABDOMEN:

Normal.



OTHER FINDINGS:

None.



IMPRESSION:

Mild fibrotic changes, no consolidation.

## 2018-07-01 NOTE — CARD
--------------- APPROVED REPORT --------------





EKG Measurement

Heart [id number]

[id number]

[id number]

[id number]

[id number]



<Conclusion>

Normal sinus rhythm

Incomplete right bundle branch block

Septal infarct, age undetermined

Abnormal ECG

## 2018-09-02 ENCOUNTER — HOSPITAL ENCOUNTER (OUTPATIENT)
Dept: HOSPITAL 14 - H.ER | Age: 83
Setting detail: OBSERVATION
LOS: 1 days | Discharge: HOME | End: 2018-09-03
Attending: FAMILY MEDICINE | Admitting: FAMILY MEDICINE
Payer: COMMERCIAL

## 2018-09-02 VITALS — BODY MASS INDEX: 33.1 KG/M2

## 2018-09-02 DIAGNOSIS — D64.9: ICD-10-CM

## 2018-09-02 DIAGNOSIS — J44.9: ICD-10-CM

## 2018-09-02 DIAGNOSIS — M19.90: ICD-10-CM

## 2018-09-02 DIAGNOSIS — Z99.81: ICD-10-CM

## 2018-09-02 DIAGNOSIS — G40.909: ICD-10-CM

## 2018-09-02 DIAGNOSIS — R06.03: ICD-10-CM

## 2018-09-02 DIAGNOSIS — Z79.899: ICD-10-CM

## 2018-09-02 DIAGNOSIS — I11.0: ICD-10-CM

## 2018-09-02 DIAGNOSIS — Z79.4: ICD-10-CM

## 2018-09-02 DIAGNOSIS — F03.90: ICD-10-CM

## 2018-09-02 DIAGNOSIS — E10.9: ICD-10-CM

## 2018-09-02 DIAGNOSIS — Z87.01: ICD-10-CM

## 2018-09-02 DIAGNOSIS — J45.901: Primary | ICD-10-CM

## 2018-09-02 DIAGNOSIS — R56.9: ICD-10-CM

## 2018-09-02 DIAGNOSIS — H26.9: ICD-10-CM

## 2018-09-02 DIAGNOSIS — I50.9: ICD-10-CM

## 2018-09-02 LAB
ALBUMIN SERPL-MCNC: 3.4 G/DL (ref 3.5–5)
ALBUMIN/GLOB SERPL: 1.3 {RATIO} (ref 1–2.1)
ALT SERPL-CCNC: 24 U/L (ref 9–52)
AST SERPL-CCNC: 26 U/L (ref 14–36)
BASOPHILS # BLD AUTO: 0 K/UL (ref 0–0.2)
BASOPHILS NFR BLD: 0.6 % (ref 0–2)
BNP SERPL-MCNC: 400 PG/ML (ref 0–900)
BUN SERPL-MCNC: 19 MG/DL (ref 7–17)
CALCIUM SERPL-MCNC: 8.6 MG/DL (ref 8.4–10.2)
EOSINOPHIL # BLD AUTO: 0.1 K/UL (ref 0–0.7)
EOSINOPHIL NFR BLD: 2.4 % (ref 0–4)
ERYTHROCYTE [DISTWIDTH] IN BLOOD BY AUTOMATED COUNT: 14.7 % (ref 11.5–14.5)
GFR NON-AFRICAN AMERICAN: 52
HGB BLD-MCNC: 9.6 G/DL (ref 12–16)
LYMPHOCYTES # BLD AUTO: 1.3 K/UL (ref 1–4.3)
LYMPHOCYTES NFR BLD AUTO: 31.6 % (ref 20–40)
MCH RBC QN AUTO: 27.8 PG (ref 27–31)
MCHC RBC AUTO-ENTMCNC: 31.2 G/DL (ref 33–37)
MCV RBC AUTO: 88.9 FL (ref 81–99)
MONOCYTES # BLD: 0.5 K/UL (ref 0–0.8)
MONOCYTES NFR BLD: 11.9 % (ref 0–10)
NEUTROPHILS # BLD: 2.2 K/UL (ref 1.8–7)
NEUTROPHILS NFR BLD AUTO: 53.5 % (ref 50–75)
NRBC BLD AUTO-RTO: 0 % (ref 0–0)
PLATELET # BLD: 190 K/UL (ref 130–400)
PMV BLD AUTO: 8.1 FL (ref 7.2–11.7)
RBC # BLD AUTO: 3.47 MIL/UL (ref 3.8–5.2)
WBC # BLD AUTO: 4.1 K/UL (ref 4.8–10.8)

## 2018-09-02 PROCEDURE — 96374 THER/PROPH/DIAG INJ IV PUSH: CPT

## 2018-09-02 PROCEDURE — 80053 COMPREHEN METABOLIC PANEL: CPT

## 2018-09-02 PROCEDURE — 84484 ASSAY OF TROPONIN QUANT: CPT

## 2018-09-02 PROCEDURE — 93005 ELECTROCARDIOGRAM TRACING: CPT

## 2018-09-02 PROCEDURE — 80185 ASSAY OF PHENYTOIN TOTAL: CPT

## 2018-09-02 PROCEDURE — 99285 EMERGENCY DEPT VISIT HI MDM: CPT

## 2018-09-02 PROCEDURE — 83880 ASSAY OF NATRIURETIC PEPTIDE: CPT

## 2018-09-02 PROCEDURE — 71045 X-RAY EXAM CHEST 1 VIEW: CPT

## 2018-09-02 PROCEDURE — 81003 URINALYSIS AUTO W/O SCOPE: CPT

## 2018-09-02 PROCEDURE — 85025 COMPLETE CBC W/AUTO DIFF WBC: CPT

## 2018-09-02 PROCEDURE — 82948 REAGENT STRIP/BLOOD GLUCOSE: CPT

## 2018-09-02 PROCEDURE — 83605 ASSAY OF LACTIC ACID: CPT

## 2018-09-02 PROCEDURE — 87040 BLOOD CULTURE FOR BACTERIA: CPT

## 2018-09-02 NOTE — ED PDOC
HPI: SOB/CHF/COPD


Time Seen by Provider: 09/02/18 20:51


Chief Complaint (Nursing): Shortness Of Breath


Chief Complaint (Provider): Shortness Of Breath


History Per: Patient


History/Exam Limitations: no limitations


Onset/Duration Of Symptoms: Hrs (x2)


Current Symptoms Are (Timing): Still Present


Quality: Tightness


Additional Complaint(s): 


88 y/o female with a PMHx of seizure disorder, asthma and CHF presents to the 

ED complaining of shortness of breath, onset 2 hours ago. Patient states 

shortness of breath is associated with chest tightness. Daughter present at 

bedside and also reports patient has a cough that sounds productive. 





PMD: Kike Sandoval





Past Medical History


Reviewed: Historical Data, Nursing Documentation, Vital Signs


Vital Signs: 


 Last Vital Signs











Temp  98.8 F   09/02/18 20:50


 


Pulse  88   09/02/18 21:56


 


Resp  20   09/02/18 21:37


 


BP  149/87   09/02/18 21:55


 


Pulse Ox  99   09/02/18 22:56














- Medical History


PMH: Anemia, Arthritis, Asthma, COPD, Dementia, Diabetes, Fractures (neck), HTN

, Pneumonia, Seizures


   Denies: Alzheimer's Disease, Anxiety, Atrial Fibrillation, Bipolar Disorder, 

Bronchitis, CAD, Cardia Arrhythmia, CHF, Crohn's Disease, Depression, 

Diverticulitis, Emphysema, Gastritis, Gall Bladder Disease, HIV, 

Hypercholesterolemia, Hyperthyroidism, Hypothyroidism, Kidney Stones, Migraine, 

Mitral Valve Prolapse, Multiple Sclerosis, Osteoporosis, Pancreatitis, Paranoia

, Parkinson's Disease, Peripheral Edema, Post Traumatic Stress Disorder, 

Pulmonary Embolism, Chronic Kidney Disease, Rheumatoid Arthritis, Schizophrenia

, Sickle Cell Disease, Sexually Transmitted Disease, Sleep Apnea, TIA





- Surgical History


Surgical History: No Surg Hx


   Denies: Appendectomy, CABG, Carotid Endarterectomy, Cholecystectomy, 

Coronary Stent, Pacemaker, Tonsillectomy





- Family History


Family History: States: Unknown Family Hx





- Social History


Current smoker - smoking cessation education provided: No


Alcohol: None


Drugs: Denies





- Home Medications


Home Medications: 


 Ambulatory Orders











 Medication  Instructions  Recorded


 


Acetaminophen [Tylenol 325mg tab] 325 mg PO Q4 PRN 04/22/18


 


Albuterol HFA [Ventolin HFA 90 2 puff IN Q6 04/22/18





mcg/actuation (8 g)]  


 


Furosemide [Lasix] 20 mg PO DAILY 04/22/18


 


Magnesium Hydroxide [Milk Of 30 ml PO DAILY 04/22/18





Magnesia]  


 


Montelukast [Singulair] 10 mg PO DAILY 04/22/18


 


Pantoprazole Sodium [Protonix] 40 mg PO DAILY 04/22/18


 


amLODIPine [Norvasc] 2.5 mg PO DAILY 04/22/18


 


Phenytoin [Dilantin] 150 mg PO TID #30 udc 05/01/18


 


predniSONE [predniSONE Tab] 20 mg PO DAILY #10 tab 05/01/18


 


Ibuprofen [Motrin] 600 mg PO BID PRN 05/17/18


 


Nystatin [Nystatin] 1 appful TOP TID 05/17/18


 


guaiFENesin [Robitussin] 100 mg PO Q6 PRN 05/17/18


 


levoFLOXacin [Levaquin] 500 mg PO DAILY #10 tab 07/01/18














- Allergies


Allergies/Adverse Reactions: 


 Allergies











Allergy/AdvReac Type Severity Reaction Status Date / Time


 


No Known Allergies Allergy   Verified 09/02/18 20:52














Review of Systems


ROS Statement: Except As Marked, All Systems Reviewed And Found Negative


Cardiovascular: Positive for: Chest Pain ("Tightness")


Respiratory: Positive for: Cough, Shortness of Breath





Physical Exam





- Reviewed


Nursing Documentation Reviewed: Yes


Vital Signs Reviewed: Yes





- Physical Exam


Appears: Positive for: In Acute Distress (mild)


Head Exam: Positive for: ATRAUMATIC, NORMOCEPHALIC


Skin: Positive for: Normal Color, Warm, Dry


Eye Exam: Positive for: Normal appearance, EOMI, PERRL


Neck: Positive for: Normal, Painless ROM


Cardiovascular/Chest: Positive for: Regular Rate, Rhythm.  Negative for: Murmur


Respiratory: Positive for: Decreased Breath Sounds, Rhonchi (bilateral )


Gastrointestinal/Abdominal: Positive for: Normal Exam, Soft.  Negative for: 

Tenderness


Back: Positive for: Normal Inspection.  Negative for: L CVA Tenderness, R CVA 

Tenderness, Vertebral Tenderness


Extremity: Positive for: Normal ROM.  Negative for: Deformity


Neurologic/Psych: Positive for: Alert, Oriented.  Negative for: Motor/Sensory 

Deficits





- Laboratory Results


Result Diagrams: 


 09/02/18 21:30





 09/02/18 21:30





- ECG


O2 Sat by Pulse Oximetry: 99 (RA)


Pulse Ox Interpretation: Normal





Medical Decision Making


Medical Decision Making: 


Time: 2055


Impression: 88 y/o  female with respiratory distress, in setting of 

asthma exacerbation. 


Plan:


-- EKG


-- ED Urine Dipstick


-- Heplock Insertion


-- Urinalysis 





Time: 2130


Plan:


-- Peak Flow Pre/Post Tx


-- Blood Culture


-- Nitroglycerin 2% 1 ea TOP


-- Lasix 40 mg IV


-- Duoneb 3mg/5ml UD 3 ml INH


-- CBC with differentials


-- Troponin I


-- Lact Acid, Plasma


-- Dilantin (Phenytoin)


-- CMP


-- BNP





Time: 2247


Plan:


-- Duoneb 3mg/5ml UD 3 ml INH


-- SOLU-Medrol 125 mg IVP


-- Peak Flow Pre/Post Tx





Time: 2250


-- Labs reviewed and show no clinically significant abnormalities. 


-- CXR show no active disease. 


-- Patient reports of mild improvement. Patient to be placed on observation for 

asthma exacerbation. 


-- Case discussed with Dr. George for Dr. Pal for admission. 


________________________________________________________________________________

________


Scribe Attestation:


Documented by Malka Barcenas acting as a scribe for Courtney House MD.





Provider Scribe Attestation:


All medical record entries made by the Scribe were at my direction and 

personally dictated by me. I have reviewed the chart and agree that the record 

accurately reflects my personal performance of the history, physical exam, 

medical decision making, and the department course for this patient. I have 

also personally directed, reviewed, and agree with the discharge instructions 

and disposition.





Disposition





- Clinical Impression


Clinical Impression: 


 Asthma exacerbation








- Patient ED Disposition


Is Patient to be Admitted: Yes





- Disposition


Disposition Time: 22:50


Condition: IMPROVED


Forms:  [facility] (English)





- Pt Status Changed To:


Hospital Disposition Of: Observation

## 2018-09-03 VITALS
TEMPERATURE: 98.6 F | RESPIRATION RATE: 20 BRPM | OXYGEN SATURATION: 97 % | HEART RATE: 59 BPM | SYSTOLIC BLOOD PRESSURE: 154 MMHG | DIASTOLIC BLOOD PRESSURE: 74 MMHG

## 2018-09-03 LAB
BACTERIA #/AREA URNS HPF: (no result) /[HPF]
BILIRUB UR-MCNC: NEGATIVE MG/DL
COLOR UR: YELLOW
GLUCOSE UR STRIP-MCNC: (no result) MG/DL
LEUKOCYTE ESTERASE UR-ACNC: (no result) LEU/UL
PH UR STRIP: 7 [PH] (ref 5–8)
PROT UR STRIP-MCNC: 30 MG/DL
RBC # UR STRIP: NEGATIVE /UL
SP GR UR STRIP: 1.01 (ref 1–1.03)
SQUAMOUS EPITHIAL: 27 /HPF (ref 0–5)
URINE CLARITY: (no result)
URINE HYALINE CAST: (no result) /HPF (ref 0–2)
UROBILINOGEN UR-MCNC: (no result) MG/DL (ref 0.2–1)

## 2018-09-03 NOTE — RAD
Date of service: 



09/02/2018



HISTORY:

chest pain  



COMPARISON:

06/30/2018 



FINDINGS:



LUNGS:

Mild bibasilar volume loss, greater on the left.  Stable mild 

interstitial change.



PLEURA:

No significant pleural effusion identified, no pneumothorax apparent.



CARDIOVASCULAR:

Cardiomegaly, unchanged. Aorta is stable.



OSSEOUS STRUCTURES:

No significant abnormalities.



VISUALIZED UPPER ABDOMEN:

Normal.



OTHER FINDINGS:

None.



IMPRESSION:

No new focal infiltrate or CHF.

## 2018-09-03 NOTE — CP.PCM.HP
History of Present Illness





- History of Present Illness


History of Present Illness: 





pt admitted for asthma exacerbation. no f/c, n/v/d. per daughter pt is on home 

o2  @ 2lpm and c/o sob last night. no soba t present. no coug/congestion at 

present. 


daughter thinks ?? stress causing exacerbation of s/s.








Present on Admission





- Present on Admission


Any Indicators Present on Admission: Yes


History of Uncontrolled Diabetes: Yes





Review of Systems





- Respiratory


Respiratory: As Per HPI, Cough, Chest Congestion





Past Patient History





- Infectious Disease


Hx of Infectious Diseases: None





- Tetanus Immunizations


Tetanus Immunization: Unknown





- Past Medical History & Family History


Past Medical History?: Yes





- Past Social History


Smoking Status: Never Smoked





- CARDIAC


Hx Atrial Fibrillation: No


Hx Cardia Arrhythmia: No


Hx Congestive Heart Failure: No


Hx Hypercholesterolemia: No


Hx Hypertension: Yes


Hx Mitral Valve Prolapse: No


Hx Pacemaker: No


Hx Peripheral Edema: No





- PULMONARY


Hx Asthma: Yes


Hx Bronchitis: No


Hx Chronic Obstructive Pulmonary Disease (COPD): Yes


Hx Emphysema: No


Hx Pneumonia: Yes


Hx Pulmonary Embolism: No


Hx Sleep Apnea: No





- NEUROLOGICAL


Hx Alzheimer's Disease: No


Hx Dementia: Yes


Hx Migraine: No


Hx Multiple Sclerosis: No


Hx Parkinson's Disease: No


Hx Seizures: Yes


Hx Transient Ischemic Attacks (TIA): No





- HEENT


Hx HEENT Problems: No


Hx Cataracts: Yes





- RENAL


Hx Chronic Kidney Disease: No


Hx Kidney Stones: No





- ENDOCRINE/METABOLIC


Hx Diabetes Mellitus Type 1: Yes


Hx Hyperthyroidism: No


Hx Hypothyroidism: No





- HEMATOLOGICAL/ONCOLOGICAL


Hx Anemia: Yes


Hx Human Immunodeficiency Virus (HIV): No


Hx Sickle Cell Disease: No





- INTEGUMENTARY


Hx Dermatological Problems: No





- MUSCULOSKELETAL/RHEUMATOLOGICAL


Hx Arthritis: Yes


Hx Falls: No


Hx Fractures: Yes (neck)


Hx Osteoporosis: No


Hx Rheumatoid Arthritis: No





- GASTROINTESTINAL


Hx Crohn's Disease: No


Hx Diverticulitis: No


Hx Gall Bladder Disease: No


Hx Gastritis: No


Hx Pancreatitis: No





- GENITOURINARY/GYNECOLOGICAL


Hx Sexually Transmitted Disorders: No





- PSYCHIATRIC


Hx Anxiety: No


Hx Bipolar Disorder: No


Hx Depression: No


Hx Paranoia: No


Hx Post Traumatic Stress Disorder: No


Hx Schizophrenia: No


Hx Substance Use: No





- SURGICAL HISTORY


Hx Appendectomy: No


Hx Carotid Endarterectomy: No


Hx Cholecystectomy: No


Hx Coronary Artery Bypass Graft: No


Hx Coronary Stent: No


Hx Tonsillectomy: No





- ANESTHESIA


Hx Anesthesia: Yes


Hx Anesthesia Reactions: No





Meds


Allergies/Adverse Reactions: 


 Allergies











Allergy/AdvReac Type Severity Reaction Status Date / Time


 


No Known Allergies Allergy   Verified 09/02/18 20:52














Physical Exam





- Constitutional


Appears: Well, Non-toxic, No Acute Distress





- Head Exam


Head Exam: ATRAUMATIC, NORMAL INSPECTION, NORMOCEPHALIC





- Eye Exam


Eye Exam: EOMI, Normal appearance, PERRL


Pupil Exam: NORMAL ACCOMODATION, PERRL





- ENT Exam


ENT Exam: Mucous Membranes Moist, Normal Exam





- Neck Exam


Neck exam: Positive for: Normal Inspection





- Respiratory Exam


Respiratory Exam: Clear to Auscultation Bilateral, NORMAL BREATHING PATTERN





- Cardiovascular Exam


Cardiovascular Exam: REGULAR RHYTHM, RRR, +S1, +S2





- GI/Abdominal Exam


GI & Abdominal Exam: Normal Bowel Sounds, Soft.  absent: Tenderness





- Extremities Exam


Extremities exam: Positive for: full ROM, normal capillary refill, normal 

inspection, pedal pulses present





- Back Exam


Back exam: NORMAL INSPECTION





- Neurological Exam


Neurological exam: Alert, CN II-XII Intact, Normal Gait, Oriented x3, Reflexes 

Normal





- Psychiatric Exam


Psychiatric exam: Normal Affect, Normal Mood





- Skin


Skin Exam: Dry, Intact, Normal Color, Warm





Results





- Vital Signs


Recent Vital Signs: 





 Last Vital Signs











Temp  97.6 F   09/03/18 07:37


 


Pulse  68   09/03/18 13:09


 


Resp  19   09/03/18 07:37


 


BP  154/63 H  09/03/18 13:09


 


Pulse Ox  100   09/03/18 07:37














- Labs


Result Diagrams: 


 09/02/18 21:30





 09/02/18 21:30


Labs: 





 Laboratory Results - last 24 hr











  09/02/18 09/02/18 09/02/18





  21:30 21:30 21:30


 


WBC   4.1 L D 


 


RBC   3.47 L 


 


Hgb   9.6 L 


 


Hct   30.9 L 


 


MCV   88.9 


 


MCH   27.8 


 


MCHC   31.2 L 


 


RDW   14.7 H 


 


Plt Count   190 


 


MPV   8.1 


 


Neut % (Auto)   53.5 


 


Lymph % (Auto)   31.6 


 


Mono % (Auto)   11.9 H 


 


Eos % (Auto)   2.4 


 


Baso % (Auto)   0.6 


 


Neut # (Auto)   2.2 


 


Lymph # (Auto)   1.3 


 


Mono # (Auto)   0.5 


 


Eos # (Auto)   0.1 


 


Baso # (Auto)   0.0 


 


Sodium  133  


 


Potassium  4.4  


 


Chloride  92 L  


 


Carbon Dioxide  36 H  


 


Anion Gap  9 L  


 


BUN  19 H  


 


Creatinine  1.0  


 


Est GFR ( Amer)  > 60  


 


Est GFR (Non-Af Amer)  52  


 


POC Glucose (mg/dL)   


 


Random Glucose  140 H  


 


Lactic Acid   


 


Calcium  8.6  


 


Total Bilirubin  0.2  


 


AST  26  


 


ALT  24  


 


Alkaline Phosphatase  96  


 


Troponin I  0.0530  


 


NT-Pro-B Natriuret Pep  400  


 


Total Protein  6.1 L  


 


Albumin  3.4 L  


 


Globulin  2.7  


 


Albumin/Globulin Ratio  1.3  


 


Urine Color   


 


Urine Clarity   


 


Urine pH   


 


Ur Specific Gravity   


 


Urine Protein   


 


Urine Glucose (UA)   


 


Urine Ketones   


 


Urine Blood   


 


Urine Nitrate   


 


Urine Bilirubin   


 


Urine Urobilinogen   


 


Ur Leukocyte Esterase   


 


Urine RBC (Auto)   


 


Urine Microscopic WBC   


 


Ur Squamous Epith Cells   


 


Urine Bacteria   


 


Hyaline Casts   


 


Urine Yeast (Budding)   


 


Phenytoin    35.5 H














  09/02/18 09/03/18 09/03/18





  21:30 01:11 05:53


 


WBC   


 


RBC   


 


Hgb   


 


Hct   


 


MCV   


 


MCH   


 


MCHC   


 


RDW   


 


Plt Count   


 


MPV   


 


Neut % (Auto)   


 


Lymph % (Auto)   


 


Mono % (Auto)   


 


Eos % (Auto)   


 


Baso % (Auto)   


 


Neut # (Auto)   


 


Lymph # (Auto)   


 


Mono # (Auto)   


 


Eos # (Auto)   


 


Baso # (Auto)   


 


Sodium   


 


Potassium   


 


Chloride   


 


Carbon Dioxide   


 


Anion Gap   


 


BUN   


 


Creatinine   


 


Est GFR ( Amer)   


 


Est GFR (Non-Af Amer)   


 


POC Glucose (mg/dL)    162 H


 


Random Glucose   


 


Lactic Acid  1.4  


 


Calcium   


 


Total Bilirubin   


 


AST   


 


ALT   


 


Alkaline Phosphatase   


 


Troponin I   


 


NT-Pro-B Natriuret Pep   


 


Total Protein   


 


Albumin   


 


Globulin   


 


Albumin/Globulin Ratio   


 


Urine Color   Yellow 


 


Urine Clarity   Turbid 


 


Urine pH   7.0 


 


Ur Specific Gravity   1.008 


 


Urine Protein   30 


 


Urine Glucose (UA)   Neg 


 


Urine Ketones   Negative 


 


Urine Blood   Negative 


 


Urine Nitrate   Negative 


 


Urine Bilirubin   Negative 


 


Urine Urobilinogen   0.2-1.0 


 


Ur Leukocyte Esterase   Large 


 


Urine RBC (Auto)   5 H 


 


Urine Microscopic WBC   16 H 


 


Ur Squamous Epith Cells   27 H 


 


Urine Bacteria   Occ H 


 


Hyaline Casts   0-2 


 


Urine Yeast (Budding)   Occ H 


 


Phenytoin   














  09/03/18





  10:54


 


WBC 


 


RBC 


 


Hgb 


 


Hct 


 


MCV 


 


MCH 


 


MCHC 


 


RDW 


 


Plt Count 


 


MPV 


 


Neut % (Auto) 


 


Lymph % (Auto) 


 


Mono % (Auto) 


 


Eos % (Auto) 


 


Baso % (Auto) 


 


Neut # (Auto) 


 


Lymph # (Auto) 


 


Mono # (Auto) 


 


Eos # (Auto) 


 


Baso # (Auto) 


 


Sodium 


 


Potassium 


 


Chloride 


 


Carbon Dioxide 


 


Anion Gap 


 


BUN 


 


Creatinine 


 


Est GFR ( Amer) 


 


Est GFR (Non-Af Amer) 


 


POC Glucose (mg/dL)  152 H


 


Random Glucose 


 


Lactic Acid 


 


Calcium 


 


Total Bilirubin 


 


AST 


 


ALT 


 


Alkaline Phosphatase 


 


Troponin I 


 


NT-Pro-B Natriuret Pep 


 


Total Protein 


 


Albumin 


 


Globulin 


 


Albumin/Globulin Ratio 


 


Urine Color 


 


Urine Clarity 


 


Urine pH 


 


Ur Specific Gravity 


 


Urine Protein 


 


Urine Glucose (UA) 


 


Urine Ketones 


 


Urine Blood 


 


Urine Nitrate 


 


Urine Bilirubin 


 


Urine Urobilinogen 


 


Ur Leukocyte Esterase 


 


Urine RBC (Auto) 


 


Urine Microscopic WBC 


 


Ur Squamous Epith Cells 


 


Urine Bacteria 


 


Hyaline Casts 


 


Urine Yeast (Budding) 


 


Phenytoin 














Assessment & Plan


(1) DVT prophylaxis


Assessment and Plan: 


scd and aehose


Status: Acute   





(2) Asthma exacerbation


Assessment and Plan: 


doing well, comfortable at present. 


no tx since 2200 last night


cont home meds


cont home o2 level of 2 lpm


Status: Acute   





- Assessment and Plan (Free Text)


Assessment: 





dilantin level noted. will hold dialntin at present, pt to see pmd dr hendricks 1-

2 days and will leave to his discretion to start/stop this med.


spoke w/ dr hendricks who is aware and case d/c w/ same. will follow as outpt





Decision To Admit





- Pt Status Changed To:


Hospital Disposition Of: Observation





- .


Bed Request Type: Med/Surg


Admitting Physician: Robbie Pal

## 2018-09-03 NOTE — CARD
--------------- APPROVED REPORT --------------





Date of service: 09/02/2018



EKG Measurement

Heart [id number]

CT [id number]

[id number]

[id number]

[id number]



<Conclusion>

Normal sinus rhythm

Incomplete right bundle branch block

Septal infarct, age undetermined

Abnormal ECG

## 2018-09-12 ENCOUNTER — HOSPITAL ENCOUNTER (INPATIENT)
Dept: HOSPITAL 14 - H.ER | Age: 83
LOS: 3 days | Discharge: HOME | DRG: 101 | End: 2018-09-15
Attending: INTERNAL MEDICINE | Admitting: INTERNAL MEDICINE
Payer: COMMERCIAL

## 2018-09-12 VITALS — BODY MASS INDEX: 24.9 KG/M2

## 2018-09-12 DIAGNOSIS — I10: ICD-10-CM

## 2018-09-12 DIAGNOSIS — D64.9: ICD-10-CM

## 2018-09-12 DIAGNOSIS — J32.2: ICD-10-CM

## 2018-09-12 DIAGNOSIS — Z87.81: ICD-10-CM

## 2018-09-12 DIAGNOSIS — N39.0: ICD-10-CM

## 2018-09-12 DIAGNOSIS — Z87.01: ICD-10-CM

## 2018-09-12 DIAGNOSIS — Z79.899: ICD-10-CM

## 2018-09-12 DIAGNOSIS — E11.51: ICD-10-CM

## 2018-09-12 DIAGNOSIS — F03.90: ICD-10-CM

## 2018-09-12 DIAGNOSIS — G40.909: Primary | ICD-10-CM

## 2018-09-12 DIAGNOSIS — M19.90: ICD-10-CM

## 2018-09-12 DIAGNOSIS — Z86.73: ICD-10-CM

## 2018-09-12 DIAGNOSIS — K21.9: ICD-10-CM

## 2018-09-12 DIAGNOSIS — H26.9: ICD-10-CM

## 2018-09-12 DIAGNOSIS — Z79.82: ICD-10-CM

## 2018-09-12 DIAGNOSIS — J44.9: ICD-10-CM

## 2018-09-12 LAB
ALBUMIN SERPL-MCNC: 3.5 G/DL (ref 3.5–5)
ALBUMIN/GLOB SERPL: 1.1 {RATIO} (ref 1–2.1)
ALT SERPL-CCNC: 22 U/L (ref 9–52)
AST SERPL-CCNC: 29 U/L (ref 14–36)
BACTERIA #/AREA URNS HPF: (no result) /[HPF]
BASOPHILS # BLD AUTO: 0 K/UL (ref 0–0.2)
BASOPHILS NFR BLD: 0.4 % (ref 0–2)
BILIRUB UR-MCNC: NEGATIVE MG/DL
BUN SERPL-MCNC: 38 MG/DL (ref 7–17)
CALCIUM SERPL-MCNC: 9.3 MG/DL (ref 8.4–10.2)
COLOR UR: YELLOW
EOSINOPHIL # BLD AUTO: 0.1 K/UL (ref 0–0.7)
EOSINOPHIL NFR BLD: 1.4 % (ref 0–4)
ERYTHROCYTE [DISTWIDTH] IN BLOOD BY AUTOMATED COUNT: 15.1 % (ref 11.5–14.5)
GFR NON-AFRICAN AMERICAN: 42
GLUCOSE UR STRIP-MCNC: (no result) MG/DL
HGB BLD-MCNC: 10.5 G/DL (ref 12–16)
LEUKOCYTE ESTERASE UR-ACNC: (no result) LEU/UL
LYMPHOCYTES # BLD AUTO: 2 K/UL (ref 1–4.3)
LYMPHOCYTES NFR BLD AUTO: 29.6 % (ref 20–40)
MCH RBC QN AUTO: 28.9 PG (ref 27–31)
MCHC RBC AUTO-ENTMCNC: 32.9 G/DL (ref 33–37)
MCV RBC AUTO: 87.8 FL (ref 81–99)
MONOCYTES # BLD: 0.5 K/UL (ref 0–0.8)
MONOCYTES NFR BLD: 7.6 % (ref 0–10)
NEUTROPHILS # BLD: 4.2 K/UL (ref 1.8–7)
NEUTROPHILS NFR BLD AUTO: 61 % (ref 50–75)
NRBC BLD AUTO-RTO: 0.3 % (ref 0–0)
PH UR STRIP: 7 [PH] (ref 5–8)
PLATELET # BLD: 192 K/UL (ref 130–400)
PMV BLD AUTO: 8.4 FL (ref 7.2–11.7)
PROT UR STRIP-MCNC: NEGATIVE MG/DL
RBC # BLD AUTO: 3.65 MIL/UL (ref 3.8–5.2)
RBC # UR STRIP: (no result) /UL
SP GR UR STRIP: 1.01 (ref 1–1.03)
SQUAMOUS EPITHIAL: 10 /HPF (ref 0–5)
TROPONIN I SERPL-MCNC: 0.07 NG/ML (ref 0–0.12)
URINE CLARITY: (no result)
UROBILINOGEN UR-MCNC: (no result) MG/DL (ref 0.2–1)
WBC # BLD AUTO: 6.8 K/UL (ref 4.8–10.8)

## 2018-09-12 NOTE — CT
Date of service: 



09/12/2018



PROCEDURE:  CT HEAD WITHOUT CONTRAST.



HISTORY:

confusion



COMPARISON:

04/22/2018.



TECHNIQUE:

Axial computed tomography images were obtained through the head/brain 

without intravenous contrast.  



Radiation dose:



Total exam DLP = 1041.13 mGy-cm.



This CT exam was performed using one or more of the following dose 

reduction techniques: Automated exposure control, adjustment of the 

mA and/or kV according to patient size, and/or use of iterative 

reconstruction technique.



FINDINGS:



HEMORRHAGE:

No intracranial hemorrhage. 



BRAIN:

There are mild chronic microangiopathic changes. There are old 

lacunar infarctions in the right anterior limb of internal capsule 

and left cerebellar hemisphere. There is no mass, mass effect or 

abnormal extra-axial fluid collection.  There is no territorial 

infarction. The midline sagittal structures are normal.



VENTRICLES:

There is mild age-related global parenchymal volume loss and 

proportionate enlargement of the ventricles and cortical sulci. 



CALVARIUM:

There is mild hyperostosis frontalis interna.



PARANASAL SINUSES:

There is high attenuation soft tissue in the left posterior ethmoid 

air cells and mild mucosal thickening in the left sphenoid chamber.



MASTOID AIR CELLS:

Bilateral mastoid effusions.



OTHER FINDINGS:

None.



IMPRESSION:

No acute intracranial abnormality.



Mild chronic microangiopathic changes and mild age-related global 

parenchymal volume loss.



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 

capsule and left cerebellar hemisphere.



Chronic left posterior ethmoid sinusitis. Bilateral mastoid effusions.

## 2018-09-12 NOTE — ED PDOC
HPI: General Adult


Time Seen by Provider: 09/12/18 10:20


Chief Complaint (Nursing): Dizziness/Lightheaded


Chief Complaint (Provider): Confusion


History Per: Patient


History/Exam Limitations: no limitations


Onset/Duration Of Symptoms: Hrs


Current Symptoms Are (Timing): Better


Additional Complaint(s): 


89 year old female with a PMHx of COPD, asthma, anemia, seizure and diabetes 

presents to the ER for an evaluation of confusion starting last night, states 

the patient was "spacing out". she is not sure if pt has history of dementia or 

not. Patient takes Dilantin for seizure (had prior admission for high dilantin 

level as per daughter). this am pt was still slight confused but resolved after 

called 911, and en route here pt was at baseline and conversing. Patient's 

daughter is with her at bedside. 


PMD: Dr. Sandoval 








NIHSS Stroke Scale





- Date/Time Evaluation Performed


Date Performed: 09/12/18


Time Performed: 10:56


When Was NIHSS Performed: Baseline





- How Severe is the Stroke


Level of Consciousness: 0=Alert


LOC to Questions: 0=Both comments correct


LOC to commands: 0=Obeys both correctly


Best Gaze: 0=Normal


Visual: 0=No visual loss


Facial: 0=Normal


Motor Arm - Left: 0=No drift


Motor Arm - Right: 0=No drift


Motor Leg - Left: 0=No drift


Motor Leg - Right: 0=No drift


Limb Ataxia: 0=Absent


Sensory: 0=Normal


Best Language: 0=No aphasia


Dysarthia: 0=Normal articulation


Extinction & Inattention (Neglect): 0=Normal, no object


Score: 0





Past Medical History


Reviewed: Historical Data, Nursing Documentation, Vital Signs


Vital Signs: 


 Last Vital Signs











Temp  98.6 F   09/13/18 16:11


 


Pulse  64   09/13/18 16:11


 


Resp  20   09/13/18 16:11


 


BP  158/75 H  09/13/18 16:11


 


Pulse Ox  98   09/13/18 16:11














- Medical History


PMH: Anemia, Arthritis, Asthma, COPD, Dementia, Diabetes, Fractures (neck), HTN

, Pneumonia, Seizures


   Denies: Alzheimer's Disease, Anxiety, Atrial Fibrillation, Bipolar Disorder, 

Bronchitis, CAD, Cardia Arrhythmia, CHF, Crohn's Disease, Depression, 

Diverticulitis, Emphysema, Gastritis, Gall Bladder Disease, HIV, 

Hypercholesterolemia, Hyperthyroidism, Hypothyroidism, Kidney Stones, Migraine, 

Mitral Valve Prolapse, Multiple Sclerosis, Osteoporosis, Pancreatitis, Paranoia

, Parkinson's Disease, Peripheral Edema, Post Traumatic Stress Disorder, 

Pulmonary Embolism, Chronic Kidney Disease, Rheumatoid Arthritis, Schizophrenia

, Sickle Cell Disease, Sexually Transmitted Disease, Sleep Apnea, TIA





- Surgical History


Surgical History: 


   Denies: Appendectomy, CABG, Carotid Endarterectomy, Cholecystectomy, 

Coronary Stent, Pacemaker, Tonsillectomy


Other surgeries: tubal ligation





- Family History


Family History: States: Unknown Family Hx





- Social History


Current smoker - smoking cessation education provided: No


Alcohol: None


Drugs: Denies





- Home Medications


Home Medications: 


 Ambulatory Orders











 Medication  Instructions  Recorded


 


Acetaminophen [Tylenol 325mg tab] 325 mg PO Q4 PRN 04/22/18


 


Furosemide [Lasix] 20 mg PO DAILY 04/22/18


 


Magnesium Hydroxide [Milk Of 30 ml PO DAILY PRN 04/22/18





Magnesia]  


 


Montelukast [Singulair] 10 mg PO DAILY 04/22/18


 


Pantoprazole Sodium [Protonix] 40 mg PO DAILY 04/22/18


 


amLODIPine [Norvasc] 2.5 mg PO DAILY 04/22/18


 


predniSONE [predniSONE Tab] 20 mg PO DAILY #10 tab 05/01/18


 


Ibuprofen [Motrin Tab] 600 mg PO BID PRN 05/17/18


 


Nystatin 1 appful TOP TID 05/17/18


 


guaiFENesin [Robitussin] 100 mg PO Q6 PRN 05/17/18


 


Glipizide [Glipizide ER] 2.5 mg PO DAILY 09/03/18


 


Albuterol/Ipratropium [Duoneb 3 1 ea IH Q6 PRN 09/12/18





MG/3 Ml-0.5 MG/3 Ml 3 Ml]  


 


Azithromycin [Zithromax] 250 mg PO DAILY 09/12/18


 


Budesonide/Formoterol Fumarate 1 aer IH BID 09/12/18





[Symbicort]  


 


Phenytoin [Dilantin] 100 mg PO TID 09/12/18














- Allergies


Allergies/Adverse Reactions: 


 Allergies











Allergy/AdvReac Type Severity Reaction Status Date / Time


 


No Known Allergies Allergy   Verified 09/12/18 10:25














Review of Systems


ROS Statement: Except As Marked, All Systems Reviewed And Found Negative


Neurological: Positive for: Confusion


Psych: Negative for: Suicidal ideation (homicidal ideation)





Physical Exam





- Reviewed


Nursing Documentation Reviewed: Yes


Vital Signs Reviewed: Yes





- Physical Exam


Appears: Positive for: Non-toxic, No Acute Distress


Head Exam: Positive for: ATRAUMATIC, NORMAL INSPECTION, NORMOCEPHALIC


Skin: Positive for: Normal Color, Warm, Dry


Eye Exam: Positive for: EOMI, Normal appearance, PERRL


ENT: Positive for: Normal ENT Inspection


Neck: Positive for: Normal, Painless ROM, Supple.  Negative for: Decreased ROM


Cardiovascular/Chest: Positive for: Regular Rate, Rhythm.  Negative for: Murmur


Respiratory: Positive for: Normal Breath Sounds.  Negative for: Decreased 

Breath Sounds, Wheezing, Respiratory Distress


Gastrointestinal/Abdominal: Positive for: Normal Exam, Soft.  Negative for: 

Tenderness, Guarding, Rebound


Back: Positive for: Normal Inspection


Extremity: Positive for: Pedal Edema (trace on bilateral lower extremities)


Neurologic/Psych: Positive for: Alert, Oriented (x3).  Negative for: Motor/

Sensory Deficits





- Laboratory Results


Result Diagrams: 


 09/13/18 05:24





 09/13/18 05:24





- ECG


O2 Sat by Pulse Oximetry: 97 (RA)


Pulse Ox Interpretation: Normal





Medical Decision Making


Medical Decision Making: 


Time: 1141


Initial Plan: episode of confusion, rule out stroke, seizure, started last 

night. pt currently at baseline.





--Head w/o Contrast CT


--CMP


--Dilantin 


--Troponin 


--CBC w/ Differential 


--Reevaluation 





Time: 1306


PROCEDURE:  CT HEAD WITHOUT CONTRAST.


HISTORY:


confusion


COMPARISON:


04/22/2018.


TECHNIQUE:


Axial computed tomography images were obtained through the head/brain without 

intravenous contrast.  


Radiation dose:


Total exam DLP = 1041.13 mGy-cm.


This CT exam was performed using one or more of the following dose reduction 

techniques: Automated exposure control, adjustment of the mA and/or kV 

according to patient size, and/or use of iterative reconstruction technique.


FINDINGS:


HEMORRHAGE:


No intracranial hemorrhage. 


BRAIN:


There are mild chronic microangiopathic changes. There are old lacunar 

infarctions in the right anterior limb of internal capsule and left cerebellar 

hemisphere. There is no mass, mass effect or abnormal extra-axial fluid 

collection.  There is no territorial infarction. The midline sagittal 

structures are normal.


VENTRICLES:


There is mild age-related global parenchymal volume loss and proportionate 

enlargement of the ventricles and cortical sulci. 


CALVARIUM:


There is mild hyperostosis frontalis interna.


PARANASAL SINUSES:


There is high attenuation soft tissue in the left posterior ethmoid air cells 

and mild mucosal thickening in the left sphenoid chamber.


MASTOID AIR CELLS:


Bilateral mastoid effusions.


OTHER FINDINGS:


None.


IMPRESSION:


No acute intracranial abnormality.


Mild chronic microangiopathic changes and mild age-related global parenchymal 

volume loss.


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 

cerebellar hemisphere.


Chronic left posterior ethmoid sinusitis. Bilateral mastoid effusions.








discussed results w patient and daiprinceter at bedside. 





Time: 1340


Spoke to PMD, Dr. Campos who informed to speak to neurologist on call and admit 

patient to telemetry. 





Time: 1401


--Aspirin 325mg PO after pt passed swallow screen





Time: 1404


Neurologist DR darnell consult placed for episode of confusion, resolved.  

recommends that we give depakote dose. 


awaiting dilantin level





Time: 1420


--Depakene Cap 250mg PO





dilantin level wnl.





pt eating tray of food.





--------------------------------------------------------------------------------

-----------------


Scribe Attestation:   


Documented by Alex Thomas, acting as a scribe for Ricco Colmenares MD





Provider Scribe Attestation:


All medical record entries made by the Scribe were at my direction and 

personally dictated by me. I have reviewed the chart and agree that the record 

accurately reflects my personal performance of the history, physical exam, 

medical decision making, and the department course for this patient. I have 

also personally directed, reviewed, and agree with the discharge instructions 

and disposition.

















Disposition





- Clinical Impression


Clinical Impression: 


 Seizure, Altered mental status








- Patient ED Disposition


Is Patient to be Admitted: Yes


Counseled Patient/Family Regarding: Studies Performed, Diagnosis





- Disposition


Disposition Time: 13:50


Condition: STABLE

## 2018-09-12 NOTE — RAD
Date of service: 



09/12/2018



HISTORY:

AMS, resolved  



COMPARISON:

09/02/2018. 



FINDINGS:



LUNGS:

The lungs are well inflated and clear.



PLEURA:

No significant pleural effusion identified, no pneumothorax apparent.



CARDIOVASCULAR:

Normal.



OSSEOUS STRUCTURES:

There is an S-shaped scoliosis in the thoracolumbar spine.



VISUALIZED UPPER ABDOMEN:

Normal.



OTHER FINDINGS:

None.



IMPRESSION:

No active pulmonary disease.

## 2018-09-13 LAB
BUN SERPL-MCNC: 35 MG/DL (ref 7–17)
CALCIUM SERPL-MCNC: 9.4 MG/DL (ref 8.4–10.2)
ERYTHROCYTE [DISTWIDTH] IN BLOOD BY AUTOMATED COUNT: 15.1 % (ref 11.5–14.5)
GFR NON-AFRICAN AMERICAN: 47
HGB BLD-MCNC: 10.3 G/DL (ref 12–16)
MCH RBC QN AUTO: 28.9 PG (ref 27–31)
MCHC RBC AUTO-ENTMCNC: 32.7 G/DL (ref 33–37)
MCV RBC AUTO: 88.3 FL (ref 81–99)
PLATELET # BLD: 200 K/UL (ref 130–400)
RBC # BLD AUTO: 3.57 MIL/UL (ref 3.8–5.2)
WBC # BLD AUTO: 5.5 K/UL (ref 4.8–10.8)

## 2018-09-13 RX ADMIN — PHENYTOIN SCH MG: 125 SUSPENSION ORAL at 12:45

## 2018-09-13 RX ADMIN — PHENYTOIN SCH MG: 125 SUSPENSION ORAL at 09:16

## 2018-09-13 RX ADMIN — PANTOPRAZOLE SODIUM SCH MG: 40 TABLET, DELAYED RELEASE ORAL at 09:16

## 2018-09-13 RX ADMIN — IPRATROPIUM BROMIDE AND ALBUTEROL SULFATE PRN ML: .5; 3 SOLUTION RESPIRATORY (INHALATION) at 01:30

## 2018-09-13 RX ADMIN — PHENYTOIN SCH MG: 125 SUSPENSION ORAL at 16:29

## 2018-09-13 RX ADMIN — FLUTICASONE PROPIONATE AND SALMETEROL SCH PUFF: 50; 250 POWDER RESPIRATORY (INHALATION) at 09:15

## 2018-09-13 RX ADMIN — GLIPIZIDE SCH MG: 2.5 TABLET, EXTENDED RELEASE ORAL at 09:15

## 2018-09-13 RX ADMIN — ENOXAPARIN SODIUM SCH MG: 30 INJECTION SUBCUTANEOUS at 09:16

## 2018-09-13 RX ADMIN — FLUTICASONE PROPIONATE AND SALMETEROL SCH PUFF: 50; 250 POWDER RESPIRATORY (INHALATION) at 21:30

## 2018-09-13 NOTE — CP.PCM.CON
History of Present Illness





- History of Present Illness


History of Present Illness: 





89 y/o female, who was observed by fletcher to have several hours of language, and 

strange language. She has no weakness or other complaints. Patient has a 

history of dementia and is currently not on medication. In the past, the 

patient has had similar spells and has not been evaluated for epilepsy. There 

is no history of head trauma, intracranial pathology. She is a patients of 

, and had a possible seizure last winter. She was started on DIlantin 

and was compliant at 100 mg tid. It is not clear how many seizures she has a 

month, but she lives with the daughter and she states that they happen monthly. 





ROS: negative for 12 point system. 





Neg headache; neg vertigo; neg focal weakness. 





NKDA





PMHx: DMII, HTN, Mild Dementia. GERD. Cervical fx s/p fixation several months 

ago. 





ON exam: 


Awake, alert, minimal verbal output, EOMI. CN 2-12 normal. 


moving all extremities but limited effort. Gait is widebased. 


Does not name or repeat, but is tangential. 














Past Patient History





- Infectious Disease


Hx of Infectious Diseases: None





- Tetanus Immunizations


Tetanus Immunization: Unknown





- Past Medical History & Family History


Past Medical History?: Yes





- Past Social History


Alcohol: None


Drugs: Denies





- CARDIAC


Hx Atrial Fibrillation: No


Hx Cardia Arrhythmia: No


Hx Congestive Heart Failure: No


Hx Hypercholesterolemia: No


Hx Hypertension: Yes


Hx Mitral Valve Prolapse: No


Hx Pacemaker: No


Hx Peripheral Edema: No





- PULMONARY


Hx Asthma: Yes


Hx Bronchitis: No


Hx Chronic Obstructive Pulmonary Disease (COPD): Yes


Hx Emphysema: No


Hx Pneumonia: Yes


Hx Pulmonary Embolism: No


Hx Sleep Apnea: No





- NEUROLOGICAL


Hx Alzheimer's Disease: No


Hx Dementia: Yes


Hx Migraine: No


Hx Multiple Sclerosis: No


Hx Parkinson's Disease: No


Hx Seizures: Yes


Hx Transient Ischemic Attacks (TIA): No





- HEENT


Hx HEENT Problems: Yes


Hx Cataracts: Yes





- RENAL


Hx Chronic Kidney Disease: No


Hx Kidney Stones: No





- ENDOCRINE/METABOLIC


Hx Hyperthyroidism: No


Hx Hypothyroidism: No





- HEMATOLOGICAL/ONCOLOGICAL


Hx Anemia: Yes


Hx Human Immunodeficiency Virus (HIV): No


Hx Sickle Cell Disease: No





- INTEGUMENTARY


Hx Dermatological Problems: No





- MUSCULOSKELETAL/RHEUMATOLOGICAL


Hx Arthritis: Yes


Hx Fractures: Yes (neck)


Hx Osteoporosis: No


Hx Rheumatoid Arthritis: No





- GASTROINTESTINAL


Hx Crohn's Disease: No


Hx Diverticulitis: No


Hx Gall Bladder Disease: No


Hx Gastritis: No


Hx Pancreatitis: No





- GENITOURINARY/GYNECOLOGICAL


Hx Sexually Transmitted Disorders: No





- PSYCHIATRIC


Hx Anxiety: No


Hx Bipolar Disorder: No


Hx Depression: No


Hx Paranoia: No


Hx Post Traumatic Stress Disorder: No


Hx Schizophrenia: No





- SURGICAL HISTORY


Hx Appendectomy: No


Hx Carotid Endarterectomy: No


Hx Cholecystectomy: No


Hx Coronary Artery Bypass Graft: No


Hx Coronary Stent: No


Hx Tonsillectomy: No





- ANESTHESIA


Hx Anesthesia: Yes


Hx Anesthesia Reactions: No





Meds


Home Medications: 


 Home Medication List











 Medication  Instructions  Recorded  Confirmed  Type


 


Phenytoin [Dilantin] 200 mg PO DAILY #30 udc 09/14/18  Rx


 


Valproic Acid [Depakene] 500 mg PO BID #60 sgl 09/14/18  Rx











Allergies/Adverse Reactions: 


 Allergies











Allergy/AdvReac Type Severity Reaction Status Date / Time


 


No Known Allergies Allergy   Verified 09/12/18 10:25














- Medications


Medications: 


 Current Medications





Albuterol/Ipratropium (Duoneb 3 Mg/0.5 Mg (3 Ml) Ud)  3 ml IH RQ6 PRN


   PRN Reason: Shortness of Breath


   Last Admin: 09/13/18 01:30 Dose:  3 ml


Amlodipine Besylate (Norvasc)  2.5 mg PO DAILY [facility]


   Last Admin: 09/13/18 09:15 Dose:  2.5 mg


Azithromycin (Zithromax)  250 mg PO DAILY [facility]


   PRN Reason: Protocol


   Last Admin: 09/13/18 09:16 Dose:  250 mg


Enoxaparin Sodium (Lovenox)  30 mg SC DAILY [facility]


   PRN Reason: Protocol


   Last Admin: 09/13/18 09:16 Dose:  30 mg


Furosemide (Lasix)  20 mg PO DAILY [facility]


   Last Admin: 09/13/18 09:16 Dose:  20 mg


Glipizide (Glucotrol Xl)  2.5 mg PO DAILY [facility]


   Last Admin: 09/13/18 09:15 Dose:  2.5 mg


Guaifenesin (Robitussin)  100 mg PO Q6 PRN


   PRN Reason: Cough


Ibuprofen (Motrin Tab)  600 mg PO BID PRN


   PRN Reason: Pain, moderate (4-7)


Magnesium Hydroxide (Milk Of Magnesia)  30 ml PO DAILY PRN


   PRN Reason: Constipation


Montelukast Sodium (Singulair)  10 mg PO DAILY [facility]


   Last Admin: 09/13/18 09:16 Dose:  10 mg


Nystatin (Nystop Topical Powder)  1 applic TOP TID [facility]


   Last Admin: 09/13/18 16:29 Dose:  1 appl


Pantoprazole Sodium (Protonix Ec Tab)  40 mg PO DAILY [facility]


   Last Admin: 09/13/18 09:16 Dose:  40 mg


Phenytoin (Dilantin)  100 mg PO TID [facility]


   Last Admin: 09/13/18 16:29 Dose:  100 mg


Prednisone (Prednisone Tab)  20 mg PO DAILY [facility]


   Last Admin: 09/13/18 09:16 Dose:  20 mg


Fluticasone/Salmeterol (Advair Diskus 250/50)  1 puff IH Q12 [facility]


   Last Admin: 09/13/18 09:15 Dose:  1 puff











Results





- Vital Signs


Recent Vital Signs: 


 Last Vital Signs











Temp  98.1 F   09/13/18 20:23


 


Pulse  66   09/13/18 20:23


 


Resp  20   09/13/18 20:23


 


BP  170/78 H  09/13/18 20:23


 


Pulse Ox  98   09/13/18 20:23














- Labs


Result Diagrams: 


 09/14/18 04:20





 09/14/18 04:20


Labs: 


 Laboratory Results - last 24 hr











  09/12/18 09/13/18 09/13/18





  21:21 05:11 05:24


 


WBC   


 


RBC   


 


Hgb   


 


Hct   


 


MCV   


 


MCH   


 


MCHC   


 


RDW   


 


Plt Count   


 


Sodium    140


 


Potassium    4.1


 


Chloride    92 L


 


Carbon Dioxide    39 H


 


Anion Gap    13


 


BUN    35 H


 


Creatinine    1.1


 


Est GFR ( Amer)    57


 


Est GFR (Non-Af Amer)    47


 


POC Glucose (mg/dL)  98  101 


 


Random Glucose    99


 


Calcium    9.4














  09/13/18 09/13/18 09/13/18





  05:24 10:56 15:43


 


WBC  5.5  


 


RBC  3.57 L  


 


Hgb  10.3 L  


 


Hct  31.5 L  


 


MCV  88.3  


 


MCH  28.9  


 


MCHC  32.7 L  


 


RDW  15.1 H  


 


Plt Count  200  


 


Sodium   


 


Potassium   


 


Chloride   


 


Carbon Dioxide   


 


Anion Gap   


 


BUN   


 


Creatinine   


 


Est GFR ( Amer)   


 


Est GFR (Non-Af Amer)   


 


POC Glucose (mg/dL)   246 H  154 H


 


Random Glucose   


 


Calcium

## 2018-09-13 NOTE — CP.PCM.HP
History of Present Illness





- History of Present Illness


History of Present Illness: 





This H & P is for yesterday's 9/12/18 date. 








89 y/o female with several hours of confusion and inappropriate language. 

Admitted for further neurological w/u .





Neg headache; neg vertigo; neg focal weakness. 





NKDA





PMHx: DMII, HTN, Mild Dementia. GERD. Cervical fx s/p fixation several months 

ago. 





Labs: See Below. 





VSS: 





Head: Normocephalic, no lesions. Nose: Mucosa normal, no obstruction. Throat: 

Clear, no exudates, no lesions. Neck: Supple, no masses, no thyromegaly, no 

bruits. Chest: Bilateral wheezing and rhonchi.  Heart: RR, no murmurs, no rubs, 

no gallops. Abdomen: Soft, no tenderness, no masses, BS normal. Extremities: 

FROM, no deformities, no edema, no erythema. Neuro: Physiological, no 

localizing findings. 





Acute confusional state; with rapid recovery upon arrival to ER. Hypoglycemic 

episdoe? r/o CVA. 





Cont same Rx for now. Add Baby ASA. 


Neurology consult. Obtain Dilantin levels. EEG. CT Head. Will assess if 

hardware in neck is not a contraindication for MRI. 





If Neuro clears patient, will d/c today or tomorrow. 





Kike Sandoval M.D., [facility]





Cell: [phone]





Past Patient History





- Infectious Disease


Hx of Infectious Diseases: None





- Tetanus Immunizations


Tetanus Immunization: Unknown





- Past Medical History & Family History


Past Medical History?: Yes





- Past Social History


Alcohol: None


Drugs: Denies





- CARDIAC


Hx Atrial Fibrillation: No


Hx Cardia Arrhythmia: No


Hx Congestive Heart Failure: No


Hx Hypercholesterolemia: No


Hx Hypertension: Yes


Hx Mitral Valve Prolapse: No


Hx Pacemaker: No


Hx Peripheral Edema: No





- PULMONARY


Hx Asthma: Yes


Hx Bronchitis: No


Hx Chronic Obstructive Pulmonary Disease (COPD): Yes


Hx Emphysema: No


Hx Pneumonia: Yes


Hx Pulmonary Embolism: No


Hx Sleep Apnea: No





- NEUROLOGICAL


Hx Alzheimer's Disease: No


Hx Dementia: Yes


Hx Migraine: No


Hx Multiple Sclerosis: No


Hx Parkinson's Disease: No


Hx Seizures: Yes


Hx Transient Ischemic Attacks (TIA): No





- HEENT


Hx HEENT Problems: Yes


Hx Cataracts: Yes





- RENAL


Hx Chronic Kidney Disease: No


Hx Kidney Stones: No





- ENDOCRINE/METABOLIC


Hx Hyperthyroidism: No


Hx Hypothyroidism: No





- HEMATOLOGICAL/ONCOLOGICAL


Hx Anemia: Yes


Hx Human Immunodeficiency Virus (HIV): No


Hx Sickle Cell Disease: No





- INTEGUMENTARY


Hx Dermatological Problems: No





- MUSCULOSKELETAL/RHEUMATOLOGICAL


Hx Arthritis: Yes


Hx Fractures: Yes (neck)


Hx Osteoporosis: No


Hx Rheumatoid Arthritis: No





- GASTROINTESTINAL


Hx Crohn's Disease: No


Hx Diverticulitis: No


Hx Gall Bladder Disease: No


Hx Gastritis: No


Hx Pancreatitis: No





- GENITOURINARY/GYNECOLOGICAL


Hx Sexually Transmitted Disorders: No





- PSYCHIATRIC


Hx Anxiety: No


Hx Bipolar Disorder: No


Hx Depression: No


Hx Paranoia: No


Hx Post Traumatic Stress Disorder: No


Hx Schizophrenia: No





- SURGICAL HISTORY


Hx Appendectomy: No


Hx Carotid Endarterectomy: No


Hx Cholecystectomy: No


Hx Coronary Artery Bypass Graft: No


Hx Coronary Stent: No


Hx Tonsillectomy: No





- ANESTHESIA


Hx Anesthesia: Yes


Hx Anesthesia Reactions: No





Meds


Allergies/Adverse Reactions: 


 Allergies











Allergy/AdvReac Type Severity Reaction Status Date / Time


 


No Known Allergies Allergy   Verified 09/12/18 10:25














Results





- Vital Signs


Recent Vital Signs: 





 Last Vital Signs











Temp  97.5 F L  09/13/18 12:01


 


Pulse  65   09/13/18 12:01


 


Resp  20   09/13/18 12:01


 


BP  132/70   09/13/18 12:01


 


Pulse Ox  98   09/13/18 12:01














- Labs


Result Diagrams: 


 09/13/18 05:24





 09/13/18 05:24


Labs: 





 Laboratory Results - last 24 hr











  09/12/18 09/12/18 09/13/18





  15:40 21:21 05:11


 


WBC   


 


RBC   


 


Hgb   


 


Hct   


 


MCV   


 


MCH   


 


MCHC   


 


RDW   


 


Plt Count   


 


Sodium   


 


Potassium   


 


Chloride   


 


Carbon Dioxide   


 


Anion Gap   


 


BUN   


 


Creatinine   


 


Est GFR ( Amer)   


 


Est GFR (Non-Af Amer)   


 


POC Glucose (mg/dL)   98  101


 


Random Glucose   


 


Calcium   


 


Urine Color  Yellow  


 


Urine Clarity  Cloudy  


 


Urine pH  7.0  


 


Ur Specific Gravity  1.008  


 


Urine Protein  Negative  


 


Urine Glucose (UA)  Neg  


 


Urine Ketones  Negative  


 


Urine Blood  Small  


 


Urine Nitrate  Negative  


 


Urine Bilirubin  Negative  


 


Urine Urobilinogen  0.2-1.0  


 


Ur Leukocyte Esterase  Large  


 


Urine RBC (Auto)  5 H  


 


Urine Microscopic WBC  72 H  


 


Ur Squamous Epith Cells  10 H  


 


Urine Bacteria  Occ H  














  09/13/18 09/13/18 09/13/18





  05:24 05:24 10:56


 


WBC   5.5 


 


RBC   3.57 L 


 


Hgb   10.3 L 


 


Hct   31.5 L 


 


MCV   88.3 


 


MCH   28.9 


 


MCHC   32.7 L 


 


RDW   15.1 H 


 


Plt Count   200 


 


Sodium  140  


 


Potassium  4.1  


 


Chloride  92 L  


 


Carbon Dioxide  39 H  


 


Anion Gap  13  


 


BUN  35 H  


 


Creatinine  1.1  


 


Est GFR ( Amer)  57  


 


Est GFR (Non-Af Amer)  47  


 


POC Glucose (mg/dL)    246 H


 


Random Glucose  99  


 


Calcium  9.4  


 


Urine Color   


 


Urine Clarity   


 


Urine pH   


 


Ur Specific Gravity   


 


Urine Protein   


 


Urine Glucose (UA)   


 


Urine Ketones   


 


Urine Blood   


 


Urine Nitrate   


 


Urine Bilirubin   


 


Urine Urobilinogen   


 


Ur Leukocyte Esterase   


 


Urine RBC (Auto)   


 


Urine Microscopic WBC   


 


Ur Squamous Epith Cells   


 


Urine Bacteria

## 2018-09-14 VITALS — RESPIRATION RATE: 18 BRPM

## 2018-09-14 LAB
BUN SERPL-MCNC: 30 MG/DL (ref 7–17)
CALCIUM SERPL-MCNC: 9 MG/DL (ref 8.4–10.2)
ERYTHROCYTE [DISTWIDTH] IN BLOOD BY AUTOMATED COUNT: 14.8 % (ref 11.5–14.5)
GFR NON-AFRICAN AMERICAN: 47
HGB BLD-MCNC: 10.4 G/DL (ref 12–16)
MCH RBC QN AUTO: 28.6 PG (ref 27–31)
MCHC RBC AUTO-ENTMCNC: 32.2 G/DL (ref 33–37)
MCV RBC AUTO: 88.8 FL (ref 81–99)
PLATELET # BLD: 198 K/UL (ref 130–400)
RBC # BLD AUTO: 3.62 MIL/UL (ref 3.8–5.2)
WBC # BLD AUTO: 5.7 K/UL (ref 4.8–10.8)

## 2018-09-14 RX ADMIN — PHENYTOIN SCH: 125 SUSPENSION ORAL at 15:32

## 2018-09-14 RX ADMIN — PHENYTOIN SCH MG: 125 SUSPENSION ORAL at 10:35

## 2018-09-14 RX ADMIN — FLUTICASONE PROPIONATE AND SALMETEROL SCH PUFF: 50; 250 POWDER RESPIRATORY (INHALATION) at 21:00

## 2018-09-14 RX ADMIN — ENOXAPARIN SODIUM SCH MG: 30 INJECTION SUBCUTANEOUS at 10:43

## 2018-09-14 RX ADMIN — PANTOPRAZOLE SODIUM SCH MG: 40 TABLET, DELAYED RELEASE ORAL at 10:42

## 2018-09-14 RX ADMIN — DIVALPROEX SODIUM SCH MG: 500 TABLET, DELAYED RELEASE ORAL at 21:00

## 2018-09-14 RX ADMIN — FLUTICASONE PROPIONATE AND SALMETEROL SCH PUFF: 50; 250 POWDER RESPIRATORY (INHALATION) at 10:36

## 2018-09-14 RX ADMIN — IPRATROPIUM BROMIDE AND ALBUTEROL SULFATE PRN ML: .5; 3 SOLUTION RESPIRATORY (INHALATION) at 04:58

## 2018-09-14 RX ADMIN — GLIPIZIDE SCH MG: 2.5 TABLET, EXTENDED RELEASE ORAL at 10:43

## 2018-09-14 NOTE — CT
Date of service: 



09/14/2018



PROCEDURE:  CT HEAD WITHOUT CONTRAST.



HISTORY:

ams



COMPARISON:

Prior head CT without contrast 09/12/2018.



TECHNIQUE:

Axial computed tomography images were obtained through the head/brain 

without intravenous contrast.  



Radiation dose:



Total exam DLP = 823.07 mGy-cm.



This CT exam was performed using one or more of the following dose 

reduction techniques: Automated exposure control, adjustment of the 

mA and/or kV according to patient size, and/or use of iterative 

reconstruction technique.



FINDINGS:



HEMORRHAGE:

No intracranial hemorrhage. 



BRAIN:

Good corticomedullary differentiation is seen. Proportional, diffuse 

expansion of the ventriculosulcal and cisternal spaces is appreciated 

with white matter lucency compatible with diffuse cerebral atrophy 

and chronic microangiopathy.  No suspicious extra-axial fluid 

collection is identified and the midline brain anatomy appears 

grossly nonfocal as imaged. There is no mass effect throughout. 

Posterolateral right frontal chronic infarct reiterated as well as 

right cited internal capsule anterior limb chronic lacune. Left 

cerebellar chronic lacune reiterated as well.



VENTRICLES:

Unremarkable. No hydrocephalus. 



CALVARIUM:

Unremarkable.



PARANASAL SINUSES:

Bilateral ethmoid sinusitis identified incidentally.



MASTOID AIR CELLS:

Unremarkable as visualized. No inflammatory changes.



OTHER FINDINGS:

None.



IMPRESSION:

Stable limited age related neuro degenerative findings are 

appreciated. 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 

reiterated as well as anterior limb right internal capsule chronic 

lacune. Left cerebellar chronic lacune reiterated as well.

## 2018-09-14 NOTE — CP.PCM.CON
History of Present Illness





- History of Present Illness


History of Present Illness: 


consult requested for dementia/ AMS


88 y/o female with several hours of confusion and inappropriate language. 

Admitted for further neurological w/u , PT HAS BEE on dilantin 


on evaluation of patient her daughter was by bedside who reported noticeable 

AMS compared to yesterday on admission


attempted to evaluate pt in presence of her treating APN, pt noted to have 

upward rolling eye movements, also having involuntary movements of right upper 

limb/ pt possibly having seizure episode, not responding to her name 


unable to assess pt , pt needs immediate neurological attention, noted RRT was 

called


please call for psychiatric evaluation upon neurological and medical 

stabilization, 


as pt at current mental status could not be interviewed








Past Patient History





- Infectious Disease


Hx of Infectious Diseases: None





- Tetanus Immunizations


Tetanus Immunization: Unknown





- Past Medical History & Family History


Past Medical History?: Yes





- Past Social History


Alcohol: None


Drugs: Denies





- CARDIAC


Hx Atrial Fibrillation: No


Hx Cardia Arrhythmia: No


Hx Congestive Heart Failure: No


Hx Hypercholesterolemia: No


Hx Hypertension: Yes


Hx Mitral Valve Prolapse: No


Hx Pacemaker: No


Hx Peripheral Edema: No





- PULMONARY


Hx Asthma: Yes


Hx Bronchitis: No


Hx Chronic Obstructive Pulmonary Disease (COPD): Yes


Hx Emphysema: No


Hx Pneumonia: Yes


Hx Pulmonary Embolism: No


Hx Sleep Apnea: No





- NEUROLOGICAL


Hx Alzheimer's Disease: No


Hx Dementia: Yes


Hx Migraine: No


Hx Multiple Sclerosis: No


Hx Parkinson's Disease: No


Hx Seizures: Yes


Hx Transient Ischemic Attacks (TIA): No





- HEENT


Hx HEENT Problems: Yes


Hx Cataracts: Yes





- RENAL


Hx Chronic Kidney Disease: No


Hx Kidney Stones: No





- ENDOCRINE/METABOLIC


Hx Hyperthyroidism: No


Hx Hypothyroidism: No





- HEMATOLOGICAL/ONCOLOGICAL


Hx Anemia: Yes


Hx Human Immunodeficiency Virus (HIV): No


Hx Sickle Cell Disease: No





- INTEGUMENTARY


Hx Dermatological Problems: No





- MUSCULOSKELETAL/RHEUMATOLOGICAL


Hx Arthritis: Yes


Hx Fractures: Yes (neck)


Hx Osteoporosis: No


Hx Rheumatoid Arthritis: No





- GASTROINTESTINAL


Hx Crohn's Disease: No


Hx Diverticulitis: No


Hx Gall Bladder Disease: No


Hx Gastritis: No


Hx Pancreatitis: No





- GENITOURINARY/GYNECOLOGICAL


Hx Sexually Transmitted Disorders: No





- PSYCHIATRIC


Hx Anxiety: No


Hx Bipolar Disorder: No


Hx Depression: No


Hx Paranoia: No


Hx Post Traumatic Stress Disorder: No


Hx Schizophrenia: No





- SURGICAL HISTORY


Hx Appendectomy: No


Hx Carotid Endarterectomy: No


Hx Cholecystectomy: No


Hx Coronary Artery Bypass Graft: No


Hx Coronary Stent: No


Hx Tonsillectomy: No





- ANESTHESIA


Hx Anesthesia: Yes


Hx Anesthesia Reactions: No





Meds


Home Medications: 


 Home Medication List











 Medication  Instructions  Recorded  Confirmed  Type


 


Phenytoin [Dilantin] 200 mg PO DAILY #30 udc 09/14/18  Rx


 


Valproic Acid [Depakene] 500 mg PO BID #60 sgl 09/14/18  Rx











Allergies/Adverse Reactions: 


 Allergies











Allergy/AdvReac Type Severity Reaction Status Date / Time


 


No Known Allergies Allergy   Verified 09/12/18 10:25














- Medications


Medications: 


 Current Medications





Albuterol/Ipratropium (Duoneb 3 Mg/0.5 Mg (3 Ml) Ud)  3 ml  RQ6 PRN


   PRN Reason: Shortness of Breath


   Last Admin: 09/14/18 04:58 Dose:  3 ml


Amlodipine Besylate (Norvasc)  2.5 mg PO DAILY [facility]


   Last Admin: 09/14/18 10:42 Dose:  2.5 mg


Divalproex Sodium (Depakote Dr(*Bid*))  500 mg PO BID [facility]


Enoxaparin Sodium (Lovenox)  30 mg SC DAILY [facility]


   PRN Reason: Protocol


   Last Admin: 09/14/18 10:43 Dose:  30 mg


Furosemide (Lasix)  20 mg PO DAILY [facility]


   Last Admin: 09/14/18 10:43 Dose:  20 mg


Glipizide (Glucotrol Xl)  2.5 mg PO DAILY [facility]


   Last Admin: 09/14/18 10:43 Dose:  2.5 mg


Guaifenesin (Robitussin)  100 mg PO Q6 PRN


   PRN Reason: Cough


Ibuprofen (Motrin Tab)  600 mg PO BID PRN


   PRN Reason: Pain, moderate (4-7)


Magnesium Hydroxide (Milk Of Magnesia)  30 ml PO DAILY [facility]


   PRN Reason: Constipation


Montelukast Sodium (Singulair)  10 mg PO DAILY [facility]


   Last Admin: 09/14/18 10:44 Dose:  10 mg


Nystatin (Nystop Topical Powder)  1 applic TOP TID [facility]


   Last Admin: 09/14/18 15:32 Dose:  Not Given


Pantoprazole Sodium (Protonix Ec Tab)  40 mg PO DAILY [facility]


   Last Admin: 09/14/18 10:42 Dose:  40 mg


Phenytoin (Dilantin)  200 mg PO DAILY [facility]


Prednisone (Prednisone Tab)  20 mg PO DAILY [facility]


   Last Admin: 09/14/18 10:43 Dose:  20 mg


Fluticasone/Salmeterol (Advair Diskus 250/50)  1 puff IH Q12 [facility]


   Last Admin: 09/14/18 10:36 Dose:  1 puff











Results





- Vital Signs


Recent Vital Signs: 


 Last Vital Signs











Temp  97.4 F L  09/14/18 15:48


 


Pulse  80   09/14/18 15:48


 


Resp  18   09/14/18 15:48


 


BP  174/99 H  09/14/18 15:48


 


Pulse Ox  100   09/14/18 15:48














- Labs


Result Diagrams: 


 09/14/18 04:20





 09/14/18 04:20


Labs: 


 Laboratory Results - last 24 hr











  09/13/18 09/14/18 09/14/18





  21:15 04:20 04:20


 


WBC   5.7 


 


RBC   3.62 L 


 


Hgb   10.4 L 


 


Hct   32.2 L 


 


MCV   88.8 


 


MCH   28.6 


 


MCHC   32.2 L 


 


RDW   14.8 H 


 


Plt Count   198 


 


Sodium    138


 


Potassium    3.8


 


Chloride    93 L


 


Carbon Dioxide    41 H*


 


Anion Gap    8 L


 


BUN    30 H


 


Creatinine    1.1


 


Est GFR ( Amer)    57


 


Est GFR (Non-Af Amer)    47


 


POC Glucose (mg/dL)  135 H  


 


Random Glucose    87


 


Calcium    9.0














  09/14/18 09/14/18 09/14/18





  05:09 09:18 10:57


 


WBC   


 


RBC   


 


Hgb   


 


Hct   


 


MCV   


 


MCH   


 


MCHC   


 


RDW   


 


Plt Count   


 


Sodium   


 


Potassium   


 


Chloride   


 


Carbon Dioxide   


 


Anion Gap   


 


BUN   


 


Creatinine   


 


Est GFR ( Amer)   


 


Est GFR (Non-Af Amer)   


 


POC Glucose (mg/dL)  92  97  97


 


Random Glucose   


 


Calcium   














Assessment & Plan





- Assessment and Plan (Free Text)


Assessment: 





seizure disorder


delirium due to seizue disorder


hx of dementia


Plan: 





please call for psychiatry consult when pt mental status allows for her to be 

interviewed

## 2018-09-14 NOTE — PCM.RRT
RRT Nurse Assessment





- Situation


Location: 4n


Room Number: 403-2


RRT Reason for Call: Change in Mental Status


RRT Called By: RN





- IV


IV Inserted during RRT?: Yes


IV Fluids Initiated During RRT?: no


New IV Insertion Tolerance:: Good





- Respiratory


Oxygen Delivery Method: Nasal Cannula


Received Nebulizer Treatments: No


Was the Patient Ventilated with Bag/Mask 100% O2?: No


Secretions Suctioned?: No


Was the Patient Intubated?: No


Was the Patient Placed on a Ventilator?: No





- Ventilator Settings


Peak Flow: 0





- Diagnostic Test Ordered


EKG: No


Chest X-Ray: No


CT Scan: No





- Stat Labs Ordered


RRT Stat Labs Ordered: ABG


CPR started during RRT?: No





- Vital Signs


Vital Signs: 


Rapid Response Vital Sign





Blood Pressure                   151/75


Pulse Rate                       71


Respiratory Rate                 18


Temperature                      98.1 F


Oxygen Saturation                94











- Time RRT Ended


Time RRT Ended: 09:30





- Vital Signs at end of RRT


Vital Signs at end of RRT: 


Rapid Response End Vital Sign





Blood Pressure                   143/74


Pulse Rate                       69


Respiratory Rate                 18


Temperature                      98.1 F


O2 Sat by Pulse Oximetry         94











- Recommendations


RRT Level of Care Recommendations: Remain in current setting





I.Reason for RRT





- A) Acute Change in Patient:


(Select all that apply): Staff member or family is worried about patient


Subjective: 


RRT called for AMS of 89 year old female with PMH of Dementia, DMII, HTN, GERD 

and Cervical fx was admitted for confusion and foul language. 








- Respiratory


Oxygen Delivery Method: Nasal Cannula @L/min


Oxygen Flow Rate: 3





- Head


Head Exam: ATRAUMATIC





- Eyes


Eye Exam: Normal appearance





- Respiratory Exam


Respiratory Exam: NORMAL BREATHING PATTERN





- Neurological Exam


Additional exam: 


Oriented to self and place








- Extremities Exam


Additional comments: 


bilateral hand deformity present








Plan





- Assessment of Findings&Treatment Plan


Location: [facility]


RRT Called by: RN


RRT Reason: AMS





Vitals at RRT start:


   Blood Pressure                151/75


   Pulse Rate                        71


   Respiratory Rate              18


   Temperature                    98.1 F


   Oxygen Saturation           94





O2 NC @ 3L


Accucheck: 97





Vitals at RRT end:


   Blood Pressure                 143/74


   Pulse Rate                        69


   Respiratory Rate              18


   Temperature                    98.1 F


   Oxygen Saturation             94





RRT called for AMS of 89 year old female with PMH of Dementia, DMII, HTN, GERD 

and Cervical fx was admitted for confusion and foul language. Upon arrival 

patient was sitting in bed in no acute distress, vitally stable, oriented to 

self and place. She was holding her right arm up, indicating some sort of issue 

but could not express what the problem was. Upon palpation it was noted that 

the peripheral line was the cause of the discomfort and it was removed by RN. 

Her responses to questions were inappropriate. Urine culture report warranted 

repeat, patient was started on ceftriaxone 1gm emperically, recommend follow up 

culture results. She remained conscious and in no acute distress throughout 

entire RRT.

## 2018-09-14 NOTE — CP.PCM.PN
Subjective





- Subjective


Subjective: 





88 y/o female with several hours of confusion and inappropriate language. 

Admitted for further neurological w/u .





Neg headache; neg vertigo; neg focal weakness. 





NKDA





PMHx: DMII, HTN, Mild Dementia. GERD. Cervical fx s/p fixation several months 

ago. 





Labs: See Below. 





VSS: 





Head: Normocephalic, no lesions. Nose: Mucosa normal, no obstruction. Throat: 

Clear, no exudates, no lesions. Neck: Supple, no masses, no thyromegaly, no 

bruits. Chest: Bilateral wheezing and rhonchi.  Heart: RR, no murmurs, no rubs, 

no gallops. Abdomen: Soft, no tenderness, no masses, BS normal. Extremities: 

FROM, no deformities, no edema, no erythema. Neuro: Physiological, no 

localizing findings. Slightly better today. 





Acute confusional state; with rapid recovery upon arrival to ER. Hypoglycemic 

episdoe? r/o CVA. As mentioned: better mentation today. 





Cont same Rx for now. Add Baby ASA. 


Neurology f/u. Obtain D/C tomorrow if Neuro clears.  








Kike Sandoval M.D., [facility]





Cell: [phone]








Objective





- Vital Signs/Intake and Output


Vital Signs (last 24 hours): 


 











Temp Pulse Resp BP Pulse Ox


 


 97.7 F   75   18   169/82 H  96 


 


 09/14/18 20:04  09/14/18 20:59  09/14/18 20:04  09/14/18 20:04  09/14/18 20:04











- Medications


Medications: 


 Current Medications





Albuterol/Ipratropium (Duoneb 3 Mg/0.5 Mg (3 Ml) Ud)  3 ml  RQ6 PRN


   PRN Reason: Shortness of Breath


   Last Admin: 09/14/18 04:58 Dose:  3 ml


Amlodipine Besylate (Norvasc)  2.5 mg PO DAILY [facility]


   Last Admin: 09/14/18 10:42 Dose:  2.5 mg


Divalproex Sodium (Depakote Dr(*Bid*))  500 mg PO BID [facility]


   Last Admin: 09/14/18 21:00 Dose:  500 mg


Enoxaparin Sodium (Lovenox)  30 mg SC DAILY [facility]


   PRN Reason: Protocol


   Last Admin: 09/14/18 10:43 Dose:  30 mg


Furosemide (Lasix)  20 mg PO DAILY [facility]


   Last Admin: 09/14/18 10:43 Dose:  20 mg


Glipizide (Glucotrol Xl)  2.5 mg PO DAILY [facility]


   Last Admin: 09/14/18 10:43 Dose:  2.5 mg


Guaifenesin (Robitussin)  100 mg PO Q6 PRN


   PRN Reason: Cough


Ibuprofen (Motrin Tab)  600 mg PO BID PRN


   PRN Reason: Pain, moderate (4-7)


Magnesium Hydroxide (Milk Of Magnesia)  30 ml PO DAILY PRN


   PRN Reason: Constipation


Montelukast Sodium (Singulair)  10 mg PO DAILY [facility]


   Last Admin: 09/14/18 10:44 Dose:  10 mg


Nystatin (Nystop Topical Powder)  1 applic TOP TID [facility]


   Last Admin: 09/14/18 17:22 Dose:  Not Given


Pantoprazole Sodium (Protonix Ec Tab)  40 mg PO DAILY [facility]


   Last Admin: 09/14/18 10:42 Dose:  40 mg


Phenytoin (Dilantin)  200 mg PO DAILY [facility]


Prednisone (Prednisone Tab)  20 mg PO DAILY [facility]


   Last Admin: 09/14/18 10:43 Dose:  20 mg


Fluticasone/Salmeterol (Advair Diskus 250/50)  1 puff IH Q12 [facility]


   Last Admin: 09/14/18 21:00 Dose:  1 puff











- Labs


Labs: 


 





 09/14/18 04:20 





 09/14/18 04:20

## 2018-09-15 VITALS
SYSTOLIC BLOOD PRESSURE: 147 MMHG | TEMPERATURE: 97.4 F | OXYGEN SATURATION: 97 % | DIASTOLIC BLOOD PRESSURE: 75 MMHG | HEART RATE: 67 BPM

## 2018-09-15 LAB
BUN SERPL-MCNC: 27 MG/DL (ref 7–17)
CALCIUM SERPL-MCNC: 9.1 MG/DL (ref 8.4–10.2)
ERYTHROCYTE [DISTWIDTH] IN BLOOD BY AUTOMATED COUNT: 14.9 % (ref 11.5–14.5)
GFR NON-AFRICAN AMERICAN: 52
HGB BLD-MCNC: 10.5 G/DL (ref 12–16)
MCH RBC QN AUTO: 28.3 PG (ref 27–31)
MCHC RBC AUTO-ENTMCNC: 32 G/DL (ref 33–37)
MCV RBC AUTO: 88.5 FL (ref 81–99)
PLATELET # BLD: 220 K/UL (ref 130–400)
RBC # BLD AUTO: 3.71 MIL/UL (ref 3.8–5.2)
WBC # BLD AUTO: 5.4 K/UL (ref 4.8–10.8)

## 2018-09-15 RX ADMIN — ENOXAPARIN SODIUM SCH MG: 30 INJECTION SUBCUTANEOUS at 09:40

## 2018-09-15 RX ADMIN — PANTOPRAZOLE SODIUM SCH MG: 40 TABLET, DELAYED RELEASE ORAL at 09:41

## 2018-09-15 RX ADMIN — DIVALPROEX SODIUM SCH MG: 500 TABLET, DELAYED RELEASE ORAL at 09:38

## 2018-09-15 RX ADMIN — FLUTICASONE PROPIONATE AND SALMETEROL SCH PUFF: 50; 250 POWDER RESPIRATORY (INHALATION) at 09:37

## 2018-09-15 RX ADMIN — GLIPIZIDE SCH MG: 2.5 TABLET, EXTENDED RELEASE ORAL at 09:39

## 2022-04-27 NOTE — RAD
PROCEDURE:  CHEST RADIOGRAPH, 1 VIEW



HISTORY:

altered MS



COMPARISON:

Chest radiograph dated 04/18/2018.



FINDINGS:



LUNGS:

Bibasilar atelectasis.



PLEURA:

Small bilateral pleural effusion. Limited evaluation for left 

pneumothorax.



CARDIOVASCULAR:

Cardiomediastinal silhouette stably enlarged.



OSSEOUS STRUCTURES:

Unchanged.



VISUALIZED UPPER ABDOMEN:

Normal.



OTHER FINDINGS:

None. 



IMPRESSION:

Bibasilar atelectasis and small bilateral pleural effusions.  There 

are evaluation for pneumothorax due to obscuration. Patent